# Patient Record
Sex: MALE | Race: BLACK OR AFRICAN AMERICAN | Employment: OTHER | URBAN - METROPOLITAN AREA
[De-identification: names, ages, dates, MRNs, and addresses within clinical notes are randomized per-mention and may not be internally consistent; named-entity substitution may affect disease eponyms.]

---

## 2017-03-10 RX ORDER — LISINOPRIL 5 MG/1
TABLET ORAL
Qty: 30 TAB | Refills: 1 | Status: SHIPPED | OUTPATIENT
Start: 2017-03-10 | End: 2017-05-28 | Stop reason: SDUPTHER

## 2017-07-10 RX ORDER — LISINOPRIL 5 MG/1
TABLET ORAL
Qty: 30 TAB | Refills: 0 | OUTPATIENT
Start: 2017-07-10

## 2017-07-10 NOTE — TELEPHONE ENCOUNTER
Spoke to patient using 2 patient identifiers. Per Janet Wayne NP, refill for lisinopril not approved. Needs to be seen first prior to refill. Will have  to call and schedule an appt.

## 2017-07-13 RX ORDER — LISINOPRIL 5 MG/1
TABLET ORAL
Qty: 30 TAB | Refills: 0 | Status: SHIPPED | OUTPATIENT
Start: 2017-07-13 | End: 2018-06-22 | Stop reason: SDUPTHER

## 2017-07-13 RX ORDER — CARVEDILOL 25 MG/1
25 TABLET ORAL 2 TIMES DAILY WITH MEALS
Qty: 60 TAB | Refills: 0 | Status: SHIPPED | OUTPATIENT
Start: 2017-07-13 | End: 2018-01-16 | Stop reason: ALTCHOICE

## 2017-07-19 ENCOUNTER — TELEPHONE (OUTPATIENT)
Dept: CARDIOLOGY CLINIC | Age: 57
End: 2017-07-19

## 2017-07-19 NOTE — TELEPHONE ENCOUNTER
Jessica Ahmadi and spoke to Giovana Juarez. She said there is a rx for lisinopril and will fill that. Spoke to patient using 2 patient identifiers. Patient was informed that carvedilol has been filled and is ready for  and lisinopril is getting filled now. Patient verbalized understanding.

## 2017-08-01 ENCOUNTER — OFFICE VISIT (OUTPATIENT)
Dept: CARDIOLOGY CLINIC | Age: 57
End: 2017-08-01

## 2017-08-01 DIAGNOSIS — I50.22 CHRONIC SYSTOLIC HEART FAILURE (HCC): ICD-10-CM

## 2017-08-01 DIAGNOSIS — I10 ESSENTIAL HYPERTENSION: Primary | ICD-10-CM

## 2017-08-01 NOTE — PROGRESS NOTES
[address] [place] [address] [address], [place], [address] [facility]     Amy Oliver is a 61 y.o. male. Last seen 1 year ago. Subjective: The patient is a 62 yo male who returns for an annual follow up.      ***  Patient denies any exertional chest pain, dyspnea, palpitations, syncope, orthopnea, edema or paroxysmal nocturnal dyspnea. Patient Active Problem List    Diagnosis Date Noted    Abnormal nuclear stress test 12/07/2015    GERD (gastroesophageal reflux disease)     HTN (hypertension)     Tobacco use disorder     Chronic systolic heart failure (HCC)       Nicole Lowe MD  Past Medical History:   Diagnosis Date    Abnormal nuclear stress test 12/7/2015    Cardiomyopathy ([address] 2010    EF 45%    Chronic systolic heart failure (HCC)     GERD (gastroesophageal reflux disease)     HTN (hypertension)     PAC (premature atrial contraction)     Tobacco use disorder       Past Surgical History:   Procedure Laterality Date    HX COLECTOMY      HX SPLENECTOMY       No Known Allergies   Family History   Problem Relation Age of Onset    Stroke Mother     Heart Disease Paternal Grandfather     Coronary Artery Disease Sister       Social History     Social History    Marital status: SINGLE     Spouse name: N/A    Number of children: N/A    Years of education: N/A     Occupational History    Not on file. Social History Main Topics    Smoking status: Current Some Day Smoker     Packs/day: 0.50     Types: Cigarettes    Smokeless tobacco: Not on file      Comment: 6-7 CIGARETTES A DAY    Alcohol use No    Drug use: Not on file    Sexual activity: Not on file     Other Topics Concern    Not on file     Social History Narrative      Current Outpatient Prescriptions   Medication Sig    lisinopril (PRINIVIL, ZESTRIL) 5 mg tablet TAKE 1 TABLET BY MOUTH DAILY    carvedilol (COREG) 25 mg tablet Take 1 Tab by mouth two (2) times daily (with meals).     esomeprazole (NEXIUM) 40 mg capsule Take 40 mg by mouth daily.  cyclobenzaprine (FLEXERIL) 10 mg tablet Take 10 mg by mouth three (3) times daily as needed for Muscle Spasm(s).  meloxicam (MOBIC) 15 mg tablet Take 15 mg by mouth daily.  Cholecalciferol, Vitamin D3, (VITAMIN D3) 1,000 unit cap Take 1 Cap by mouth as needed. No current facility-administered medications for this visit. Review of Systems  Constitutional: Negative for fever, chills, malaise/fatigue and diaphoresis. Respiratory: Negative for cough, hemoptysis, sputum production, shortness of breath and wheezing. Cardiovascular: Negative for chest pain, palpitations, orthopnea, claudication, leg swelling and PND. Gastrointestinal: Negative for heartburn, nausea, vomiting, blood in stool and melena. Genitourinary: Negative for dysuria and flank pain. Musculoskeletal: Negative for joint pain and back pain. Skin: Negative for rash. Neurological: Negative for focal weakness, seizures, loss of consciousness, weakness and headaches. Endo/Heme/Allergies: Does not bruise/bleed easily. Psychiatric/Behavioral: Negative for memory loss. The patient does not have insomnia. Physical Exam:    Visit Vitals    Ht 5' 11\" (1.803 m)     Wt Readings from Last 3 Encounters:   05/20/16 169 lb 1 oz (76.7 kg)   12/23/15 169 lb 14.4 oz (77.1 kg)   12/07/15 172 lb (78 kg)       Gen: NAD    Mental Status - Alert. General Appearance - Not in acute distress. Neck - no JVD    Chest and Lung Exam   Inspection: Accessory muscles - No use of accessory muscles in breathing. Auscultation:   Breath sounds: - Normal.     Cardiovascular   Inspection: Jugular vein - Bilateral - Inspection Normal.   Palpation/Percussion:   Apical Impulse: - Normal.   Auscultation: Rhythm - Regular. Heart Sounds - S1 WNL and S2 WNL. No S3 or S4. Murmurs & Other Heart Sounds: Auscultation of the heart reveals - No Murmurs.      Peripheral Vascular   Upper Extremity: Inspection - Bilateral - No Cyanotic nailbeds or Digital clubbing. Lower Extremity:   Palpation: Edema - Bilateral - No edema. Abdomen: Soft, non-tender, bowel sounds are active. Neuro: A&O times 3, CN and motor grossly WNL    Cardiographics  ***     Assessment:     Encounter Diagnoses   Name Primary?     Essential hypertension Yes    Chronic systolic heart failure ([address]         Plan:     ***    Written by Kasia Bangura, as dictated by Pamela Salter MD.

## 2018-01-16 ENCOUNTER — OFFICE VISIT (OUTPATIENT)
Dept: CARDIOLOGY CLINIC | Age: 58
End: 2018-01-16

## 2018-01-16 VITALS
OXYGEN SATURATION: 96 % | HEART RATE: 56 BPM | RESPIRATION RATE: 16 BRPM | BODY MASS INDEX: 25.81 KG/M2 | HEIGHT: 71 IN | WEIGHT: 184.4 LBS | DIASTOLIC BLOOD PRESSURE: 96 MMHG | SYSTOLIC BLOOD PRESSURE: 138 MMHG

## 2018-01-16 DIAGNOSIS — I50.22 CHRONIC SYSTOLIC HEART FAILURE (HCC): ICD-10-CM

## 2018-01-16 DIAGNOSIS — I10 ESSENTIAL HYPERTENSION: Primary | ICD-10-CM

## 2018-01-16 RX ORDER — METOPROLOL SUCCINATE 25 MG/1
25 TABLET, EXTENDED RELEASE ORAL
Qty: 30 TAB | Refills: 5 | Status: SHIPPED | OUTPATIENT
Start: 2018-01-16 | End: 2020-01-01 | Stop reason: SDUPTHER

## 2018-01-16 NOTE — PATIENT INSTRUCTIONS
Stop Carvedilol. Start Toprol XL 25 mg every bedtime. Monitor your blood pressure at home. Call back in 1 week to inform us of your BP readings.

## 2018-01-16 NOTE — PROGRESS NOTES
[address] [place] [address] [address], [place], [address]     Sebas Hart is a 61 y.o. male. Last seen 1.5 years ago. Subjective:     Mr. Maira Carolina been has intolerant Coreg BID secondary to fatigue and has only been taking it once daily. He reports chest pressure and fatigue after climbing stairs. He states he can run up 10 flights of stairs but will feel like he is going to collapse once he stops. He quit smoking 1.5 years ago, had been smoking 1 ppd. His weight has increased about 20 lbs since last visit. Patient denies any dyspnea, palpitations, syncope, orthopnea, edema or paroxysmal nocturnal dyspnea. He enjoys playing [employer] in his free time. Patient Active Problem List    Diagnosis Date Noted    Abnormal nuclear stress test 12/07/2015    GERD (gastroesophageal reflux disease)     HTN (hypertension)     Tobacco use disorder     [facility]       Mejia Dorsey NP  Past Medical History:   Diagnosis Date    Abnormal nuclear stress test 12/7/2015    [facility]omy[facility] ([facility] 2010    EF 45%    Chronic systolic heart failure (HCC)     GERD (gastroesophageal reflux disease)     HTN (hypertension)     PAC (premature atrial contraction)     Tobacco use disorder       Past Surgical History:   Procedure Laterality Date    HX COLECTOMY      HX SPLENECTOMY       No Known Allergies   Family History   Problem Relation Age of Onset    Stroke Mother     Heart Disease Paternal Grandfather     Coronary Artery Disease Sister       Social History     Social History    Marital status: SINGLE     Spouse name: N/A    Number of children: N/A    Years of education: N/A     Occupational History    Not on file.      Social History Main Topics    Smoking status: Former Smoker     Packs/day: 0.50     Types: Cigarettes    Smokeless tobacco: Never Used      Comment: quit a year and half ago    Alcohol use No      Comment: rarely    Drug use: No    Sexual activity: Not on file     Other Topics Concern    Not on file     Social History Narrative      Current Outpatient Prescriptions   Medication Sig    metoprolol succinate (TOPROL-XL) 25 mg XL tablet Take 1 Tab by mouth nightly.  lisinopril (PRINIVIL, ZESTRIL) 5 mg tablet TAKE 1 TABLET BY MOUTH DAILY    esomeprazole (NEXIUM) 40 mg capsule Take 40 mg by mouth daily.  cyclobenzaprine (FLEXERIL) 10 mg tablet Take 10 mg by mouth three (3) times daily as needed for Muscle Spasm(s).  meloxicam (MOBIC) 15 mg tablet Take 15 mg by mouth daily.  Cholecalciferol, Vitamin D3, (VITAMIN D3) 1,000 unit cap Take 1 Cap by mouth as needed. No current facility-administered medications for this visit. Review of Systems  Constitutional: Negative for fever, chills and diaphoresis. +fatigue  Respiratory: Negative for cough, hemoptysis, sputum production, shortness of breath and wheezing. Cardiovascular: Negative for palpitations, orthopnea, claudication, leg swelling and PND. +exertional chest pressure  Gastrointestinal: Negative for heartburn, nausea, vomiting, blood in stool and melena. Genitourinary: Negative for dysuria and flank pain. Musculoskeletal: Negative for joint pain and back pain. Skin: Negative for rash. Neurological: Negative for focal weakness, seizures, loss of consciousness, weakness and headaches. Endo/Heme/Allergies: Does not bruise/bleed easily. Psychiatric/Behavioral: Negative for memory loss. The patient does not have insomnia. Physical Exam:    Visit Vitals    BP (!) 138/96 (BP 1 Location: Right arm, BP Patient Position: Sitting)    Pulse (!) 56    Resp 16    Ht 5' 11\" (1.803 m)    Wt 184 lb 6.4 oz (83.6 kg)    SpO2 96%    BMI 25.72 kg/m2     Wt Readings from Last 3 Encounters:   01/16/18 184 lb 6.4 oz (83.6 kg)   05/20/16 169 lb 1 oz (76.7 kg)   12/23/15 169 lb 14.4 oz (77.1 kg)       Gen: NAD    Mental Status - Alert. General Appearance - Not in acute distress.      Neck - no JVD    Chest and Lung Exam Inspection: Accessory muscles - No use of accessory muscles in breathing. Auscultation:   Breath sounds: - Normal.     Cardiovascular   Inspection: Jugular vein - Bilateral - Inspection Normal.   Palpation/Percussion:   Apical Impulse: - Normal.   Auscultation: Rhythm - Regular. Heart Sounds - S1 WNL and S2 WNL. No S3 or S4. Murmurs & Other Heart Sounds: Auscultation of the heart reveals - No Murmurs. Peripheral Vascular   Upper Extremity: Inspection - Bilateral - No Cyanotic nailbeds or Digital clubbing. Lower Extremity:   Palpation: Edema - Bilateral - No edema. Abdomen: Soft, non-tender, bowel sounds are active. Neuro: A&O times 3, CN and motor grossly WNL    Cardiographics  EKG 1/16/18- SB 44      Assessment:     Encounter Diagnoses   Name Primary?  Essential hypertension Yes    Chronic systolic heart failure (HCC)           Plan:     HR is 44 bpm on EKG. He states he has been unable to tolerate Coreg BID secondary to fatigue and has only been taking it once daily. He also reports fatigue on Coreg as well as exertional fatigue and chest pressure after climbing stairs. Will discontinue Coreg and have him start Toprol XL 25 mg once daily at bedtime. He will monitor his BP at home and call back with his results in 1 week. Follow up in 6 months.      Written by Bronson Chavez, as dictated by Fernanda Grant MD.

## 2018-01-16 NOTE — PROGRESS NOTES
1. Have you been to the ER, urgent care clinic since your last visit? Hospitalized since your last visit? No.    2. Have you seen or consulted any other health care providers outside of the [address] [place] [address] since your last visit? Include any pap smears or colon screening. Yes has seen gastro.       Chief Complaint   Patient presents with    Annual Exam     heart palps,soboe-PT IS ONLY TAKING 1 TAB OF COREG DUE TO FATIGUE-WANTS TO DISCUSS

## 2018-06-22 RX ORDER — LISINOPRIL 5 MG/1
TABLET ORAL
Qty: 30 TAB | Refills: 0 | Status: SHIPPED | COMMUNITY
Start: 2018-06-22 | End: 2019-10-23 | Stop reason: SDUPTHER

## 2019-01-01 ENCOUNTER — APPOINTMENT (OUTPATIENT)
Dept: INFUSION THERAPY | Age: 59
End: 2019-01-01
Payer: OTHER GOVERNMENT

## 2019-01-01 ENCOUNTER — HOSPITAL ENCOUNTER (OUTPATIENT)
Dept: INFUSION THERAPY | Age: 59
Discharge: HOME | End: 2019-12-02
Payer: OTHER GOVERNMENT

## 2019-01-01 ENCOUNTER — HOSPITAL ENCOUNTER (OUTPATIENT)
Dept: CT IMAGING | Age: 59
Discharge: HOME | End: 2019-12-16
Attending: NURSE PRACTITIONER
Payer: OTHER GOVERNMENT

## 2019-01-01 ENCOUNTER — TELEPHONE (OUTPATIENT)
Dept: ONCOLOGY | Age: 59
End: 2019-01-01

## 2019-01-01 ENCOUNTER — DOCUMENTATION ONLY (OUTPATIENT)
Dept: ONCOLOGY | Age: 59
End: 2019-01-01

## 2019-01-01 ENCOUNTER — TELEPHONE (OUTPATIENT)
Dept: PALLATIVE CARE | Age: 59
End: 2019-01-01

## 2019-01-01 ENCOUNTER — OFFICE VISIT (OUTPATIENT)
Dept: ONCOLOGY | Age: 59
End: 2019-01-01

## 2019-01-01 ENCOUNTER — HOSPITAL ENCOUNTER (OUTPATIENT)
Dept: INFUSION THERAPY | Age: 59
Discharge: HOME | End: 2019-12-20
Payer: OTHER GOVERNMENT

## 2019-01-01 VITALS
DIASTOLIC BLOOD PRESSURE: 62 MMHG | HEIGHT: 71 IN | TEMPERATURE: 98.2 F | RESPIRATION RATE: 16 BRPM | WEIGHT: 157 LBS | BODY MASS INDEX: 21.98 KG/M2 | HEART RATE: 78 BPM | SYSTOLIC BLOOD PRESSURE: 117 MMHG | OXYGEN SATURATION: 97 %

## 2019-01-01 VITALS
OXYGEN SATURATION: 97 % | DIASTOLIC BLOOD PRESSURE: 61 MMHG | HEART RATE: 90 BPM | BODY MASS INDEX: 22.15 KG/M2 | TEMPERATURE: 98.4 F | RESPIRATION RATE: 16 BRPM | WEIGHT: 158.8 LBS | SYSTOLIC BLOOD PRESSURE: 117 MMHG

## 2019-01-01 VITALS
HEART RATE: 83 BPM | DIASTOLIC BLOOD PRESSURE: 86 MMHG | TEMPERATURE: 98.2 F | BODY MASS INDEX: 21.98 KG/M2 | WEIGHT: 157 LBS | HEIGHT: 71 IN | OXYGEN SATURATION: 92 % | RESPIRATION RATE: 16 BRPM | SYSTOLIC BLOOD PRESSURE: 126 MMHG

## 2019-01-01 VITALS
TEMPERATURE: 97.9 F | DIASTOLIC BLOOD PRESSURE: 80 MMHG | SYSTOLIC BLOOD PRESSURE: 119 MMHG | WEIGHT: 158 LBS | HEIGHT: 71 IN | OXYGEN SATURATION: 97 % | RESPIRATION RATE: 16 BRPM | BODY MASS INDEX: 22.12 KG/M2 | HEART RATE: 95 BPM

## 2019-01-01 DIAGNOSIS — Z51.11 ENCOUNTER FOR ANTINEOPLASTIC CHEMOTHERAPY AND IMMUNOTHERAPY: ICD-10-CM

## 2019-01-01 DIAGNOSIS — C80.1 ADENOCARCINOMA OF UNKNOWN ORIGIN (HCC): ICD-10-CM

## 2019-01-01 DIAGNOSIS — G62.0 NEUROPATHY DUE TO CHEMOTHERAPEUTIC DRUG (HCC): ICD-10-CM

## 2019-01-01 DIAGNOSIS — T45.1X5A NEUROPATHY DUE TO CHEMOTHERAPEUTIC DRUG (HCC): ICD-10-CM

## 2019-01-01 DIAGNOSIS — F51.04 PSYCHOPHYSIOLOGICAL INSOMNIA: ICD-10-CM

## 2019-01-01 DIAGNOSIS — G89.3 ACUTE NEOPLASM-RELATED PAIN: ICD-10-CM

## 2019-01-01 DIAGNOSIS — F41.9 ANXIETY: ICD-10-CM

## 2019-01-01 DIAGNOSIS — C80.1 ADENOCARCINOMA OF UNKNOWN PRIMARY (HCC): ICD-10-CM

## 2019-01-01 DIAGNOSIS — C80.1 ADENOCARCINOMA OF UNKNOWN PRIMARY (HCC): Primary | ICD-10-CM

## 2019-01-01 DIAGNOSIS — F32.89 OTHER DEPRESSION: ICD-10-CM

## 2019-01-01 DIAGNOSIS — Z51.11 CHEMOTHERAPY MANAGEMENT, ENCOUNTER FOR: ICD-10-CM

## 2019-01-01 DIAGNOSIS — K59.03 DRUG-INDUCED CONSTIPATION: ICD-10-CM

## 2019-01-01 DIAGNOSIS — Z51.12 ENCOUNTER FOR ANTINEOPLASTIC CHEMOTHERAPY AND IMMUNOTHERAPY: ICD-10-CM

## 2019-01-01 DIAGNOSIS — G47.09 OTHER INSOMNIA: ICD-10-CM

## 2019-01-01 DIAGNOSIS — F41.9 ANXIETY: Primary | ICD-10-CM

## 2019-01-01 DIAGNOSIS — C34.90 NON-SMALL CELL LUNG CANCER, UNSPECIFIED LATERALITY (HCC): Primary | ICD-10-CM

## 2019-01-01 DIAGNOSIS — D70.1 CHEMOTHERAPY-INDUCED NEUTROPENIA (HCC): Primary | ICD-10-CM

## 2019-01-01 DIAGNOSIS — T45.1X5A CHEMOTHERAPY-INDUCED NEUTROPENIA (HCC): Primary | ICD-10-CM

## 2019-01-01 LAB
ALBUMIN SERPL-MCNC: 3.3 G/DL (ref 3.5–5)
ALBUMIN SERPL-MCNC: 3.5 G/DL (ref 3.5–5)
ALBUMIN/GLOB SERPL: 0.8 {RATIO} (ref 1.1–2.2)
ALBUMIN/GLOB SERPL: 0.9 {RATIO} (ref 1.1–2.2)
ALP SERPL-CCNC: 78 U/L (ref 45–117)
ALP SERPL-CCNC: 93 U/L (ref 45–117)
ALT SERPL-CCNC: 18 U/L (ref 12–78)
ALT SERPL-CCNC: 18 U/L (ref 12–78)
ANION GAP SERPL CALC-SCNC: 5 MMOL/L (ref 5–15)
ANION GAP SERPL CALC-SCNC: 6 MMOL/L (ref 5–15)
AST SERPL-CCNC: 11 U/L (ref 15–37)
AST SERPL-CCNC: 11 U/L (ref 15–37)
BASOPHILS # BLD: 0 K/UL (ref 0–0.1)
BASOPHILS # BLD: 0 K/UL (ref 0–0.1)
BASOPHILS NFR BLD: 0 % (ref 0–1)
BASOPHILS NFR BLD: 1 % (ref 0–1)
BILIRUB SERPL-MCNC: 0.2 MG/DL (ref 0.2–1)
BILIRUB SERPL-MCNC: 0.3 MG/DL (ref 0.2–1)
BUN SERPL-MCNC: 11 MG/DL (ref 6–20)
BUN SERPL-MCNC: 13 MG/DL (ref 6–20)
BUN/CREAT SERPL: 13 (ref 12–20)
BUN/CREAT SERPL: 15 (ref 12–20)
CALCIUM SERPL-MCNC: 8.9 MG/DL (ref 8.5–10.1)
CALCIUM SERPL-MCNC: 9.3 MG/DL (ref 8.5–10.1)
CHLORIDE SERPL-SCNC: 106 MMOL/L (ref 97–108)
CHLORIDE SERPL-SCNC: 108 MMOL/L (ref 97–108)
CO2 SERPL-SCNC: 26 MMOL/L (ref 21–32)
CO2 SERPL-SCNC: 27 MMOL/L (ref 21–32)
CREAT SERPL-MCNC: 0.87 MG/DL (ref 0.7–1.3)
CREAT SERPL-MCNC: 0.87 MG/DL (ref 0.7–1.3)
DIFFERENTIAL METHOD BLD: ABNORMAL
DIFFERENTIAL METHOD BLD: ABNORMAL
EOSINOPHIL # BLD: 0 K/UL (ref 0–0.4)
EOSINOPHIL # BLD: 0 K/UL (ref 0–0.4)
EOSINOPHIL NFR BLD: 0 % (ref 0–7)
EOSINOPHIL NFR BLD: 0 % (ref 0–7)
ERYTHROCYTE [DISTWIDTH] IN BLOOD BY AUTOMATED COUNT: 17.8 % (ref 11.5–14.5)
ERYTHROCYTE [DISTWIDTH] IN BLOOD BY AUTOMATED COUNT: 18.7 % (ref 11.5–14.5)
GLOBULIN SER CALC-MCNC: 3.7 G/DL (ref 2–4)
GLOBULIN SER CALC-MCNC: 4 G/DL (ref 2–4)
GLUCOSE SERPL-MCNC: 106 MG/DL (ref 65–100)
GLUCOSE SERPL-MCNC: 134 MG/DL (ref 65–100)
HCT VFR BLD AUTO: 32.8 % (ref 36.6–50.3)
HCT VFR BLD AUTO: 35.4 % (ref 36.6–50.3)
HGB BLD-MCNC: 10.9 G/DL (ref 12.1–17)
HGB BLD-MCNC: 11.7 G/DL (ref 12.1–17)
IMM GRANULOCYTES # BLD AUTO: 0 K/UL (ref 0–0.04)
IMM GRANULOCYTES # BLD AUTO: 0 K/UL (ref 0–0.04)
IMM GRANULOCYTES NFR BLD AUTO: 0 % (ref 0–0.5)
IMM GRANULOCYTES NFR BLD AUTO: 0 % (ref 0–0.5)
LYMPHOCYTES # BLD: 2.5 K/UL (ref 0.8–3.5)
LYMPHOCYTES # BLD: 2.7 K/UL (ref 0.8–3.5)
LYMPHOCYTES NFR BLD: 43 % (ref 12–49)
LYMPHOCYTES NFR BLD: 50 % (ref 12–49)
MCH RBC QN AUTO: 31.7 PG (ref 26–34)
MCH RBC QN AUTO: 31.8 PG (ref 26–34)
MCHC RBC AUTO-ENTMCNC: 33.1 G/DL (ref 30–36.5)
MCHC RBC AUTO-ENTMCNC: 33.2 G/DL (ref 30–36.5)
MCV RBC AUTO: 95.3 FL (ref 80–99)
MCV RBC AUTO: 96.2 FL (ref 80–99)
MONOCYTES # BLD: 0.9 K/UL (ref 0–1)
MONOCYTES # BLD: 1.2 K/UL (ref 0–1)
MONOCYTES NFR BLD: 16 % (ref 5–13)
MONOCYTES NFR BLD: 22 % (ref 5–13)
NEUTS SEG # BLD: 1.5 K/UL (ref 1.8–8)
NEUTS SEG # BLD: 2.3 K/UL (ref 1.8–8)
NEUTS SEG NFR BLD: 28 % (ref 32–75)
NEUTS SEG NFR BLD: 39 % (ref 32–75)
NRBC # BLD: 0 K/UL (ref 0–0.01)
NRBC # BLD: 0 K/UL (ref 0–0.01)
NRBC BLD-RTO: 0 PER 100 WBC
NRBC BLD-RTO: 0 PER 100 WBC
PLATELET # BLD AUTO: 153 K/UL (ref 150–400)
PLATELET # BLD AUTO: 189 K/UL (ref 150–400)
PMV BLD AUTO: 11 FL (ref 8.9–12.9)
PMV BLD AUTO: 11.8 FL (ref 8.9–12.9)
POTASSIUM SERPL-SCNC: 4 MMOL/L (ref 3.5–5.1)
POTASSIUM SERPL-SCNC: 4 MMOL/L (ref 3.5–5.1)
PROT SERPL-MCNC: 7.2 G/DL (ref 6.4–8.2)
PROT SERPL-MCNC: 7.3 G/DL (ref 6.4–8.2)
RBC # BLD AUTO: 3.44 M/UL (ref 4.1–5.7)
RBC # BLD AUTO: 3.68 M/UL (ref 4.1–5.7)
RBC MORPH BLD: ABNORMAL
SODIUM SERPL-SCNC: 138 MMOL/L (ref 136–145)
SODIUM SERPL-SCNC: 140 MMOL/L (ref 136–145)
TSH SERPL DL<=0.05 MIU/L-ACNC: 2.2 UIU/ML (ref 0.36–3.74)
WBC # BLD AUTO: 5.4 K/UL (ref 4.1–11.1)
WBC # BLD AUTO: 5.8 K/UL (ref 4.1–11.1)

## 2019-01-01 PROCEDURE — 74011000258 HC RX REV CODE- 258: Performed by: INTERNAL MEDICINE

## 2019-01-01 PROCEDURE — 74011250636 HC RX REV CODE- 250/636: Performed by: INTERNAL MEDICINE

## 2019-01-01 PROCEDURE — 96413 CHEMO IV INFUSION 1 HR: CPT

## 2019-01-01 PROCEDURE — 84443 ASSAY THYROID STIM HORMONE: CPT

## 2019-01-01 PROCEDURE — 74011250636 HC RX REV CODE- 250/636: Performed by: NURSE PRACTITIONER

## 2019-01-01 PROCEDURE — 96377 APPLICATON ON-BODY INJECTOR: CPT

## 2019-01-01 PROCEDURE — 80053 COMPREHEN METABOLIC PANEL: CPT

## 2019-01-01 PROCEDURE — 36415 COLL VENOUS BLD VENIPUNCTURE: CPT

## 2019-01-01 PROCEDURE — 74011000250 HC RX REV CODE- 250: Performed by: INTERNAL MEDICINE

## 2019-01-01 PROCEDURE — 96417 CHEMO IV INFUS EACH ADDL SEQ: CPT

## 2019-01-01 PROCEDURE — 74011000255 HC RX REV CODE- 255: Performed by: NURSE PRACTITIONER

## 2019-01-01 PROCEDURE — 77030012965 HC NDL HUBR BBMI -A

## 2019-01-01 PROCEDURE — 74011636320 HC RX REV CODE- 636/320: Performed by: NURSE PRACTITIONER

## 2019-01-01 PROCEDURE — 96415 CHEMO IV INFUSION ADDL HR: CPT

## 2019-01-01 PROCEDURE — 74177 CT ABD & PELVIS W/CONTRAST: CPT

## 2019-01-01 PROCEDURE — 74011000258 HC RX REV CODE- 258: Performed by: NURSE PRACTITIONER

## 2019-01-01 PROCEDURE — 74011000250 HC RX REV CODE- 250: Performed by: NURSE PRACTITIONER

## 2019-01-01 PROCEDURE — 85025 COMPLETE CBC W/AUTO DIFF WBC: CPT

## 2019-01-01 PROCEDURE — 96375 TX/PRO/DX INJ NEW DRUG ADDON: CPT

## 2019-01-01 PROCEDURE — 96367 TX/PROPH/DG ADDL SEQ IV INF: CPT

## 2019-01-01 RX ORDER — SODIUM CHLORIDE 9 MG/ML
10 INJECTION INTRAMUSCULAR; INTRAVENOUS; SUBCUTANEOUS AS NEEDED
Status: ACTIVE | OUTPATIENT
Start: 2019-01-01 | End: 2019-01-01

## 2019-01-01 RX ORDER — HEPARIN 100 UNIT/ML
300-500 SYRINGE INTRAVENOUS AS NEEDED
Status: CANCELLED
Start: 2019-01-01

## 2019-01-01 RX ORDER — SODIUM CHLORIDE 9 MG/ML
25 INJECTION, SOLUTION INTRAVENOUS CONTINUOUS
Status: DISPENSED | OUTPATIENT
Start: 2019-01-01 | End: 2019-01-01

## 2019-01-01 RX ORDER — BARIUM SULFATE 20 MG/ML
900 SUSPENSION ORAL
Status: COMPLETED | OUTPATIENT
Start: 2019-01-01 | End: 2019-01-01

## 2019-01-01 RX ORDER — HEPARIN 100 UNIT/ML
300-500 SYRINGE INTRAVENOUS AS NEEDED
Status: ACTIVE | OUTPATIENT
Start: 2019-01-01 | End: 2019-01-01

## 2019-01-01 RX ORDER — SODIUM CHLORIDE 0.9 % (FLUSH) 0.9 %
10 SYRINGE (ML) INJECTION AS NEEDED
Status: ACTIVE | OUTPATIENT
Start: 2019-01-01 | End: 2019-01-01

## 2019-01-01 RX ORDER — ALBUTEROL SULFATE 0.83 MG/ML
2.5 SOLUTION RESPIRATORY (INHALATION) AS NEEDED
Status: CANCELLED
Start: 2019-01-01

## 2019-01-01 RX ORDER — SODIUM CHLORIDE 9 MG/ML
10 INJECTION INTRAMUSCULAR; INTRAVENOUS; SUBCUTANEOUS AS NEEDED
Status: CANCELLED | OUTPATIENT
Start: 2019-01-01

## 2019-01-01 RX ORDER — ZOLPIDEM TARTRATE 5 MG/1
5 TABLET ORAL
Qty: 30 TAB | Refills: 0 | Status: SHIPPED | OUTPATIENT
Start: 2019-01-01 | End: 2019-01-01 | Stop reason: ALTCHOICE

## 2019-01-01 RX ORDER — AMOXICILLIN 250 MG
1 CAPSULE ORAL
Qty: 30 TAB | Refills: 3 | Status: SHIPPED | OUTPATIENT
Start: 2019-01-01 | End: 2020-01-01 | Stop reason: SDUPTHER

## 2019-01-01 RX ORDER — OXYCODONE HCL 10 MG/1
10 TABLET, FILM COATED, EXTENDED RELEASE ORAL EVERY 12 HOURS
Qty: 60 TAB | Refills: 0 | Status: SHIPPED | OUTPATIENT
Start: 2019-01-01 | End: 2020-01-01 | Stop reason: SDUPTHER

## 2019-01-01 RX ORDER — SODIUM CHLORIDE 0.9 % (FLUSH) 0.9 %
10 SYRINGE (ML) INJECTION
Status: COMPLETED | OUTPATIENT
Start: 2019-01-01 | End: 2019-01-01

## 2019-01-01 RX ORDER — SODIUM CHLORIDE 0.9 % (FLUSH) 0.9 %
5-10 SYRINGE (ML) INJECTION AS NEEDED
Status: CANCELLED | OUTPATIENT
Start: 2019-01-01

## 2019-01-01 RX ORDER — EPINEPHRINE 1 MG/ML
0.3 INJECTION, SOLUTION, CONCENTRATE INTRAVENOUS AS NEEDED
Status: CANCELLED | OUTPATIENT
Start: 2019-01-01

## 2019-01-01 RX ORDER — OXYCODONE HYDROCHLORIDE 10 MG/1
10 TABLET ORAL
Qty: 42 TAB | Refills: 0 | Status: SHIPPED | OUTPATIENT
Start: 2019-01-01 | End: 2019-01-01 | Stop reason: SDUPTHER

## 2019-01-01 RX ORDER — SODIUM CHLORIDE 9 MG/ML
25 INJECTION, SOLUTION INTRAVENOUS CONTINUOUS
Status: CANCELLED | OUTPATIENT
Start: 2019-01-01

## 2019-01-01 RX ORDER — SODIUM CHLORIDE 0.9 % (FLUSH) 0.9 %
10 SYRINGE (ML) INJECTION AS NEEDED
Status: CANCELLED
Start: 2019-01-01

## 2019-01-01 RX ORDER — ACETAMINOPHEN 325 MG/1
650 TABLET ORAL AS NEEDED
Status: CANCELLED
Start: 2019-01-01

## 2019-01-01 RX ORDER — PALONOSETRON 0.05 MG/ML
0.25 INJECTION, SOLUTION INTRAVENOUS ONCE
Status: COMPLETED | OUTPATIENT
Start: 2019-01-01 | End: 2019-01-01

## 2019-01-01 RX ORDER — DIPHENHYDRAMINE HYDROCHLORIDE 50 MG/ML
50 INJECTION, SOLUTION INTRAMUSCULAR; INTRAVENOUS ONCE
Status: COMPLETED | OUTPATIENT
Start: 2019-01-01 | End: 2019-01-01

## 2019-01-01 RX ORDER — HYDROCORTISONE SODIUM SUCCINATE 100 MG/2ML
100 INJECTION, POWDER, FOR SOLUTION INTRAMUSCULAR; INTRAVENOUS AS NEEDED
Status: CANCELLED | OUTPATIENT
Start: 2019-01-01

## 2019-01-01 RX ORDER — LORAZEPAM 1 MG/1
1 TABLET ORAL
Qty: 30 TAB | Refills: 0 | Status: SHIPPED | OUTPATIENT
Start: 2019-01-01 | End: 2020-01-01 | Stop reason: SDUPTHER

## 2019-01-01 RX ORDER — ONDANSETRON 2 MG/ML
8 INJECTION INTRAMUSCULAR; INTRAVENOUS AS NEEDED
Status: CANCELLED | OUTPATIENT
Start: 2019-01-01

## 2019-01-01 RX ORDER — OXYCODONE HYDROCHLORIDE 10 MG/1
10 TABLET ORAL
Qty: 56 TAB | Refills: 0 | Status: SHIPPED | OUTPATIENT
Start: 2019-01-01 | End: 2019-01-01

## 2019-01-01 RX ORDER — HEPARIN 100 UNIT/ML
500 SYRINGE INTRAVENOUS AS NEEDED
Status: CANCELLED | OUTPATIENT
Start: 2019-01-01

## 2019-01-01 RX ORDER — DIPHENHYDRAMINE HYDROCHLORIDE 50 MG/ML
50 INJECTION, SOLUTION INTRAMUSCULAR; INTRAVENOUS AS NEEDED
Status: CANCELLED
Start: 2019-01-01

## 2019-01-01 RX ADMIN — PEGFILGRASTIM 6 MG: KIT SUBCUTANEOUS at 16:53

## 2019-01-01 RX ADMIN — SODIUM CHLORIDE 25 ML/HR: 900 INJECTION, SOLUTION INTRAVENOUS at 12:33

## 2019-01-01 RX ADMIN — Medication 10 ML: at 09:37

## 2019-01-01 RX ADMIN — Medication 10 ML: at 09:48

## 2019-01-01 RX ADMIN — Medication 500 UNITS: at 13:15

## 2019-01-01 RX ADMIN — BARIUM SULFATE 900 ML: 20 SUSPENSION ORAL at 09:36

## 2019-01-01 RX ADMIN — SODIUM CHLORIDE 25 ML/HR: 900 INJECTION, SOLUTION INTRAVENOUS at 11:42

## 2019-01-01 RX ADMIN — SODIUM CHLORIDE 200 MG: 9 INJECTION, SOLUTION INTRAVENOUS at 12:36

## 2019-01-01 RX ADMIN — Medication 10 ML: at 16:48

## 2019-01-01 RX ADMIN — FAMOTIDINE 20 MG: 10 INJECTION, SOLUTION INTRAVENOUS at 11:59

## 2019-01-01 RX ADMIN — SODIUM CHLORIDE 10 ML: 9 INJECTION INTRAMUSCULAR; INTRAVENOUS; SUBCUTANEOUS at 09:48

## 2019-01-01 RX ADMIN — Medication 10 ML: at 13:15

## 2019-01-01 RX ADMIN — Medication 10 ML: at 10:23

## 2019-01-01 RX ADMIN — DEXAMETHASONE SODIUM PHOSPHATE 12 MG: 4 INJECTION, SOLUTION INTRA-ARTICULAR; INTRALESIONAL; INTRAMUSCULAR; INTRAVENOUS; SOFT TISSUE at 12:10

## 2019-01-01 RX ADMIN — PACLITAXEL 324 MG: 6 INJECTION, SOLUTION INTRAVENOUS at 13:05

## 2019-01-01 RX ADMIN — Medication 500 UNITS: at 16:48

## 2019-01-01 RX ADMIN — SODIUM CHLORIDE 10 ML: 9 INJECTION, SOLUTION INTRAMUSCULAR; INTRAVENOUS; SUBCUTANEOUS at 10:23

## 2019-01-01 RX ADMIN — DIPHENHYDRAMINE HYDROCHLORIDE 50 MG: 50 INJECTION INTRAMUSCULAR; INTRAVENOUS at 12:02

## 2019-01-01 RX ADMIN — SODIUM CHLORIDE 150 MG: 900 INJECTION, SOLUTION INTRAVENOUS at 12:05

## 2019-01-01 RX ADMIN — IOPAMIDOL 100 ML: 755 INJECTION, SOLUTION INTRAVENOUS at 09:36

## 2019-01-01 RX ADMIN — CARBOPLATIN 580 MG: 10 INJECTION INTRAVENOUS at 16:15

## 2019-01-01 RX ADMIN — PALONOSETRON 0.25 MG: 0.25 INJECTION, SOLUTION INTRAVENOUS at 11:56

## 2019-04-22 ENCOUNTER — APPOINTMENT (OUTPATIENT)
Dept: GENERAL RADIOLOGY | Age: 59
End: 2019-04-22
Attending: EMERGENCY MEDICINE
Payer: OTHER GOVERNMENT

## 2019-04-22 ENCOUNTER — HOSPITAL ENCOUNTER (EMERGENCY)
Age: 59
Discharge: HOME | End: 2019-04-22
Attending: EMERGENCY MEDICINE | Admitting: EMERGENCY MEDICINE
Payer: OTHER GOVERNMENT

## 2019-04-22 VITALS
HEIGHT: 71 IN | SYSTOLIC BLOOD PRESSURE: 156 MMHG | HEART RATE: 66 BPM | WEIGHT: 186 LBS | RESPIRATION RATE: 20 BRPM | TEMPERATURE: 98 F | DIASTOLIC BLOOD PRESSURE: 91 MMHG | OXYGEN SATURATION: 97 % | BODY MASS INDEX: 26.04 KG/M2

## 2019-04-22 DIAGNOSIS — R91.1 LUNG NODULE: ICD-10-CM

## 2019-04-22 DIAGNOSIS — S43.401A SPRAIN OF RIGHT SHOULDER, UNSPECIFIED SHOULDER SPRAIN TYPE, INITIAL ENCOUNTER: Primary | ICD-10-CM

## 2019-04-22 PROCEDURE — 74011250636 HC RX REV CODE- 250/636: Performed by: PHYSICIAN ASSISTANT

## 2019-04-22 PROCEDURE — 99282 EMERGENCY DEPT VISIT SF MDM: CPT

## 2019-04-22 PROCEDURE — 73030 X-RAY EXAM OF SHOULDER: CPT

## 2019-04-22 PROCEDURE — 96372 THER/PROPH/DIAG INJ SC/IM: CPT

## 2019-04-22 RX ORDER — MELOXICAM 15 MG/1
15 TABLET ORAL DAILY
Qty: 20 TAB | Refills: 0 | Status: SHIPPED | OUTPATIENT
Start: 2019-04-22 | End: 2019-06-04

## 2019-04-22 RX ORDER — CYCLOBENZAPRINE HCL 10 MG
10 TABLET ORAL
Qty: 10 TAB | Refills: 0 | Status: SHIPPED | OUTPATIENT
Start: 2019-04-22 | End: 2019-06-04

## 2019-04-22 RX ORDER — KETOROLAC TROMETHAMINE 30 MG/ML
30 INJECTION, SOLUTION INTRAMUSCULAR; INTRAVENOUS
Status: COMPLETED | OUTPATIENT
Start: 2019-04-22 | End: 2019-04-22

## 2019-04-22 RX ADMIN — KETOROLAC TROMETHAMINE 30 MG: 30 INJECTION, SOLUTION INTRAMUSCULAR; INTRAVENOUS at 10:44

## 2019-04-22 NOTE — ED TRIAGE NOTES
States pain to right shoulder x 2 weeks from \" I was swinging some kids around\". States went to PCP, given Lidocaine patches without relief.

## 2019-04-22 NOTE — DISCHARGE INSTRUCTIONS
Patient Education        Shoulder Pain: Care Instructions  Your Care Instructions    You can hurt your shoulder by using it too much during an activity, such as fishing or baseball. It can also happen as part of the everyday wear and tear of getting older. Shoulder injuries can be slow to heal, but your shoulder should get better with time. Your doctor may recommend a sling to rest your shoulder. If you have injured your shoulder, you may need testing and treatment. Follow-up care is a key part of your treatment and safety. Be sure to make and go to all appointments, and call your doctor if you are having problems. It's also a good idea to know your test results and keep a list of the medicines you take. How can you care for yourself at home? · Take pain medicines exactly as directed. ? If the doctor gave you a prescription medicine for pain, take it as prescribed. ? If you are not taking a prescription pain medicine, ask your doctor if you can take an over-the-counter medicine. ? Do not take two or more pain medicines at the same time unless the doctor told you to. Many pain medicines contain acetaminophen, which is Tylenol. Too much acetaminophen (Tylenol) can be harmful. · If your doctor recommends that you wear a sling, use it as directed. Do not take it off before your doctor tells you to. · Put ice or a cold pack on the sore area for 10 to 20 minutes at a time. Put a thin cloth between the ice and your skin. · If there is no swelling, you can put moist heat, a heating pad, or a warm cloth on your shoulder. Some doctors suggest alternating between hot and cold. · Rest your shoulder for a few days. If your doctor recommends it, you can then begin gentle exercise of the shoulder, but do not lift anything heavy. When should you call for help? Call 911 anytime you think you may need emergency care. For example, call if:    · You have chest pain or pressure.  This may occur with:  ? Sweating. ? Shortness of breath. ? Nausea or vomiting. ? Pain that spreads from the chest to the neck, jaw, or one or both shoulders or arms. ? Dizziness or lightheadedness. ? A fast or uneven pulse. After calling 911, chew 1 adult-strength aspirin. Wait for an ambulance. Do not try to drive yourself.     · Your arm or hand is cool or pale or changes color.    Call your doctor now or seek immediate medical care if:    · You have signs of infection, such as:  ? Increased pain, swelling, warmth, or redness in your shoulder. ? Red streaks leading from a place on your shoulder. ? Pus draining from an area of your shoulder. ? Swollen lymph nodes in your neck, armpits, or groin. ? A fever.    Watch closely for changes in your health, and be sure to contact your doctor if:    · You cannot use your shoulder.     · Your shoulder does not get better as expected. Where can you learn more? Go to http://judi-juliana.info/. Enter G518 in the search box to learn more about \"Shoulder Pain: Care Instructions. \"  Current as of: September 20, 2018  Content Version: 11.9  © 2122-9098 [employer]. Care instructions adapted under license by [employer] (which disclaims liability or warranty for this information). If you have questions about a medical condition or this instruction, always ask your healthcare professional. [place] [address] any warranty or liability for your use of this information.

## 2019-04-22 NOTE — ED NOTES
.. 
Emergency Department Nursing Plan of Care The Nursing Plan of Care is developed from the Nursing assessment and Emergency Department Attending provider initial evaluation. The plan of care may be reviewed in the ED Provider note. The Plan of Care was developed with the following considerations:  
Patient / Family readiness to learn indicated by:verbalized understanding and appropriate questions asked Persons(s) to be included in education: patient Barriers to Learning/Limitations:No 
 
Signed John Ayala RN   
4/22/2019   9:57 AM

## 2019-04-22 NOTE — ED NOTES
Pt given a copy of radiologist typed report today. Pt given printed discharge instructions and 2 script(s). Pt verbalized understanding of instructions and script(s). Pt verbalized importance of following up with PCP. Pt alert and oriented, in no acute distress, ambulatory with self.

## 2019-04-22 NOTE — ED PROVIDER NOTES
EMERGENCY DEPARTMENT HISTORY AND PHYSICAL EXAM 
 
 
Date: 4/22/2019 Patient Name: Jona Gilford History of Presenting Illness Chief Complaint Patient presents with  Shoulder Pain History Provided By: Patient HPI: Jona Gilford, 60 y.o. male with PMHx significant for GERD, hypertension, tobacco abuse, chronic systolic heart failure, PAC, cardiomyopathy, colectomy, splenectomy, presents ambulatory to the ED with cc of acute moderate aching right shoulder pain X 2 weeks secondary to \"throwing kids around while playing\". Topical lidocaine patch from PCP without relief. Denies numbness, tingling, fever, chills, chest pain, shortness of breath, focal weakness. There are no other complaints, changes, or physical findings at this time. PCP: Coretta Escobedo NP No current facility-administered medications on file prior to encounter. Current Outpatient Medications on File Prior to Encounter Medication Sig Dispense Refill  lisinopril (PRINIVIL, ZESTRIL) 5 mg tablet TAKE 1 TABLET BY MOUTH DAILY 30 Tab 0  
 metoprolol succinate (TOPROL-XL) 25 mg XL tablet Take 1 Tab by mouth nightly. 30 Tab 5  esomeprazole (NEXIUM) 40 mg capsule Take 40 mg by mouth daily.  Cholecalciferol, Vitamin D3, (VITAMIN D3) 1,000 unit cap Take 1 Cap by mouth as needed. Past History Past Medical History: 
Past Medical History:  
Diagnosis Date  Abnormal nuclear stress test 12/7/2015  Cardiomyopathy ([facility] 2010 EF 45%  Chronic systolic heart failure ([facility]  GERD (gastroesophageal reflux disease)  HTN (hypertension)  PAC (premature atrial contraction)  Tobacco use disorder Past Surgical History: 
Past Surgical History:  
Procedure Laterality Date  HX COLECTOMY  HX SPLENECTOMY Family History: 
Family History Problem Relation Age of Onset  Stroke Mother  Coronary Artery Disease Sister  Heart Disease Paternal Grandfather Social History: 
Social History Tobacco Use  Smoking status: Former Smoker Packs/day: 0.50 Types: Cigarettes Last attempt to quit: 4/22/2016 Years since quitting: 3.0  Smokeless tobacco: Never Used Substance Use Topics  Alcohol use: Yes Comment: rarely  Drug use: No  
 
 
Allergies: 
No Known Allergies Review of Systems Review of Systems Constitutional: Negative for activity change, appetite change, chills, fatigue and fever. HENT: Negative. Respiratory: Negative. Negative for cough and shortness of breath. Cardiovascular: Negative. Negative for chest pain. Gastrointestinal: Negative. Negative for abdominal pain. Musculoskeletal: Positive for arthralgias and myalgias. Negative for back pain, gait problem, joint swelling, neck pain and neck stiffness. Skin: Negative. Negative for color change, pallor, rash and wound. Neurological: Negative for numbness and headaches. Psychiatric/Behavioral: Negative. Physical Exam  
Physical Exam  
Constitutional: He is oriented to person, place, and time. He appears well-developed and well-nourished. No distress. HENT:  
Head: Normocephalic and atraumatic. Right Ear: Hearing and external ear normal.  
Left Ear: Hearing and external ear normal.  
Nose: Nose normal.  
Eyes: Pupils are equal, round, and reactive to light. Conjunctivae and EOM are normal.  
Neck: Normal range of motion. Cardiovascular:  
Pulses: 
     Radial pulses are 2+ on the right side, and 2+ on the left side. Pulmonary/Chest: Effort normal. No accessory muscle usage. No respiratory distress. Musculoskeletal:  
     Right shoulder: He exhibits decreased range of motion, tenderness, bony tenderness and pain. He exhibits no swelling, no effusion, no crepitus, no deformity, no laceration, no spasm, normal pulse and normal strength.   
     Left shoulder: Normal.  
     Right elbow: Normal. 
     Cervical back: Normal.  
 Neurological: He is alert and oriented to person, place, and time. Skin: Skin is warm, dry and intact. He is not diaphoretic. No pallor. Psychiatric: He has a normal mood and affect. His speech is normal and behavior is normal. Judgment and thought content normal.  
Nursing note and vitals reviewed. Diagnostic Study Results Labs - No results found for this or any previous visit (from the past 12 hour(s)). Radiologic Studies -  
XR SHOULDER RT AP/LAT MIN 2 V Final Result IMPRESSION:  
  
1. No acute fracture. 2. Possible 9 mm right basilar lung nodule. Consider nonemergent PA and lateral  
chest radiograph with nipple markers. CT Results  (Last 48 hours) None CXR Results  (Last 48 hours) None Medical Decision Making I am the first provider for this patient. I reviewed the vital signs, available nursing notes, past medical history, past surgical history, family history and social history. Vital Signs-Reviewed the patient's vital signs. Patient Vitals for the past 12 hrs: 
 Temp Pulse Resp BP SpO2  
04/22/19 0929 98 °F (36.7 °C) 66 20 (!) 151/98 97 % Pulse Oximetry Analysis - 97% on RA Records Reviewed: Nursing Notes, Old Medical Records, Previous Radiology Studies and Previous Laboratory Studies Provider Notes (Medical Decision Making):  
Patient presents with shoulder pain. Ddx: sprain, strain, arthritis, bursitis, tendonitis, dislocation, fracture, strain, AC separation, clavicle fracture. Will get XR to further evaluate and treat the pain. ED Course:  
Initial assessment performed. The patients presenting problems have been discussed, and they are in agreement with the care plan formulated and outlined with them. I have encouraged them to ask questions as they arise throughout their visit. Critical Care Time:  
0 Disposition: 
10:17 AM 
I have discussed with patient their diagnosis, treatment, and follow up plan. The patient agrees to follow up as outlined in discharge paperwork and also to return to the ED with any worsening. Mónica Casas PA-C 
 
PLAN: 
1. Current Discharge Medication List  
  
CONTINUE these medications which have CHANGED Details  
meloxicam (MOBIC) 15 mg tablet Take 1 Tab by mouth daily. Qty: 20 Tab, Refills: 0  
  
cyclobenzaprine (FLEXERIL) 10 mg tablet Take 1 Tab by mouth three (3) times daily as needed for Muscle Spasm(s). Qty: 10 Tab, Refills: 0 CONTINUE these medications which have NOT CHANGED Details  
lisinopril (PRINIVIL, ZESTRIL) 5 mg tablet TAKE 1 TABLET BY MOUTH DAILY Qty: 30 Tab, Refills: 0  
  
metoprolol succinate (TOPROL-XL) 25 mg XL tablet Take 1 Tab by mouth nightly. Qty: 30 Tab, Refills: 5  
  
esomeprazole (NEXIUM) 40 mg capsule Take 40 mg by mouth daily. Cholecalciferol, Vitamin D3, (VITAMIN D3) 1,000 unit cap Take 1 Cap by mouth as needed. 2.  
Follow-up Information Follow up With Specialties Details Why Contact Info [facility]  Schedule an appointment as soon as possible for a visit in 1 week As needed, If symptoms worsen [facility] [address] [facility] 
[phone] [facility] Orthopedic Surgery  Schedule an appointment as soon as possible for a visit in 1 week As needed, If symptoms worsen [address] [address] 
[phone] Return to ED if worse Diagnosis Clinical Impression: 1. Sprain of right shoulder, unspecified shoulder sprain type, initial encounter 2. Lung nodule Attestations:

## 2019-05-10 ENCOUNTER — HOSPITAL ENCOUNTER (OUTPATIENT)
Dept: CT IMAGING | Age: 59
Discharge: HOME | End: 2019-05-10
Attending: INTERNAL MEDICINE
Payer: OTHER GOVERNMENT

## 2019-05-10 DIAGNOSIS — R91.1 PULMONARY NODULE/LESION, SOLITARY: ICD-10-CM

## 2019-05-10 PROCEDURE — 71250 CT THORAX DX C-: CPT

## 2019-05-28 ENCOUNTER — HOSPITAL ENCOUNTER (OUTPATIENT)
Dept: PET IMAGING | Age: 59
Discharge: HOME | End: 2019-05-28
Attending: INTERNAL MEDICINE
Payer: OTHER GOVERNMENT

## 2019-05-28 VITALS — WEIGHT: 183 LBS | HEIGHT: 71 IN | BODY MASS INDEX: 25.62 KG/M2

## 2019-05-28 DIAGNOSIS — R91.1 PULMONARY NODULE: ICD-10-CM

## 2019-05-28 PROCEDURE — A9552 F18 FDG: HCPCS

## 2019-05-28 RX ORDER — SODIUM CHLORIDE 0.9 % (FLUSH) 0.9 %
10 SYRINGE (ML) INJECTION
Status: COMPLETED | OUTPATIENT
Start: 2019-05-28 | End: 2019-05-28

## 2019-05-28 RX ADMIN — Medication 10 ML: at 08:33

## 2019-06-04 ENCOUNTER — ANESTHESIA (OUTPATIENT)
Dept: ENDOSCOPY | Age: 59
End: 2019-06-04
Payer: OTHER GOVERNMENT

## 2019-06-04 ENCOUNTER — ANESTHESIA EVENT (OUTPATIENT)
Dept: ENDOSCOPY | Age: 59
End: 2019-06-04
Payer: OTHER GOVERNMENT

## 2019-06-04 ENCOUNTER — HOSPITAL ENCOUNTER (OUTPATIENT)
Age: 59
Setting detail: OUTPATIENT SURGERY
Discharge: HOME | End: 2019-06-04
Attending: INTERNAL MEDICINE | Admitting: INTERNAL MEDICINE
Payer: OTHER GOVERNMENT

## 2019-06-04 VITALS
RESPIRATION RATE: 12 BRPM | OXYGEN SATURATION: 95 % | HEIGHT: 71 IN | WEIGHT: 184 LBS | TEMPERATURE: 97.8 F | DIASTOLIC BLOOD PRESSURE: 78 MMHG | SYSTOLIC BLOOD PRESSURE: 111 MMHG | BODY MASS INDEX: 25.76 KG/M2 | HEART RATE: 83 BPM

## 2019-06-04 PROCEDURE — 74011250636 HC RX REV CODE- 250/636

## 2019-06-04 PROCEDURE — 77030009046 HC CATH BRNCH BLLN OCOA -B: Performed by: INTERNAL MEDICINE

## 2019-06-04 PROCEDURE — 88341 IMHCHEM/IMCYTCHM EA ADD ANTB: CPT

## 2019-06-04 PROCEDURE — 76040000008: Performed by: INTERNAL MEDICINE

## 2019-06-04 PROCEDURE — 77030003657 HC NDL SCLER BSC -B: Performed by: INTERNAL MEDICINE

## 2019-06-04 PROCEDURE — 74011250636 HC RX REV CODE- 250/636: Performed by: INTERNAL MEDICINE

## 2019-06-04 PROCEDURE — 88305 TISSUE EXAM BY PATHOLOGIST: CPT

## 2019-06-04 PROCEDURE — 77030020143 HC AIRWY LARYN INTUB CGAS -A: Performed by: ANESTHESIOLOGY

## 2019-06-04 PROCEDURE — 74011000250 HC RX REV CODE- 250: Performed by: INTERNAL MEDICINE

## 2019-06-04 PROCEDURE — 88342 IMHCHEM/IMCYTCHM 1ST ANTB: CPT

## 2019-06-04 PROCEDURE — 77030022556 HC FCPS BIOP TIS ENDOSC BSC -B: Performed by: INTERNAL MEDICINE

## 2019-06-04 PROCEDURE — 76060000033 HC ANESTHESIA 1 TO 1.5 HR: Performed by: INTERNAL MEDICINE

## 2019-06-04 PROCEDURE — 88172 CYTP DX EVAL FNA 1ST EA SITE: CPT

## 2019-06-04 PROCEDURE — 74011000250 HC RX REV CODE- 250

## 2019-06-04 PROCEDURE — 88173 CYTOPATH EVAL FNA REPORT: CPT

## 2019-06-04 RX ORDER — SODIUM CHLORIDE 9 MG/ML
INJECTION, SOLUTION INTRAVENOUS
Status: DISCONTINUED | OUTPATIENT
Start: 2019-06-04 | End: 2019-06-04 | Stop reason: HOSPADM

## 2019-06-04 RX ORDER — LIDOCAINE HYDROCHLORIDE 20 MG/ML
2 INJECTION, SOLUTION INFILTRATION; PERINEURAL
Status: DISCONTINUED | OUTPATIENT
Start: 2019-06-04 | End: 2019-06-04 | Stop reason: HOSPADM

## 2019-06-04 RX ORDER — EPHEDRINE SULFATE/0.9% NACL/PF 50 MG/5 ML
SYRINGE (ML) INTRAVENOUS AS NEEDED
Status: DISCONTINUED | OUTPATIENT
Start: 2019-06-04 | End: 2019-06-04 | Stop reason: HOSPADM

## 2019-06-04 RX ORDER — GLYCOPYRROLATE 0.2 MG/ML
INJECTION INTRAMUSCULAR; INTRAVENOUS AS NEEDED
Status: DISCONTINUED | OUTPATIENT
Start: 2019-06-04 | End: 2019-06-04 | Stop reason: HOSPADM

## 2019-06-04 RX ORDER — FENTANYL CITRATE 50 UG/ML
INJECTION, SOLUTION INTRAMUSCULAR; INTRAVENOUS AS NEEDED
Status: DISCONTINUED | OUTPATIENT
Start: 2019-06-04 | End: 2019-06-04 | Stop reason: HOSPADM

## 2019-06-04 RX ORDER — SEVOFLURANE 250 ML/250ML
LIQUID RESPIRATORY (INHALATION)
Status: DISCONTINUED
Start: 2019-06-04 | End: 2019-06-04 | Stop reason: HOSPADM

## 2019-06-04 RX ORDER — LIDOCAINE HYDROCHLORIDE 20 MG/ML
INJECTION, SOLUTION EPIDURAL; INFILTRATION; INTRACAUDAL; PERINEURAL AS NEEDED
Status: DISCONTINUED | OUTPATIENT
Start: 2019-06-04 | End: 2019-06-04 | Stop reason: HOSPADM

## 2019-06-04 RX ORDER — TAMSULOSIN HYDROCHLORIDE 0.4 MG/1
0.4 CAPSULE ORAL DAILY
COMMUNITY
End: 2020-01-01

## 2019-06-04 RX ORDER — LIDOCAINE HYDROCHLORIDE 40 MG/ML
SOLUTION TOPICAL ONCE
Status: COMPLETED | OUTPATIENT
Start: 2019-06-04 | End: 2019-06-04

## 2019-06-04 RX ORDER — LIDOCAINE HYDROCHLORIDE AND EPINEPHRINE 20; 10 MG/ML; UG/ML
2 INJECTION, SOLUTION INFILTRATION; PERINEURAL AS NEEDED
Status: DISCONTINUED | OUTPATIENT
Start: 2019-06-04 | End: 2019-06-04 | Stop reason: HOSPADM

## 2019-06-04 RX ORDER — PROPOFOL 10 MG/ML
INJECTION, EMULSION INTRAVENOUS AS NEEDED
Status: DISCONTINUED | OUTPATIENT
Start: 2019-06-04 | End: 2019-06-04 | Stop reason: HOSPADM

## 2019-06-04 RX ORDER — LIDOCAINE HYDROCHLORIDE 20 MG/ML
JELLY TOPICAL ONCE
Status: COMPLETED | OUTPATIENT
Start: 2019-06-04 | End: 2019-06-04

## 2019-06-04 RX ADMIN — Medication 20 MG: at 13:16

## 2019-06-04 RX ADMIN — Medication 30 MG: at 13:20

## 2019-06-04 RX ADMIN — LIDOCAINE HYDROCHLORIDE 18 ML: 20 INJECTION, SOLUTION INFILTRATION; PERINEURAL at 14:00

## 2019-06-04 RX ADMIN — LIDOCAINE HYDROCHLORIDE: 20 JELLY TOPICAL at 14:00

## 2019-06-04 RX ADMIN — LIDOCAINE HYDROCHLORIDE: 40 SOLUTION TOPICAL at 12:42

## 2019-06-04 RX ADMIN — PROPOFOL 160 MG: 10 INJECTION, EMULSION INTRAVENOUS at 13:11

## 2019-06-04 RX ADMIN — SODIUM CHLORIDE: 9 INJECTION, SOLUTION INTRAVENOUS at 13:02

## 2019-06-04 RX ADMIN — GLYCOPYRROLATE 0.1 MG: 0.2 INJECTION INTRAMUSCULAR; INTRAVENOUS at 13:07

## 2019-06-04 RX ADMIN — LIDOCAINE HYDROCHLORIDE 60 MG: 20 INJECTION, SOLUTION EPIDURAL; INFILTRATION; INTRACAUDAL; PERINEURAL at 13:11

## 2019-06-04 RX ADMIN — FENTANYL CITRATE 100 MCG: 50 INJECTION, SOLUTION INTRAMUSCULAR; INTRAVENOUS at 13:11

## 2019-06-04 NOTE — PERIOP NOTES
Patient tolerated procedure without problems. Abdomen soft and patient arousable and voices no complaints Report received from CRNA, see anesthesia note. Patient transported to endoscopy recovery area.  Endoscope was pre-cleaned at bedside immediately following procedure by Kathie Doe

## 2019-06-04 NOTE — DISCHARGE INSTRUCTIONS
Lucrecia Irizarry  [id number]  1960        BRONCHOSCOPY / EUS / EBUS DISCHARGE INSTRUCTIONS  Discomfort:  Sore throat- throat lozenges or warm salt water gargle  redness at IV site- apply warm compress to area; if redness or soreness persist- contact your physician  You may not operate a vehicle for 12 hours  You may not engage in an occupation involving machinery or appliances for rest of today  You may not drink alcoholic beverages for at least 12 hours  Avoid making any critical decisions for at least 24 hour  Blood tinged secretions - this should stop within 2-3 hours  DIET  Nothing by mouth- do not eat or drink for two hours. You may eat and drink after 4 pm              You may resume your regular diet            ACTIVITY  You may resume your normal daily activities however it is recommended that you spend the remainder of the day resting -  avoid any strenuous activity. CALL M.D.   ANY SIGN OF                                         Increasing pain, nausea, vomiting  Abdominal distension (swelling)  New increased bleeding (oral or rectal)  Fever (chills)  Pain in chest area  Bloody discharge from nose or mouth  Shortness of breath     You may not take any Advil, Aspirin, Ibuprofen, Motrin, Aleve, or Goodys for 5 days, ONLY  Tylenol as needed for pain.     Call physicians office for following  Results of procedure / biopsy in 5 business days  Follow up with Dr. Prince Jeong

## 2019-06-04 NOTE — ANESTHESIA POSTPROCEDURE EVALUATION
Procedure(s):  ENDOSCOPIC BRONCHOSCOPY ULTRASOUND (EBUS)  BRONCHOSCOPY  INJECTION.     general    Anesthesia Post Evaluation      Multimodal analgesia: multimodal analgesia not used between 6 hours prior to anesthesia start to PACU discharge  Patient location during evaluation: bedside  Patient participation: complete - patient participated  Level of consciousness: awake  Pain management: adequate  Airway patency: patent  Anesthetic complications: no  Cardiovascular status: acceptable  Respiratory status: acceptable  Hydration status: acceptable  Post anesthesia nausea and vomiting:  controlled      Vitals Value Taken Time   /78 6/4/2019  3:10 PM   Temp     Pulse 83 6/4/2019  3:10 PM   Resp 12 6/4/2019  3:10 PM   SpO2 95 % 6/4/2019  3:10 PM

## 2019-06-04 NOTE — PERIOP NOTES
1320: Anesthesia staff at patient's bedside administering anesthesia and monitoring patients vital signs throughout procedure. See anesthesia note. 1430: Patient tolerated procedure without problems. Abdomen soft and patient arousable and voices no complaints Report received from CRNA, see anesthesia note. Patient transported to endoscopy recovery area. Patient tolerated procedure without problems.  Endoscope was pre-cleaned at bedside immediately following procedure by Lex Lainez

## 2019-06-04 NOTE — H&P
59 yo with PMHx of tobacco abuse and mediastinal lymphadenopathy on PET scan presenting for EBUS. No new sxs since clinic visit.     Gen: awake, alert, in RA  Lungs: CTA, no w/r/r  CV: RRR, no m/g/r  Abd: soft/nt/nd  LE: no edema    - will proceed with EBUS through [facility]

## 2019-06-04 NOTE — DISCHARGE SUMMARY
Sarita Richard  [id number]  1960      BRONCHOSCOPY / EUS / EBUS DISCHARGE INSTRUCTIONS  Discomfort:  Sore throat- throat lozenges or warm salt water gargle  redness at IV site- apply warm compress to area; if redness or soreness persist- contact your physician  Gaseous discomfort- walking, belching will help relieve any discomfort  You may not operate a vehicle for 12 hours  You may not engage in an occupation involving machinery or appliances for rest of today  You may not drink alcoholic beverages for at least 12 hours  Avoid making any critical decisions for at least 24 hour  Blood tinged secretions - this should stop within 2-3 hours  DIET  Nothing by mouth- do not eat or drink for two hours. You may eat and drink after 4 pm   You may resume your regular diet - however -  remember your colon is empty and a heavy meal will produce gas. Avoid these foods:  vegetables, fried / greasy foods, carbonated drinks  ACTIVITY  You may resume your normal daily activities however it is recommended that you spend the remainder of the day resting -  avoid any strenuous activity. CALL M.D. ANY SIGN OF   Increasing pain, nausea, vomiting  Abdominal distension (swelling)  New increased bleeding (oral or rectal)  Fever (chills)  Pain in chest area  Bloody discharge from nose or mouth  Shortness of breath    You smart list may or may not take any Advil, Aspirin, Ibuprofen, Motrin, Aleve, or Goodys for 5 days, ONLY  Tylenol as needed for pain.     Call [phone] office for following  Results of procedure / biopsy in 5 business days  Follow up with Dr. Shelby Perez

## 2019-06-04 NOTE — ANESTHESIA PREPROCEDURE EVALUATION
Relevant Problems   No relevant active problems       Anesthetic History   No history of anesthetic complications            Review of Systems / Medical History  Patient summary reviewed and pertinent labs reviewed    Pulmonary    COPD: moderate               Neuro/Psych   Within defined limits           Cardiovascular    Hypertension: well controlled      CHF  Dysrhythmias (\"irregular hearbeat\")       Exercise tolerance: <4 METS  Comments: EF 50-55% 2016   GI/Hepatic/Renal     GERD           Endo/Other        Cancer (h/o colon cancer)     Other Findings              Physical Exam    Airway  Mallampati: III  TM Distance: 4 - 6 cm  Neck ROM: normal range of motion   Mouth opening: Normal     Cardiovascular    Rhythm: regular  Rate: normal         Dental    Dentition: Upper dentition intact and Lower dentition intact     Pulmonary  Breath sounds clear to auscultation               Abdominal         Other Findings            Anesthetic Plan    ASA: 3  Anesthesia type: general          Induction: Intravenous  Anesthetic plan and risks discussed with: Patient

## 2019-06-04 NOTE — ROUTINE PROCESS
[facility]  1960  [id number]    Situation:  Verbal report received from: JHON Hernandez RN  Procedure: Procedure(s):  ENDOSCOPIC BRONCHOSCOPY ULTRASOUND (EBUS)  BRONCHOSCOPY    Background:    Preoperative diagnosis: Lymphadenopathy  Postoperative diagnosis: * No post-op diagnosis entered *    :  Dr. Sebas Salamanca  Assistant(s): Endoscopy Technician-1: Onur Fleming  Endoscopy RN-1: Temo James RN    Specimens: * No specimens in log *  H. Pylori  no    Assessment:  Intra-procedure medications     Anesthesia gave intra-procedure sedation and medications, see anesthesia flow sheet yes    Intravenous fluids: NS@ KVO     Vital signs stable     Abdominal assessment: round and soft     Recommendation:  Discharge patient per MD order. Family or Friend   Permission to share finding with family or friend yes    Endoscopy discharge instructions have been reviewed and given to patient and friend. The patient and friend verbalized understanding and acceptance of instructions.

## 2019-06-05 NOTE — PROCEDURES
[address] [facility] [address]  [address] [facility] [address]  [address] [facility], [address] [place] [address]  [phone]    Aisha Reaves  1960  [id number]      Date of Procedure: 6/4/2019    Preoperative diagnosis: Lymphadenopathy    Procedure: Procedure(s):  ENDOSCOPIC BRONCHOSCOPY ULTRASOUND (EBUS)  BRONCHOSCOPY  INJECTION    Indication: mediastinal lymphadenopathy    :  Pascual Stock MD    Assistant(s): Endoscopy Technician-1: Bear García  Endoscopy RN-1: Keon Frey RN    Anesthesia/Sedation:  General anesthesia      Procedure Details:  After infomed consent was obtained for the procedure, with all risks and benefits of procedure explained the patient was taken to the endoscopy suite and placed in the supine position. Following sequential administration of sedation as per above, the bronchoscope was inserted into the mouth and advanced under direct vision to the tracheobronchial tree. Normal appearing lungs and mucosa. TBNA done of Station 4R (3 passes), 4L (4 passes) and 2 R (4 passes)      Findings:   Enlarged lymphnodes    Therapies:   None    Specimens:   See above           Complications:   None noted; patient tolerated the procedure well. EBL:  Minimal           Impression:    Abnormal CT      Recommendations:    Follow up cytology      Blake Barney MD  6/5/2019  9:57 AM

## 2019-06-14 NOTE — PROGRESS NOTES
[address] [facility] Oncology at [address] [place] [address]  [phone]    Hematology / Oncology Consult    Reason for Visit:   Martha Strange is a 60 y.o. male who is seen in consultation at the request of Dr. Janine Armstrong for evaluation of lung cancer. Hematology Oncology Treatment History:     Diagnosis: Adenocarcinoma of unknown primary    Stage: Pending    Pathology:   6/4/19 4R LN biopsy: rare malignant cells admixed with abundant blood clot  6/4/19 4L LN FNA and core biopsy: Adenocarcinoma. 6/4/19 2R LN FNA and core biopsy: Adenocarcinoma. Comment: IHC stains negative for GATA3, AR, ER, p40. Immunohistochemistry shows that the tumor cells express CK7 with focal expression of CDX2. Tumor cells lack expression of CK20, TTF-1 and Napsin A. These findings are not entirely diagnostic and could be seen in either a primary pulmonary malignancy or a primary upper gastrointestinal tract malignancy. Other sites are also not excluded. Clinical and radiographic correlation is recommended. Prior Treatment: None    Current Treatment: pending  Treatment duration   Frequency of visits     History of Present Illness:   Mr. Pam Calix is a 59 y/o male with COPD, remote h/o colon cancer    Pt presented with shortness of breath, dyspnea on exertion, and was found on chest CT to have R mediastinal lymphadenopathy, which also were PET avid. Case was discussed at Thoracic Tumor Board with thought that this could be pancreatic, urothelial, or germ cell origin. More tissue is recommended. Pt had EGD 3 yrs ago and was told he had GERD. Patient has h/o colon cancer in 2002. He states a cancerous polyp was found and then he underwent colectomy in 2002. This was done by Dr. Petros Peguero at [facility]. Patient had colonoscopies regularly afterwards, last one was approx 2015. Last EGD was in 2018.     He saw Dr. Alissa Dupont who felt that     Past Medical History:   Diagnosis Date    Abnormal nuclear stress test 12/7/2015   [facility] ([facility]     colon    Cardiomyopathy ([facility] 2010    EF 45%    Chronic obstructive pulmonary disease (HCC)     Chronic systolic heart failure (HCC)     GERD (gastroesophageal reflux disease)     HTN (hypertension)     PAC (premature atrial contraction)     Tobacco use disorder       Past Surgical History:   Procedure Laterality Date    HX COLECTOMY      HX SPLENECTOMY        Social History     Tobacco Use    Smoking status: Former Smoker     Packs/day: 0.50     Types: Cigarettes     Last attempt to quit: 4/22/2016     Years since quitting: 3.1    Smokeless tobacco: Never Used   Substance Use Topics    Alcohol use: Yes     Comment: rarely      Family History   Problem Relation Age of Onset    Stroke Mother     Coronary Artery Disease Sister     Heart Disease Paternal Grandfather      Current Outpatient Medications   Medication Sig    tamsulosin (FLOMAX) 0.4 mg capsule Take 0.4 mg by mouth daily.  OTHER Take  by inhalation. Alero Inhaler    lisinopril (PRINIVIL, ZESTRIL) 5 mg tablet TAKE 1 TABLET BY MOUTH DAILY    metoprolol succinate (TOPROL-XL) 25 mg XL tablet Take 1 Tab by mouth nightly. No current facility-administered medications for this visit. No Known Allergies     Review of Systems: A complete review of systems was obtained, negative except as described above.     Physical Exam:     Visit Vitals  /81   Pulse 72   Temp 97.8 °F (36.6 °C) (Oral)   Resp 16   Ht 5' 11\" (1.803 m)   Wt 176 lb (79.8 kg)   SpO2 95%   BMI 24.55 kg/m²     ECOG PS: 0  General: Well developed, no acute distress  Eyes: PERRLA, EOMI, anicteric sclerae  HENT: Atraumatic, OP clear, TMs intact without erythema  Neck: Supple  Lymphatic: No cervical, supraclavicular, axillary or inguinal adenopathy  Respiratory: CTAB, normal respiratory effort  CV: Normal rate, regular rhythm, no murmurs, no peripheral edema  GI: Soft, nontender, nondistended, no masses, no hepatomegaly, no splenomegaly  MS: Normal gait and station. Digits without clubbing or cyanosis. Skin: No rashes, ecchymoses, or petechiae. Normal temperature, turgor, and texture. Neuro/Psych: Alert, oriented. 5/5 strength in all 4 extremities. Appropriate affect, normal judgment/insight. Results:     Lab Results   Component Value Date/Time    WBC 5.9 11/25/2015 10:22 AM    HGB 15.6 11/25/2015 10:22 AM    HCT 44.7 11/25/2015 10:22 AM    PLATELET 129 80/56/9340 10:22 AM    MCV 88 11/25/2015 10:22 AM    ABS. NEUTROPHILS 2.7 11/25/2015 10:22 AM     Lab Results   Component Value Date/Time    Sodium 145 (H) 11/25/2015 10:22 AM    Potassium 5.1 11/25/2015 10:22 AM    Chloride 107 11/25/2015 10:22 AM    CO2 26 11/25/2015 10:22 AM    Glucose 62 (L) 11/25/2015 10:22 AM    BUN 12 11/25/2015 10:22 AM    Creatinine 1.06 11/25/2015 10:22 AM    GFR est AA 90 11/25/2015 10:22 AM    GFR est non-AA 78 11/25/2015 10:22 AM    Calcium 9.7 11/25/2015 10:22 AM     Lab Results   Component Value Date/Time    Bilirubin, total 0.4 11/25/2015 10:22 AM    ALT (SGPT) 12 11/25/2015 10:22 AM    AST (SGOT) 19 11/25/2015 10:22 AM    Alk. phosphatase 72 11/25/2015 10:22 AM    Protein, total 6.8 11/25/2015 10:22 AM    Albumin 4.3 11/25/2015 10:22 AM     No results found for: IRON, FE, TIBC, IBCT, PSAT, FERR    No results found for: B12LT, FOL, RBCF  No results found for: TSH, TSH2, TSH3, TSHP, TSHEXT  No results found for: HAMAT, HAAB, HABT, HAAT, HBSAG, HBSB, HBSAT, HBABN, HBCM, HBCAB, HBCAT, XBCABS, [address], [address] [place] [address], [facility], [address], [facility], HBCMLT, HBCLT, HBEBLT, [id number], [id number], HAVMLT, [id number], HBCMLT, [id number], HCGAT      Imaging:     Chest CT 5/10/19:  FINDINGS:  THYROID: No nodule. MEDIASTINUM: There are enlarged lymph nodes in the right paratracheal region  with 3 enlarged lymph nodes in this area. The largest is inferiorly in the right  paratracheal region measuring 2.5 x 1.7 cm.  There are also enlarged lymph nodes  in the medial and lateral AP window with the largest lymph node measuring 18 mm. These lymph nodes are worrisome for neoplastic process. There is a normal size  subcarinal lymph node. Dung Orcas REBECCA: No mass or lymphadenopathy. THORACIC AORTA: No aneurysm. MAIN PULMONARY ARTERY: Normal in caliber. TRACHEA/BRONCHI: Patent. ESOPHAGUS: No wall thickening or dilatation. HEART: Normal in size. PLEURA: No effusion or pneumothorax. LUNGS: There are moderate changes of centrilobular emphysema. There are bullous  lesions at each apex left greater than right. There is a calcified granuloma in the left upper lobe.   There is volume loss in the medial segment right middle lobe that extends to a  oval-shaped opacity measures 1.8 x 1.1 cm this may all be atelectasis. Pulmonary  nodule within the atelectasis is not excluded. No abnormality corresponds with the rounded opacity seen on the chest  radiograph. Possibly that was a nipple shadow. .  Mild scarring is seen medially in the lingula. INCIDENTALLY IMAGED UPPER ABDOMEN: The spleen has been removed. No adrenal  masses. BONES: No destructive bone lesion. IMPRESSION:  1. There are are multiple enlarged lymph nodes in the right paratracheal region  as well as adenopathy and AP window region. These enlarged lymph nodes are  worrisome for neoplastic process. 2. There is volume loss in right middle lobe with a nodular area of opacity that  could be atelectasis but a superimposed nodule within the areas of atelectasis  is not excluded. Further evaluation is suggested. Tissue diagnosis is suggested. PET CT scan may yield more information. . 23X     5/28/19 PET:  FINDINGS:  HEAD/NECK: No apparent foci of abnormal hypermetabolism. Cerebral evaluation is  limited by normal intense activity. CHEST: A hypermetabolic right paratracheal lymph node SUV is 8.5. There are  hypermetabolic lymph nodes anterior to the main bronchi bilaterally. There is  centrilobular emphysema.  There is volume loss in the right middle lobe but a  central obstructing mass is not identified on this PET scan. ABDOMEN/PELVIS: No foci of abnormal hypermetabolism. The prostate gland is  enlarged. SKELETON: No foci of abnormal hypermetabolism in the axial and visualized  appendicular skeleton. IMPRESSION: Hypermetabolic mediastinal lymph nodes concerning for neoplastic  process. Assessment & Plan:   Shahla Guthrie is a 60 y.o. male with COPD, remote h/o colon cancer comes in for evaluation and management of adenocarcinoma of unknown primary. 1. Adenocarcinoma of unknown primary: Involving mediastinal LNs, with no other PET findings. Per Thoracic Tumor Board discussion, this could represent upper GI origin, urothelial origin or germ cell tumor. Therefore, search for a primary lesion would involve referral to Urology for cystoscopy and GI for EGD, colonoscopy. Given h/o colon cancer in 2002, it would be very helpful to obtain these records. Additional tissue would also be helpful, and therefore patient is seeing Dr. Kd Pate on 6/20/19 for thoracoscopy and LN biopsy. -- Obtain records from [facility] regarding prior surgery  -- Obtain records from GI regarding prior EGD, colonoscopy results  -- Follow with up with Urology, Dr. Froilan Sanchez on 6/24/19 - I recommend cystoscopy to search from a primary urothelial cancer. -- Follow up with Dr. Rashmi Jovel for EGD, colonoscopy now  -- Thoracoscopy and biopsy by Dr. Kd Pate next week. -- Check CEA, CA 19-9, PSA, LDH, betaHCG, AFP, CBC, CMP  -- Return in 3 weeks for follow up    2. H/o Stage I [T1NxMx] sigmoid colon cancer: Found in sigmoid adenoma during a colonoscopy; went on to undergo segmented resection of the sigmoid colon in 2002. Pathology per outside records:  2/6/2002 sigmoid colon polyp: Well differentiated adenocarcinoma arising in an adenoma, involving the mucosa and invading into the upper half of the submucosal stalk. No vascular space involvement is identified.    2/21/2002 Sigmoidectomy, liver biopsy:  -Sigmoid colon, segmented resection: No residual adenocarcinoma present. Polypectomy site with granulation tissue and vascular congestion. No LNs recovered. Distal and proximal margins benign.  -Liver biopsy: Negative for metastatic carcinoma. No significant inflammation or obvious cirrhosis identified. Very minimal steatosis (rare cells with fat globules). -Addendum: Reblocks of adipose tissue; three small benign lymphoid aggregates (< 0.1cm). 3. COPD: Quit smoking in approx 2016. SOB improved after starting inhaler. 4. HTN: Well controlled on Lisinopril and Metoprolol. 5. BPH: On Flomax. Supportive care medications include: Pending  Emotional well being: Pt is coping well with his/her disease and has excellent support. I appreciate the opportunity to participate in Mr. Mónica solo.     Signed By: Shant Dorantes MD     June 18, 2019

## 2019-06-18 ENCOUNTER — OFFICE VISIT (OUTPATIENT)
Dept: ONCOLOGY | Age: 59
End: 2019-06-18

## 2019-06-18 VITALS
OXYGEN SATURATION: 95 % | SYSTOLIC BLOOD PRESSURE: 116 MMHG | TEMPERATURE: 97.8 F | BODY MASS INDEX: 24.64 KG/M2 | HEART RATE: 72 BPM | RESPIRATION RATE: 16 BRPM | WEIGHT: 176 LBS | HEIGHT: 71 IN | DIASTOLIC BLOOD PRESSURE: 81 MMHG

## 2019-06-18 DIAGNOSIS — C80.1 ADENOCARCINOMA OF UNKNOWN PRIMARY (HCC): Primary | ICD-10-CM

## 2019-06-18 DIAGNOSIS — Z85.038 HISTORY OF COLON CANCER: ICD-10-CM

## 2019-06-18 DIAGNOSIS — I10 ESSENTIAL HYPERTENSION: ICD-10-CM

## 2019-06-18 DIAGNOSIS — J44.9 CHRONIC OBSTRUCTIVE PULMONARY DISEASE, UNSPECIFIED COPD TYPE (HCC): ICD-10-CM

## 2019-06-18 DIAGNOSIS — N40.0 BENIGN PROSTATIC HYPERPLASIA, UNSPECIFIED WHETHER LOWER URINARY TRACT SYMPTOMS PRESENT: ICD-10-CM

## 2019-06-18 NOTE — PROGRESS NOTES
Janette Eubanks is a 60 y.o. male here today for evaluation of lung cancer. He reports no pain today.

## 2019-06-19 ENCOUNTER — TELEPHONE (OUTPATIENT)
Dept: ONCOLOGY | Age: 59
End: 2019-06-19

## 2019-06-19 NOTE — TELEPHONE ENCOUNTER
Spoke with patient. Verified ID. Made aware of need to have labs drawn per Dr. Luis Alberto Gibbons. He verbalized understanding. Lab orders faxed complete to [facility] per his request.  He states he is still attempting to obtain records from [facility] regarding his colon cancer. States he will update this office as soon as possible.

## 2019-06-20 ENCOUNTER — TELEPHONE (OUTPATIENT)
Dept: ONCOLOGY | Age: 59
End: 2019-06-20

## 2019-06-20 ENCOUNTER — OFFICE VISIT (OUTPATIENT)
Dept: SURGERY | Age: 59
End: 2019-06-20

## 2019-06-20 VITALS
WEIGHT: 176 LBS | OXYGEN SATURATION: 92 % | DIASTOLIC BLOOD PRESSURE: 77 MMHG | HEIGHT: 71 IN | BODY MASS INDEX: 24.64 KG/M2 | RESPIRATION RATE: 18 BRPM | SYSTOLIC BLOOD PRESSURE: 117 MMHG | HEART RATE: 84 BPM

## 2019-06-20 DIAGNOSIS — C80.1 ADENOCARCINOMA OF UNKNOWN PRIMARY (HCC): Primary | ICD-10-CM

## 2019-06-20 DIAGNOSIS — R59.0 MEDIASTINAL LYMPHADENOPATHY: ICD-10-CM

## 2019-06-20 NOTE — PROGRESS NOTES
New patient. 1. Have you been to the ER, urgent care clinic since your last visit? Hospitalized since your last visit? No    2. Have you seen or consulted any other health care providers outside of the [address] [place] [address] since your last visit? Include any pap smears or colon screening. Yes, Dr. Melanie Harding @ [facility].

## 2019-06-20 NOTE — PROGRESS NOTES
Asked to see Mr Lyndon Keith re: mediastinal lymphadenopathy    Referred by Dr. Domo Perdue    Mr Lyndon Keith is a pleasant 61 y/o gentleman with a past medical history significant for HTN, cardiomyopathy and colon cancer. He is referred by Oncology for a surgical biopsy. He was discovered to have mediastinal lymphadenopathy and an EBUS was performed by Pulmonary. This was diagnostic for Adeno of unknown primary. He was presented in tumor board and the recommendations were for more tissue to continue the workup to determine the primary. He reports that he is nervous but essentially asymptomatic    No Known Allergies    PMHx: cardiomyopathy, colon cancer, HTN    PSHx: partial colectomy, splenectomy    SocHx: quit smoking 2016    Family History   Problem Relation Age of Onset    Stroke Mother     Coronary Artery Disease Sister     Heart Disease Paternal Grandfather        Outpatient Medications Marked as Taking for the 6/20/19 encounter (Office Visit) with Gilbert Snider MD   Medication Sig Dispense Refill    tamsulosin (FLOMAX) 0.4 mg capsule Take 0.4 mg by mouth daily.  OTHER Take  by inhalation. Alero Inhaler      lisinopril (PRINIVIL, ZESTRIL) 5 mg tablet TAKE 1 TABLET BY MOUTH DAILY 30 Tab 0    metoprolol succinate (TOPROL-XL) 25 mg XL tablet Take 1 Tab by mouth nightly. 30 Tab 5       ROS:    Constitutional- denies weight loss or gain  HEENT- denies dysphagia  Neuro- denies syncope  Optho- no recent changes in vision  Resp- denies hemoptysis  CV- denies angina or palpitations  GI- denies abd pain  - no complaints  ID- denies recent fevers  Vasc- denies claudication    Blood pressure 117/77, pulse 84, resp. rate 18, height 5' 11\" (1.803 m), weight 176 lb (79.8 kg), SpO2 92 %.     On exam he is seated upright  Alert and oriented  Well developed, muscular  Appears younger than his stated age  Normal speech, normal affect  No cervical or supraclavicular lymphadenopathy  Lungs CTA b/l  Rrr, no murmurs  Radial pulses palp b/l  Abd sntnd, no organomegaly  LE non edematous    ==============================    Labs pending    ==============================    I have personally reviewed his Chest CT and PET scan. He has enlarged, hypermetabolic station 2R and 4R LNs. No other evidence of disease    ==============================    PFTs- FeV1   DLCO    ==============================    Pathology- Adenocarcinoma, mixed staining patter    ==========================    Diagnoses  1: Adenocarcinoma of unknown origin  2: Mediastinal lymphadenopathy    =============================    Mr. Coco Pugh has adenocarcinoma in his mediastinal lymph nodes. The primary is unknown. More tissue is needed. I think he would benefit from a mediastinoscopy for surgical biopsies. Will post for next week. He is comfortable with this plan and just wants definitive answers so he can start getting treated. Thank you for allowing us to care for Mr Alia Boyd spent 60 minutes with Amy Oliver and their family, greater than 50% of which was spent in counseling and education reviewing their films, discussing surgical options and formulating a future care plan.

## 2019-06-20 NOTE — TELEPHONE ENCOUNTER
Patient called requesting a call back in regards to the medical records he had sent over. Patient would like to know if that was enough records or is there something in particular Dr. Joes Bauman wanted him to send. The records that the patient sent over have been scanned into his chart.  # [phone] or [phone]

## 2019-06-23 LAB
AFP-TM SERPL-MCNC: 2.5 NG/ML (ref 0–8.3)
ALBUMIN SERPL-MCNC: 4.4 G/DL (ref 3.6–4.8)
ALBUMIN/GLOB SERPL: 1.6 {RATIO} (ref 1.2–2.2)
ALP SERPL-CCNC: 57 IU/L (ref 39–117)
ALT SERPL-CCNC: 8 IU/L (ref 0–44)
AST SERPL-CCNC: 10 IU/L (ref 0–40)
B-HCG SERPL QL: NEGATIVE MIU/ML
BASOPHILS # BLD AUTO: 0 X10E3/UL (ref 0–0.2)
BASOPHILS NFR BLD AUTO: 0 %
BILIRUB SERPL-MCNC: 0.4 MG/DL (ref 0–1.2)
BUN SERPL-MCNC: 15 MG/DL (ref 8–27)
BUN/CREAT SERPL: 13 (ref 10–24)
CALCIUM SERPL-MCNC: 9.9 MG/DL (ref 8.6–10.2)
CANCER AG19-9 SERPL-ACNC: 1 U/ML (ref 0–35)
CEA SERPL-MCNC: 26.8 NG/ML (ref 0–4.7)
CHLORIDE SERPL-SCNC: 103 MMOL/L (ref 96–106)
CO2 SERPL-SCNC: 25 MMOL/L (ref 20–29)
CREAT SERPL-MCNC: 1.16 MG/DL (ref 0.76–1.27)
EOSINOPHIL # BLD AUTO: 0.1 X10E3/UL (ref 0–0.4)
EOSINOPHIL NFR BLD AUTO: 1 %
ERYTHROCYTE [DISTWIDTH] IN BLOOD BY AUTOMATED COUNT: 16.2 % (ref 12.3–15.4)
GLOBULIN SER CALC-MCNC: 2.8 G/DL (ref 1.5–4.5)
GLUCOSE SERPL-MCNC: 95 MG/DL (ref 65–99)
HCT VFR BLD AUTO: 41 % (ref 37.5–51)
HGB BLD-MCNC: 13.7 G/DL (ref 13–17.7)
IMM GRANULOCYTES # BLD AUTO: 0 X10E3/UL (ref 0–0.1)
IMM GRANULOCYTES NFR BLD AUTO: 0 %
LDH SERPL-CCNC: 170 IU/L (ref 121–224)
LYMPHOCYTES # BLD AUTO: 2.5 X10E3/UL (ref 0.7–3.1)
LYMPHOCYTES NFR BLD AUTO: 41 %
MCH RBC QN AUTO: 29.5 PG (ref 26.6–33)
MCHC RBC AUTO-ENTMCNC: 33.4 G/DL (ref 31.5–35.7)
MCV RBC AUTO: 88 FL (ref 79–97)
MONOCYTES # BLD AUTO: 0.7 X10E3/UL (ref 0.1–0.9)
MONOCYTES NFR BLD AUTO: 11 %
NEUTROPHILS # BLD AUTO: 2.9 X10E3/UL (ref 1.4–7)
NEUTROPHILS NFR BLD AUTO: 47 %
PLATELET # BLD AUTO: 255 X10E3/UL (ref 150–450)
POTASSIUM SERPL-SCNC: 5.1 MMOL/L (ref 3.5–5.2)
PROT SERPL-MCNC: 7.2 G/DL (ref 6–8.5)
PSA SERPL-MCNC: 0.5 NG/ML (ref 0–4)
RBC # BLD AUTO: 4.64 X10E6/UL (ref 4.14–5.8)
SODIUM SERPL-SCNC: 141 MMOL/L (ref 134–144)
WBC # BLD AUTO: 6.1 X10E3/UL (ref 3.4–10.8)

## 2019-06-25 ENCOUNTER — HOSPITAL ENCOUNTER (OUTPATIENT)
Age: 59
Discharge: HOME | End: 2019-06-25
Attending: THORACIC SURGERY (CARDIOTHORACIC VASCULAR SURGERY) | Admitting: THORACIC SURGERY (CARDIOTHORACIC VASCULAR SURGERY)
Payer: OTHER GOVERNMENT

## 2019-06-25 ENCOUNTER — APPOINTMENT (OUTPATIENT)
Dept: GENERAL RADIOLOGY | Age: 59
End: 2019-06-25
Attending: THORACIC SURGERY (CARDIOTHORACIC VASCULAR SURGERY)
Payer: OTHER GOVERNMENT

## 2019-06-25 ENCOUNTER — ANESTHESIA EVENT (OUTPATIENT)
Dept: SURGERY | Age: 59
End: 2019-06-25
Payer: OTHER GOVERNMENT

## 2019-06-25 ENCOUNTER — ANESTHESIA (OUTPATIENT)
Dept: SURGERY | Age: 59
End: 2019-06-25
Payer: OTHER GOVERNMENT

## 2019-06-25 VITALS
TEMPERATURE: 97.5 F | HEART RATE: 53 BPM | RESPIRATION RATE: 11 BRPM | WEIGHT: 176 LBS | SYSTOLIC BLOOD PRESSURE: 112 MMHG | HEIGHT: 71 IN | DIASTOLIC BLOOD PRESSURE: 70 MMHG | BODY MASS INDEX: 24.64 KG/M2 | OXYGEN SATURATION: 97 %

## 2019-06-25 DIAGNOSIS — C80.1 ADENOCARCINOMA OF UNKNOWN ORIGIN (HCC): Primary | ICD-10-CM

## 2019-06-25 LAB
ABO + RH BLD: NORMAL
BLOOD GROUP ANTIBODIES SERPL: NORMAL
SPECIMEN EXP DATE BLD: NORMAL

## 2019-06-25 PROCEDURE — 77030032490 HC SLV COMPR SCD KNE COVD -B: Performed by: THORACIC SURGERY (CARDIOTHORACIC VASCULAR SURGERY)

## 2019-06-25 PROCEDURE — 77030018836 HC SOL IRR NACL ICUM -A: Performed by: THORACIC SURGERY (CARDIOTHORACIC VASCULAR SURGERY)

## 2019-06-25 PROCEDURE — 77030026438 HC STYL ET INTUB CARD -A: Performed by: NURSE ANESTHETIST, CERTIFIED REGISTERED

## 2019-06-25 PROCEDURE — 77030013079 HC BLNKT BAIR HGGR 3M -A: Performed by: NURSE ANESTHETIST, CERTIFIED REGISTERED

## 2019-06-25 PROCEDURE — 88342 IMHCHEM/IMCYTCHM 1ST ANTB: CPT

## 2019-06-25 PROCEDURE — 77030031139 HC SUT VCRL2 J&J -A: Performed by: THORACIC SURGERY (CARDIOTHORACIC VASCULAR SURGERY)

## 2019-06-25 PROCEDURE — 86900 BLOOD TYPING SEROLOGIC ABO: CPT

## 2019-06-25 PROCEDURE — 88305 TISSUE EXAM BY PATHOLOGIST: CPT

## 2019-06-25 PROCEDURE — 88331 PATH CONSLTJ SURG 1 BLK 1SPC: CPT

## 2019-06-25 PROCEDURE — 76210000016 HC OR PH I REC 1 TO 1.5 HR: Performed by: THORACIC SURGERY (CARDIOTHORACIC VASCULAR SURGERY)

## 2019-06-25 PROCEDURE — 74011000250 HC RX REV CODE- 250: Performed by: THORACIC SURGERY (CARDIOTHORACIC VASCULAR SURGERY)

## 2019-06-25 PROCEDURE — 36415 COLL VENOUS BLD VENIPUNCTURE: CPT

## 2019-06-25 PROCEDURE — 74011250636 HC RX REV CODE- 250/636: Performed by: THORACIC SURGERY (CARDIOTHORACIC VASCULAR SURGERY)

## 2019-06-25 PROCEDURE — 77030014008 HC SPNG HEMSTAT J&J -C: Performed by: THORACIC SURGERY (CARDIOTHORACIC VASCULAR SURGERY)

## 2019-06-25 PROCEDURE — 76210000021 HC REC RM PH II 0.5 TO 1 HR: Performed by: THORACIC SURGERY (CARDIOTHORACIC VASCULAR SURGERY)

## 2019-06-25 PROCEDURE — 74011000250 HC RX REV CODE- 250

## 2019-06-25 PROCEDURE — 76010000149 HC OR TIME 1 TO 1.5 HR: Performed by: THORACIC SURGERY (CARDIOTHORACIC VASCULAR SURGERY)

## 2019-06-25 PROCEDURE — 77030002996 HC SUT SLK J&J -A: Performed by: THORACIC SURGERY (CARDIOTHORACIC VASCULAR SURGERY)

## 2019-06-25 PROCEDURE — 77030008684 HC TU ET CUF COVD -B: Performed by: NURSE ANESTHETIST, CERTIFIED REGISTERED

## 2019-06-25 PROCEDURE — 76060000033 HC ANESTHESIA 1 TO 1.5 HR: Performed by: THORACIC SURGERY (CARDIOTHORACIC VASCULAR SURGERY)

## 2019-06-25 PROCEDURE — 77030020782 HC GWN BAIR PAWS FLX 3M -B

## 2019-06-25 PROCEDURE — 74011250636 HC RX REV CODE- 250/636

## 2019-06-25 PROCEDURE — 74011250637 HC RX REV CODE- 250/637: Performed by: ANESTHESIOLOGY

## 2019-06-25 PROCEDURE — 71045 X-RAY EXAM CHEST 1 VIEW: CPT

## 2019-06-25 PROCEDURE — 74011250636 HC RX REV CODE- 250/636: Performed by: ANESTHESIOLOGY

## 2019-06-25 PROCEDURE — 77030011640 HC PAD GRND REM COVD -A: Performed by: THORACIC SURGERY (CARDIOTHORACIC VASCULAR SURGERY)

## 2019-06-25 PROCEDURE — 77030010938 HC CLP LIG TELE -A: Performed by: THORACIC SURGERY (CARDIOTHORACIC VASCULAR SURGERY)

## 2019-06-25 PROCEDURE — 88341 IMHCHEM/IMCYTCHM EA ADD ANTB: CPT

## 2019-06-25 RX ORDER — MIDAZOLAM HYDROCHLORIDE 1 MG/ML
INJECTION, SOLUTION INTRAMUSCULAR; INTRAVENOUS AS NEEDED
Status: DISCONTINUED | OUTPATIENT
Start: 2019-06-25 | End: 2019-06-25 | Stop reason: HOSPADM

## 2019-06-25 RX ORDER — ROPIVACAINE HYDROCHLORIDE 5 MG/ML
150 INJECTION, SOLUTION EPIDURAL; INFILTRATION; PERINEURAL AS NEEDED
Status: DISCONTINUED | OUTPATIENT
Start: 2019-06-25 | End: 2019-06-25 | Stop reason: HOSPADM

## 2019-06-25 RX ORDER — MIDAZOLAM HYDROCHLORIDE 1 MG/ML
1 INJECTION, SOLUTION INTRAMUSCULAR; INTRAVENOUS AS NEEDED
Status: DISCONTINUED | OUTPATIENT
Start: 2019-06-25 | End: 2019-06-25 | Stop reason: HOSPADM

## 2019-06-25 RX ORDER — EPHEDRINE SULFATE/0.9% NACL/PF 50 MG/5 ML
5 SYRINGE (ML) INTRAVENOUS AS NEEDED
Status: DISCONTINUED | OUTPATIENT
Start: 2019-06-25 | End: 2019-06-25 | Stop reason: HOSPADM

## 2019-06-25 RX ORDER — FENTANYL CITRATE 50 UG/ML
INJECTION, SOLUTION INTRAMUSCULAR; INTRAVENOUS AS NEEDED
Status: DISCONTINUED | OUTPATIENT
Start: 2019-06-25 | End: 2019-06-25 | Stop reason: HOSPADM

## 2019-06-25 RX ORDER — SODIUM CHLORIDE 9 MG/ML
25 INJECTION, SOLUTION INTRAVENOUS CONTINUOUS
Status: DISCONTINUED | OUTPATIENT
Start: 2019-06-25 | End: 2019-06-25 | Stop reason: HOSPADM

## 2019-06-25 RX ORDER — DEXMEDETOMIDINE HYDROCHLORIDE 4 UG/ML
INJECTION, SOLUTION INTRAVENOUS AS NEEDED
Status: DISCONTINUED | OUTPATIENT
Start: 2019-06-25 | End: 2019-06-25 | Stop reason: HOSPADM

## 2019-06-25 RX ORDER — DIPHENHYDRAMINE HYDROCHLORIDE 50 MG/ML
12.5 INJECTION, SOLUTION INTRAMUSCULAR; INTRAVENOUS AS NEEDED
Status: DISCONTINUED | OUTPATIENT
Start: 2019-06-25 | End: 2019-06-25 | Stop reason: HOSPADM

## 2019-06-25 RX ORDER — OXYCODONE HYDROCHLORIDE 5 MG/1
5 TABLET ORAL AS NEEDED
Status: DISCONTINUED | OUTPATIENT
Start: 2019-06-25 | End: 2019-06-25 | Stop reason: HOSPADM

## 2019-06-25 RX ORDER — SODIUM CHLORIDE 0.9 % (FLUSH) 0.9 %
5-40 SYRINGE (ML) INJECTION EVERY 8 HOURS
Status: DISCONTINUED | OUTPATIENT
Start: 2019-06-25 | End: 2019-06-25 | Stop reason: HOSPADM

## 2019-06-25 RX ORDER — OXYCODONE AND ACETAMINOPHEN 5; 325 MG/1; MG/1
1 TABLET ORAL
Qty: 30 TAB | Refills: 0 | Status: SHIPPED | OUTPATIENT
Start: 2019-06-25 | End: 2019-06-30

## 2019-06-25 RX ORDER — MORPHINE SULFATE 10 MG/ML
2 INJECTION, SOLUTION INTRAMUSCULAR; INTRAVENOUS
Status: DISCONTINUED | OUTPATIENT
Start: 2019-06-25 | End: 2019-06-25 | Stop reason: HOSPADM

## 2019-06-25 RX ORDER — SODIUM CHLORIDE, SODIUM LACTATE, POTASSIUM CHLORIDE, CALCIUM CHLORIDE 600; 310; 30; 20 MG/100ML; MG/100ML; MG/100ML; MG/100ML
INJECTION, SOLUTION INTRAVENOUS
Status: DISCONTINUED | OUTPATIENT
Start: 2019-06-25 | End: 2019-06-25 | Stop reason: HOSPADM

## 2019-06-25 RX ORDER — NEOSTIGMINE METHYLSULFATE 1 MG/ML
INJECTION INTRAVENOUS AS NEEDED
Status: DISCONTINUED | OUTPATIENT
Start: 2019-06-25 | End: 2019-06-25 | Stop reason: HOSPADM

## 2019-06-25 RX ORDER — SODIUM CHLORIDE, SODIUM LACTATE, POTASSIUM CHLORIDE, CALCIUM CHLORIDE 600; 310; 30; 20 MG/100ML; MG/100ML; MG/100ML; MG/100ML
1000 INJECTION, SOLUTION INTRAVENOUS CONTINUOUS
Status: DISCONTINUED | OUTPATIENT
Start: 2019-06-25 | End: 2019-06-25 | Stop reason: HOSPADM

## 2019-06-25 RX ORDER — ONDANSETRON 2 MG/ML
INJECTION INTRAMUSCULAR; INTRAVENOUS AS NEEDED
Status: DISCONTINUED | OUTPATIENT
Start: 2019-06-25 | End: 2019-06-25 | Stop reason: HOSPADM

## 2019-06-25 RX ORDER — SODIUM CHLORIDE, SODIUM LACTATE, POTASSIUM CHLORIDE, CALCIUM CHLORIDE 600; 310; 30; 20 MG/100ML; MG/100ML; MG/100ML; MG/100ML
100 INJECTION, SOLUTION INTRAVENOUS CONTINUOUS
Status: DISCONTINUED | OUTPATIENT
Start: 2019-06-25 | End: 2019-06-25 | Stop reason: HOSPADM

## 2019-06-25 RX ORDER — ONDANSETRON 2 MG/ML
4 INJECTION INTRAMUSCULAR; INTRAVENOUS AS NEEDED
Status: DISCONTINUED | OUTPATIENT
Start: 2019-06-25 | End: 2019-06-25 | Stop reason: HOSPADM

## 2019-06-25 RX ORDER — ROCURONIUM BROMIDE 10 MG/ML
INJECTION, SOLUTION INTRAVENOUS AS NEEDED
Status: DISCONTINUED | OUTPATIENT
Start: 2019-06-25 | End: 2019-06-25 | Stop reason: HOSPADM

## 2019-06-25 RX ORDER — MIDAZOLAM HYDROCHLORIDE 1 MG/ML
0.5 INJECTION, SOLUTION INTRAMUSCULAR; INTRAVENOUS
Status: DISCONTINUED | OUTPATIENT
Start: 2019-06-25 | End: 2019-06-25 | Stop reason: HOSPADM

## 2019-06-25 RX ORDER — FENTANYL CITRATE 50 UG/ML
50 INJECTION, SOLUTION INTRAMUSCULAR; INTRAVENOUS AS NEEDED
Status: DISCONTINUED | OUTPATIENT
Start: 2019-06-25 | End: 2019-06-25 | Stop reason: HOSPADM

## 2019-06-25 RX ORDER — GLYCOPYRROLATE 0.2 MG/ML
INJECTION INTRAMUSCULAR; INTRAVENOUS AS NEEDED
Status: DISCONTINUED | OUTPATIENT
Start: 2019-06-25 | End: 2019-06-25 | Stop reason: HOSPADM

## 2019-06-25 RX ORDER — HYDROMORPHONE HYDROCHLORIDE 1 MG/ML
0.2 INJECTION, SOLUTION INTRAMUSCULAR; INTRAVENOUS; SUBCUTANEOUS
Status: ACTIVE | OUTPATIENT
Start: 2019-06-25 | End: 2019-06-25

## 2019-06-25 RX ORDER — LIDOCAINE HYDROCHLORIDE 10 MG/ML
0.1 INJECTION, SOLUTION EPIDURAL; INFILTRATION; INTRACAUDAL; PERINEURAL AS NEEDED
Status: DISCONTINUED | OUTPATIENT
Start: 2019-06-25 | End: 2019-06-25 | Stop reason: HOSPADM

## 2019-06-25 RX ORDER — EPHEDRINE SULFATE/0.9% NACL/PF 50 MG/5 ML
SYRINGE (ML) INTRAVENOUS AS NEEDED
Status: DISCONTINUED | OUTPATIENT
Start: 2019-06-25 | End: 2019-06-25 | Stop reason: HOSPADM

## 2019-06-25 RX ORDER — DEXAMETHASONE SODIUM PHOSPHATE 4 MG/ML
INJECTION, SOLUTION INTRA-ARTICULAR; INTRALESIONAL; INTRAMUSCULAR; INTRAVENOUS; SOFT TISSUE AS NEEDED
Status: DISCONTINUED | OUTPATIENT
Start: 2019-06-25 | End: 2019-06-25 | Stop reason: HOSPADM

## 2019-06-25 RX ORDER — PHENYLEPHRINE HCL IN 0.9% NACL 0.4MG/10ML
SYRINGE (ML) INTRAVENOUS AS NEEDED
Status: DISCONTINUED | OUTPATIENT
Start: 2019-06-25 | End: 2019-06-25 | Stop reason: HOSPADM

## 2019-06-25 RX ORDER — SODIUM CHLORIDE 0.9 % (FLUSH) 0.9 %
5-40 SYRINGE (ML) INJECTION AS NEEDED
Status: DISCONTINUED | OUTPATIENT
Start: 2019-06-25 | End: 2019-06-25 | Stop reason: HOSPADM

## 2019-06-25 RX ORDER — CEFAZOLIN SODIUM/WATER 2 G/20 ML
2 SYRINGE (ML) INTRAVENOUS ONCE
Status: COMPLETED | OUTPATIENT
Start: 2019-06-25 | End: 2019-06-25

## 2019-06-25 RX ORDER — ACETAMINOPHEN 325 MG/1
650 TABLET ORAL ONCE
Status: COMPLETED | OUTPATIENT
Start: 2019-06-25 | End: 2019-06-25

## 2019-06-25 RX ORDER — FENTANYL CITRATE 50 UG/ML
25 INJECTION, SOLUTION INTRAMUSCULAR; INTRAVENOUS
Status: DISCONTINUED | OUTPATIENT
Start: 2019-06-25 | End: 2019-06-25 | Stop reason: HOSPADM

## 2019-06-25 RX ORDER — LIDOCAINE HYDROCHLORIDE 20 MG/ML
INJECTION, SOLUTION EPIDURAL; INFILTRATION; INTRACAUDAL; PERINEURAL AS NEEDED
Status: DISCONTINUED | OUTPATIENT
Start: 2019-06-25 | End: 2019-06-25 | Stop reason: HOSPADM

## 2019-06-25 RX ORDER — PROPOFOL 10 MG/ML
INJECTION, EMULSION INTRAVENOUS AS NEEDED
Status: DISCONTINUED | OUTPATIENT
Start: 2019-06-25 | End: 2019-06-25 | Stop reason: HOSPADM

## 2019-06-25 RX ADMIN — DEXAMETHASONE SODIUM PHOSPHATE 4 MG: 4 INJECTION, SOLUTION INTRA-ARTICULAR; INTRALESIONAL; INTRAMUSCULAR; INTRAVENOUS; SOFT TISSUE at 09:38

## 2019-06-25 RX ADMIN — Medication 120 MCG: at 09:36

## 2019-06-25 RX ADMIN — FENTANYL CITRATE 50 MCG: 50 INJECTION, SOLUTION INTRAMUSCULAR; INTRAVENOUS at 09:48

## 2019-06-25 RX ADMIN — NEOSTIGMINE METHYLSULFATE 3 MG: 1 INJECTION INTRAVENOUS at 10:18

## 2019-06-25 RX ADMIN — SODIUM CHLORIDE, SODIUM LACTATE, POTASSIUM CHLORIDE, CALCIUM CHLORIDE: 600; 310; 30; 20 INJECTION, SOLUTION INTRAVENOUS at 09:12

## 2019-06-25 RX ADMIN — PROPOFOL 250 MG: 10 INJECTION, EMULSION INTRAVENOUS at 09:24

## 2019-06-25 RX ADMIN — LIDOCAINE HYDROCHLORIDE 60 MG: 20 INJECTION, SOLUTION EPIDURAL; INFILTRATION; INTRACAUDAL; PERINEURAL at 09:24

## 2019-06-25 RX ADMIN — FENTANYL CITRATE 100 MCG: 50 INJECTION, SOLUTION INTRAMUSCULAR; INTRAVENOUS at 09:24

## 2019-06-25 RX ADMIN — FENTANYL CITRATE 50 MCG: 50 INJECTION, SOLUTION INTRAMUSCULAR; INTRAVENOUS at 10:35

## 2019-06-25 RX ADMIN — Medication 2 G: at 09:32

## 2019-06-25 RX ADMIN — DEXMEDETOMIDINE HYDROCHLORIDE 8 MCG: 4 INJECTION, SOLUTION INTRAVENOUS at 09:42

## 2019-06-25 RX ADMIN — ROCURONIUM BROMIDE 50 MG: 10 INJECTION, SOLUTION INTRAVENOUS at 09:24

## 2019-06-25 RX ADMIN — SODIUM CHLORIDE, SODIUM LACTATE, POTASSIUM CHLORIDE, AND CALCIUM CHLORIDE 1000 ML: 600; 310; 30; 20 INJECTION, SOLUTION INTRAVENOUS at 08:21

## 2019-06-25 RX ADMIN — ACETAMINOPHEN 650 MG: 325 TABLET ORAL at 08:25

## 2019-06-25 RX ADMIN — Medication 120 MCG: at 09:32

## 2019-06-25 RX ADMIN — MIDAZOLAM HYDROCHLORIDE 2 MG: 1 INJECTION, SOLUTION INTRAMUSCULAR; INTRAVENOUS at 09:19

## 2019-06-25 RX ADMIN — GLYCOPYRROLATE 0.6 MG: 0.2 INJECTION INTRAMUSCULAR; INTRAVENOUS at 10:18

## 2019-06-25 RX ADMIN — DEXMEDETOMIDINE HYDROCHLORIDE 4 MCG: 4 INJECTION, SOLUTION INTRAVENOUS at 09:40

## 2019-06-25 RX ADMIN — DEXMEDETOMIDINE HYDROCHLORIDE 8 MCG: 4 INJECTION, SOLUTION INTRAVENOUS at 09:46

## 2019-06-25 RX ADMIN — ONDANSETRON 4 MG: 2 INJECTION INTRAMUSCULAR; INTRAVENOUS at 09:38

## 2019-06-25 RX ADMIN — Medication 20 MG: at 09:59

## 2019-06-25 NOTE — BRIEF OP NOTE
BRIEF OPERATIVE NOTE    Date of Procedure: 6/25/2019   Preoperative Diagnosis: ADENOCARCINOMA OF UNKNOWN PRIMARY  Postoperative Diagnosis: ADENOCARCINOMA OF UNKNOWN PRIMARY    Procedure(s):  MEDIASTINOSCOPY  Surgeon(s) and Role:     Serina Santos MD - Primary         Surgical Assistant: none    Surgical Staff:  Circ-1: Chris Nur RN  Physician Assistant: DOMINIK Pina  Scrub RN-1: Anuja Serrano RN  Event Time In Time Out   Incision Start 0199    Incision Close 1008      Anesthesia: General   Estimated Blood Loss: minimal  Specimens:   ID Type Source Tests Collected by Time Destination   1 : 2 R lymph node Frozen Section Lymph Node  Armando Thayer MD 6/25/2019 6883 Pathology   2 : 2 R lymph node Fresh Lymph Node  Armando Thayer MD 6/25/2019 0984 Pathology      Findings: 2R LN biopsied.   Frozen confirms metastatic disease   Complications: none  Implants: * No implants in log *    Condition: stable    Disposition: to pacu

## 2019-06-25 NOTE — OP NOTES
[address]  
OPERATIVE REPORT Name:  Ernesto Ojeda 
MR#:  [id number] :  1960 ACCOUNT #:  [de-identified] DATE OF SERVICE:  2019 CLINICAL SERVICE:  Thoracic Service. ATTENDING SURGEON:  Maame Powell MD 
 
OPERATIONS PERFORMED:  Mediastinoscopy with multiple excisional biopsies. PREOPERATIVE DIAGNOSES: 
1. Adenocarcinoma of unknown primary. 2.  Mediastinal lymphadenopathy. POSTOPERATIVE DIAGNOSES: 
1. Adenocarcinoma of unknown primary. 2.  Mediastinal lymphadenopathy. FIRST ASSIST:  DOMINIK Abarca 
 
SPECIMENS SENT:  Multiple biopsies of lymph nodes from stations 2R were sent to anatomic pathology. DRAINS AND TUBE:  None. ANESTHESIA:  General with endotracheal intubation. ESTIMATED BLOOD LOSS:  For this case was minimal. 
 
INDICATIONS FOR PROCEDURE:  The patient is a 41-year-old gentleman with a remote history of colon cancer who has been seen by Oncology for incidentally diagnosed mediastinal lymphadenopathy. He underwent an EBUS which showed adenocarcinoma of unknown primary, however, there was not enough tissue to complete a workup. He was presented in Tumor Board and the decision was made to proceed with mediastinoscopy for surgical biopsies. PROCEDURE IN DETAIL:  After informed consent was obtained and placed on the chart, the patient was taken to the operating room and placed supine on the operating table. General anesthesia with endotracheal intubation was induced without complication. Preoperative antibiotics were administered. The patient's neck was placed in an extended position and the anterior neck and chest were prepped and draped in sterile fashion. Time-out was performed. A semilunar incision was then made just cephalad to the sternal notch. Dissection was carried down through the platysma. The strap muscles were identified and the raphae was divided.   Dissection was carried down to the pretracheal fascia which was developed in a caudad fashion. Mediastinoscope was then introduced. Correlating the PET scan and CT scan with a very large lymph node which was encountered at station 2R. Multiple excisional biopsies of this lymph node were taken. A very small sample was sent for frozen section to confirm we had the correct specimen. Frozen section revealed metastatic carcinoma. The rest of the biopsies were sent to anatomic pathology for permanent section. Meticulous hemostasis was ensured. Mediastinoscope was withdrawn. Strap muscles were reapproximated using 3-0 Vicryl suture, the platysma was reapproximated using 3-0 Vicryl suture, and the skin closed using 4-0 Vicryl subcuticular stitch and Dermabond. Approximately, 25 mL of 1% lidocaine mixed with 0.25% Marcaine was used as local anesthetic. The patient was then reversed from general anesthesia, extubated, and taken to PACU in stable condition. All surgical counts were correct x2 at the end of this case. There were no immediate complications identified during this case. Dr. Letha Gonzalez was present and scrubbed throughout the entire procedure. Jose LEHMAN, was instrumental in completion of the operation. She assisted with opening and closing of all incisions and was the primary bedside Chavez Fontana MD 
 
 
[id number] 
D:  06/25/2019 13:02 
T:  06/25/2019 13:07 JOB #:  [id number] CC:  Dot Cowart MD

## 2019-06-25 NOTE — DISCHARGE INSTRUCTIONS
Patient Education        Mediastinoscopy: What to Expect at Home  Your Recovery  Mediastinoscopy (say \"lpi-bpu-lyhi-xyb-USN-ofxg-pee\") is a test that looks at the space behind your breastbone in front of your lungs. During the test, your doctor made a small cut (incision) in your neck or chest. Then the doctor placed a lighted tube into the cut to look around inside that space. You may be sore where the doctor made the cut in your skin and put in the lighted tube. You may feel some pain in your lung when you take a deep breath. These symptoms usually get better in a few days. You may need to take it easy at home for a day or two after the procedure. For 1 week, try to avoid heavy lifting and strenuous activities. Your doctor may give you specific instructions on when you can do your normal activities again, such as driving and going back to work. This care sheet gives you a general idea about how long it will take for you to recover. But each person recovers at a different pace. Follow the steps below to get better as quickly as possible. How can you care for yourself at home? Activity    · You can do your normal activities when it feels okay to do so.     · Many people are able to return to work within a few days after this procedure. Diet    · You can eat your normal diet. If your stomach is upset, try bland, low-fat foods like plain rice, broiled chicken, toast, and yogurt. Medicines    · Be safe with medicines. Read and follow all instructions on the label. ? If the doctor gave you a prescription medicine for pain, take it as prescribed. ? If you are not taking a prescription pain medicine, ask your doctor if you can take an over-the-counter medicine.     · If you take aspirin or some other blood thinner, be sure to talk to your doctor. He or she will tell you if and when to start taking this medicine again.  Make sure that you understand exactly what your doctor wants you to do.     · Your doctor will tell you if and when you can restart your medicines. He or she will also give you instructions about taking any new medicines. Incision care    · You will have a dressing over the cut (incision). A dressing helps the incision heal and protects it. Your doctor will tell you how to take care of this.     · If you have strips of tape on the cut the doctor made, leave the tape on for a week or until it falls off.     · If you had stitches, your doctor will tell you when to come back to have them removed.     · You may shower 24 to 48 hours after the procedure, if your doctor okays it. Pat the incision dry. Do not take a bath for the first 2 weeks, or until your doctor tells you it is okay. Follow-up care is a key part of your treatment and safety. Be sure to make and go to all appointments, and call your doctor if you are having problems. It's also a good idea to know your test results and keep a list of the medicines you take. When should you call for help? Call 911 anytime you think you may need emergency care. For example, call if:    · You passed out (lost consciousness).     · You have severe trouble breathing.     · You are having chest pain that is different or worse than usual.     · You cough up blood.    Call your doctor now or seek immediate medical care if:    · You have new pain, or your pain gets worse.     · You have loose stitches, or your incision comes open.     · You have trouble breathing.     · You have symptoms of infection, such as:  ? Increased pain, swelling, warmth, or redness. ? Red streaks leading from the incision. ? Pus draining from the incision. ? A fever.    Watch closely for any changes in your health, and be sure to contact your doctor if you have any problems. Where can you learn more? Go to http://judi-juliana.info/. Enter D579 in the search box to learn more about \"Mediastinoscopy: What to Expect at Home. \"  Current as of: September 5, 2018  Content Version: 11.9  © 2006-2018 [employer]. Care instructions adapted under license by [employer] (which disclaims liability or warranty for this information). If you have questions about a medical condition or this instruction, always ask your healthcare professional. [address] any warranty or liability for your use of this information. ______________________________________________________________________    Anesthesia Discharge Instructions    After general anesthesia or intervenous sedation, for 24 hours or while taking prescription Narcotics:  · Limit your activities  · Do not drive or operate hazardous machinery  · If you have not urinated within 8 hours after discharge, please contact your surgeon on call. · Do not make important personal or business decisions  · Do not drink alcoholic beverages    Report the following to your surgeon:  · Excessive pain, swelling, redness or odor of or around the surgical area  · Temperature over 100.5 degrees  · Nausea and vomiting lasting longer than 4 hours or if unable to take medication  · Any signs of decreased circulation or nerve impairment to extremity:  Change in color, persistent numbness, tingling, coldness or increased pain.   · Any questions

## 2019-06-25 NOTE — ANESTHESIA PREPROCEDURE EVALUATION
Relevant Problems No relevant active problems Anesthetic History No history of anesthetic complications Review of Systems / Medical History Patient summary reviewed, nursing notes reviewed and pertinent labs reviewed Pulmonary COPD Smoker Neuro/Psych Within defined limits Cardiovascular Hypertension CHF Comments: CM 45% GI/Hepatic/Renal 
  
GERD Endo/Other Cancer Other Findings Physical Exam 
 
Airway Mallampati: II 
TM Distance: > 6 cm Neck ROM: normal range of motion Mouth opening: Normal 
 
 Cardiovascular Regular rate and rhythm,  S1 and S2 normal,  no murmur, click, rub, or gallop Dental 
No notable dental hx Pulmonary Breath sounds clear to auscultation Abdominal 
GI exam deferred Other Findings Anesthetic Plan ASA: 3 Anesthesia type: general 
 
 
 
 
Induction: Intravenous Anesthetic plan and risks discussed with: Patient

## 2019-06-25 NOTE — H&P
Asked to see Mr Bravo Leahy re: mediastinal lymphadenopathy     Referred by Dr. Satnam Greene     Mr Bravo Leahy is a pleasant 59 y/o gentleman with a past medical history significant for HTN, cardiomyopathy and colon cancer. He is referred by Oncology for a surgical biopsy. He was discovered to have mediastinal lymphadenopathy and an EBUS was performed by Pulmonary. This was diagnostic for Adeno of unknown primary. He was presented in tumor board and the recommendations were for more tissue to continue the workup to determine the primary. He reports that he is nervous but essentially asymptomatic     No Known Allergies     PMHx: cardiomyopathy, colon cancer, HTN     PSHx: partial colectomy, splenectomy     SocHx: quit smoking 2016           Family History   Problem Relation Age of Onset    Stroke Mother      Coronary Artery Disease Sister      Heart Disease Paternal Grandfather                  Outpatient Medications Marked as Taking for the 6/20/19 encounter (Office Visit) with Farhana Sam MD   Medication Sig Dispense Refill    tamsulosin (FLOMAX) 0.4 mg capsule Take 0.4 mg by mouth daily.        OTHER Take  by inhalation. Alero Inhaler        lisinopril (PRINIVIL, ZESTRIL) 5 mg tablet TAKE 1 TABLET BY MOUTH DAILY 30 Tab 0    metoprolol succinate (TOPROL-XL) 25 mg XL tablet Take 1 Tab by mouth nightly. 30 Tab 5         ROS:     Constitutional- denies weight loss or gain  HEENT- denies dysphagia  Neuro- denies syncope  Optho- no recent changes in vision  Resp- denies hemoptysis  CV- denies angina or palpitations  GI- denies abd pain  - no complaints  ID- denies recent fevers  Vasc- denies claudication     Blood pressure 117/77, pulse 84, resp.  rate 18, height 5' 11\" (1.803 m), weight 176 lb (79.8 kg), SpO2 92 %.     On exam he is seated upright  Alert and oriented  Well developed, muscular  Appears younger than his stated age  Normal speech, normal affect  No cervical or supraclavicular lymphadenopathy  Lungs CTA b/l  Rrr, no murmurs  Radial pulses palp b/l  Abd sntnd, no organomegaly  LE non edematous     ==============================     Labs pending     ==============================     I have personally reviewed his Chest CT and PET scan. He has enlarged, hypermetabolic station 2R and 4R LNs. No other evidence of disease     ==============================     PFTs- FeV1   DLCO     ==============================     Pathology- Adenocarcinoma, mixed staining patter     ==========================     Diagnoses  1: Adenocarcinoma of unknown origin  2: Mediastinal lymphadenopathy     =============================     Mr. Piero Willis has adenocarcinoma in his mediastinal lymph nodes. The primary is unknown. More tissue is needed. I think he would benefit from a mediastinoscopy for surgical biopsies.     Will post for next week. He is comfortable with this plan and just wants definitive answers so he can start getting treated. Date of Surgery Update:  Godwin Grey was seen and examined. History and physical has been reviewed. The patient has been examined. There have been no significant clinical changes since the completion of the originally dated History and Physical.  Patient identified by surgeon; surgical site was confirmed by patient and surgeon.     Signed By: Gabbi Whipple MD     June 25, 2019 8:53 AM

## 2019-06-25 NOTE — ANESTHESIA POSTPROCEDURE EVALUATION
Post-Anesthesia Evaluation and Assessment Patient: Sully Griffiths MRN: [id number]  SSN: [id number] YOB: 1960  Age: 60 y.o. Sex: male I have evaluated the patient and they are stable and ready for discharge from the PACU. Cardiovascular Function/Vital Signs Visit Vitals /67 Pulse (!) 59 Temp 36.4 °C (97.5 °F) Resp 15 Ht 5' 11\" (1.803 m) Wt 79.8 kg (176 lb) SpO2 94% BMI 24.55 kg/m² Patient is status post General anesthesia for Procedure(s): MEDIASTINOSCOPY. Nausea/Vomiting: None Postoperative hydration reviewed and adequate. Pain: 
Pain Scale 1: Numeric (0 - 10) (06/25/19 1037) Pain Intensity 1: 6 (06/25/19 1037) Managed Neurological Status:  
Neuro (WDL): Within Defined Limits (06/25/19 1037) At baseline Mental Status, Level of Consciousness: Alert and  oriented to person, place, and time Pulmonary Status:  
O2 Device: Room air (06/25/19 1037) Adequate oxygenation and airway patent Complications related to anesthesia: None Post-anesthesia assessment completed. No concerns Signed By: Katiana Gonzalez MD   
 June 25, 2019 Procedure(s): MEDIASTINOSCOPY. general 
 
<BSHSIANPOST> Vitals Value Taken Time /67 6/25/2019 10:45 AM  
Temp 36.4 °C (97.5 °F) 6/25/2019 10:35 AM  
Pulse 58 6/25/2019 10:48 AM  
Resp 15 6/25/2019 10:48 AM  
SpO2 95 % 6/25/2019 10:48 AM  
Vitals shown include unvalidated device data.

## 2019-06-25 NOTE — ROUTINE PROCESS
Patient: Macho Plummer MRN: [id number]  SSN: [id number]   YOB: 1960  Age: 60 y.o. Sex: male     Patient is status post Procedure(s):  MEDIASTINOSCOPY.     Surgeon(s) and Role:     Kt Adjutant, MD - Primary    Local/Dose/Irrigation:                    Peripheral IV 06/25/19 Left Hand (Active)            Airway - Endotracheal Tube 06/25/19 Oral (Active)                   Dressing/Packing:       Splint/Cast:  ]    Other:

## 2019-07-09 ENCOUNTER — OFFICE VISIT (OUTPATIENT)
Dept: ONCOLOGY | Age: 59
End: 2019-07-09

## 2019-07-09 VITALS
OXYGEN SATURATION: 94 % | HEART RATE: 68 BPM | RESPIRATION RATE: 16 BRPM | SYSTOLIC BLOOD PRESSURE: 136 MMHG | TEMPERATURE: 98.3 F | BODY MASS INDEX: 24.56 KG/M2 | WEIGHT: 175.4 LBS | HEIGHT: 71 IN | DIASTOLIC BLOOD PRESSURE: 87 MMHG

## 2019-07-09 DIAGNOSIS — R59.0 MEDIASTINAL LYMPHADENOPATHY: ICD-10-CM

## 2019-07-09 DIAGNOSIS — N40.0 BENIGN PROSTATIC HYPERPLASIA, UNSPECIFIED WHETHER LOWER URINARY TRACT SYMPTOMS PRESENT: ICD-10-CM

## 2019-07-09 DIAGNOSIS — I10 ESSENTIAL HYPERTENSION: ICD-10-CM

## 2019-07-09 DIAGNOSIS — C80.1 ADENOCARCINOMA OF UNKNOWN PRIMARY (HCC): Primary | ICD-10-CM

## 2019-07-09 DIAGNOSIS — J44.9 CHRONIC OBSTRUCTIVE PULMONARY DISEASE, UNSPECIFIED COPD TYPE (HCC): ICD-10-CM

## 2019-07-09 DIAGNOSIS — Z85.038 HISTORY OF COLON CANCER: ICD-10-CM

## 2019-07-09 NOTE — PERIOP NOTES
[address]  Endoscopy Preprocedure Instructions      1. On the day of your surgery, please report to registration located on the 2nd floor of the  [facility]. yes    2. You must have a responsible adult to drive you to the hospital, stay at the hospital during your procedure and drive you home. If they leave your procedure will not be started (no exceptions). yes    3. Do not have anything to eat or drink (including water, gum, mints, coffee, and juice) after midnight. This does not apply to the medications you were instructed to take by your physician. yesIf you are currently taking Plavix, Coumadin, Aspirin, or other blood-thinning agents, contact your physician for special instructions. not applicable,    4. If you are having a procedure that requires bowel prep: We recommend that you have only clear liquids the day before your procedure and begin your bowel prep by 5:00 pm.  You may continue to drink clear liquids until midnight. If for any reason you are not able to complete your prep please notify your physician immediately. yes    5. Have a list of all current medications, including vitamins, herbal supplements and any other over the counter medications. yes    6. If you wear glasses, contacts, dentures and/or hearing aids, they may be removed prior to procedure, please bring a case to store them in. yes    7. You should understand that if you do not follow these instructions your procedure may be cancelled. If your physical condition changes (I.e. fever, cold or flu) please contact your doctor as soon as possible. 8. It is important that you be on time.   If for any reason you are unable to keep your appointment please call [phone] the day of or your physicians office prior to your scheduled procedure

## 2019-07-09 NOTE — PROGRESS NOTES
Ahmet Quiroz is a 60 y.o. male    Chief Complaint   Patient presents with    Lung Cancer     1. Have you been to the ER, urgent care clinic since your last visit? Hospitalized since your last visit? No    2. Have you seen or consulted any other health care providers outside of the [address] [place] [address] since your last visit? Include any pap smears or colon screening.  No

## 2019-07-09 NOTE — PROGRESS NOTES
[address] [facility] Oncology at [facility]  [phone]    Hematology / Oncology Established Visit    Reason for Visit:   Gloria Myers is a 60 y.o. male who is seen in consultation at the request of Dr. Jabari Multani for evaluation of lung cancer. Hematology Oncology Treatment History:     Diagnosis: Adenocarcinoma of unknown primary    Stage: Pending    Pathology:   6/4/19 4R LN biopsy: rare malignant cells admixed with abundant blood clot  6/4/19 4L LN FNA and core biopsy: Adenocarcinoma. 6/4/19 2R LN FNA and core biopsy: Adenocarcinoma. Comment: IHC stains negative for GATA3, AR, ER, p40. Immunohistochemistry shows that the tumor cells express CK7 with focal expression of CDX2. Tumor cells lack expression of CK20, TTF-1 and Napsin A. These findings are not entirely diagnostic and could be seen in either a primary pulmonary malignancy or a primary upper gastrointestinal tract malignancy. Other sites are also not excluded. Clinical and radiographic correlation is recommended. 6/25/19 2R LN, mediastinum: Metastatic adenocarcinoma (see Comment). Comment - The tumor appears poorly differentiated with areas of necrosis. Immunohistochemical staining reveals positive staining for cytokeratin 7 (strong, diffuse), CDX-2 (focal, weak to moderate) and CEA (focal, moderate to strong). The tumor cells are negative for cytokeratin 20, cytokeratin 5/6, p40, p63, TTF-1, napsin-A, PLAP, HCG, AFP and c-KIT (). The findings are not entirely specific with regard to primary site but would be consistent with upper GI/pancreaticobiliary tract. A pulmonary primary is less likely, but still in the differential.     Prior Treatment: None    Current Treatment: pending  Treatment duration   Frequency of visits     History of Present Illness:   Mr. Maico Greene is a 59 y/o male with COPD, remote h/o colon cancer comes in for evaluation of mediastinal lymphadenopathy.  Pt had recently p/w shortness of breath, dyspnea on exertion, and was found on chest CT to have R mediastinal lymphadenopathy, which also were PET avid. Case was discussed at Thoracic Tumor Board with thought that this could be pancreatic, urothelial, or germ cell origin. More tissue is recommended. Pt had EGD 3 yrs ago and was told he had GERD. Patient has h/o colon cancer in 2002. He states a cancerous polyp was found and then he underwent colectomy in 2002. This was done by Dr. Adolfo Pinzon at [facility]. Patient had colonoscopies regularly afterwards, last one was approx 2015. Last EGD was in 2018. Since the last visit, patient underwent surgical biopsy of mediastinal LN by Dr. Rowdy Wu. He saw Dr. Jeremiah Garcia who felt that new endoscopies could be done if needed. He saw Dr. Jason Chiang of [facility] and is planned for a cystoscopy later this month. Pt remains anxious about getting answers as soon as possible. Past Medical History:   Diagnosis Date    Abnormal nuclear stress test 12/7/2015    Cancer [facility] [address]     colon    Cardiomyopathy ([facility] [address] 2010    EF 45%    Chronic obstructive pulmonary disease (HCC)     Chronic systolic heart failure (HCC)     GERD (gastroesophageal reflux disease)     HTN (hypertension)     PAC (premature atrial contraction)     Tobacco use disorder       Past Surgical History:   Procedure Laterality Date    HX COLECTOMY      HX SPLENECTOMY        Social History     Tobacco Use    Smoking status: Former Smoker     Packs/day: 0.50     Types: Cigarettes     Last attempt to quit: 4/22/2016     Years since quitting: 3.2    Smokeless tobacco: Never Used   Substance Use Topics    Alcohol use: Yes     Comment: rarely      Family History   Problem Relation Age of Onset    Stroke Mother     Coronary Artery Disease Sister     Heart Disease Paternal Grandfather      Current Outpatient Medications   Medication Sig    tamsulosin (FLOMAX) 0.4 mg capsule Take 0.4 mg by mouth daily.  OTHER Take  by inhalation.  Alero Inhaler   [facility] lisinopril (PRINIVIL, ZESTRIL) 5 mg tablet TAKE 1 TABLET BY MOUTH DAILY    metoprolol succinate (TOPROL-XL) 25 mg XL tablet Take 1 Tab by mouth nightly. No current facility-administered medications for this visit. No Known Allergies     Review of Systems: A complete review of systems was obtained, negative except as described above. Physical Exam:     Visit Vitals  /87 (BP 1 Location: Left arm, BP Patient Position: Sitting)   Pulse 68   Temp 98.3 °F (36.8 °C) (Oral)   Resp 16   Ht 5' 11\" (1.803 m)   Wt 175 lb 6.4 oz (79.6 kg)   SpO2 94%   BMI 24.46 kg/m²     ECOG PS: 0  General: Well developed, no acute distress  Eyes: PERRLA, EOMI, anicteric sclerae  HENT: Atraumatic, OP clear, TMs intact without erythema  Neck: Supple  Lymphatic: No cervical, supraclavicular, axillary or inguinal adenopathy  Respiratory: CTAB, normal respiratory effort  CV: Normal rate, regular rhythm, no murmurs, no peripheral edema  GI: Soft, nontender, nondistended, no masses, no hepatomegaly, no splenomegaly  MS: Normal gait and station. Digits without clubbing or cyanosis. Skin: No rashes, ecchymoses, or petechiae. Normal temperature, turgor, and texture. Neuro/Psych: Alert, oriented. 5/5 strength in all 4 extremities. Appropriate affect, normal judgment/insight. Results:     Lab Results   Component Value Date/Time    WBC 6.1 06/21/2019 10:30 AM    HGB 13.7 06/21/2019 10:30 AM    HCT 41.0 06/21/2019 10:30 AM    PLATELET 079 27/22/3027 10:30 AM    MCV 88 06/21/2019 10:30 AM    ABS.  NEUTROPHILS 2.9 06/21/2019 10:30 AM     Lab Results   Component Value Date/Time    Sodium 141 06/21/2019 10:30 AM    Potassium 5.1 06/21/2019 10:30 AM    Chloride 103 06/21/2019 10:30 AM    CO2 25 06/21/2019 10:30 AM    Glucose 95 06/21/2019 10:30 AM    BUN 15 06/21/2019 10:30 AM    Creatinine 1.16 06/21/2019 10:30 AM    GFR est AA 79 06/21/2019 10:30 AM    GFR est non-AA 68 06/21/2019 10:30 AM    Calcium 9.9 06/21/2019 10:30 AM     Lab Results   Component Value Date/Time    Bilirubin, total 0.4 06/21/2019 10:30 AM    ALT (SGPT) 8 06/21/2019 10:30 AM    AST (SGOT) 10 06/21/2019 10:30 AM    Alk. phosphatase 57 06/21/2019 10:30 AM    Protein, total 7.2 06/21/2019 10:30 AM    Albumin 4.4 06/21/2019 10:30 AM     No results found for: IRON, FE, TIBC, IBCT, PSAT, FERR    No results found for: B12LT, FOL, RBCF  No results found for: TSH, TSH2, TSH3, TSHP, TSHEXT, TSHEXT  No results found for: HAMAT, HAAB, HABT, HAAT, HBSAG, HBSB, HBSAT, HBABN, HBCM, HBCAB, HBCAT, XBCABS, [address] [facility], [address] [place] [address], [facility], [id number], [address] [facility], [facility], HBCLT, HBEBLT, [id number], [id number], [address] [place] [address], [id number], HBCMLT, [id number], HCGAT      Imaging:     Chest CT 5/10/19:  FINDINGS:  THYROID: No nodule. MEDIASTINUM: There are enlarged lymph nodes in the right paratracheal region  with 3 enlarged lymph nodes in this area. The largest is inferiorly in the right  paratracheal region measuring 2.5 x 1.7 cm. There are also enlarged lymph nodes  in the medial and lateral AP window with the largest lymph node measuring 18 mm. These lymph nodes are worrisome for neoplastic process. There is a normal size  subcarinal lymph node. Velora Sis REBECCA: No mass or lymphadenopathy. THORACIC AORTA: No aneurysm. MAIN PULMONARY ARTERY: Normal in caliber. TRACHEA/BRONCHI: Patent. ESOPHAGUS: No wall thickening or dilatation. HEART: Normal in size. PLEURA: No effusion or pneumothorax. LUNGS: There are moderate changes of centrilobular emphysema. There are bullous  lesions at each apex left greater than right. There is a calcified granuloma in the left upper lobe.   There is volume loss in the medial segment right middle lobe that extends to a  oval-shaped opacity measures 1.8 x 1.1 cm this may all be atelectasis. Pulmonary  nodule within the atelectasis is not excluded. No abnormality corresponds with the rounded opacity seen on the chest  radiograph. Possibly that was a nipple shadow. .  Mild scarring is seen medially in the lingula. INCIDENTALLY IMAGED UPPER ABDOMEN: The spleen has been removed. No adrenal  masses. BONES: No destructive bone lesion. IMPRESSION:  1. There are are multiple enlarged lymph nodes in the right paratracheal region  as well as adenopathy and AP window region. These enlarged lymph nodes are  worrisome for neoplastic process. 2. There is volume loss in right middle lobe with a nodular area of opacity that  could be atelectasis but a superimposed nodule within the areas of atelectasis  is not excluded. Further evaluation is suggested. Tissue diagnosis is suggested. PET CT scan may yield more information. . 23X     5/28/19 PET:  FINDINGS:  HEAD/NECK: No apparent foci of abnormal hypermetabolism. Cerebral evaluation is  limited by normal intense activity. CHEST: A hypermetabolic right paratracheal lymph node SUV is 8.5. There are  hypermetabolic lymph nodes anterior to the main bronchi bilaterally. There is  centrilobular emphysema. There is volume loss in the right middle lobe but a  central obstructing mass is not identified on this PET scan. ABDOMEN/PELVIS: No foci of abnormal hypermetabolism. The prostate gland is  enlarged. SKELETON: No foci of abnormal hypermetabolism in the axial and visualized  appendicular skeleton. IMPRESSION: Hypermetabolic mediastinal lymph nodes concerning for neoplastic process. Procedures:     EGD 7/10/19:   Esophagus:normal  Stomach: diffuse enlargement gastric folds with nodular erythema and erosion throughout stomach  Duodenum/jejunum: normal    Colonoscopy 7/10/19: Diverticulosis    Assessment & Plan:   Varsha Calderon is a 60 y.o. male with COPD, remote h/o colon cancer comes in for evaluation and management of adenocarcinoma of unknown primary. 1. Adenocarcinoma of unknown primary: Involving mediastinal LNs, with no other PET findings. Per Thoracic Tumor Board discussion, this could represent upper GI origin, urothelial origin or germ cell tumor.  Therefore, search for a primary lesion would involve referral to Urology for cystoscopy and GI for EGD, colonoscopy. Given h/o colon cancer in 2002, it would be very helpful to obtain these records. Additional tissue would also be helpful, and therefore patient is seeing Dr. Collette Doyle on 6/20/19 for thoracoscopy and LN biopsy. CEA 26.8. Other tumor markers negative (AFP, hCG, CA 19-9)  -- [facility] testing off of 6/25/19 pathology specimen  -- EGD, colonoscopy tomorrow, 7/10 - diffusely enlarged gastric folds seen in stomach - biopsies taken and path pending  -- Scheduled for cystoscopy 7/17  -- Return in 2 weeks. 2. H/o Stage I [T1NxMx] sigmoid colon cancer: Found in sigmoid adenoma during a colonoscopy; went on to undergo segmented resection of the sigmoid colon in 2002. Pathology per outside records:  2/6/2002 sigmoid colon polyp: Well differentiated adenocarcinoma arising in an adenoma, involving the mucosa and invading into the upper half of the submucosal stalk. No vascular space involvement is identified. 2/21/2002 Sigmoidectomy, liver biopsy:  -Sigmoid colon, segmented resection: No residual adenocarcinoma present. Polypectomy site with granulation tissue and vascular congestion. No LNs recovered. Distal and proximal margins benign.  -Liver biopsy: Negative for metastatic carcinoma. No significant inflammation or obvious cirrhosis identified. Very minimal steatosis (rare cells with fat globules). -Addendum: Reblocks of adipose tissue; three small benign lymphoid aggregates (< 0.1cm). 3. COPD: Quit smoking in approx 2016. SOB improved after starting inhaler. 4. HTN: Well controlled on Lisinopril and Metoprolol. 5. BPH: On Flomax. Supportive care medications include: Pending  Emotional well being: Pt is coping well with his/her disease and has excellent support. I appreciate the opportunity to participate in Mr. Rj solo.     Signed By: Anita Davenport MD     July 9, 2019

## 2019-07-10 ENCOUNTER — ANESTHESIA (OUTPATIENT)
Dept: ENDOSCOPY | Age: 59
End: 2019-07-10
Payer: OTHER GOVERNMENT

## 2019-07-10 ENCOUNTER — HOSPITAL ENCOUNTER (OUTPATIENT)
Age: 59
Setting detail: OUTPATIENT SURGERY
Discharge: HOME | End: 2019-07-10
Attending: INTERNAL MEDICINE | Admitting: INTERNAL MEDICINE
Payer: OTHER GOVERNMENT

## 2019-07-10 ENCOUNTER — ANESTHESIA EVENT (OUTPATIENT)
Dept: ENDOSCOPY | Age: 59
End: 2019-07-10
Payer: OTHER GOVERNMENT

## 2019-07-10 VITALS
OXYGEN SATURATION: 99 % | HEIGHT: 71 IN | TEMPERATURE: 97.6 F | WEIGHT: 168.87 LBS | SYSTOLIC BLOOD PRESSURE: 132 MMHG | RESPIRATION RATE: 23 BRPM | DIASTOLIC BLOOD PRESSURE: 78 MMHG | HEART RATE: 76 BPM | BODY MASS INDEX: 23.64 KG/M2

## 2019-07-10 PROCEDURE — 74011250636 HC RX REV CODE- 250/636

## 2019-07-10 PROCEDURE — 77030021593 HC FCPS BIOP ENDOSC BSC -A: Performed by: INTERNAL MEDICINE

## 2019-07-10 PROCEDURE — 76040000019: Performed by: INTERNAL MEDICINE

## 2019-07-10 PROCEDURE — 76060000031 HC ANESTHESIA FIRST 0.5 HR: Performed by: INTERNAL MEDICINE

## 2019-07-10 PROCEDURE — 88342 IMHCHEM/IMCYTCHM 1ST ANTB: CPT

## 2019-07-10 PROCEDURE — 88305 TISSUE EXAM BY PATHOLOGIST: CPT

## 2019-07-10 RX ORDER — FENTANYL CITRATE 50 UG/ML
100 INJECTION, SOLUTION INTRAMUSCULAR; INTRAVENOUS
Status: DISCONTINUED | OUTPATIENT
Start: 2019-07-10 | End: 2019-07-10 | Stop reason: HOSPADM

## 2019-07-10 RX ORDER — SODIUM CHLORIDE 9 MG/ML
50 INJECTION, SOLUTION INTRAVENOUS CONTINUOUS
Status: DISCONTINUED | OUTPATIENT
Start: 2019-07-10 | End: 2019-07-10 | Stop reason: HOSPADM

## 2019-07-10 RX ORDER — LIDOCAINE HYDROCHLORIDE 20 MG/ML
INJECTION, SOLUTION EPIDURAL; INFILTRATION; INTRACAUDAL; PERINEURAL AS NEEDED
Status: DISCONTINUED | OUTPATIENT
Start: 2019-07-10 | End: 2019-07-10 | Stop reason: HOSPADM

## 2019-07-10 RX ORDER — NALOXONE HYDROCHLORIDE 0.4 MG/ML
0.4 INJECTION, SOLUTION INTRAMUSCULAR; INTRAVENOUS; SUBCUTANEOUS
Status: DISCONTINUED | OUTPATIENT
Start: 2019-07-10 | End: 2019-07-10 | Stop reason: HOSPADM

## 2019-07-10 RX ORDER — SODIUM CHLORIDE 9 MG/ML
INJECTION, SOLUTION INTRAVENOUS
Status: DISCONTINUED | OUTPATIENT
Start: 2019-07-10 | End: 2019-07-10 | Stop reason: HOSPADM

## 2019-07-10 RX ORDER — FLUMAZENIL 0.1 MG/ML
0.2 INJECTION INTRAVENOUS
Status: DISCONTINUED | OUTPATIENT
Start: 2019-07-10 | End: 2019-07-10 | Stop reason: HOSPADM

## 2019-07-10 RX ORDER — PROPOFOL 10 MG/ML
INJECTION, EMULSION INTRAVENOUS
Status: DISCONTINUED | OUTPATIENT
Start: 2019-07-10 | End: 2019-07-10 | Stop reason: HOSPADM

## 2019-07-10 RX ORDER — EPINEPHRINE 0.1 MG/ML
1 INJECTION INTRACARDIAC; INTRAVENOUS
Status: DISCONTINUED | OUTPATIENT
Start: 2019-07-10 | End: 2019-07-10 | Stop reason: HOSPADM

## 2019-07-10 RX ORDER — MIDAZOLAM HYDROCHLORIDE 1 MG/ML
.25-5 INJECTION, SOLUTION INTRAMUSCULAR; INTRAVENOUS
Status: DISCONTINUED | OUTPATIENT
Start: 2019-07-10 | End: 2019-07-10 | Stop reason: HOSPADM

## 2019-07-10 RX ORDER — DEXTROMETHORPHAN/PSEUDOEPHED 2.5-7.5/.8
1.2 DROPS ORAL
Status: DISCONTINUED | OUTPATIENT
Start: 2019-07-10 | End: 2019-07-10 | Stop reason: HOSPADM

## 2019-07-10 RX ORDER — PROPOFOL 10 MG/ML
INJECTION, EMULSION INTRAVENOUS AS NEEDED
Status: DISCONTINUED | OUTPATIENT
Start: 2019-07-10 | End: 2019-07-10 | Stop reason: HOSPADM

## 2019-07-10 RX ORDER — ATROPINE SULFATE 0.1 MG/ML
0.5 INJECTION INTRAVENOUS
Status: DISCONTINUED | OUTPATIENT
Start: 2019-07-10 | End: 2019-07-10 | Stop reason: HOSPADM

## 2019-07-10 RX ADMIN — LIDOCAINE HYDROCHLORIDE 20 MG: 20 INJECTION, SOLUTION EPIDURAL; INFILTRATION; INTRACAUDAL; PERINEURAL at 15:50

## 2019-07-10 RX ADMIN — PROPOFOL 100 MG: 10 INJECTION, EMULSION INTRAVENOUS at 15:50

## 2019-07-10 RX ADMIN — PROPOFOL 140 MCG/KG/MIN: 10 INJECTION, EMULSION INTRAVENOUS at 15:50

## 2019-07-10 RX ADMIN — PROPOFOL 50 MG: 10 INJECTION, EMULSION INTRAVENOUS at 15:54

## 2019-07-10 RX ADMIN — SODIUM CHLORIDE: 9 INJECTION, SOLUTION INTRAVENOUS at 14:45

## 2019-07-10 NOTE — DISCHARGE INSTRUCTIONS
Macho Plummer  [id number]  1960    DISCHARGE INSTRUCTIONS  Discomfort:  Redness at IV site- apply warm compress to area; if redness or soreness persist- contact your physician. There may be a slight amount of blood passed from the rectum. Gaseous discomfort - walking, belching will help relieve any discomfort. You may not operate a vehicle for 12 hours. You may not engage in an occupation involving machinery or appliances for rest of today. You may not drink alcoholic beverages for at least 12 hours. Avoid making any critical decisions for at least 24 hours. DIET:   High fiber diet. - however -  remember your colon is empty and a heavy meal will produce gas. Avoid these foods:  vegetables, fried / greasy foods, carbonated drinks for today. ACTIVITY:  You may resume your normal daily activities it is recommended that you spend the remainder of the day resting -  avoid any strenuous activity. CALL M.D.   ANY SIGN OF:   Increasing pain, nausea, vomiting  Abdominal distension (swelling)  New increased bleeding (oral or rectal)  Fever (chills)  Pain in chest area  Bloody discharge from nose or mouth  Shortness of breath     Follow-up Instructions:  Call Dr. Mary Ann Stiles in two weeks for biopsy results      DISCHARGE SUMMARY from Nurse    The following personal items collected during your admission are returned to you:   Dental Appliance: Dental Appliances: None  Vision: Visual Aid: None  Hearing Aid:    Jewelry:    Clothing:    Other Valuables:    Valuables sent to safe:

## 2019-07-10 NOTE — PERIOP NOTES
Patient tolerated procedures well. Report was eceived from [address]. Patient was transferred to recovery and report was given to [facility].     Endoscope was pre-cleaned at bedside immediately following procedure by Wilner Coyne

## 2019-07-10 NOTE — PROCEDURES
Eliot Naranjo M.D. July 10, 2019    Esophagogastroduodenoscopy (EGD) Procedure Note  Herberth Mosqueda  : 1960  [facility] Medical Record Number: [id number]      Indications:    Abnormal CT  Referring Physician:  Pebbles Lam NP  Anesthesia/Sedation: Conscious Sedation/Moderate Sedation  Endoscopist:  Dr. Ravindra Mejias  Assistants: None  Permit:  The indications, risks, benefits and alternatives were reviewed with the patient or their decision maker who was provided an opportunity to ask questions and all questions were answered. The specific risks of esophagogastroduodenoscopy with conscious sedation were reviewed, including but not limited to anesthetic complication, bleeding, adverse drug reaction, missed lesion, infection, IV site reactions, and intestinal perforation which would lead to the need for surgical repair. Alternatives to EGD including radiographic imaging, observation without testing, or laboratory testing were reviewed as well as the limitations of those alternatives discussed. After considering the options and having all their questions answered, the patient or their decision maker provided both verbal and written consent to proceed. Procedure in Detail:  After obtaining informed consent, positioning of the patient in the left lateral decubitus position, and conduction of a pre-procedure pause or \"time out\" the endoscope was introduced into the mouth and advanced to the duodenum. A careful inspection was made, and findings or interventions are described below.     Findings:   Esophagus:normal  Stomach: diffuse enlargement gastric folds with nodular erythema and erosion throughout stomach  Duodenum/jejunum: normal    Complications/estimated blood loss: none    Therapies:  biopsy of stomach fundus, body, antrum    Specimens: biopsies    Implants:none Recommendations:  -colonoscopy  Thank you for entrusting me with this patient's care. Please do not hesitate to contact me with any questions or if I can be of assistance with any of your other patients' GI needs.   Signed By: Clementina Bishop MD                        July 10, 2019

## 2019-07-10 NOTE — PROCEDURES
[address]  [phone]      July 10, 2019    Colonoscopy Procedure Note  Gloria Myers  :  1960  [facility] Medical Record Number: [id number]    Indications:     abnormal XR  PCP:  Carola Mcdonald NP  Anesthesia/Sedation: Conscious Sedation/Moderate Sedation  Endoscopist:  Dr. Gui Quinones  Assistants: None  Complications:  None  Estimate Blood Loss:  None    Permit:  The indications, risks, benefits and alternatives were reviewed with the patient or their decision maker who was provided an opportunity to ask questions and all questions were answered. The specific risks of colonoscopy with conscious sedation were reviewed, including but not limited to anesthetic complication, bleeding, adverse drug reaction, missed lesion, infection, IV site reactions, and intestinal perforation which would lead to the need for surgical repair. Alternatives to colonoscopy including radiographic imaging, observation without testing, or laboratory testing were reviewed including the limitations of those alternatives. After considering the options and having all their questions answered, the patient or their decision maker provided both verbal and written consent to proceed. Procedure in Detail:  After obtaining informed consent, positioning of the patient in the left lateral decubitus position, and conduction of a pre-procedure pause or \"time out\" the endoscope was introduced into the anus and advanced to the terminal ileum. The quality of the colonic preparation was good. A careful inspection was made as the colonoscope was withdrawn, findings and interventions are described below. Appendiceal orifice photographed    Findings:   no mucosal lesion appreciated      - Diverticulosis    Specimens:    none    Implants: none      Complications:   None; patient tolerated the procedure well.   Estimated blood loss: none    Impression:  diverticulosis    Recommendations:      - no routine follow up exam    Thank you for entrusting me with this patient's care. Please do not hesitate to contact me with any questions or if I can be of assistance with any of your other patients' GI needs.     Signed By: Venita Kamara MD                        July 10, 2019

## 2019-07-10 NOTE — ROUTINE PROCESS
Sarabjit Tian  1960  [id number]    Situation:  Verbal report received from: Jordana Yoo Rn  Procedure: Procedure(s):  COLONOSCOPY AND ESOPHAGOGASTRODUODENOSCOPY (EGD)  ESOPHAGOGASTRODUODENOSCOPY (EGD)    Background:    Preoperative diagnosis: EPIGASTRIC PAIN, GERD, MALIGNANT ADENOMATOUS NEOPLASM, PERSONAL HX OF COLON CANCER  Postoperative diagnosis: nodular gastritis  colonoc anastamosis  diverticulosis    :  Dr. Yumiko Plata  Assistant(s): Endoscopy Technician-1: Heike Ring  Endoscopy RN-1: Lyndon Alvarado RN    Specimens:   ID Type Source Tests Collected by Time Destination   1 : gastric biopsy Preservative   Chris Peña MD 7/10/2019 1602 Pathology     H. Pylori  no    Assessment:  Intra-procedure medications     Anesthesia gave intra-procedure sedation and medications, see anesthesia flow sheet yes    Intravenous fluids: NS@ KVO     Vital signs stable     Abdominal assessment: round and soft     Recommendation:  Discharge patient per MD order.   Family or Friend   Permission to share finding with family or friend yes

## 2019-07-10 NOTE — ANESTHESIA PREPROCEDURE EVALUATION
Relevant Problems No relevant active problems Anesthetic History No history of anesthetic complications Review of Systems / Medical History Patient summary reviewed, nursing notes reviewed and pertinent labs reviewed Pulmonary COPD Smoker Neuro/Psych Within defined limits Cardiovascular Hypertension CHF Dysrhythmias (Pac's) Comments: metoprolol succinate (TOPROL-XL) 25 mg XL tablet last taken yesterday, will give PRN Chronic systolic heart failure EF 45% GI/Hepatic/Renal 
  
GERD Endo/Other Cancer Other Findings Comments: EPIGASTRIC PAIN, GERD, MALIGNANT ADENOMATOUS NEOPLASM, PERSONAL HX OF COLON CANCER 
 
H/o colectomy H/o spenectomy Physical Exam 
 
Airway Mallampati: II 
TM Distance: 4 - 6 cm Neck ROM: normal range of motion Mouth opening: Normal 
 
 Cardiovascular Regular rate and rhythm,  S1 and S2 normal,  no murmur, click, rub, or gallop Dental 
No notable dental hx Pulmonary Breath sounds clear to auscultation Abdominal 
GI exam deferred Other Findings Anesthetic Plan ASA: 3 Anesthesia type: MAC Induction: Intravenous Anesthetic plan and risks discussed with: Patient

## 2019-07-10 NOTE — ANESTHESIA POSTPROCEDURE EVALUATION
Procedure(s): 
COLONOSCOPY AND ESOPHAGOGASTRODUODENOSCOPY (EGD) ESOPHAGOGASTRODUODENOSCOPY (EGD). MAC Anesthesia Post Evaluation Patient location during evaluation: PACU Level of consciousness: awake Pain management: adequate Airway patency: patent Anesthetic complications: no 
Cardiovascular status: acceptable Respiratory status: acceptable Hydration status: acceptable Post anesthesia nausea and vomiting:  none Vitals Value Taken Time /83 7/10/2019  4:24 PM  
Temp Pulse 79 7/10/2019  4:27 PM  
Resp 26 7/10/2019  4:27 PM  
SpO2 99 % 7/10/2019  4:27 PM  
Vitals shown include unvalidated device data.

## 2019-07-10 NOTE — H&P
Patient Name: Suki Carter   Account #: [id number]    Gender: Male    (age): 1960 (59)       Provider:     Martin Muñoz MD        Referring Physician:     Kimberley Shin 49, [place], [address]  [phone] (phone)  [phone] (fax)        Chief Complaint: abnormal XR           History of Present Illness:              Incidental finding of abnormal CXR; biopsy mediastinal LN show adenocarcinoma. No chest and no GI symptoms? Past Medical History      Medical Conditions: Hemorrhoids  High blood pressure   Surgical Procedures: Colon Resection,   Other major surgeries:, - splenectomy   Dx Studies: CAT Scan,   Colonoscopy,   Endo Posting, 1/3/2018   Medications: Anoro Ellipta 62.5-25 mcg/actuation  lisinopril 5 mg  metoprolol succinate 25 mg   Allergies: Patient has no known allergies or drug allergies   Immunizations: No Immunizations      Social History      Alcohol: None   Tobacco: Former smoker   Drugs: None   Exercise: None   Caffeine: Daily. Family History No history of Celiac sprue, GI Cancers, Inflammatory bowel disease (Crohn's or Ulcerative Colitis), Liver disease, Pancreatic Cancer, Stomach Cancer  Brother: Diagnosed with Esophageal cancer; Mother: Diagnosed with colon cancer, colon polyps; Review of Systems:   Cardiovascular: Presents suffers from palpitations. Denies chest pain, irregular heart beat, peripheral edema, syncope, Sweats. Constitutional: Denies fatigue, fever, loss of appetite, weight gain, weight loss. ENMT: Denies nose bleeds, sore throat, hearing loss. Endocrine: Denies excessive thirst, heat intolerance. Eyes: Denies loss of vision. Gastrointestinal: Presents suffers from abdominal pain, diarrhea, heartburn, nausea. Denies abdominal swelling, change in bowel habits, constipation, Bloating/gas, jaundice, rectal bleeding, stomach cramps, vomiting, dysphagia, rectal pain, Stool incontinence, hematemesis. Genitourinary: Denies dark urine, dysuria, frequent urination, hematuria, incontinence. Hematologic/Lymphatic: Denies easy bruising, prolonged bleeding. Integumentary: Denies itching, rashes, sun sensitivity. Musculoskeletal: Denies arthritis, back pain, gout, joint pain, muscle weakness, stiffness. Neurological: Denies dizziness, fainting, frequent headaches, memory loss. Psychiatric: Presents suffers from anxiety. Denies depression, difficulty sleeping, hallucinations, nervousness, panic attacks, paranoia. Respiratory: Presents suffers from dyspnea. Denies cough, wheezing. Vital Signs: see nursing notes     Physical Exam:   Constitutional:      Appearance: No distress, appears comfortable. Skin:      Inspection: No rash, no jaundice. Head/face: Inspection: Normacephalic, atraumatic. Eyes:      Conjunctivae/lids: Normal.   ENMT:      External: Normal.   Neck:      Neck: Normal appearance, trachea midline. Respiratory:      Effort: Normal respiratory effort, comfortable, speaks in complete sentences. Auscultation: normal breath sounds, no rubs, wheezes or rhonchi. Cardiovascular: Auscultation: normal, S1 and S2, no gallops , no rubs or murmurs . Gastrointestinal/Abdomen:      Abdomen: non-distended, nontender. Liver/Spleen: normal, normal size, Liver size and consistency normal, spleen is non-palpable. Musculoskeletal:      Gait/station: normal.   Muscles: normal strength and tone, no atrophy or abnormal movements. Psychiatric:      Judgment/insight: Normal, normal judgement, normal insight. Mood and affect: Normal mood, affect full, no evidence of depression, anxiety or agitation. Lymphatic:      Neck: No lymphadenopathy in the cervical or supraclavicular chain. Impressions: Malignant adenomatous neoplasm?  ?       Plan: I've discussed EGD, colonoscopy possible biopsy, polypectomy, cautery, injection, alternatives, complications including but not limited to pain, cardiopulmonary event, bleeding, perforation requiring additional blood transfusion or operative repair; all questions answered.

## 2019-07-11 ENCOUNTER — TELEPHONE (OUTPATIENT)
Dept: ONCOLOGY | Age: 59
End: 2019-07-11

## 2019-07-11 NOTE — TELEPHONE ENCOUNTER
Spoke with Carlito Acosta at [facility]. Cystoscopy has been rescheduled for 7/17/19. Will update Dr. America Raman.

## 2019-07-11 NOTE — TELEPHONE ENCOUNTER
Call placed to office of Dr. Mara Capps, [facility]. Message left for nurse to return call to office. Need to check if cystoscopy can be moved up from scheduled date of 7/24/19 per Dr. Deana Cardenas.

## 2019-07-19 ENCOUNTER — HOSPITAL ENCOUNTER (OUTPATIENT)
Dept: INTERVENTIONAL RADIOLOGY/VASCULAR | Age: 59
Discharge: HOME | End: 2019-07-19
Attending: INTERNAL MEDICINE
Payer: OTHER GOVERNMENT

## 2019-07-19 VITALS
HEART RATE: 52 BPM | HEIGHT: 71 IN | WEIGHT: 175 LBS | BODY MASS INDEX: 24.5 KG/M2 | RESPIRATION RATE: 20 BRPM | SYSTOLIC BLOOD PRESSURE: 131 MMHG | TEMPERATURE: 98.6 F | DIASTOLIC BLOOD PRESSURE: 76 MMHG | OXYGEN SATURATION: 98 %

## 2019-07-19 DIAGNOSIS — C80.1 ADENOCARCINOMA OF UNKNOWN PRIMARY (HCC): ICD-10-CM

## 2019-07-19 PROCEDURE — 74011000250 HC RX REV CODE- 250: Performed by: RADIOLOGY

## 2019-07-19 PROCEDURE — C1788 PORT, INDWELLING, IMP: HCPCS

## 2019-07-19 PROCEDURE — C1892 INTRO/SHEATH,FIXED,PEEL-AWAY: HCPCS

## 2019-07-19 PROCEDURE — 36561 INSERT TUNNELED CV CATH: CPT

## 2019-07-19 PROCEDURE — 74011250636 HC RX REV CODE- 250/636: Performed by: RADIOLOGY

## 2019-07-19 PROCEDURE — 74011250636 HC RX REV CODE- 250/636: Performed by: INTERNAL MEDICINE

## 2019-07-19 PROCEDURE — 77030031139 HC SUT VCRL2 J&J -A

## 2019-07-19 PROCEDURE — 77030039266 HC ADH SKN EXOFIN S2SG -A

## 2019-07-19 RX ORDER — LIDOCAINE HYDROCHLORIDE 20 MG/ML
20 INJECTION, SOLUTION INFILTRATION; PERINEURAL ONCE
Status: COMPLETED | OUTPATIENT
Start: 2019-07-19 | End: 2019-07-19

## 2019-07-19 RX ORDER — HEPARIN SODIUM 200 [USP'U]/100ML
400 INJECTION, SOLUTION INTRAVENOUS ONCE
Status: COMPLETED | OUTPATIENT
Start: 2019-07-19 | End: 2019-07-19

## 2019-07-19 RX ORDER — MIDAZOLAM HYDROCHLORIDE 1 MG/ML
5 INJECTION, SOLUTION INTRAMUSCULAR; INTRAVENOUS
Status: DISCONTINUED | OUTPATIENT
Start: 2019-07-19 | End: 2019-07-23 | Stop reason: HOSPADM

## 2019-07-19 RX ORDER — HEPARIN 100 UNIT/ML
300 SYRINGE INTRAVENOUS ONCE
Status: COMPLETED | OUTPATIENT
Start: 2019-07-19 | End: 2019-07-19

## 2019-07-19 RX ORDER — CEFAZOLIN SODIUM/WATER 2 G/20 ML
2 SYRINGE (ML) INTRAVENOUS ONCE
Status: COMPLETED | OUTPATIENT
Start: 2019-07-19 | End: 2019-07-19

## 2019-07-19 RX ORDER — SODIUM CHLORIDE 9 MG/ML
25 INJECTION, SOLUTION INTRAVENOUS CONTINUOUS
Status: DISCONTINUED | OUTPATIENT
Start: 2019-07-19 | End: 2019-07-23 | Stop reason: HOSPADM

## 2019-07-19 RX ORDER — LIDOCAINE HYDROCHLORIDE AND EPINEPHRINE 10; 10 MG/ML; UG/ML
20 INJECTION, SOLUTION INFILTRATION; PERINEURAL ONCE
Status: COMPLETED | OUTPATIENT
Start: 2019-07-19 | End: 2019-07-19

## 2019-07-19 RX ORDER — FENTANYL CITRATE 50 UG/ML
100 INJECTION, SOLUTION INTRAMUSCULAR; INTRAVENOUS
Status: DISCONTINUED | OUTPATIENT
Start: 2019-07-19 | End: 2019-07-23 | Stop reason: HOSPADM

## 2019-07-19 RX ADMIN — SODIUM CHLORIDE 25 ML/HR: 900 INJECTION, SOLUTION INTRAVENOUS at 10:33

## 2019-07-19 RX ADMIN — SODIUM CHLORIDE, PRESERVATIVE FREE 300 UNITS: 5 INJECTION INTRAVENOUS at 11:35

## 2019-07-19 RX ADMIN — LIDOCAINE HYDROCHLORIDE AND EPINEPHRINE 200 MG: 10; 10 INJECTION, SOLUTION INFILTRATION; PERINEURAL at 11:35

## 2019-07-19 RX ADMIN — LIDOCAINE HYDROCHLORIDE 200 MG: 20 INJECTION, SOLUTION INFILTRATION; PERINEURAL at 11:35

## 2019-07-19 RX ADMIN — FENTANYL CITRATE 50 MCG: 50 INJECTION, SOLUTION INTRAMUSCULAR; INTRAVENOUS at 11:11

## 2019-07-19 RX ADMIN — MIDAZOLAM 1 MG: 1 INJECTION INTRAMUSCULAR; INTRAVENOUS at 11:18

## 2019-07-19 RX ADMIN — MIDAZOLAM 2 MG: 1 INJECTION INTRAMUSCULAR; INTRAVENOUS at 11:11

## 2019-07-19 RX ADMIN — HEPARIN SODIUM IN SODIUM CHLORIDE 200 UNITS: 200 INJECTION INTRAVENOUS at 11:35

## 2019-07-19 RX ADMIN — FENTANYL CITRATE 50 MCG: 50 INJECTION, SOLUTION INTRAMUSCULAR; INTRAVENOUS at 11:31

## 2019-07-19 RX ADMIN — FENTANYL CITRATE 25 MCG: 50 INJECTION, SOLUTION INTRAMUSCULAR; INTRAVENOUS at 11:18

## 2019-07-19 RX ADMIN — Medication 2 G: at 10:51

## 2019-07-19 RX ADMIN — MIDAZOLAM 1 MG: 1 INJECTION INTRAMUSCULAR; INTRAVENOUS at 11:28

## 2019-07-19 RX ADMIN — MIDAZOLAM 1 MG: 1 INJECTION INTRAMUSCULAR; INTRAVENOUS at 11:35

## 2019-07-19 NOTE — DISCHARGE INSTRUCTIONS
[facility]  Angiography Department      Radiologist:    Dr. Gunjan Buitrago     Date:   7/19/2019      [facility]      Watch for signs of infection:    1. Redness,   2. Fever, chills,   3. Increased pain, and/or drainage from the site. If this occurs, call your physician at once. Return next week for a [employer] check:       Do not register. Proceed to the Radiology Waiting Area and let the  know you are here for a \"PORT site check\". If you have an appointment with a provider or at the infusion center in the upcoming week,  they may check your site and change the dressing for you. Keep your dressing clean and dry. Leave the dressing in place until seen here next week. Continue your previous diet and restart your regularly prescribed medications. You may take Tylenol, as directed on the label, for pain if needed. Avoid ibuprofen (Advil, Motrin) and aspirin as they may cause you to bleed. Because you received sedation, you are not to drive or sign any legal documents for the next 24 hours. Do not lift anything heavier than 5 pounds with the affected arm and avoid pushing and pulling movements for several days. If you have any questions or concerns, please call our radiology department at [phone]. [facility] Department of Interventional Radiology  [facility]  Implanted [facility] Longest Discharge Instructions      General Instructions:   A port is like an implanted IV. They are usually ordered for patients who will be getting chemotherapy, but can also be used as an IV for long term antibiotics, large amounts of fluids, and/or blood products. Your blood can be drawn from your port for labs also. Those patients who do not have good veins find the ports convenient as they can get the IV they need with one stick. The port can be used long term, and the care is easy.  The device is under the skin, and once the skin heals, care is minimal. All that is required is the nurse who accesses the port will need to flush it with heparinized saline after each use. Ports are usually placed in the chest wall, usually on the right side. But they can be place in the arms and in the abdomen. Home Care Instructions: If your port is in your arm, do not allow blood pressure or other IVs to be place in that arm. Do not allow bra straps or any clothing to rub the skin over the port. Do not bathe or swim until the skin has healed and if the port is accessed. Once it is healed, and when the port is not accessed, it is okay to bathe and swim. Restrict yourself to light activity for the first 5 days after getting the port put in, after that, resume normal activity slowly. You may resume your normal diet and medications. Follow-Up Instructions: Please see your oncologist, or whatever physician ordered the port as he/she has requested of you. Call If: You should call your Physician and/or the Radiology Nurse if you notice redness, pus, swelling, or pain from the area of your incision. Call if you should develop a fever. The nurses who access your port will know to call your doctor if the port does not seem to be working properly. You need to tell the nurses who use the port if you should have any pain or swelling at the site during an infusion.     To Reach Us: [phone]      Patient Signature:  Date: 7/19/2019  Discharging Nurse: Syeda Lind RN

## 2019-07-19 NOTE — H&P
Interventional and Vascular Radiology History and Physical    Patient: Braulio Rinaldi 60 y.o. male       Chief Complaint: No chief complaint on file.       History of Present Illness: chemotherapy     History:    Past Medical History:   Diagnosis Date    Abnormal nuclear stress test 12/7/2015    Cancer ([facility]     colon    Cardiomyopathy ([facility] 2010    EF 45%    Chronic obstructive pulmonary disease (HCC)     Chronic systolic heart failure (HCC)     GERD (gastroesophageal reflux disease)     HTN (hypertension)     PAC (premature atrial contraction)     Tobacco use disorder      Family History   Problem Relation Age of Onset    Stroke Mother     Coronary Artery Disease Sister     Heart Disease Paternal Grandfather      Social History     Socioeconomic History    Marital status: SINGLE     Spouse name: Not on file    Number of children: Not on file    Years of education: Not on file    Highest education level: Not on file   Occupational History    Not on file   Social Needs    Financial resource strain: Not on file    Food insecurity:     Worry: Not on file     Inability: Not on file    Transportation needs:     Medical: Not on file     Non-medical: Not on file   Tobacco Use    Smoking status: Former Smoker     Packs/day: 0.50     Types: Cigarettes     Last attempt to quit: 4/22/2016     Years since quitting: 3.2    Smokeless tobacco: Never Used   Substance and Sexual Activity    Alcohol use: Not Currently     Comment: rarely    Drug use: No    Sexual activity: Not on file   Lifestyle    Physical activity:     Days per week: Not on file     Minutes per session: Not on file    Stress: Not on file   Relationships    Social connections:     Talks on phone: Not on file     Gets together: Not on file     Attends Mandaen service: Not on file     Active member of club or organization: Not on file     Attends meetings of clubs or organizations: Not on file     Relationship status: Not on file   Piotr Carry Intimate partner violence:     Fear of current or ex partner: Not on file     Emotionally abused: Not on file     Physically abused: Not on file     Forced sexual activity: Not on file   Other Topics Concern    Not on file   Social History Narrative    Not on file       Allergies: No Known Allergies    Current Medications:  Current Outpatient Medications   Medication Sig    tamsulosin (FLOMAX) 0.4 mg capsule Take 0.4 mg by mouth daily.  OTHER Take  by inhalation. Alero Inhaler    lisinopril (PRINIVIL, ZESTRIL) 5 mg tablet TAKE 1 TABLET BY MOUTH DAILY    metoprolol succinate (TOPROL-XL) 25 mg XL tablet Take 1 Tab by mouth nightly. Current Facility-Administered Medications   Medication Dose Route Frequency    0.9% sodium chloride infusion  25 mL/hr IntraVENous CONTINUOUS    fentaNYL citrate (PF) injection 100 mcg  100 mcg IntraVENous Multiple    midazolam (VERSED) injection 5 mg  5 mg IntraVENous Multiple        Physical Exam:  Blood pressure 131/76, pulse (!) 52, temperature 98.6 °F (37 °C), resp. rate 20, height 5' 11\" (1.803 m), weight 79.4 kg (175 lb), SpO2 98 %. LUNG: clear to auscultation bilaterally, HEART: regular rate and rhythm, S1, S2 normal, no murmur, click, rub or gallop      Alerts:    Hospital Problems  Date Reviewed: 7/11/2019    None          Laboratory:    No results for input(s): HGB, HCT, WBC, PLT, INR, BUN, CREA, K, CRCLT, HGBEXT, HCTEXT, PLTEXT in the last 72 hours. No lab exists for component: PTT, PT, INREXT      Plan of Care/Planned Procedure:  Risks, benefits, and alternatives reviewed with patient and he agrees to proceed with the procedure. Conscious sedation will be performed with IV fentanyl and versed.  Plan is for chest port placement       Angelia Ellington MD

## 2019-07-19 NOTE — ROUTINE PROCESS
Procedure reviewed with patient by Dr. Daniels Shown. Opportunity to verbalize questions and concerns. Consent obtained.

## 2019-07-23 ENCOUNTER — TELEPHONE (OUTPATIENT)
Dept: ONCOLOGY | Age: 59
End: 2019-07-23

## 2019-07-23 NOTE — PROGRESS NOTES
[id number] [facility]  [phone]    Hematology / Oncology Established Visit    Reason for Visit:   Geovanna Henry is a 60 y.o. male who comes in for f/u of adenocarcinoma of unknown primary. Initially referred by Dr. Hiram Ramirez. Hematology Oncology Treatment History:     Diagnosis: Adenocarcinoma of unknown primary    Stage: N/A    Pathology:   6/4/19 4R LN biopsy: rare malignant cells admixed with abundant blood clot  6/4/19 4L LN FNA and core biopsy: Adenocarcinoma. 6/4/19 2R LN FNA and core biopsy: Adenocarcinoma. Comment: IHC stains negative for GATA3, AR, ER, p40. Immunohistochemistry shows that the tumor cells express CK7 with focal expression of CDX2. Tumor cells lack expression of CK20, TTF-1 and Napsin A. These findings are not entirely diagnostic and could be seen in either a primary pulmonary malignancy or a primary upper gastrointestinal tract malignancy. Other sites are also not excluded. Clinical and radiographic correlation is recommended. 6/25/19 2R LN, mediastinum: Metastatic adenocarcinoma (see Comment). Comment - The tumor appears poorly differentiated with areas of necrosis. Immunohistochemical staining reveals positive staining for cytokeratin 7 (strong, diffuse), CDX-2 (focal, weak to moderate) and CEA (focal, moderate to strong). The tumor cells are negative for cytokeratin 20, cytokeratin 5/6, p40, p63, TTF-1, napsin-A, PLAP, HCG, AFP and c-KIT (). The findings are not entirely specific with regard to primary site but would be consistent with upper GI/pancreaticobiliary tract. A pulmonary primary is less likely, but still in the differential.     Prior Treatment: None    Current Treatment: pending  Treatment duration   Frequency of visits     History of Present Illness:   Mr. Sierra Gooden is a 60 y.o. male with COPD, remote h/o colon cancer comes in for evaluation of mediastinal lymphadenopathy.  Pt had recently p/w shortness of breath, dyspnea on exertion, and was found on chest CT to have R mediastinal lymphadenopathy, which also were PET avid. Case was discussed at Thoracic Tumor Board with thought that this could be pancreatic, urothelial, or germ cell origin. More tissue is recommended. Pt had EGD 3 yrs ago and was told he had GERD. Patient has h/o colon cancer in 2002. He states a cancerous polyp was found and then he underwent colectomy in 2002. This was done by Dr. Arelis White at [facility]. Patient had colonoscopies regularly afterwards, last one was approx 2015. Last EGD was in 2018. Since the last visit, patient underwent EGD, colonoscopy by Dr. Tiny Palma, notable for diffusely enlarged gastric folds, but biopsies negative. Pt was scheduled for cytoscopy on 7/17/19, but pt did not attend. Pt remains anxious about getting answers as soon as possible. Patient states that his mid back pain has now progressed to involve the back of his shoulders.      Past Medical History:   Diagnosis Date    Abnormal nuclear stress test 12/7/2015    Cancer ([facility]     colon    Cardiomyopathy ([facility] [facility] 2010    EF 45%    Chronic obstructive pulmonary disease (HCC)     Chronic systolic heart failure (HCC)     GERD (gastroesophageal reflux disease)     HTN (hypertension)     PAC (premature atrial contraction)     Tobacco use disorder       Past Surgical History:   Procedure Laterality Date    COLONOSCOPY N/A 7/10/2019    COLONOSCOPY AND ESOPHAGOGASTRODUODENOSCOPY (EGD) performed by Chela Bhatia MD at [facility] 112 HX COLECTOMY      HX SPLENECTOMY      IR INSERT TUNL CVC W PORT OVER 5 YEARS  7/19/2019      Social History     Tobacco Use    Smoking status: Former Smoker     Packs/day: 0.50     Types: Cigarettes     Last attempt to quit: 4/22/2016     Years since quitting: 3.2    Smokeless tobacco: Never Used   Substance Use Topics    Alcohol use: Not Currently     Comment: rarely      Family History   Problem Relation Age of Onset    Stroke Mother     Coronary Artery Disease Sister     Heart Disease Paternal Grandfather      Current Outpatient Medications   Medication Sig    tamsulosin (FLOMAX) 0.4 mg capsule Take 0.4 mg by mouth daily.  OTHER Take  by inhalation. Alero Inhaler    lisinopril (PRINIVIL, ZESTRIL) 5 mg tablet TAKE 1 TABLET BY MOUTH DAILY    metoprolol succinate (TOPROL-XL) 25 mg XL tablet Take 1 Tab by mouth nightly. No current facility-administered medications for this visit. No Known Allergies     Review of Systems: A complete review of systems was obtained, negative except as described above. Physical Exam:     Visit Vitals  Ht 5' 11\" (1.803 m)   BMI 24.41 kg/m²     ECOG PS: 0  General: Well developed, no acute distress  Eyes: PERRLA, EOMI, anicteric sclerae  HENT: Atraumatic, OP clear, TMs intact without erythema  Neck: Supple  Lymphatic: No cervical, supraclavicular, axillary or inguinal adenopathy  Respiratory: CTAB, normal respiratory effort  CV: Normal rate, regular rhythm, no murmurs, no peripheral edema, port  GI: Soft, nontender, nondistended, no masses, no hepatomegaly, no splenomegaly  MS: Normal gait and station. Digits without clubbing or cyanosis. Skin: No rashes, ecchymoses, or petechiae. Normal temperature, turgor, and texture. Neuro/Psych: Alert, oriented. 5/5 strength in all 4 extremities. Appropriate affect, normal judgment/insight. Results:     Lab Results   Component Value Date/Time    WBC 6.1 06/21/2019 10:30 AM    HGB 13.7 06/21/2019 10:30 AM    HCT 41.0 06/21/2019 10:30 AM    PLATELET 592 68/35/4138 10:30 AM    MCV 88 06/21/2019 10:30 AM    ABS.  NEUTROPHILS 2.9 06/21/2019 10:30 AM     Lab Results   Component Value Date/Time    Sodium 141 06/21/2019 10:30 AM    Potassium 5.1 06/21/2019 10:30 AM    Chloride 103 06/21/2019 10:30 AM    CO2 25 06/21/2019 10:30 AM    Glucose 95 06/21/2019 10:30 AM    BUN 15 06/21/2019 10:30 AM    Creatinine 1.16 06/21/2019 10:30 AM    GFR est AA 79 06/21/2019 10:30 AM    GFR est non-AA 68 06/21/2019 10:30 AM    Calcium 9.9 06/21/2019 10:30 AM     Lab Results   Component Value Date/Time    Bilirubin, total 0.4 06/21/2019 10:30 AM    ALT (SGPT) 8 06/21/2019 10:30 AM    AST (SGOT) 10 06/21/2019 10:30 AM    Alk. phosphatase 57 06/21/2019 10:30 AM    Protein, total 7.2 06/21/2019 10:30 AM    Albumin 4.4 06/21/2019 10:30 AM     No results found for: IRON, FE, TIBC, IBCT, PSAT, FERR    No results found for: B12LT, FOL, RBCF  No results found for: TSH, TSH2, TSH3, TSHP, TSHEXT, TSHEXT  No results found for: HAMAT, HAAB, HABT, HAAT, HBSAG, HBSB, HBSAT, HBABN, HBCM, HBCAB, HBCAT, XBCABS, [address] [facility], [address] [place] [address], [facility], [id number], [facility], [facility], HBCLT, HBEBLT, [id number], [id number], [address] [facility], [id number], HBCMLT, [id number], HCGAT      Imaging:     Chest CT 5/10/19:  FINDINGS:  THYROID: No nodule. MEDIASTINUM: There are enlarged lymph nodes in the right paratracheal region  with 3 enlarged lymph nodes in this area. The largest is inferiorly in the right  paratracheal region measuring 2.5 x 1.7 cm. There are also enlarged lymph nodes  in the medial and lateral AP window with the largest lymph node measuring 18 mm. These lymph nodes are worrisome for neoplastic process. There is a normal size  subcarinal lymph node. Gabriel Humberto REBECCA: No mass or lymphadenopathy. THORACIC AORTA: No aneurysm. MAIN PULMONARY ARTERY: Normal in caliber. TRACHEA/BRONCHI: Patent. ESOPHAGUS: No wall thickening or dilatation. HEART: Normal in size. PLEURA: No effusion or pneumothorax. LUNGS: There are moderate changes of centrilobular emphysema. There are bullous  lesions at each apex left greater than right. There is a calcified granuloma in the left upper lobe.   There is volume loss in the medial segment right middle lobe that extends to a  oval-shaped opacity measures 1.8 x 1.1 cm this may all be atelectasis. Pulmonary  nodule within the atelectasis is not excluded.   No abnormality corresponds with the rounded opacity seen on the chest  radiograph. Possibly that was a nipple shadow. .  Mild scarring is seen medially in the lingula. INCIDENTALLY IMAGED UPPER ABDOMEN: The spleen has been removed. No adrenal  masses. BONES: No destructive bone lesion. IMPRESSION:  1. There are are multiple enlarged lymph nodes in the right paratracheal region  as well as adenopathy and AP window region. These enlarged lymph nodes are  worrisome for neoplastic process. 2. There is volume loss in right middle lobe with a nodular area of opacity that  could be atelectasis but a superimposed nodule within the areas of atelectasis  is not excluded. Further evaluation is suggested. Tissue diagnosis is suggested. PET CT scan may yield more information. . 23X     5/28/19 PET:  FINDINGS:  HEAD/NECK: No apparent foci of abnormal hypermetabolism. Cerebral evaluation is  limited by normal intense activity. CHEST: A hypermetabolic right paratracheal lymph node SUV is 8.5. There are  hypermetabolic lymph nodes anterior to the main bronchi bilaterally. There is  centrilobular emphysema. There is volume loss in the right middle lobe but a  central obstructing mass is not identified on this PET scan. ABDOMEN/PELVIS: No foci of abnormal hypermetabolism. The prostate gland is  enlarged. SKELETON: No foci of abnormal hypermetabolism in the axial and visualized  appendicular skeleton. IMPRESSION: Hypermetabolic mediastinal lymph nodes concerning for neoplastic process. Procedures:     EGD 7/10/19:   Esophagus:normal  Stomach: diffuse enlargement gastric folds with nodular erythema and erosion throughout stomach  Duodenum/jejunum: normal    Colonoscopy 7/10/19: Diverticulosis    Assessment & Plan:   Herberth Mosqueda is a 60 y.o. male with COPD, remote h/o colon cancer comes in for evaluation and management of adenocarcinoma of unknown primary. 1. Adenocarcinoma of unknown primary: Involving mediastinal LNs, with no other PET findings.  Per Thoracic Tumor Board discussion, this could represent upper GI origin, urothelial origin or germ cell tumor. Search for primary lesion was overall negative: Cystoscopy, EGD, colonoscopy negative for malignancy although EGD abnormal with diffusely enlarged gastric folds. Despite h/o colon cancer in 2002, it would be very odd to see a recurrence in the mediastinal LNs. CEA 26.8. Other tumor markers negative (AFP, hCG, CA 19-9). Unfortunately, this disease is considered incurable, but is treatable. At this time, I recommend chemotherapy treatment using the Carboplatin-Paclitaxel regimen (which covers both lung and GI primaries). We discussed the risks and benefits of chemotherapy with Carboplatin and Paclitaxel. Potential side effects include but are not limited to: nausea, vomiting, diarrhea, constipation, taste changes, allergic reactions, myelosuppression, infection, fatigue, alopecia, neuropathy, mucositis, renal failure, infertility, and rarely, death. Additionally, chemotherapy leads to radiosensitization which can intensify the side effects of radiation therapy. The patient has consented to beginning chemotherapy and chemo ed packet given. Supportive care medications include: Zofran, Compazine, Emla cream.  -- f/u [facility] testing off of 6/25/19 pathology specimen  -- f/u HER2 and PDL1 testing  -- Repeat imaging for new baseline now, scheduled for 7/30/19.  -- Discussed treatment with Carbo-Taxol to be given every 3 weeks with plan to repeat imaging prior to cycle 3.  -- Return on 8/1/19 to start cycle 1 of Carbo-Taxol regimen    2. Neoplasm pain: Affecting the upper back and now shoulders. This may be referred pain related to the mediastinal LNs. Pt is mainly taking pain medicines at night to ease pain enough to sleep. Discussed stool softeners prn, but pt currently having loose stools rather than constipation. -- Hydrocodone/APAP 5/325 - bid prn, #30 tabs given on 7/25/19.  -- Refer to palliative medicine if needed.      3. H/o Stage I [T1NxMx] sigmoid colon cancer: Found in sigmoid adenoma during a colonoscopy; went on to undergo segmented resection of the sigmoid colon in 2002. Pathology per outside records:  2/6/2002 sigmoid colon polyp: Well differentiated adenocarcinoma arising in an adenoma, involving the mucosa and invading into the upper half of the submucosal stalk. No vascular space involvement is identified. 2/21/2002 Sigmoidectomy, liver biopsy:  -Sigmoid colon, segmented resection: No residual adenocarcinoma present. Polypectomy site with granulation tissue and vascular congestion. No LNs recovered. Distal and proximal margins benign.  -Liver biopsy: Negative for metastatic carcinoma. No significant inflammation or obvious cirrhosis identified. Very minimal steatosis (rare cells with fat globules). -Addendum: Reblocks of adipose tissue; three small benign lymphoid aggregates (< 0.1cm). 4. COPD: Quit smoking in approx 2016. SOB improved after starting inhaler. 5. HTN: Well controlled on Lisinopril and Metoprolol. 6. BPH: On Flomax. Goals of care: Palliative to improve quality and duration of life, but no cure. Emotional well being: Pt is coping well with his/her disease and has excellent support. I appreciate the opportunity to participate in Mr. Tari solo.     Signed By: Mark Gracia MD     July 25, 2019

## 2019-07-23 NOTE — TELEPHONE ENCOUNTER
Call placed to [facility], spoke with medical records. States patient canceled cystoscopy and follow up office visit. Call placed to patient to confirm. Message left to return call to office.

## 2019-07-24 NOTE — TELEPHONE ENCOUNTER
Received return call from patient. Verified ID x 2.  States he did have cystoscopy completed on 7/17/19 at [facility]. He voices confusion as to why the office would report it wasn't done. States he will call [facility] to discuss this. Provided fax number to this office. He will ask to have cystoscopy results sent here. He will call this office back if further assist is needed.

## 2019-07-24 NOTE — TELEPHONE ENCOUNTER
Call placed to [facility]. Confirmed cystoscopy was completed on 7/17/19. Per Kuldip Shhaid in medical records, results not received yet. Results to be sent to this office when available. Message left for Dr. Lc Foy to call Dr. Austin Pruett.

## 2019-07-25 ENCOUNTER — OFFICE VISIT (OUTPATIENT)
Dept: ONCOLOGY | Age: 59
End: 2019-07-25

## 2019-07-25 VITALS
OXYGEN SATURATION: 95 % | RESPIRATION RATE: 16 BRPM | WEIGHT: 178 LBS | BODY MASS INDEX: 24.92 KG/M2 | HEART RATE: 67 BPM | DIASTOLIC BLOOD PRESSURE: 83 MMHG | TEMPERATURE: 98 F | SYSTOLIC BLOOD PRESSURE: 150 MMHG | HEIGHT: 71 IN

## 2019-07-25 DIAGNOSIS — J44.9 CHRONIC OBSTRUCTIVE PULMONARY DISEASE, UNSPECIFIED COPD TYPE (HCC): ICD-10-CM

## 2019-07-25 DIAGNOSIS — C80.1 ADENOCARCINOMA OF UNKNOWN PRIMARY (HCC): Primary | ICD-10-CM

## 2019-07-25 DIAGNOSIS — Z85.038 HISTORY OF COLON CANCER: ICD-10-CM

## 2019-07-25 DIAGNOSIS — R59.0 MEDIASTINAL LYMPHADENOPATHY: ICD-10-CM

## 2019-07-25 DIAGNOSIS — I10 ESSENTIAL HYPERTENSION: ICD-10-CM

## 2019-07-25 DIAGNOSIS — G89.3 ACUTE NEOPLASM-RELATED PAIN: ICD-10-CM

## 2019-07-25 RX ORDER — HYDROCODONE BITARTRATE AND ACETAMINOPHEN 5; 325 MG/1; MG/1
TABLET ORAL
COMMUNITY
Start: 2019-07-22 | End: 2019-07-25 | Stop reason: SDUPTHER

## 2019-07-25 RX ORDER — ONDANSETRON 4 MG/1
8 TABLET, FILM COATED ORAL
Qty: 45 TAB | Refills: 3 | Status: SHIPPED | OUTPATIENT
Start: 2019-07-25 | End: 2019-10-23 | Stop reason: SDUPTHER

## 2019-07-25 RX ORDER — HYDROCODONE BITARTRATE AND ACETAMINOPHEN 5; 325 MG/1; MG/1
1 TABLET ORAL
Qty: 30 TAB | Refills: 0 | Status: SHIPPED | OUTPATIENT
Start: 2019-07-25 | End: 2019-08-13 | Stop reason: SDUPTHER

## 2019-07-25 RX ORDER — PROCHLORPERAZINE MALEATE 10 MG
10 TABLET ORAL
Qty: 45 TAB | Refills: 3 | Status: SHIPPED | OUTPATIENT
Start: 2019-07-25 | End: 2019-10-23 | Stop reason: SDUPTHER

## 2019-07-25 RX ORDER — LIDOCAINE AND PRILOCAINE 25; 25 MG/G; MG/G
CREAM TOPICAL AS NEEDED
Qty: 30 G | Refills: 1 | Status: SHIPPED | OUTPATIENT
Start: 2019-07-25 | End: 2020-01-01

## 2019-07-25 NOTE — PATIENT INSTRUCTIONS
We are prescribing two anti-nausea medications, zofran (ondansetron) and compazine (prochlorperazine). Nausea prevention: For the first 2 days after chemotherapy, take zofran before breakfast and lunch. Take compazine before dinner. This is on a scheduled basis, which means you take these medications whether you feel nauseated or not. Nausea treatment:  After the first 2-3 days, you can switch to taking the zofran and compazine only as needed for nausea. You can take the zofran every 8 hours, and the compazine every 6 hours. I recommend taking the compazine in the evening because it can cause drowsiness.

## 2019-07-25 NOTE — PROGRESS NOTES
Albin Alvarez is a 60 y.o. male here today for follow up of Adenocarcinoma of unknown primary. States he saw PCP this past Monday for pain to shoulders & upper back. Was prescribed short supply of hydrocodone/acet. & was advised to follow up in this office. States medication effective for pain. Asking if refill can be provided today, has one tab left.

## 2019-07-29 RX ORDER — DIPHENHYDRAMINE HYDROCHLORIDE 50 MG/ML
50 INJECTION, SOLUTION INTRAMUSCULAR; INTRAVENOUS ONCE
Status: CANCELLED
Start: 2019-08-01

## 2019-07-29 RX ORDER — ACETAMINOPHEN 325 MG/1
650 TABLET ORAL AS NEEDED
Status: CANCELLED
Start: 2019-08-01

## 2019-07-29 RX ORDER — SODIUM CHLORIDE 9 MG/ML
10 INJECTION INTRAMUSCULAR; INTRAVENOUS; SUBCUTANEOUS AS NEEDED
Status: CANCELLED | OUTPATIENT
Start: 2019-08-01

## 2019-07-29 RX ORDER — HEPARIN 100 UNIT/ML
300-500 SYRINGE INTRAVENOUS AS NEEDED
Status: CANCELLED
Start: 2019-08-01

## 2019-07-29 RX ORDER — SODIUM CHLORIDE 9 MG/ML
25 INJECTION, SOLUTION INTRAVENOUS CONTINUOUS
Status: CANCELLED | OUTPATIENT
Start: 2019-08-01

## 2019-07-29 RX ORDER — DIPHENHYDRAMINE HYDROCHLORIDE 50 MG/ML
50 INJECTION, SOLUTION INTRAMUSCULAR; INTRAVENOUS AS NEEDED
Status: CANCELLED
Start: 2019-08-01

## 2019-07-29 RX ORDER — SODIUM CHLORIDE 0.9 % (FLUSH) 0.9 %
10 SYRINGE (ML) INJECTION AS NEEDED
Status: CANCELLED
Start: 2019-08-01

## 2019-07-29 RX ORDER — ALBUTEROL SULFATE 0.83 MG/ML
2.5 SOLUTION RESPIRATORY (INHALATION) AS NEEDED
Status: CANCELLED
Start: 2019-08-01

## 2019-07-29 RX ORDER — PALONOSETRON 0.05 MG/ML
0.25 INJECTION, SOLUTION INTRAVENOUS ONCE
Status: CANCELLED
Start: 2019-08-01

## 2019-07-29 RX ORDER — ONDANSETRON 2 MG/ML
8 INJECTION INTRAMUSCULAR; INTRAVENOUS AS NEEDED
Status: CANCELLED | OUTPATIENT
Start: 2019-08-01

## 2019-07-29 RX ORDER — HYDROCORTISONE SODIUM SUCCINATE 100 MG/2ML
100 INJECTION, POWDER, FOR SOLUTION INTRAMUSCULAR; INTRAVENOUS AS NEEDED
Status: CANCELLED | OUTPATIENT
Start: 2019-08-01

## 2019-07-29 RX ORDER — LORAZEPAM 2 MG/ML
0.5 INJECTION INTRAMUSCULAR
Status: CANCELLED
Start: 2019-08-01

## 2019-07-29 RX ORDER — EPINEPHRINE 1 MG/ML
0.3 INJECTION, SOLUTION, CONCENTRATE INTRAVENOUS AS NEEDED
Status: CANCELLED | OUTPATIENT
Start: 2019-08-01

## 2019-07-30 ENCOUNTER — HOSPITAL ENCOUNTER (OUTPATIENT)
Dept: CT IMAGING | Age: 59
Discharge: HOME | End: 2019-07-30
Attending: INTERNAL MEDICINE
Payer: OTHER GOVERNMENT

## 2019-07-30 ENCOUNTER — TELEPHONE (OUTPATIENT)
Dept: ONCOLOGY | Age: 59
End: 2019-07-30

## 2019-07-30 DIAGNOSIS — C80.1 ADENOCARCINOMA OF UNKNOWN PRIMARY (HCC): ICD-10-CM

## 2019-07-30 PROCEDURE — 74177 CT ABD & PELVIS W/CONTRAST: CPT

## 2019-07-30 PROCEDURE — 74011636320 HC RX REV CODE- 636/320: Performed by: RADIOLOGY

## 2019-07-30 RX ADMIN — IOPAMIDOL 100 ML: 755 INJECTION, SOLUTION INTRAVENOUS at 14:14

## 2019-07-30 NOTE — PROGRESS NOTES
[address] [facility] Oncology at [facility]  [phone]    Hematology / Oncology Established Visit    Reason for Visit:   Mandie Murphy is a 60 y.o. male who comes in for f/u of adenocarcinoma of unknown primary. Initially referred by Dr. Alvin Acevedo. Hematology Oncology Treatment History:     Diagnosis: Adenocarcinoma of unknown primary    Stage: N/A    Pathology:   6/4/19 4R LN biopsy: rare malignant cells admixed with abundant blood clot  6/4/19 4L LN FNA and core biopsy: Adenocarcinoma. 6/4/19 2R LN FNA and core biopsy: Adenocarcinoma. Comment: IHC stains negative for GATA3, AR, ER, p40. Immunohistochemistry shows that the tumor cells express CK7 with focal expression of CDX2. Tumor cells lack expression of CK20, TTF-1 and Napsin A. These findings are not entirely diagnostic and could be seen in either a primary pulmonary malignancy or a primary upper gastrointestinal tract malignancy. Other sites are also not excluded. Clinical and radiographic correlation is recommended. 6/25/19 2R LN, mediastinum: Metastatic adenocarcinoma (see Comment). Comment - The tumor appears poorly differentiated with areas of necrosis. Immunohistochemical staining reveals positive staining for cytokeratin 7 (strong, diffuse), CDX-2 (focal, weak to moderate) and CEA (focal, moderate to strong). The tumor cells are negative for cytokeratin 20, cytokeratin 5/6, p40, p63, TTF-1, napsin-A, PLAP, HCG, AFP and c-KIT (). The findings are not entirely specific with regard to primary site but would be consistent with upper GI/pancreaticobiliary tract. A pulmonary primary is less likely, but still in the differential.     Prior Treatment: None    Current Treatment: Carbo-Taxol every 3 weeks until disease progression or toxicity. History of Present Illness:   Mr. Steph Serna is a 60 y.o. male with COPD, remote h/o colon cancer comes in for evaluation of mediastinal lymphadenopathy.  Pt had recently p/w shortness of breath, dyspnea on exertion, and was found on chest CT to have R mediastinal lymphadenopathy, which also were PET avid. Case was discussed at Thoracic Tumor Board with thought that this could be pancreatic, urothelial, or germ cell origin. More tissue is recommended. Pt had EGD 3 yrs ago and was told he had GERD. Patient has h/o colon cancer in 2002. He states a cancerous polyp was found and then he underwent colectomy in 2002. This was done by Dr. Stu Richardson at [facility]. Patient had colonoscopies regularly afterwards, last one was approx 2015. Last EGD was in 2018. As part of search for primary lesion, patient underwent EGD, colonoscopy by Dr. Bahman Casas, notable for diffusely enlarged gastric folds, but biopsies negative. Pt underwent cytoscopy on 7/17/19 with findings negative for malignancy. Patient states that he has some mid back pain which is involving the back of his shoulders. He comes in to start chemotherapy with cycle 1 of Carbo-Taxol today. No new symptoms.      Past Medical History:   Diagnosis Date    Abnormal nuclear stress test 12/7/2015    Cancer ([address]     colon    Cardiomyopathy ([address] 2010    EF 45%    Chronic obstructive pulmonary disease (HCC)     Chronic systolic heart failure (HCC)     GERD (gastroesophageal reflux disease)     HTN (hypertension)     PAC (premature atrial contraction)     Tobacco use disorder       Past Surgical History:   Procedure Laterality Date    COLONOSCOPY N/A 7/10/2019    COLONOSCOPY AND ESOPHAGOGASTRODUODENOSCOPY (EGD) performed by Mary Damian MD at [facility] HX COLECTOMY      HX SPLENECTOMY      IR INSERT TUNL CVC W PORT OVER 5 YEARS  7/19/2019      Social History     Tobacco Use    Smoking status: Former Smoker     Packs/day: 0.50     Types: Cigarettes     Last attempt to quit: 4/22/2016     Years since quitting: 3.2    Smokeless tobacco: Never Used   Substance Use Topics    Alcohol use: Not Currently     Comment: rarely Family History   Problem Relation Age of Onset    Stroke Mother     Coronary Artery Disease Sister     Heart Disease Paternal Grandfather      Current Outpatient Medications   Medication Sig    HYDROcodone-acetaminophen (NORCO) 5-325 mg per tablet Take 1 Tab by mouth two (2) times daily as needed for Pain for up to 30 days. Max Daily Amount: 2 Tabs.  ondansetron hcl (ZOFRAN) 4 mg tablet Take 2 Tabs by mouth every eight (8) hours as needed for Nausea.  prochlorperazine (COMPAZINE) 10 mg tablet Take 1 Tab by mouth every six (6) hours as needed for Nausea.  lidocaine-prilocaine (EMLA) topical cream Apply  to affected area as needed for Pain. Apply quarter size amount to port prior to chemotherapy    tamsulosin (FLOMAX) 0.4 mg capsule Take 0.4 mg by mouth daily.  OTHER Take  by inhalation. Alero Inhaler    lisinopril (PRINIVIL, ZESTRIL) 5 mg tablet TAKE 1 TABLET BY MOUTH DAILY    metoprolol succinate (TOPROL-XL) 25 mg XL tablet Take 1 Tab by mouth nightly. No current facility-administered medications for this visit. No Known Allergies     Review of Systems: A complete review of systems was obtained, negative except as described above. Physical Exam:     There were no vitals taken for this visit. ECOG PS: 0  General: Well developed, no acute distress  Eyes: PERRLA, EOMI, anicteric sclerae  HENT: Atraumatic, OP clear, TMs intact without erythema  Neck: Supple  Lymphatic: No cervical, supraclavicular, axillary or inguinal adenopathy  Respiratory: CTAB, normal respiratory effort  CV: Normal rate, regular rhythm, no murmurs, no peripheral edema, port  GI: Soft, nontender, nondistended, no masses, no hepatomegaly, no splenomegaly  MS: Normal gait and station. Digits without clubbing or cyanosis. Skin: No rashes, ecchymoses, or petechiae. Normal temperature, turgor, and texture. Neuro/Psych: Alert, oriented. 5/5 strength in all 4 extremities.  Appropriate affect, normal judgment/insight. Results:     Lab Results   Component Value Date/Time    WBC 6.5 08/01/2019 11:40 AM    HGB 14.1 08/01/2019 11:40 AM    HCT 42.8 08/01/2019 11:40 AM    PLATELET 249 15/61/4020 11:40 AM    MCV 89.5 08/01/2019 11:40 AM    ABS. NEUTROPHILS 2.8 08/01/2019 11:40 AM     Lab Results   Component Value Date/Time    Sodium 141 06/21/2019 10:30 AM    Potassium 5.1 06/21/2019 10:30 AM    Chloride 103 06/21/2019 10:30 AM    CO2 25 06/21/2019 10:30 AM    Glucose 95 06/21/2019 10:30 AM    BUN 15 06/21/2019 10:30 AM    Creatinine 1.16 06/21/2019 10:30 AM    GFR est AA 79 06/21/2019 10:30 AM    GFR est non-AA 68 06/21/2019 10:30 AM    Calcium 9.9 06/21/2019 10:30 AM     Lab Results   Component Value Date/Time    Bilirubin, total 0.4 06/21/2019 10:30 AM    ALT (SGPT) 8 06/21/2019 10:30 AM    AST (SGOT) 10 06/21/2019 10:30 AM    Alk. phosphatase 57 06/21/2019 10:30 AM    Protein, total 7.2 06/21/2019 10:30 AM    Albumin 4.4 06/21/2019 10:30 AM     No results found for: IRON, FE, TIBC, IBCT, PSAT, FERR    No results found for: B12LT, FOL, RBCF  No results found for: TSH, TSH2, TSH3, TSHP, TSHEXT, TSHEXT  No results found for: HAMAT, HAAB, HABT, HAAT, HBSAG, HBSB, HBSAT, HBABN, HBCM, HBCAB, HBCAT, XBCABS, [facility], [address] [place] [address], [facility], [id number], [facility], [facility], HBCLT, HBEBLT, [id number], [id number], [address] [place] [address], [id number], HBCMLT, [id number], HCGAT      Imaging:     Chest CT 5/10/19:  FINDINGS:  THYROID: No nodule. MEDIASTINUM: There are enlarged lymph nodes in the right paratracheal region  with 3 enlarged lymph nodes in this area. The largest is inferiorly in the right  paratracheal region measuring 2.5 x 1.7 cm. There are also enlarged lymph nodes  in the medial and lateral AP window with the largest lymph node measuring 18 mm. These lymph nodes are worrisome for neoplastic process. There is a normal size  subcarinal lymph node. Alphia Pierini REBECCA: No mass or lymphadenopathy. THORACIC AORTA: No aneurysm.   MAIN PULMONARY ARTERY: Normal in caliber. TRACHEA/BRONCHI: Patent. ESOPHAGUS: No wall thickening or dilatation. HEART: Normal in size. PLEURA: No effusion or pneumothorax. LUNGS: There are moderate changes of centrilobular emphysema. There are bullous  lesions at each apex left greater than right. There is a calcified granuloma in the left upper lobe.   There is volume loss in the medial segment right middle lobe that extends to a  oval-shaped opacity measures 1.8 x 1.1 cm this may all be atelectasis. Pulmonary  nodule within the atelectasis is not excluded. No abnormality corresponds with the rounded opacity seen on the chest  radiograph. Possibly that was a nipple shadow. .  Mild scarring is seen medially in the lingula. INCIDENTALLY IMAGED UPPER ABDOMEN: The spleen has been removed. No adrenal  masses. BONES: No destructive bone lesion. IMPRESSION:  1. There are are multiple enlarged lymph nodes in the right paratracheal region  as well as adenopathy and AP window region. These enlarged lymph nodes are  worrisome for neoplastic process. 2. There is volume loss in right middle lobe with a nodular area of opacity that  could be atelectasis but a superimposed nodule within the areas of atelectasis  is not excluded. Further evaluation is suggested. Tissue diagnosis is suggested. PET CT scan may yield more information. . 23X     5/28/19 PET:  FINDINGS:  HEAD/NECK: No apparent foci of abnormal hypermetabolism. Cerebral evaluation is  limited by normal intense activity. CHEST: A hypermetabolic right paratracheal lymph node SUV is 8.5. There are  hypermetabolic lymph nodes anterior to the main bronchi bilaterally. There is  centrilobular emphysema. There is volume loss in the right middle lobe but a  central obstructing mass is not identified on this PET scan. ABDOMEN/PELVIS: No foci of abnormal hypermetabolism. The prostate gland is  enlarged.   SKELETON: No foci of abnormal hypermetabolism in the axial and visualized  appendicular skeleton. IMPRESSION: Hypermetabolic mediastinal lymph nodes concerning for neoplastic process. Chest/abd/pelvis CT 7/30/19:  FINDINGS:   THYROID: No nodule. MEDIASTINUM: Mediastinal lymphadenopathy has loosely decreased with upper  paratracheal node measuring 1.3 x 1.9 cm, previously 1.4 x 1.8 cm (2, 24), right  paratracheal node at the level of the aortic arch measuring 1.4 x 2.1 cm,  previously 1.7 x 2.6 cm of (2, 27) series, precarinal node on the right  measuring 1.7 x 2.2 cm, previously 1.7 x 2.5 cm (2, 31), and left pericarinal  noted measuring 1.2 x 1.8 cm, previously 1.8 x 2.1 cm of (2, 30). However, there  is an enlarged left preaortic node just lateral to left mainstem bronchus  measuring 1.3 x 2.1 cm which previously measured 8 x 8 mm (2, 33). REBECCA: No mass or lymphadenopathy. THORACIC AORTA: No dissection or aneurysm. MAIN PULMONARY ARTERY: Normal in caliber. TRACHEA/BRONCHI: Patent. ESOPHAGUS: No wall thickening or dilatation. HEART: Normal in size. PLEURA: No effusion or pneumothorax. LUNGS: Centrilobular bullous emphysematous change. Right middle lobe ovoid  entity most likely reflective of atelectasis adjacent to the right heart margin  appears unchanged. Left upper lobe granuloma. No acute infiltrate, nodule, or  mass. LIVER: No mass or biliary dilatation. GALLBLADDER: Unremarkable. SPLEEN: Surgically absent. PANCREAS: No mass or ductal dilatation. ADRENALS: Right adrenal mass not seen previously measures 2.0 x 3.0 cm (2, 62). Left adrenal appears unremarkable. KIDNEYS: No mass, calculus, or hydronephrosis. STOMACH: Unremarkable. SMALL BOWEL: No dilatation or wall thickening. COLON: No dilatation or wall thickening. APPENDIX: Unremarkable. PERITONEUM: No ascites or pneumoperitoneum. RETROPERITONEUM: Right retroperitoneal adenopathy posterior to the inferior vena  cava at the level of the renal hilum measures 1.3 x 1.9 cm, not previously seen  (2, 69).   REPRODUCTIVE ORGANS: The prostate and seminal vesicles appear unremarkable. URINARY BLADDER: No mass or calculus. BONES: No destructive bone lesion. ADDITIONAL COMMENTS: Right Port-A-Cath in place with catheter traversing to the  atriocaval junction region. IMPRESSION:  1. Mixed response to therapy with most mediastinal lymphadenopathy diminishing  in size, however, a left mediastinal lymph node has shown increased size and  there is a new right adrenal mass and new right retroperitoneal adenopathy  suspicious for aggressive metastatic disease. 2. Additional incidental findings as above including centrilobular bullous  emphysema with probable right middle lobe atelectasis. Procedures:     EGD 7/10/19:   Esophagus:normal  Stomach: diffuse enlargement gastric folds with nodular erythema and erosion throughout stomach  Duodenum/jejunum: normal    Colonoscopy 7/10/19: Diverticulosis    Assessment & Plan:   Albin Alvarez is a 60 y.o. male with COPD, remote h/o colon cancer comes in for evaluation and management of adenocarcinoma of unknown primary. 1. Adenocarcinoma of unknown primary: Involving mediastinal LNs, with no other PET findings. Per Thoracic Tumor Board discussion, this could represent upper GI origin, urothelial origin or germ cell tumor. Search for primary lesion was overall negative: Cystoscopy, EGD, colonoscopy negative for malignancy although EGD abnormal with diffusely enlarged gastric folds. Despite h/o colon cancer in 2002, it would be very odd to see a recurrence in the mediastinal LNs. CEA 26.8. Other tumor markers negative (AFP, hCG, CA 19-9). Unfortunately, this disease is considered incurable, but is treatable. At this time, I recommend chemotherapy treatment using the Carboplatin-Paclitaxel regimen (which covers both lung and GI primaries). We discussed the risks and benefits of chemotherapy with Carboplatin and Paclitaxel.   Potential side effects include but are not limited to: nausea, vomiting, diarrhea, constipation, taste changes, allergic reactions, myelosuppression, infection, fatigue, alopecia, neuropathy, mucositis, renal failure, infertility, and rarely, death. Additionally, chemotherapy leads to radiosensitization which can intensify the side effects of radiation therapy. The patient has consented to beginning chemotherapy and chemo ed packet given. Supportive care medications include: Zofran, Compazine, Emla cream.  -- f/u [facility] testing off of 6/25/19 pathology specimen  -- f/u HER2 and PDL1 testing  -- Proceed with cycle 1 of Carbo-Taxol today with plan to treat every 3 weeks with plan to repeat imaging prior to cycle 3.  -- [facility] sheet for CBC, BMP on 8/15/19.   -- Return in 3 weeks for cycle 2  -- Remind to take dexamethasone 4 mg on days 2 and 3 after treatment- sign rx.    -- Lindsey Major to meet with pt about FMLA     2. Neoplasm pain: Affecting the upper back and now shoulders. This may be referred pain related to the mediastinal LNs. Pt is mainly taking pain medicines at night to ease pain enough to sleep. Discussed stool softeners prn, but pt currently having loose stools rather than constipation. -- Hydrocodone/APAP 5/325 - bid prn, #30 tabs given on 7/25/19.  -- Refer to palliative medicine if needed. 3. H/o Stage I [T1NxMx] sigmoid colon cancer: Found in sigmoid adenoma during a colonoscopy; went on to undergo segmented resection of the sigmoid colon in 2002. Pathology per outside records:  2/6/2002 sigmoid colon polyp: Well differentiated adenocarcinoma arising in an adenoma, involving the mucosa and invading into the upper half of the submucosal stalk. No vascular space involvement is identified. 2/21/2002 Sigmoidectomy, liver biopsy:  -Sigmoid colon, segmented resection: No residual adenocarcinoma present. Polypectomy site with granulation tissue and vascular congestion. No LNs recovered. Distal and proximal margins benign.  -Liver biopsy: Negative for metastatic carcinoma.  No significant inflammation or obvious cirrhosis identified. Very minimal steatosis (rare cells with fat globules). -Addendum: Reblocks of adipose tissue; three small benign lymphoid aggregates (< 0.1cm). 4. COPD: Quit smoking in approx 2016. SOB improved after starting inhaler. 5. HTN: Well controlled on Lisinopril and Metoprolol. 6. BPH: On Flomax. Goals of care: Palliative to improve quality and duration of life, but no cure. Emotional well being: Pt is coping well with his/her disease and has excellent support. I appreciate the opportunity to participate in Mr. Aida solo.     Signed By: Hans Velásquez MD     August 1, 2019

## 2019-07-30 NOTE — TELEPHONE ENCOUNTER
[address]  Social Work Navigator Encounter     Patient Name:  Pamela Zamudio    Medical History: Adenocarcinoma of unknown primary    Advance Directives: none of file; conversation deferred    Narrative: Social work referral from [facility] regarding disability questions. Follow-up call placed to patient. No answer. Left voicemail advising patient can call me back or wait to talk to me when he RTC on 8/1/19.      Barriers to Care:     Assessment/Action:    Plan/Referral:   Insurance/Entitlements referral    Thank you,   Catalina Post

## 2019-08-01 ENCOUNTER — HOSPITAL ENCOUNTER (OUTPATIENT)
Dept: INFUSION THERAPY | Age: 59
Discharge: HOME | End: 2019-08-01
Payer: OTHER GOVERNMENT

## 2019-08-01 ENCOUNTER — OFFICE VISIT (OUTPATIENT)
Dept: ONCOLOGY | Age: 59
End: 2019-08-01

## 2019-08-01 VITALS
WEIGHT: 171 LBS | HEIGHT: 71 IN | RESPIRATION RATE: 16 BRPM | TEMPERATURE: 97.2 F | SYSTOLIC BLOOD PRESSURE: 133 MMHG | BODY MASS INDEX: 23.94 KG/M2 | DIASTOLIC BLOOD PRESSURE: 84 MMHG | HEART RATE: 56 BPM | OXYGEN SATURATION: 97 %

## 2019-08-01 VITALS
TEMPERATURE: 97.7 F | HEART RATE: 82 BPM | WEIGHT: 171.2 LBS | HEIGHT: 71 IN | OXYGEN SATURATION: 97 % | BODY MASS INDEX: 23.97 KG/M2 | SYSTOLIC BLOOD PRESSURE: 135 MMHG | RESPIRATION RATE: 18 BRPM | DIASTOLIC BLOOD PRESSURE: 79 MMHG

## 2019-08-01 DIAGNOSIS — Z51.11 CHEMOTHERAPY MANAGEMENT, ENCOUNTER FOR: ICD-10-CM

## 2019-08-01 DIAGNOSIS — J44.9 CHRONIC OBSTRUCTIVE PULMONARY DISEASE, UNSPECIFIED COPD TYPE (HCC): ICD-10-CM

## 2019-08-01 DIAGNOSIS — C80.1 ADENOCARCINOMA OF UNKNOWN ORIGIN (HCC): Primary | ICD-10-CM

## 2019-08-01 DIAGNOSIS — C80.1 ADENOCARCINOMA OF UNKNOWN PRIMARY (HCC): Primary | ICD-10-CM

## 2019-08-01 DIAGNOSIS — I10 ESSENTIAL HYPERTENSION: ICD-10-CM

## 2019-08-01 DIAGNOSIS — R59.0 MEDIASTINAL LYMPHADENOPATHY: ICD-10-CM

## 2019-08-01 DIAGNOSIS — T45.1X5A CHEMOTHERAPY INDUCED NAUSEA AND VOMITING: ICD-10-CM

## 2019-08-01 DIAGNOSIS — Z85.038 HISTORY OF COLON CANCER: ICD-10-CM

## 2019-08-01 DIAGNOSIS — R11.2 CHEMOTHERAPY INDUCED NAUSEA AND VOMITING: ICD-10-CM

## 2019-08-01 LAB
ALBUMIN SERPL-MCNC: 3.8 G/DL (ref 3.5–5)
ALBUMIN/GLOB SERPL: 1 {RATIO} (ref 1.1–2.2)
ALP SERPL-CCNC: 67 U/L (ref 45–117)
ALT SERPL-CCNC: 12 U/L (ref 12–78)
ANION GAP SERPL CALC-SCNC: 4 MMOL/L (ref 5–15)
AST SERPL-CCNC: 11 U/L (ref 15–37)
BASOPHILS # BLD: 0 K/UL (ref 0–0.1)
BASOPHILS NFR BLD: 1 % (ref 0–1)
BILIRUB SERPL-MCNC: 0.6 MG/DL (ref 0.2–1)
BUN SERPL-MCNC: 14 MG/DL (ref 6–20)
BUN/CREAT SERPL: 14 (ref 12–20)
CALCIUM SERPL-MCNC: 9.1 MG/DL (ref 8.5–10.1)
CHLORIDE SERPL-SCNC: 109 MMOL/L (ref 97–108)
CO2 SERPL-SCNC: 28 MMOL/L (ref 21–32)
CREAT SERPL-MCNC: 0.99 MG/DL (ref 0.7–1.3)
DIFFERENTIAL METHOD BLD: ABNORMAL
EOSINOPHIL # BLD: 0.1 K/UL (ref 0–0.4)
EOSINOPHIL NFR BLD: 1 % (ref 0–7)
ERYTHROCYTE [DISTWIDTH] IN BLOOD BY AUTOMATED COUNT: 15.9 % (ref 11.5–14.5)
GLOBULIN SER CALC-MCNC: 3.9 G/DL (ref 2–4)
GLUCOSE SERPL-MCNC: 95 MG/DL (ref 65–100)
HCT VFR BLD AUTO: 42.8 % (ref 36.6–50.3)
HGB BLD-MCNC: 14.1 G/DL (ref 12.1–17)
IMM GRANULOCYTES # BLD AUTO: 0 K/UL (ref 0–0.04)
IMM GRANULOCYTES NFR BLD AUTO: 0 % (ref 0–0.5)
LYMPHOCYTES # BLD: 2.7 K/UL (ref 0.8–3.5)
LYMPHOCYTES NFR BLD: 42 % (ref 12–49)
MCH RBC QN AUTO: 29.5 PG (ref 26–34)
MCHC RBC AUTO-ENTMCNC: 32.9 G/DL (ref 30–36.5)
MCV RBC AUTO: 89.5 FL (ref 80–99)
MONOCYTES # BLD: 0.9 K/UL (ref 0–1)
MONOCYTES NFR BLD: 14 % (ref 5–13)
NEUTS SEG # BLD: 2.8 K/UL (ref 1.8–8)
NEUTS SEG NFR BLD: 42 % (ref 32–75)
NRBC # BLD: 0 K/UL (ref 0–0.01)
NRBC BLD-RTO: 0 PER 100 WBC
PLATELET # BLD AUTO: 217 K/UL (ref 150–400)
PMV BLD AUTO: 11.4 FL (ref 8.9–12.9)
POTASSIUM SERPL-SCNC: 4.2 MMOL/L (ref 3.5–5.1)
PROT SERPL-MCNC: 7.7 G/DL (ref 6.4–8.2)
RBC # BLD AUTO: 4.78 M/UL (ref 4.1–5.7)
SODIUM SERPL-SCNC: 141 MMOL/L (ref 136–145)
WBC # BLD AUTO: 6.5 K/UL (ref 4.1–11.1)

## 2019-08-01 PROCEDURE — 96415 CHEMO IV INFUSION ADDL HR: CPT

## 2019-08-01 PROCEDURE — 85025 COMPLETE CBC W/AUTO DIFF WBC: CPT

## 2019-08-01 PROCEDURE — 36415 COLL VENOUS BLD VENIPUNCTURE: CPT

## 2019-08-01 PROCEDURE — 74011250636 HC RX REV CODE- 250/636: Performed by: INTERNAL MEDICINE

## 2019-08-01 PROCEDURE — 96413 CHEMO IV INFUSION 1 HR: CPT

## 2019-08-01 PROCEDURE — 80053 COMPREHEN METABOLIC PANEL: CPT

## 2019-08-01 PROCEDURE — 96375 TX/PRO/DX INJ NEW DRUG ADDON: CPT

## 2019-08-01 PROCEDURE — 96417 CHEMO IV INFUS EACH ADDL SEQ: CPT

## 2019-08-01 PROCEDURE — 74011000258 HC RX REV CODE- 258: Performed by: INTERNAL MEDICINE

## 2019-08-01 PROCEDURE — 77030012965 HC NDL HUBR BBMI -A

## 2019-08-01 PROCEDURE — 96367 TX/PROPH/DG ADDL SEQ IV INF: CPT

## 2019-08-01 RX ORDER — SODIUM CHLORIDE 0.9 % (FLUSH) 0.9 %
10 SYRINGE (ML) INJECTION AS NEEDED
Status: ACTIVE | OUTPATIENT
Start: 2019-08-01 | End: 2019-08-01

## 2019-08-01 RX ORDER — HEPARIN 100 UNIT/ML
300-500 SYRINGE INTRAVENOUS AS NEEDED
Status: ACTIVE | OUTPATIENT
Start: 2019-08-01 | End: 2019-08-01

## 2019-08-01 RX ORDER — DIPHENHYDRAMINE HYDROCHLORIDE 50 MG/ML
50 INJECTION, SOLUTION INTRAMUSCULAR; INTRAVENOUS AS NEEDED
Status: DISCONTINUED | OUTPATIENT
Start: 2019-08-01 | End: 2019-08-02 | Stop reason: HOSPADM

## 2019-08-01 RX ORDER — DIPHENHYDRAMINE HYDROCHLORIDE 50 MG/ML
50 INJECTION, SOLUTION INTRAMUSCULAR; INTRAVENOUS ONCE
Status: COMPLETED | OUTPATIENT
Start: 2019-08-01 | End: 2019-08-01

## 2019-08-01 RX ORDER — SODIUM CHLORIDE 9 MG/ML
25 INJECTION, SOLUTION INTRAVENOUS CONTINUOUS
Status: DISCONTINUED | OUTPATIENT
Start: 2019-08-01 | End: 2019-08-02 | Stop reason: HOSPADM

## 2019-08-01 RX ORDER — PALONOSETRON 0.05 MG/ML
0.25 INJECTION, SOLUTION INTRAVENOUS ONCE
Status: COMPLETED | OUTPATIENT
Start: 2019-08-01 | End: 2019-08-01

## 2019-08-01 RX ORDER — SODIUM CHLORIDE 9 MG/ML
10 INJECTION INTRAMUSCULAR; INTRAVENOUS; SUBCUTANEOUS AS NEEDED
Status: ACTIVE | OUTPATIENT
Start: 2019-08-01 | End: 2019-08-01

## 2019-08-01 RX ORDER — DEXAMETHASONE 4 MG/1
TABLET ORAL
Qty: 12 TAB | Refills: 0 | Status: SHIPPED | OUTPATIENT
Start: 2019-08-01 | End: 2019-10-09 | Stop reason: SDUPTHER

## 2019-08-01 RX ORDER — HYDROCORTISONE SODIUM SUCCINATE 100 MG/2ML
100 INJECTION, POWDER, FOR SOLUTION INTRAMUSCULAR; INTRAVENOUS AS NEEDED
Status: DISCONTINUED | OUTPATIENT
Start: 2019-08-01 | End: 2019-08-02 | Stop reason: HOSPADM

## 2019-08-01 RX ADMIN — FAMOTIDINE 20 MG: 10 INJECTION, SOLUTION INTRAVENOUS at 13:19

## 2019-08-01 RX ADMIN — CARBOPLATIN 673 MG: 10 INJECTION, SOLUTION INTRAVENOUS at 17:36

## 2019-08-01 RX ADMIN — Medication 10 ML: at 18:10

## 2019-08-01 RX ADMIN — DIPHENHYDRAMINE HYDROCHLORIDE 50 MG: 50 INJECTION INTRAMUSCULAR; INTRAVENOUS at 13:20

## 2019-08-01 RX ADMIN — SODIUM CHLORIDE 25 ML/HR: 900 INJECTION, SOLUTION INTRAVENOUS at 13:19

## 2019-08-01 RX ADMIN — SODIUM CHLORIDE 150 MG: 900 INJECTION, SOLUTION INTRAVENOUS at 13:26

## 2019-08-01 RX ADMIN — PACLITAXEL 402 MG: 6 INJECTION, SOLUTION INTRAVENOUS at 14:26

## 2019-08-01 RX ADMIN — Medication 500 UNITS: at 18:10

## 2019-08-01 RX ADMIN — DEXAMETHASONE SODIUM PHOSPHATE 12 MG: 4 INJECTION, SOLUTION INTRAMUSCULAR; INTRAVENOUS at 13:26

## 2019-08-01 RX ADMIN — PALONOSETRON HYDROCHLORIDE 0.25 MG: 0.05 INJECTION INTRAVENOUS at 13:19

## 2019-08-01 NOTE — PROGRESS NOTES

## 2019-08-01 NOTE — PROGRESS NOTES
[facility] Progress Note    Date: 2019    Name: Leandra Montelongo    MRN: [id number]         : 1960      1810. Mr. Nikolay Kim tolerated treatment well and was discharged from [place] [address] in stable condition. Port de-accessed, flushed & heparinized per protocol. He is to return on 19 for his next appointment.     Celeste Valdes RN  2019

## 2019-08-01 NOTE — PATIENT INSTRUCTIONS
1. Please  the dexamethasone prescription sent to your pharmacy. You will take 4 mg (1 tab) on days 2 and 3 after your treatment. 2. Please call with any questions, this is your care team: [phone]  Dr. Margarito Kevin, RENETTA Domingo, RN  We have an on-call doctor who covers after clinic closes at 5 pm on weekdays and weekends. 3. Please go to [address] [place] [address] on 8/14 or 8/15 with the lab slip that I gave you.

## 2019-08-01 NOTE — PROGRESS NOTES
[facility] VISIT NOTE    1130  Pt arrived at [facility] ambulatory and in no distress for C1D1 Taxol + Carboplatin. Assessment completed, pt c/o numbness in his left hand, but no pain or other complaints today. Blood pressure 133/84, pulse (!) 56, temperature 97.2 °F (36.2 °C), resp. rate 16, height 5' 11\" (1.803 m), weight 77.7 kg (171 lb 3.2 oz), SpO2 97 %. Right chest port accessed with 0.75 in madera no difficulty. Positive blood return noted and labs drawn. Patient proceeded to MD clinic. [address] report given to Kirill Singh RN.

## 2019-08-01 NOTE — PROGRESS NOTES
[facility] VISIT NOTE    SBAR received from [facility] resulted within parameters to proceed with treatment. Vitals:  Patient Vitals for the past 12 hrs:   Temp Pulse Resp BP SpO2   08/01/19 1133 97.2 °F (36.2 °C) (!) 56 16 133/84 97 %       Labs:     Recent Results (from the past 12 hour(s))   CBC WITH AUTOMATED DIFF    Collection Time: 08/01/19 11:40 AM   Result Value Ref Range    WBC 6.5 4.1 - 11.1 K/uL    RBC 4.78 4.10 - 5.70 M/uL    HGB 14.1 12.1 - 17.0 g/dL    HCT 42.8 36.6 - 50.3 %    MCV 89.5 80.0 - 99.0 FL    MCH 29.5 26.0 - 34.0 PG    MCHC 32.9 30.0 - 36.5 g/dL    RDW 15.9 (H) 11.5 - 14.5 %    PLATELET 721 987 - 648 K/uL    MPV 11.4 8.9 - 12.9 FL    NRBC 0.0 0  WBC    ABSOLUTE NRBC 0.00 0.00 - 0.01 K/uL    NEUTROPHILS 42 32 - 75 %    LYMPHOCYTES 42 12 - 49 %    MONOCYTES 14 (H) 5 - 13 %    EOSINOPHILS 1 0 - 7 %    BASOPHILS 1 0 - 1 %    IMMATURE GRANULOCYTES 0 0.0 - 0.5 %    ABS. NEUTROPHILS 2.8 1.8 - 8.0 K/UL    ABS. LYMPHOCYTES 2.7 0.8 - 3.5 K/UL    ABS. MONOCYTES 0.9 0.0 - 1.0 K/UL    ABS. EOSINOPHILS 0.1 0.0 - 0.4 K/UL    ABS. BASOPHILS 0.0 0.0 - 0.1 K/UL    ABS. IMM. GRANS. 0.0 0.00 - 0.04 K/UL    DF AUTOMATED     METABOLIC PANEL, COMPREHENSIVE    Collection Time: 08/01/19 11:40 AM   Result Value Ref Range    Sodium 141 136 - 145 mmol/L    Potassium 4.2 3.5 - 5.1 mmol/L    Chloride 109 (H) 97 - 108 mmol/L    CO2 28 21 - 32 mmol/L    Anion gap 4 (L) 5 - 15 mmol/L    Glucose 95 65 - 100 mg/dL    BUN 14 6 - 20 MG/DL    Creatinine 0.99 0.70 - 1.30 MG/DL    BUN/Creatinine ratio 14 12 - 20      GFR est AA >60 >60 ml/min/1.73m2    GFR est non-AA >60 >60 ml/min/1.73m2    Calcium 9.1 8.5 - 10.1 MG/DL    Bilirubin, total 0.6 0.2 - 1.0 MG/DL    ALT (SGPT) 12 12 - 78 U/L    AST (SGOT) 11 (L) 15 - 37 U/L    Alk.  phosphatase 67 45 - 117 U/L    Protein, total 7.7 6.4 - 8.2 g/dL    Albumin 3.8 3.5 - 5.0 g/dL    Globulin 3.9 2.0 - 4.0 g/dL    A-G Ratio 1.0 (L) 1.1 - 2.2         Medications received:    Medications Administered     0.9% sodium chloride infusion     Admin Date  08/01/2019 Action  New Bag Dose  25 mL/hr Rate  25 mL/hr Route  IntraVENous Administered By  Orly Kaplan RN          dexamethasone (DECADRON) 12 mg in 0.9% sodium chloride 50 mL IVPB     Admin Date  08/01/2019 Action  Given Dose  12 mg Route  IntraVENous Administered By  Orly Kaplan RN          diphenhydrAMINE (BENADRYL) injection 50 mg     Admin Date  08/01/2019 Action  Given Dose  50 mg Route  IntraVENous Administered By  Orly Kaplan RN          famotidine (PF) (PEPCID) 20 mg in sodium chloride 0.9% 10 mL injection     Admin Date  08/01/2019 Action  Given Dose  20 mg Route  IntraVENous Administered By  Orly Kaplan RN          fosaprepitant (EMEND) 150 mg in 0.9% sodium chloride 150 mL IVPB     Admin Date  08/01/2019 Action  Given Dose  150 mg Rate  450 mL/hr Route  IntraVENous Administered By  Orly Kaplan RN          PACLitaxel (TAXOL) 402 mg in 0.9% sodium chloride 500 mL, overfill volume 50 mL chemo infusion     Admin Date  08/01/2019 Action  New Bag Dose  402 mg Rate  205.7 mL/hr Route  IntraVENous Administered By  Antonino Marley RN          palonosetron HCl (ALOXI) injection 0.25 mg     Admin Date  08/01/2019 Action  Given Dose  0.25 mg Route  IntraVENous Administered By  Orly Kaplan RN              SBAR given to [facility].     Future Appointments:  Future Appointments   Date Time Provider Camren Wynn   8/22/2019  9:30 AM SS INF6 CH2 >4H [facility]   9/12/2019  9:00 AM SS INF2 CH3 <4H [facility]   10/3/2019  9:00 AM SS INF7 CH4 <4H [facility] [address] [place] [address]

## 2019-08-13 ENCOUNTER — TELEPHONE (OUTPATIENT)
Dept: ONCOLOGY | Age: 59
End: 2019-08-13

## 2019-08-13 DIAGNOSIS — C80.1 ADENOCARCINOMA OF UNKNOWN PRIMARY (HCC): ICD-10-CM

## 2019-08-13 DIAGNOSIS — G89.3 ACUTE NEOPLASM-RELATED PAIN: ICD-10-CM

## 2019-08-13 DIAGNOSIS — C80.1 ADENOCARCINOMA OF UNKNOWN PRIMARY (HCC): Primary | ICD-10-CM

## 2019-08-13 RX ORDER — HYDROCODONE BITARTRATE AND ACETAMINOPHEN 5; 325 MG/1; MG/1
1 TABLET ORAL
Qty: 30 TAB | Refills: 0 | Status: SHIPPED | OUTPATIENT
Start: 2019-08-13 | End: 2019-08-22 | Stop reason: SDUPTHER

## 2019-08-13 RX ORDER — HYDROCODONE BITARTRATE AND ACETAMINOPHEN 5; 325 MG/1; MG/1
1 TABLET ORAL
Qty: 30 TAB | Refills: 0 | Status: SHIPPED | OUTPATIENT
Start: 2019-08-13 | End: 2019-08-13 | Stop reason: DRUGHIGH

## 2019-08-13 NOTE — TELEPHONE ENCOUNTER
08/13/19 10:28 AM Refilled rx hydrocodone 5-325mg #30 Q8H PRN x 2 weeks. Referred to palliative for pain management. See patient back in clinic next week.

## 2019-08-13 NOTE — TELEPHONE ENCOUNTER
[employer] at [facility]  [phone]      08/13/19 9:35 AM Contacted patient regarding his prescription refill request. Per Jairo Grissom, advised that we can't escribe or provide refills on narcotics in the state of [place]. Informed him that a prescription has been placed at  for . While on phone, patient inquired if quantity could be changed. Per last note, norco twice daily prn written 7/25 for #30 tablets. Patient says he has one pill left. Also while on phone, patient wanted to relay that after most recent chemotherapy treatment he had bilateral leg aching requiring a cane to ambulate safely in house. Also had complaints of decreased appetite, no nausea or vomiting. Above symptoms have now resolved. Faxed lab order to [facility] as well per patient request to be drawn on 08/15.      11:23 AM Called patient back. Informed him prescription was ready for , re-written for a 14 day supply. Patient has office visit 08/22. Advised, per Jairo Grissom, that we can re-address pain at that visit and provide refill if needed. Also informed patient that palliative care referral was placed. Explained role of palliative care. Patient declines at this time, stating that it takes \"awhile\" to obtain referral with his insurance. Patient requesting to wait and see if pain improves over the next couple weeks. Advised I would forward above to Dr. Thomas Everett and Jairo Grissom to make aware. No further questions or concerns at this time.

## 2019-08-15 DIAGNOSIS — C80.1 ADENOCARCINOMA OF UNKNOWN PRIMARY (HCC): ICD-10-CM

## 2019-08-16 ENCOUNTER — TELEPHONE (OUTPATIENT)
Dept: ONCOLOGY | Age: 59
End: 2019-08-16

## 2019-08-16 LAB
ALBUMIN SERPL-MCNC: 4.3 G/DL (ref 3.6–4.8)
ALBUMIN/GLOB SERPL: 1.6 {RATIO} (ref 1.2–2.2)
ALP SERPL-CCNC: 60 IU/L (ref 39–117)
ALT SERPL-CCNC: 9 IU/L (ref 0–44)
AST SERPL-CCNC: 11 IU/L (ref 0–40)
BASOPHILS # BLD AUTO: 0 X10E3/UL (ref 0–0.2)
BASOPHILS NFR BLD AUTO: 1 %
BILIRUB SERPL-MCNC: 0.2 MG/DL (ref 0–1.2)
BUN SERPL-MCNC: 13 MG/DL (ref 8–27)
BUN/CREAT SERPL: 15 (ref 10–24)
CALCIUM SERPL-MCNC: 9.4 MG/DL (ref 8.6–10.2)
CHLORIDE SERPL-SCNC: 107 MMOL/L (ref 96–106)
CO2 SERPL-SCNC: 23 MMOL/L (ref 20–29)
CREAT SERPL-MCNC: 0.88 MG/DL (ref 0.76–1.27)
EOSINOPHIL # BLD AUTO: 0.1 X10E3/UL (ref 0–0.4)
EOSINOPHIL NFR BLD AUTO: 2 %
ERYTHROCYTE [DISTWIDTH] IN BLOOD BY AUTOMATED COUNT: 16.2 % (ref 12.3–15.4)
GLOBULIN SER CALC-MCNC: 2.7 G/DL (ref 1.5–4.5)
GLUCOSE SERPL-MCNC: 92 MG/DL (ref 65–99)
HCT VFR BLD AUTO: 36.8 % (ref 37.5–51)
HGB BLD-MCNC: 12.7 G/DL (ref 13–17.7)
IMM GRANULOCYTES # BLD AUTO: 0 X10E3/UL (ref 0–0.1)
IMM GRANULOCYTES NFR BLD AUTO: 0 %
LYMPHOCYTES # BLD AUTO: 2.6 X10E3/UL (ref 0.7–3.1)
LYMPHOCYTES NFR BLD AUTO: 69 %
MCH RBC QN AUTO: 30.2 PG (ref 26.6–33)
MCHC RBC AUTO-ENTMCNC: 34.5 G/DL (ref 31.5–35.7)
MCV RBC AUTO: 87 FL (ref 79–97)
MONOCYTES # BLD AUTO: 1 X10E3/UL (ref 0.1–0.9)
MONOCYTES NFR BLD AUTO: 26 %
MORPHOLOGY BLD-IMP: ABNORMAL
NEUTROPHILS # BLD AUTO: 0.1 X10E3/UL (ref 1.4–7)
NEUTROPHILS NFR BLD AUTO: 2 %
PLATELET # BLD AUTO: 231 X10E3/UL (ref 150–450)
POTASSIUM SERPL-SCNC: 4.6 MMOL/L (ref 3.5–5.2)
PROT SERPL-MCNC: 7 G/DL (ref 6–8.5)
RBC # BLD AUTO: 4.21 X10E6/UL (ref 4.14–5.8)
SODIUM SERPL-SCNC: 141 MMOL/L (ref 134–144)
WBC # BLD AUTO: 3.8 X10E3/UL (ref 3.4–10.8)

## 2019-08-16 NOTE — PROGRESS NOTES
Attempted to call patient. No answer on home/cell number listed and voicemail is currently full. Will attempt again later.

## 2019-08-16 NOTE — TELEPHONE ENCOUNTER
08/16/19 8:37 AM Called to notify of recent labs, patient's phone is off and  full will call later today.

## 2019-08-16 NOTE — PROGRESS NOTES
Patient returned call. Informed him, per Eliel, that his most recent labs show that the chemotherapy has caused his WBC count to decrease. Advised that he practice good hand hygiene, stay away from anyone known to be sick, and avoid fresh fruits/vegetables that are not thoroughly washed. Also advised that he call us if his temperature reaches 100.4 or higher, or go to the emergency department if he is unable to reach provider. Explained that the decrease in WBC increases his risk of infection. Patient verbalized understanding. No additional questions or concerns at this time.

## 2019-08-16 NOTE — PROGRESS NOTES
[address] [facility] Oncology at [facility]  [phone]    Hematology / Oncology Established Visit    Reason for Visit:   Dianna Luna is a 60 y.o. male who comes in for f/u of adenocarcinoma of unknown primary. Initially referred by Dr. Maria Eugenia Ramos. Hematology Oncology Treatment History:     Diagnosis: Adenocarcinoma of unknown primary    Stage: N/A    Pathology:   6/4/19 4R LN biopsy: rare malignant cells admixed with abundant blood clot  6/4/19 4L LN FNA and core biopsy: Adenocarcinoma. 6/4/19 2R LN FNA and core biopsy: Adenocarcinoma. Comment: IHC stains negative for GATA3, AR, ER, p40. Immunohistochemistry shows that the tumor cells express CK7 with focal expression of CDX2. Tumor cells lack expression of CK20, TTF-1 and Napsin A. These findings are not entirely diagnostic and could be seen in either a primary pulmonary malignancy or a primary upper gastrointestinal tract malignancy. Other sites are also not excluded. Clinical and radiographic correlation is recommended. 6/25/19 2R LN, mediastinum: Metastatic adenocarcinoma (see Comment). Comment - The tumor appears poorly differentiated with areas of necrosis. Immunohistochemical staining reveals positive staining for cytokeratin 7 (strong, diffuse), CDX-2 (focal, weak to moderate) and CEA (focal, moderate to strong). The tumor cells are negative for cytokeratin 20, cytokeratin 5/6, p40, p63, TTF-1, napsin-A, PLAP, HCG, AFP and c-KIT (). The findings are not entirely specific with regard to primary site but would be consistent with upper GI/pancreaticobiliary tract. A pulmonary primary is less likely, but still in the differential.     Prior Treatment: None    Current Treatment: Carbo-Taxol every 3 weeks until disease progression or toxicity. History of Present Illness:   Mr. Heather Wright is a 60 y.o. male with COPD, remote h/o colon cancer comes in for evaluation of mediastinal lymphadenopathy.  Pt had recently p/w shortness of breath, dyspnea on exertion, and was found on chest CT to have R mediastinal lymphadenopathy, which also were PET avid. Case was discussed at Thoracic Tumor Board with thought that this could be pancreatic, urothelial, or germ cell origin. More tissue is recommended. Pt had EGD 3 yrs ago and was told he had GERD. Patient has h/o colon cancer in 2002. He states a cancerous polyp was found and then he underwent colectomy in 2002. This was done by Dr. Jun Soria at [facility]. Patient had colonoscopies regularly afterwards, last one was approx 2015. Last EGD was in 2018. As part of search for primary lesion, patient underwent EGD, colonoscopy by Dr. Debbie Monk, notable for diffusely enlarged gastric folds, but biopsies negative. Pt underwent cytoscopy on 7/17/19 with findings negative for malignancy. Interval History: Patient completed cycle 1 of treatment and comes in for cycle 2. He reports the first week was \"rough. \" Reports it took a week for his appetite to return. He was only able to eat small amount of food after the first week. Reports the nausea medication worked well and prevented nausea after treatment. Patient states that he has some mid back pain which is involving the back of his shoulders and has required increased pain medication. Reports mild neuropathy in his left arm, from the tip of his fingers.      Past Medical History:   Diagnosis Date    Abnormal nuclear stress test 12/7/2015    Cancer ([facility]     colon    Cardiomyopathy ([facility] [address] 2010    EF 45%    Chronic obstructive pulmonary disease (HCC)     Chronic systolic heart failure (HCC)     GERD (gastroesophageal reflux disease)     HTN (hypertension)     PAC (premature atrial contraction)     Tobacco use disorder       Past Surgical History:   Procedure Laterality Date    COLONOSCOPY N/A 7/10/2019    COLONOSCOPY AND ESOPHAGOGASTRODUODENOSCOPY (EGD) performed by Deni Jeffers MD at [address]  IR INSERT TRESA CVC W PORT OVER 5 YEARS  7/19/2019      Social History     Tobacco Use    Smoking status: Former Smoker     Packs/day: 0.50     Types: Cigarettes     Last attempt to quit: 4/22/2016     Years since quitting: 3.3    Smokeless tobacco: Never Used   Substance Use Topics    Alcohol use: Not Currently     Comment: rarely      Family History   Problem Relation Age of Onset    Stroke Mother     Coronary Artery Disease Sister     Heart Disease Paternal Grandfather      Current Outpatient Medications   Medication Sig    HYDROcodone-acetaminophen (NORCO) 5-325 mg per tablet Take 1 Tab by mouth every eight (8) hours as needed for Pain for up to 14 days. Max Daily Amount: 3 Tabs.  dexAMETHasone (DECADRON) 4 mg tablet Take 4mg (1 tab) with breakfast on days 2 and 3 after chemotherapy to prevent nausea.  ondansetron hcl (ZOFRAN) 4 mg tablet Take 2 Tabs by mouth every eight (8) hours as needed for Nausea.  prochlorperazine (COMPAZINE) 10 mg tablet Take 1 Tab by mouth every six (6) hours as needed for Nausea.  lidocaine-prilocaine (EMLA) topical cream Apply  to affected area as needed for Pain. Apply quarter size amount to port prior to chemotherapy    tamsulosin (FLOMAX) 0.4 mg capsule Take 0.4 mg by mouth daily.  OTHER Take  by inhalation. Alero Inhaler    lisinopril (PRINIVIL, ZESTRIL) 5 mg tablet TAKE 1 TABLET BY MOUTH DAILY    metoprolol succinate (TOPROL-XL) 25 mg XL tablet Take 1 Tab by mouth nightly. No current facility-administered medications for this visit. No Known Allergies     Review of Systems: A complete review of systems was obtained, negative except as described above.     Physical Exam:     Visit Vitals  BP (!) 138/93 Comment: stats he did not take metoprolol last evening   Pulse (!) 57   Temp 97.1 °F (36.2 °C) (Oral)   Resp 16   Ht 5' 10\" (1.778 m)   Wt 168 lb (76.2 kg)   SpO2 98%   BMI 24.11 kg/m²     ECOG PS: 0  General: Well developed, no acute distress  Eyes: PERRLA, EOMI, anicteric sclerae  HENT: Atraumatic, OP clear, TMs intact without erythema  Neck: Supple  Lymphatic: No cervical, supraclavicular, axillary or inguinal adenopathy  Respiratory: CTAB, normal respiratory effort  CV: Normal rate, regular rhythm, no murmurs, no peripheral edema, port in place. GI: Soft, nontender, nondistended, no masses, no hepatomegaly, no splenomegaly  MS: Normal gait and station. Digits without clubbing or cyanosis. Skin: No rashes, ecchymoses, or petechiae. Normal temperature, turgor, and texture. Neuro/Psych: Alert, oriented. 5/5 strength in all 4 extremities. Appropriate affect, normal judgment/insight. Results:     Lab Results   Component Value Date/Time    WBC 6.5 08/22/2019 09:55 AM    HGB 13.2 08/22/2019 09:55 AM    HCT 40.0 08/22/2019 09:55 AM    PLATELET 751 39/79/9261 09:55 AM    MCV 87.7 08/22/2019 09:55 AM    ABS. NEUTROPHILS 2.6 08/22/2019 09:55 AM     Lab Results   Component Value Date/Time    Sodium 142 08/22/2019 09:55 AM    Potassium 4.3 08/22/2019 09:55 AM    Chloride 111 (H) 08/22/2019 09:55 AM    CO2 25 08/22/2019 09:55 AM    Glucose 92 08/22/2019 09:55 AM    BUN 14 08/22/2019 09:55 AM    Creatinine 0.82 08/22/2019 09:55 AM    GFR est AA >60 08/22/2019 09:55 AM    GFR est non-AA >60 08/22/2019 09:55 AM    Calcium 9.2 08/22/2019 09:55 AM     Lab Results   Component Value Date/Time    Bilirubin, total 0.2 08/15/2019 09:13 AM    ALT (SGPT) 9 08/15/2019 09:13 AM    AST (SGOT) 11 08/15/2019 09:13 AM    Alk.  phosphatase 60 08/15/2019 09:13 AM    Protein, total 7.0 08/15/2019 09:13 AM    Albumin 4.3 08/15/2019 09:13 AM    Globulin 3.9 08/01/2019 11:40 AM     No results found for: IRON, FE, TIBC, IBCT, PSAT, FERR    No results found for: B12LT, FOL, RBCF  No results found for: TSH, TSH2, TSH3, TSHP, TSHEXT, TSHEXT  No results found for: HAMAT, HAAB, HABT, HAAT, HBSAG, HBSB, HBSAT, HBABN, HBCM, HBCAB, HBCAT, XBCABS, HBEAB, HBEAG, XHEPCS, [id number], [address] [facility], [address], [address] [facility], [place], [id number], [id number], [address] [facility], [id number], [facility], [id number], [employer] [facility] Association[facility]      Imaging:     Chest CT 5/10/19:  FINDINGS:  THYROID: No nodule. MEDIASTINUM: There are enlarged lymph nodes in the right paratracheal region  with 3 enlarged lymph nodes in this area. The largest is inferiorly in the right  paratracheal region measuring 2.5 x 1.7 cm. There are also enlarged lymph nodes  in the medial and lateral AP window with the largest lymph node measuring 18 mm. These lymph nodes are worrisome for neoplastic process. There is a normal size  subcarinal lymph node. Annie [place] REBECCA: No mass or lymphadenopathy. THORACIC AORTA: No aneurysm. MAIN PULMONARY ARTERY: Normal in caliber. TRACHEA/BRONCHI: Patent. ESOPHAGUS: No wall thickening or dilatation. HEART: Normal in size. PLEURA: No effusion or pneumothorax. LUNGS: There are moderate changes of centrilobular emphysema. There are bullous  lesions at each apex left greater than right. There is a calcified granuloma in the left upper lobe.   There is volume loss in the medial segment right middle lobe that extends to a  oval-shaped opacity measures 1.8 x 1.1 cm this may all be atelectasis. Pulmonary  nodule within the atelectasis is not excluded. No abnormality corresponds with the rounded opacity seen on the chest  radiograph. Possibly that was a nipple shadow. .  Mild scarring is seen medially in the lingula. INCIDENTALLY IMAGED UPPER ABDOMEN: The spleen has been removed. No adrenal  masses. BONES: No destructive bone lesion. IMPRESSION:  1. There are are multiple enlarged lymph nodes in the right paratracheal region  as well as adenopathy and AP window region. These enlarged lymph nodes are  worrisome for neoplastic process. 2. There is volume loss in right middle lobe with a nodular area of opacity that  could be atelectasis but a superimposed nodule within the areas of atelectasis  is not excluded. Further evaluation is suggested.  Tissue diagnosis is suggested. PET CT scan may yield more information. . 23X     5/28/19 PET:  FINDINGS:  HEAD/NECK: No apparent foci of abnormal hypermetabolism. Cerebral evaluation is  limited by normal intense activity. CHEST: A hypermetabolic right paratracheal lymph node SUV is 8.5. There are  hypermetabolic lymph nodes anterior to the main bronchi bilaterally. There is  centrilobular emphysema. There is volume loss in the right middle lobe but a  central obstructing mass is not identified on this PET scan. ABDOMEN/PELVIS: No foci of abnormal hypermetabolism. The prostate gland is  enlarged. SKELETON: No foci of abnormal hypermetabolism in the axial and visualized  appendicular skeleton. IMPRESSION: Hypermetabolic mediastinal lymph nodes concerning for neoplastic process. Chest/abd/pelvis CT 7/30/19:  FINDINGS:   THYROID: No nodule. MEDIASTINUM: Mediastinal lymphadenopathy has loosely decreased with upper  paratracheal node measuring 1.3 x 1.9 cm, previously 1.4 x 1.8 cm (2, 24), right  paratracheal node at the level of the aortic arch measuring 1.4 x 2.1 cm,  previously 1.7 x 2.6 cm of (2, 27) series, precarinal node on the right  measuring 1.7 x 2.2 cm, previously 1.7 x 2.5 cm (2, 31), and left pericarinal  noted measuring 1.2 x 1.8 cm, previously 1.8 x 2.1 cm of (2, 30). However, there  is an enlarged left preaortic node just lateral to left mainstem bronchus  measuring 1.3 x 2.1 cm which previously measured 8 x 8 mm (2, 33). REBECCA: No mass or lymphadenopathy. THORACIC AORTA: No dissection or aneurysm. MAIN PULMONARY ARTERY: Normal in caliber. TRACHEA/BRONCHI: Patent. ESOPHAGUS: No wall thickening or dilatation. HEART: Normal in size. PLEURA: No effusion or pneumothorax. LUNGS: Centrilobular bullous emphysematous change. Right middle lobe ovoid  entity most likely reflective of atelectasis adjacent to the right heart margin  appears unchanged. Left upper lobe granuloma.  No acute infiltrate, nodule, or  mass. LIVER: No mass or biliary dilatation. GALLBLADDER: Unremarkable. SPLEEN: Surgically absent. PANCREAS: No mass or ductal dilatation. ADRENALS: Right adrenal mass not seen previously measures 2.0 x 3.0 cm (2, 62). Left adrenal appears unremarkable. KIDNEYS: No mass, calculus, or hydronephrosis. STOMACH: Unremarkable. SMALL BOWEL: No dilatation or wall thickening. COLON: No dilatation or wall thickening. APPENDIX: Unremarkable. PERITONEUM: No ascites or pneumoperitoneum. RETROPERITONEUM: Right retroperitoneal adenopathy posterior to the inferior vena  cava at the level of the renal hilum measures 1.3 x 1.9 cm, not previously seen  (2, 69). REPRODUCTIVE ORGANS: The prostate and seminal vesicles appear unremarkable. URINARY BLADDER: No mass or calculus. BONES: No destructive bone lesion. ADDITIONAL COMMENTS: Right Port-A-Cath in place with catheter traversing to the  atriocaval junction region. IMPRESSION:  1. Mixed response to therapy with most mediastinal lymphadenopathy diminishing  in size, however, a left mediastinal lymph node has shown increased size and  there is a new right adrenal mass and new right retroperitoneal adenopathy  suspicious for aggressive metastatic disease. 2. Additional incidental findings as above including centrilobular bullous  emphysema with probable right middle lobe atelectasis. Procedures:     EGD 7/10/19:   Esophagus:normal  Stomach: diffuse enlargement gastric folds with nodular erythema and erosion throughout stomach  Duodenum/jejunum: normal    Colonoscopy 7/10/19: Diverticulosis    Assessment & Plan:   Allie Goldman is a 60 y.o. male with COPD, remote h/o colon cancer comes in for evaluation and management of adenocarcinoma of unknown primary. 1. Adenocarcinoma of unknown primary: Involving mediastinal LNs, with no other PET findings. Per Thoracic Tumor Board discussion, this could represent upper GI origin, urothelial origin or germ cell tumor. Search for primary lesion was overall negative: Cystoscopy, EGD, colonoscopy negative for malignancy although EGD abnormal with diffusely enlarged gastric folds. Despite h/o colon cancer in 2002, it would be very odd to see a recurrence in the mediastinal LNs. CEA 26.8. Other tumor markers negative (AFP, hCG, CA 19-9). Unfortunately, this disease is considered incurable, but is treatable. At this time, I recommend chemotherapy treatment using the Carboplatin-Paclitaxel regimen (which covers both lung and GI primaries). We discussed the risks and benefits of chemotherapy with Carboplatin and Paclitaxel. Potential side effects include but are not limited to: nausea, vomiting, diarrhea, constipation, taste changes, allergic reactions, myelosuppression, infection, fatigue, alopecia, neuropathy, mucositis, renal failure, infertility, and rarely, death. Additionally, chemotherapy leads to radiosensitization which can intensify the side effects of radiation therapy. The patient has consented to beginning chemotherapy and chemo ed packet given. Supportive care medications include: Zofran, Compazine, Emla cream, dexamethasone 4 mg on days 2 and 3 after treatment. -- f/u [facility] testing off of 6/25/19 pathology specimen  -- f/u HER2 and PDL1 testing  -- Proceed with cycle 2 of Carbo-Taxol today with plan to treat every 3 weeks with plan to repeat imaging prior to cycle 3.  -- Jeanette counts on 8/16/19 showed neutropenia but will maintain dose for now and recheck jeanette counts again after cycle 2.  -- Proceed with cycle 2- decreased carboplatin to AUC 5.   -- CT after cycle 2- ordered. -- Repeat counts in 2 weeks at [facility]-call patient with results. -- Return in 3 weeks for cycle 3, MD visit, labs. -- Send PCP note Dr. Umberto Baez. 2. Neoplasm pain: Affecting the upper back and now shoulders. This may be referred pain related to the mediastinal LNs.  Pt is mainly taking pain medicines at night to ease pain enough to sleep. Discussed stool softeners prn, but pt currently having loose stools rather than constipation. -- Refused palliative medicine referral.   -- Refilled rx hydrocodone/APAP 5-325mg #42 BID PRN x 3 weeks on 8/22/19.      3. H/o Stage I [T1NxMx] sigmoid colon cancer: Found in sigmoid adenoma during a colonoscopy; went on to undergo segmented resection of the sigmoid colon in 2002. Pathology per outside records:  2/6/2002 sigmoid colon polyp: Well differentiated adenocarcinoma arising in an adenoma, involving the mucosa and invading into the upper half of the submucosal stalk. No vascular space involvement is identified. 2/21/2002 Sigmoidectomy, liver biopsy:  -Sigmoid colon, segmented resection: No residual adenocarcinoma present. Polypectomy site with granulation tissue and vascular congestion. No LNs recovered. Distal and proximal margins benign.  -Liver biopsy: Negative for metastatic carcinoma. No significant inflammation or obvious cirrhosis identified. Very minimal steatosis (rare cells with fat globules). -Addendum: Reblocks of adipose tissue; three small benign lymphoid aggregates (< 0.1cm). 4. COPD: Quit smoking in approx 2016. SOB improved after starting inhaler. 5. HTN: Well controlled on Lisinopril and Metoprolol. 6. BPH: On Flomax. 7. Chemotherapy induced neutropenia: WBC 3.5, ANC 0.1 on 8/16/19. Treatment with paclitaxel and carboplatin should be delayed until the [address] is >1500 cells/microL and platelet count is >682,496/ERCDTQ. If a patient developed severe neutropenia (<500 cells/microL) for a week or longer, or febrile neutropenia during the prior course, then the paclitaxel and carboplatin doses should be reduced by 20 to 25% for subsequent courses. [2] An alternative to dose reduction is the institution of hematopoietic growth factor support. The carboplatin dose should be decreased by 25% in patients whose platelet count jeanette is <50,000/microL for longer than five days. WBC count improved to 6.5 and ANC 2.6.   -- Repeat labs in 2 weeks- call patient with results      Goals of care: Palliative to improve quality and duration of life, but no cure. Emotional well being: Pt is coping well with his/her disease and has excellent support. I appreciate the opportunity to participate in Mr. Seth Little care.     Signed By: Beth Enciso MD     August 22, 2019

## 2019-08-16 NOTE — PROGRESS NOTES
Please call patient and let him know his most recent labs show the chemotherapy has caused his wbc count to decrease. Due to this decrease in wbc (the infection fighting cells), he needs to practice good hand hygiene, stay away from anyone who is sick, avoid fresh fruits/vegtables that are not thoroughly washed. Advise him to call us if his temperature reaches 100.4 or go the hospital if he is unable to get in touch with our practice and his temperature is rising because he is at an increased risk for getting an infection.

## 2019-08-16 NOTE — TELEPHONE ENCOUNTER
[facility] at [place]  [phone]      08/16/19 9:04 AM Received call from [facility] with critical lab result. Absolute neutrophil count 0.1. Performed readback of result. Lashay Simmons NP, already aware and has attempted to call patient.

## 2019-08-20 RX ORDER — SODIUM CHLORIDE 0.9 % (FLUSH) 0.9 %
10 SYRINGE (ML) INJECTION AS NEEDED
Status: CANCELLED
Start: 2019-08-22

## 2019-08-20 RX ORDER — DIPHENHYDRAMINE HYDROCHLORIDE 50 MG/ML
50 INJECTION, SOLUTION INTRAMUSCULAR; INTRAVENOUS ONCE
Status: CANCELLED
Start: 2019-08-22

## 2019-08-20 RX ORDER — ONDANSETRON 2 MG/ML
8 INJECTION INTRAMUSCULAR; INTRAVENOUS AS NEEDED
Status: CANCELLED | OUTPATIENT
Start: 2019-08-22

## 2019-08-20 RX ORDER — HYDROCORTISONE SODIUM SUCCINATE 100 MG/2ML
100 INJECTION, POWDER, FOR SOLUTION INTRAMUSCULAR; INTRAVENOUS AS NEEDED
Status: CANCELLED | OUTPATIENT
Start: 2019-08-22

## 2019-08-20 RX ORDER — EPINEPHRINE 1 MG/ML
0.3 INJECTION, SOLUTION, CONCENTRATE INTRAVENOUS AS NEEDED
Status: CANCELLED | OUTPATIENT
Start: 2019-08-22

## 2019-08-20 RX ORDER — SODIUM CHLORIDE 9 MG/ML
25 INJECTION, SOLUTION INTRAVENOUS CONTINUOUS
Status: CANCELLED | OUTPATIENT
Start: 2019-08-22

## 2019-08-20 RX ORDER — DIPHENHYDRAMINE HYDROCHLORIDE 50 MG/ML
50 INJECTION, SOLUTION INTRAMUSCULAR; INTRAVENOUS AS NEEDED
Status: CANCELLED
Start: 2019-08-22

## 2019-08-20 RX ORDER — ALBUTEROL SULFATE 0.83 MG/ML
2.5 SOLUTION RESPIRATORY (INHALATION) AS NEEDED
Status: CANCELLED
Start: 2019-08-22

## 2019-08-20 RX ORDER — SODIUM CHLORIDE 9 MG/ML
10 INJECTION INTRAMUSCULAR; INTRAVENOUS; SUBCUTANEOUS AS NEEDED
Status: CANCELLED | OUTPATIENT
Start: 2019-08-22

## 2019-08-20 RX ORDER — PALONOSETRON 0.05 MG/ML
0.25 INJECTION, SOLUTION INTRAVENOUS ONCE
Status: CANCELLED
Start: 2019-08-22

## 2019-08-20 RX ORDER — ACETAMINOPHEN 325 MG/1
650 TABLET ORAL AS NEEDED
Status: CANCELLED
Start: 2019-08-22

## 2019-08-20 RX ORDER — HEPARIN 100 UNIT/ML
300-500 SYRINGE INTRAVENOUS AS NEEDED
Status: CANCELLED
Start: 2019-08-22

## 2019-08-20 RX ORDER — LORAZEPAM 2 MG/ML
0.5 INJECTION INTRAMUSCULAR
Status: CANCELLED
Start: 2019-08-22

## 2019-08-22 ENCOUNTER — OFFICE VISIT (OUTPATIENT)
Dept: ONCOLOGY | Age: 59
End: 2019-08-22

## 2019-08-22 ENCOUNTER — HOSPITAL ENCOUNTER (OUTPATIENT)
Dept: INFUSION THERAPY | Age: 59
Discharge: HOME | End: 2019-08-22
Payer: OTHER GOVERNMENT

## 2019-08-22 VITALS
SYSTOLIC BLOOD PRESSURE: 138 MMHG | OXYGEN SATURATION: 98 % | HEIGHT: 70 IN | TEMPERATURE: 97.1 F | WEIGHT: 168 LBS | BODY MASS INDEX: 24.05 KG/M2 | HEART RATE: 57 BPM | DIASTOLIC BLOOD PRESSURE: 93 MMHG | RESPIRATION RATE: 16 BRPM

## 2019-08-22 VITALS
SYSTOLIC BLOOD PRESSURE: 131 MMHG | DIASTOLIC BLOOD PRESSURE: 80 MMHG | WEIGHT: 167.7 LBS | TEMPERATURE: 97.7 F | BODY MASS INDEX: 23.48 KG/M2 | OXYGEN SATURATION: 98 % | HEART RATE: 65 BPM | HEIGHT: 71 IN | RESPIRATION RATE: 16 BRPM

## 2019-08-22 DIAGNOSIS — C80.1 ADENOCARCINOMA OF UNKNOWN PRIMARY (HCC): Primary | ICD-10-CM

## 2019-08-22 DIAGNOSIS — C80.1 ADENOCARCINOMA OF UNKNOWN ORIGIN (HCC): Primary | ICD-10-CM

## 2019-08-22 DIAGNOSIS — Z51.11 CHEMOTHERAPY MANAGEMENT, ENCOUNTER FOR: ICD-10-CM

## 2019-08-22 DIAGNOSIS — G89.3 ACUTE NEOPLASM-RELATED PAIN: ICD-10-CM

## 2019-08-22 DIAGNOSIS — J44.9 CHRONIC OBSTRUCTIVE PULMONARY DISEASE, UNSPECIFIED COPD TYPE (HCC): ICD-10-CM

## 2019-08-22 LAB
ALBUMIN SERPL-MCNC: 3.7 G/DL (ref 3.5–5)
ALBUMIN/GLOB SERPL: 0.9 {RATIO} (ref 1.1–2.2)
ALP SERPL-CCNC: 73 U/L (ref 45–117)
ALT SERPL-CCNC: 21 U/L (ref 12–78)
ANION GAP SERPL CALC-SCNC: 6 MMOL/L (ref 5–15)
AST SERPL-CCNC: 14 U/L (ref 15–37)
BASOPHILS # BLD: 0 K/UL (ref 0–0.1)
BASOPHILS NFR BLD: 1 % (ref 0–1)
BILIRUB SERPL-MCNC: 0.2 MG/DL (ref 0.2–1)
BUN SERPL-MCNC: 14 MG/DL (ref 6–20)
BUN/CREAT SERPL: 17 (ref 12–20)
CALCIUM SERPL-MCNC: 9.2 MG/DL (ref 8.5–10.1)
CHLORIDE SERPL-SCNC: 111 MMOL/L (ref 97–108)
CO2 SERPL-SCNC: 25 MMOL/L (ref 21–32)
CREAT SERPL-MCNC: 0.82 MG/DL (ref 0.7–1.3)
DIFFERENTIAL METHOD BLD: ABNORMAL
EOSINOPHIL # BLD: 0 K/UL (ref 0–0.4)
EOSINOPHIL NFR BLD: 1 % (ref 0–7)
ERYTHROCYTE [DISTWIDTH] IN BLOOD BY AUTOMATED COUNT: 15.6 % (ref 11.5–14.5)
GLOBULIN SER CALC-MCNC: 4.1 G/DL (ref 2–4)
GLUCOSE SERPL-MCNC: 92 MG/DL (ref 65–100)
HCT VFR BLD AUTO: 40 % (ref 36.6–50.3)
HGB BLD-MCNC: 13.2 G/DL (ref 12.1–17)
IMM GRANULOCYTES # BLD AUTO: 0 K/UL (ref 0–0.04)
IMM GRANULOCYTES NFR BLD AUTO: 1 % (ref 0–0.5)
LYMPHOCYTES # BLD: 2.9 K/UL (ref 0.8–3.5)
LYMPHOCYTES NFR BLD: 43 % (ref 12–49)
MCH RBC QN AUTO: 28.9 PG (ref 26–34)
MCHC RBC AUTO-ENTMCNC: 33 G/DL (ref 30–36.5)
MCV RBC AUTO: 87.7 FL (ref 80–99)
MONOCYTES # BLD: 1 K/UL (ref 0–1)
MONOCYTES NFR BLD: 15 % (ref 5–13)
NEUTS SEG # BLD: 2.6 K/UL (ref 1.8–8)
NEUTS SEG NFR BLD: 39 % (ref 32–75)
NRBC # BLD: 0 K/UL (ref 0–0.01)
NRBC BLD-RTO: 0 PER 100 WBC
PLATELET # BLD AUTO: 168 K/UL (ref 150–400)
PMV BLD AUTO: 11.4 FL (ref 8.9–12.9)
POTASSIUM SERPL-SCNC: 4.3 MMOL/L (ref 3.5–5.1)
PROT SERPL-MCNC: 7.8 G/DL (ref 6.4–8.2)
RBC # BLD AUTO: 4.56 M/UL (ref 4.1–5.7)
SODIUM SERPL-SCNC: 142 MMOL/L (ref 136–145)
WBC # BLD AUTO: 6.5 K/UL (ref 4.1–11.1)

## 2019-08-22 PROCEDURE — 96413 CHEMO IV INFUSION 1 HR: CPT

## 2019-08-22 PROCEDURE — 85025 COMPLETE CBC W/AUTO DIFF WBC: CPT

## 2019-08-22 PROCEDURE — 74011250636 HC RX REV CODE- 250/636: Performed by: NURSE PRACTITIONER

## 2019-08-22 PROCEDURE — 80053 COMPREHEN METABOLIC PANEL: CPT

## 2019-08-22 PROCEDURE — 96375 TX/PRO/DX INJ NEW DRUG ADDON: CPT

## 2019-08-22 PROCEDURE — 36415 COLL VENOUS BLD VENIPUNCTURE: CPT

## 2019-08-22 PROCEDURE — 77030012965 HC NDL HUBR BBMI -A

## 2019-08-22 PROCEDURE — 74011000250 HC RX REV CODE- 250: Performed by: INTERNAL MEDICINE

## 2019-08-22 PROCEDURE — 96415 CHEMO IV INFUSION ADDL HR: CPT

## 2019-08-22 PROCEDURE — 96417 CHEMO IV INFUS EACH ADDL SEQ: CPT

## 2019-08-22 PROCEDURE — 74011000258 HC RX REV CODE- 258: Performed by: INTERNAL MEDICINE

## 2019-08-22 PROCEDURE — 74011250636 HC RX REV CODE- 250/636: Performed by: INTERNAL MEDICINE

## 2019-08-22 PROCEDURE — 96367 TX/PROPH/DG ADDL SEQ IV INF: CPT

## 2019-08-22 RX ORDER — HYDROCODONE BITARTRATE AND ACETAMINOPHEN 5; 325 MG/1; MG/1
1 TABLET ORAL 2 TIMES DAILY
Qty: 42 TAB | Refills: 0 | Status: SHIPPED | OUTPATIENT
Start: 2019-08-22 | End: 2019-09-12 | Stop reason: SDUPTHER

## 2019-08-22 RX ORDER — HEPARIN 100 UNIT/ML
300-500 SYRINGE INTRAVENOUS AS NEEDED
Status: ACTIVE | OUTPATIENT
Start: 2019-08-22 | End: 2019-08-22

## 2019-08-22 RX ORDER — DIPHENHYDRAMINE HYDROCHLORIDE 50 MG/ML
50 INJECTION, SOLUTION INTRAMUSCULAR; INTRAVENOUS ONCE
Status: COMPLETED | OUTPATIENT
Start: 2019-08-22 | End: 2019-08-22

## 2019-08-22 RX ORDER — SODIUM CHLORIDE 0.9 % (FLUSH) 0.9 %
10 SYRINGE (ML) INJECTION AS NEEDED
Status: ACTIVE | OUTPATIENT
Start: 2019-08-22 | End: 2019-08-22

## 2019-08-22 RX ORDER — SODIUM CHLORIDE 9 MG/ML
10 INJECTION INTRAMUSCULAR; INTRAVENOUS; SUBCUTANEOUS AS NEEDED
Status: ACTIVE | OUTPATIENT
Start: 2019-08-22 | End: 2019-08-22

## 2019-08-22 RX ORDER — PALONOSETRON 0.05 MG/ML
0.25 INJECTION, SOLUTION INTRAVENOUS ONCE
Status: COMPLETED | OUTPATIENT
Start: 2019-08-22 | End: 2019-08-22

## 2019-08-22 RX ORDER — SODIUM CHLORIDE 9 MG/ML
25 INJECTION, SOLUTION INTRAVENOUS CONTINUOUS
Status: DISPENSED | OUTPATIENT
Start: 2019-08-22 | End: 2019-08-22

## 2019-08-22 RX ADMIN — DIPHENHYDRAMINE HYDROCHLORIDE 50 MG: 50 INJECTION INTRAMUSCULAR; INTRAVENOUS at 11:39

## 2019-08-22 RX ADMIN — SODIUM CHLORIDE 10 ML: 9 INJECTION, SOLUTION INTRAMUSCULAR; INTRAVENOUS; SUBCUTANEOUS at 09:56

## 2019-08-22 RX ADMIN — PACLITAXEL 402 MG: 6 INJECTION, SOLUTION INTRAVENOUS at 12:37

## 2019-08-22 RX ADMIN — SODIUM CHLORIDE 10 ML: 9 INJECTION, SOLUTION INTRAMUSCULAR; INTRAVENOUS; SUBCUTANEOUS at 16:10

## 2019-08-22 RX ADMIN — SODIUM CHLORIDE 150 MG: 900 INJECTION, SOLUTION INTRAVENOUS at 11:46

## 2019-08-22 RX ADMIN — SODIUM CHLORIDE 25 ML/HR: 900 INJECTION, SOLUTION INTRAVENOUS at 11:29

## 2019-08-22 RX ADMIN — FAMOTIDINE 20 MG: 10 INJECTION, SOLUTION INTRAVENOUS at 11:36

## 2019-08-22 RX ADMIN — DEXAMETHASONE SODIUM PHOSPHATE 12 MG: 4 INJECTION, SOLUTION INTRA-ARTICULAR; INTRALESIONAL; INTRAMUSCULAR; INTRAVENOUS; SOFT TISSUE at 11:46

## 2019-08-22 RX ADMIN — PALONOSETRON HYDROCHLORIDE 0.25 MG: 0.05 INJECTION INTRAVENOUS at 11:33

## 2019-08-22 RX ADMIN — CARBOPLATIN 641 MG: 10 INJECTION, SOLUTION INTRAVENOUS at 15:36

## 2019-08-22 RX ADMIN — Medication 500 UNITS: at 16:11

## 2019-08-22 NOTE — PATIENT INSTRUCTIONS
Take Zofran in the mornings and Compazine in the evenings - do this for 1 week straight. Eat more calorie dense foods. Make sure you go to [facility] in 2 weeks  You will also get a call about a CT scan for approx 2 weeks from now.

## 2019-08-22 NOTE — PROGRESS NOTES
[facility] VISIT NOTE    0940. Pt arrived at [facility] ambulatory and in no distress for C2D1 Carbo/Taxol. Assessment completed, pt c/o feeling achy and weak after last treatment. Patient reports he bought a cane to use due to pain and weakness. RCW chest port accessed with . 75 in madera with no difficulty. Positive blood return noted and labs drawn. Pt to see MD. Modesto Moura resulted and are within parameters to treat. Medications received:  NS KVO  Aloxi IV  Pepcid IV  Benadryl IV  Emend IV  Dexamethasone IV  Taxol IV  Carboplatin IV    Tolerated treatment well, no adverse reaction noted. Port de-accessed and flushed per protocol. Positive blood return noted. Patient Vitals for the past 12 hrs:   Temp Pulse Resp BP SpO2   08/22/19 1610 97.7 °F (36.5 °C) 65 16 131/80 --   08/22/19 0944 97.1 °F (36.2 °C) (!) 57 18 (!) 138/93 98 %     Recent Results (from the past 12 hour(s))   CBC WITH AUTOMATED DIFF    Collection Time: 08/22/19  9:55 AM   Result Value Ref Range    WBC 6.5 4.1 - 11.1 K/uL    RBC 4.56 4.10 - 5.70 M/uL    HGB 13.2 12.1 - 17.0 g/dL    HCT 40.0 36.6 - 50.3 %    MCV 87.7 80.0 - 99.0 FL    MCH 28.9 26.0 - 34.0 PG    MCHC 33.0 30.0 - 36.5 g/dL    RDW 15.6 (H) 11.5 - 14.5 %    PLATELET 057 665 - 348 K/uL    MPV 11.4 8.9 - 12.9 FL    NRBC 0.0 0  WBC    ABSOLUTE NRBC 0.00 0.00 - 0.01 K/uL    NEUTROPHILS 39 32 - 75 %    LYMPHOCYTES 43 12 - 49 %    MONOCYTES 15 (H) 5 - 13 %    EOSINOPHILS 1 0 - 7 %    BASOPHILS 1 0 - 1 %    IMMATURE GRANULOCYTES 1 (H) 0.0 - 0.5 %    ABS. NEUTROPHILS 2.6 1.8 - 8.0 K/UL    ABS. LYMPHOCYTES 2.9 0.8 - 3.5 K/UL    ABS. MONOCYTES 1.0 0.0 - 1.0 K/UL    ABS. EOSINOPHILS 0.0 0.0 - 0.4 K/UL    ABS. BASOPHILS 0.0 0.0 - 0.1 K/UL    ABS. IMM.  GRANS. 0.0 0.00 - 0.04 K/UL    DF AUTOMATED     METABOLIC PANEL, COMPREHENSIVE    Collection Time: 08/22/19  9:55 AM   Result Value Ref Range    Sodium 142 136 - 145 mmol/L    Potassium 4.3 3.5 - 5.1 mmol/L    Chloride 111 (H) 97 - 108 mmol/L    CO2 25 21 - 32 mmol/L    Anion gap 6 5 - 15 mmol/L    Glucose 92 65 - 100 mg/dL    BUN 14 6 - 20 MG/DL    Creatinine 0.82 0.70 - 1.30 MG/DL    BUN/Creatinine ratio 17 12 - 20      GFR est AA >60 >60 ml/min/1.73m2    GFR est non-AA >60 >60 ml/min/1.73m2    Calcium 9.2 8.5 - 10.1 MG/DL    Bilirubin, total 0.2 0.2 - 1.0 MG/DL    ALT (SGPT) 21 12 - 78 U/L    AST (SGOT) 14 (L) 15 - 37 U/L    Alk. phosphatase 73 45 - 117 U/L    Protein, total 7.8 6.4 - 8.2 g/dL    Albumin 3.7 3.5 - 5.0 g/dL    Globulin 4.1 (H) 2.0 - 4.0 g/dL    A-G Ratio 0.9 (L) 1.1 - 2.2       1615. D/C'd from [facility] ambulatory and in no distress accompanied by family member.  Next appointment is 9/12/19 at 9:00 am.

## 2019-08-22 NOTE — PROGRESS NOTES
Renny Blair is a 60 y.o. male here today for follow up of adenocarcinoma of unknown primary. He complains of pain to muscles in shoulders, shoulder blades and arms.

## 2019-08-30 RX ORDER — ACETAMINOPHEN 325 MG/1
650 TABLET ORAL AS NEEDED
Status: CANCELLED
Start: 2019-09-12

## 2019-08-30 RX ORDER — PALONOSETRON 0.05 MG/ML
0.25 INJECTION, SOLUTION INTRAVENOUS ONCE
Status: CANCELLED
Start: 2019-09-12

## 2019-08-30 RX ORDER — DIPHENHYDRAMINE HYDROCHLORIDE 50 MG/ML
50 INJECTION, SOLUTION INTRAMUSCULAR; INTRAVENOUS AS NEEDED
Status: CANCELLED
Start: 2019-09-12

## 2019-08-30 RX ORDER — LORAZEPAM 2 MG/ML
0.5 INJECTION INTRAMUSCULAR
Status: CANCELLED
Start: 2019-09-12

## 2019-08-30 RX ORDER — SODIUM CHLORIDE 0.9 % (FLUSH) 0.9 %
10 SYRINGE (ML) INJECTION AS NEEDED
Status: CANCELLED
Start: 2019-09-12

## 2019-08-30 RX ORDER — ALBUTEROL SULFATE 0.83 MG/ML
2.5 SOLUTION RESPIRATORY (INHALATION) AS NEEDED
Status: CANCELLED
Start: 2019-09-12

## 2019-08-30 RX ORDER — HEPARIN 100 UNIT/ML
300-500 SYRINGE INTRAVENOUS AS NEEDED
Status: CANCELLED
Start: 2019-09-12

## 2019-08-30 RX ORDER — EPINEPHRINE 1 MG/ML
0.3 INJECTION, SOLUTION, CONCENTRATE INTRAVENOUS AS NEEDED
Status: CANCELLED | OUTPATIENT
Start: 2019-09-12

## 2019-08-30 RX ORDER — DIPHENHYDRAMINE HYDROCHLORIDE 50 MG/ML
50 INJECTION, SOLUTION INTRAMUSCULAR; INTRAVENOUS ONCE
Status: CANCELLED
Start: 2019-09-12

## 2019-08-30 RX ORDER — SODIUM CHLORIDE 9 MG/ML
10 INJECTION INTRAMUSCULAR; INTRAVENOUS; SUBCUTANEOUS AS NEEDED
Status: CANCELLED | OUTPATIENT
Start: 2019-09-12

## 2019-08-30 RX ORDER — HYDROCORTISONE SODIUM SUCCINATE 100 MG/2ML
100 INJECTION, POWDER, FOR SOLUTION INTRAMUSCULAR; INTRAVENOUS AS NEEDED
Status: CANCELLED | OUTPATIENT
Start: 2019-09-12

## 2019-08-30 RX ORDER — ONDANSETRON 2 MG/ML
8 INJECTION INTRAMUSCULAR; INTRAVENOUS AS NEEDED
Status: CANCELLED | OUTPATIENT
Start: 2019-09-12

## 2019-08-30 RX ORDER — SODIUM CHLORIDE 9 MG/ML
25 INJECTION, SOLUTION INTRAVENOUS CONTINUOUS
Status: CANCELLED | OUTPATIENT
Start: 2019-09-12

## 2019-08-30 NOTE — PROGRESS NOTES
[address] [facility] ([place], [place]) Oncology at [address] [place] [address]  [phone]    Hematology / Oncology Established Visit    Reason for Visit:   Carola Dorantes is a 60 y.o. male who comes in for f/u of adenocarcinoma of unknown primary. Initially referred by Dr. Pura Hunt. Hematology Oncology Treatment History:     Diagnosis: Adenocarcinoma of unknown primary    Stage: N/A    Pathology:   6/4/19 4R LN biopsy: rare malignant cells admixed with abundant blood clot  6/4/19 4L LN FNA and core biopsy: Adenocarcinoma. 6/4/19 2R LN FNA and core biopsy: Adenocarcinoma. Comment: IHC stains negative for GATA3, AR, ER, p40. Immunohistochemistry shows that the tumor cells express CK7 with focal expression of CDX2. Tumor cells lack expression of CK20, TTF-1 and Napsin A. These findings are not entirely diagnostic and could be seen in either a primary pulmonary malignancy or a primary upper gastrointestinal tract malignancy. Other sites are also not excluded. Clinical and radiographic correlation is recommended. 6/25/19 2R LN, mediastinum: Metastatic adenocarcinoma (see Comment). Comment - The tumor appears poorly differentiated with areas of necrosis. Immunohistochemical staining reveals positive staining for cytokeratin 7 (strong, diffuse), CDX-2 (focal, weak to moderate) and CEA (focal, moderate to strong). The tumor cells are negative for cytokeratin 20, cytokeratin 5/6, p40, p63, TTF-1, napsin-A, PLAP, HCG, AFP and c-KIT (). The findings are not entirely specific with regard to primary site but would be consistent with upper GI/pancreaticobiliary tract. A pulmonary primary is less likely, but still in the differential.   -PDL1 30%? , [facility] identified high tumor mutational burden - which is predictive of higher response to immunotherapy agents. Prior Treatment: None    Current Treatment: Carbo-Taxol every 3 weeks until disease progression or toxicity.      History of Present Illness:   Mr. Marlin Caldwell is a 60 y.o. male with COPD, remote h/o colon cancer comes in for evaluation of mediastinal lymphadenopathy. Pt had recently p/w shortness of breath, dyspnea on exertion, and was found on chest CT to have R mediastinal lymphadenopathy, which also were PET avid. Case was discussed at Thoracic Tumor Board with thought that this could be pancreatic, urothelial, or germ cell origin. More tissue is recommended. Pt had EGD 3 yrs ago and was told he had GERD. Patient has h/o colon cancer in 2002. He states a cancerous polyp was found and then he underwent colectomy in 2002. This was done by Dr. Skyler Flores at [facility]. Patient had colonoscopies regularly afterwards, last one was approx 2015. Last EGD was in 2018. As part of search for primary lesion, patient underwent EGD, colonoscopy by Dr. Tha Stewart, notable for diffusely enlarged gastric folds, but biopsies negative. Pt underwent cytoscopy on 7/17/19 with findings negative for malignancy. Interval History: Patient here for follow up and cycle 3 of treatment. He felt extremely fatigued and moderately nauseated on day 3 after treatment. He also reports intermittent left hand tingling and shooting pain as well as numbness in the bottom of both feet. He just started Gabapentin a few days ago. He still has the shoulder and back pain for which he takes Norco bid prn and needs a refill. He is eating and drinking moderately well. He asks whether chemo can be done a few days later next time since his daughter is getting  on 10/5/19.       Past Medical History:   Diagnosis Date    Abnormal nuclear stress test 12/7/2015    Cancer ([facility] [address] [address]     colon    Cardiomyopathy ([facility] [address] 2010    EF 45%    Chronic obstructive pulmonary disease (HCC)     Chronic systolic heart failure (HCC)     GERD (gastroesophageal reflux disease)     HTN (hypertension)     PAC (premature atrial contraction)     Tobacco use disorder       Past Surgical History:   Procedure Laterality Date    COLONOSCOPY N/A 7/10/2019    COLONOSCOPY AND ESOPHAGOGASTRODUODENOSCOPY (EGD) performed by Chris Peña MD at [facility] HX COLECTOMY      HX SPLENECTOMY      IR INSERT TUNL CVC W PORT OVER 5 YEARS  7/19/2019      Social History     Tobacco Use    Smoking status: Former Smoker     Packs/day: 0.50     Types: Cigarettes     Last attempt to quit: 4/22/2016     Years since quitting: 3.3    Smokeless tobacco: Never Used   Substance Use Topics    Alcohol use: Not Currently     Comment: rarely      Family History   Problem Relation Age of Onset    Stroke Mother     Coronary Artery Disease Sister     Heart Disease Paternal Grandfather      Current Outpatient Medications   Medication Sig    HYDROcodone-acetaminophen (NORCO) 5-325 mg per tablet Take 1 Tab by mouth two (2) times a day for 21 days. Max Daily Amount: 2 Tabs.  dexAMETHasone (DECADRON) 4 mg tablet Take 4mg (1 tab) with breakfast on days 2 and 3 after chemotherapy to prevent nausea.  ondansetron hcl (ZOFRAN) 4 mg tablet Take 2 Tabs by mouth every eight (8) hours as needed for Nausea.  prochlorperazine (COMPAZINE) 10 mg tablet Take 1 Tab by mouth every six (6) hours as needed for Nausea.  lidocaine-prilocaine (EMLA) topical cream Apply  to affected area as needed for Pain. Apply quarter size amount to port prior to chemotherapy    tamsulosin (FLOMAX) 0.4 mg capsule Take 0.4 mg by mouth daily.  OTHER Take  by inhalation. Alero Inhaler    lisinopril (PRINIVIL, ZESTRIL) 5 mg tablet TAKE 1 TABLET BY MOUTH DAILY    metoprolol succinate (TOPROL-XL) 25 mg XL tablet Take 1 Tab by mouth nightly. No current facility-administered medications for this visit. No Known Allergies     Review of Systems: A complete review of systems was obtained, negative except as described above.     Physical Exam:     Visit Vitals  /84   Pulse (!) 58   Temp 97.7 °F (36.5 °C) (Oral)   Resp 16   Ht 5' 10\" (1.778 m)   Wt 168 lb (76.2 kg)   SpO2 97% BMI 24.11 kg/m²     ECOG PS: 0  General: Well developed, no acute distress  Eyes: PERRLA, EOMI, anicteric sclerae  HENT: Atraumatic, OP clear, TMs intact without erythema  Neck: Supple  Lymphatic: No cervical, supraclavicular, axillary or inguinal adenopathy  Respiratory: CTAB, normal respiratory effort  CV: Normal rate, regular rhythm, no murmurs, no peripheral edema, port in place. GI: Soft, nontender, nondistended, no masses, no hepatomegaly, no splenomegaly  MS: Normal gait and station. Digits without clubbing or cyanosis. Skin: No rashes, ecchymoses, or petechiae. Normal temperature, turgor, and texture. Neuro/Psych: Alert, oriented. 5/5 strength in all 4 extremities. Appropriate affect, normal judgment/insight. Results:     Lab Results   Component Value Date/Time    WBC 4.9 09/12/2019 09:31 AM    HGB 12.3 09/12/2019 09:31 AM    HCT 37.1 09/12/2019 09:31 AM    PLATELET 328 72/70/3686 09:31 AM    MCV 88.3 09/12/2019 09:31 AM    ABS. NEUTROPHILS 1.6 (L) 09/12/2019 09:31 AM     Lab Results   Component Value Date/Time    Sodium 141 09/12/2019 09:31 AM    Potassium 4.3 09/12/2019 09:31 AM    Chloride 107 09/12/2019 09:31 AM    CO2 28 09/12/2019 09:31 AM    Glucose 101 (H) 09/12/2019 09:31 AM    BUN 11 09/12/2019 09:31 AM    Creatinine 0.86 09/12/2019 09:31 AM    GFR est AA >60 09/12/2019 09:31 AM    GFR est non-AA >60 09/12/2019 09:31 AM    Calcium 9.4 09/12/2019 09:31 AM     Lab Results   Component Value Date/Time    Bilirubin, total 0.2 09/12/2019 09:31 AM    ALT (SGPT) 16 09/12/2019 09:31 AM    AST (SGOT) 9 (L) 09/12/2019 09:31 AM    Alk.  phosphatase 67 09/12/2019 09:31 AM    Protein, total 7.7 09/12/2019 09:31 AM    Albumin 3.7 09/12/2019 09:31 AM    Globulin 4.0 09/12/2019 09:31 AM     No results found for: IRON, FE, TIBC, IBCT, PSAT, FERR    No results found for: B12LT, FOL, RBCF  No results found for: TSH, TSH2, TSH3, TSHP, TSHEXT, TSHEXT  No results found for: HAMAT, HAAB, HABT, HAAT, HBSAG, HBSB, HBSAT, HBABN, HBCM, HBCAB, HBCAT, XBCABS, [facility], [address], [address] [facility], [id number], [address] [facility], [facility], [address] [facility] ([place], [place]), [address] [place] [address], [id number], [id number], [address] [place] [address], [id number], HBCMLT, [id number], HCGAT      Imaging:     Chest CT 5/10/19:  FINDINGS:  THYROID: No nodule. MEDIASTINUM: There are enlarged lymph nodes in the right paratracheal region  with 3 enlarged lymph nodes in this area. The largest is inferiorly in the right  paratracheal region measuring 2.5 x 1.7 cm. There are also enlarged lymph nodes  in the medial and lateral AP window with the largest lymph node measuring 18 mm. These lymph nodes are worrisome for neoplastic process. There is a normal size  subcarinal lymph node. Isac Neal REBECCA: No mass or lymphadenopathy. THORACIC AORTA: No aneurysm. MAIN PULMONARY ARTERY: Normal in caliber. TRACHEA/BRONCHI: Patent. ESOPHAGUS: No wall thickening or dilatation. HEART: Normal in size. PLEURA: No effusion or pneumothorax. LUNGS: There are moderate changes of centrilobular emphysema. There are bullous  lesions at each apex left greater than right. There is a calcified granuloma in the left upper lobe.   There is volume loss in the medial segment right middle lobe that extends to a  oval-shaped opacity measures 1.8 x 1.1 cm this may all be atelectasis. Pulmonary  nodule within the atelectasis is not excluded. No abnormality corresponds with the rounded opacity seen on the chest  radiograph. Possibly that was a nipple shadow. .  Mild scarring is seen medially in the lingula. INCIDENTALLY IMAGED UPPER ABDOMEN: The spleen has been removed. No adrenal  masses. BONES: No destructive bone lesion. IMPRESSION:  1. There are are multiple enlarged lymph nodes in the right paratracheal region  as well as adenopathy and AP window region. These enlarged lymph nodes are  worrisome for neoplastic process.   2. There is volume loss in right middle lobe with a nodular area of opacity that  could be atelectasis but a superimposed nodule within the areas of atelectasis  is not excluded. Further evaluation is suggested. Tissue diagnosis is suggested. PET CT scan may yield more information. . 23X     5/28/19 PET:  FINDINGS:  HEAD/NECK: No apparent foci of abnormal hypermetabolism. Cerebral evaluation is  limited by normal intense activity. CHEST: A hypermetabolic right paratracheal lymph node SUV is 8.5. There are  hypermetabolic lymph nodes anterior to the main bronchi bilaterally. There is  centrilobular emphysema. There is volume loss in the right middle lobe but a  central obstructing mass is not identified on this PET scan. ABDOMEN/PELVIS: No foci of abnormal hypermetabolism. The prostate gland is  enlarged. SKELETON: No foci of abnormal hypermetabolism in the axial and visualized  appendicular skeleton. IMPRESSION: Hypermetabolic mediastinal lymph nodes concerning for neoplastic process. Chest/abd/pelvis CT 7/30/19:  FINDINGS:   THYROID: No nodule. MEDIASTINUM: Mediastinal lymphadenopathy has loosely decreased with upper  paratracheal node measuring 1.3 x 1.9 cm, previously 1.4 x 1.8 cm (2, 24), right  paratracheal node at the level of the aortic arch measuring 1.4 x 2.1 cm,  previously 1.7 x 2.6 cm of (2, 27) series, precarinal node on the right  measuring 1.7 x 2.2 cm, previously 1.7 x 2.5 cm (2, 31), and left pericarinal  noted measuring 1.2 x 1.8 cm, previously 1.8 x 2.1 cm of (2, 30). However, there  is an enlarged left preaortic node just lateral to left mainstem bronchus  measuring 1.3 x 2.1 cm which previously measured 8 x 8 mm (2, 33). REBECCA: No mass or lymphadenopathy. THORACIC AORTA: No dissection or aneurysm. MAIN PULMONARY ARTERY: Normal in caliber. TRACHEA/BRONCHI: Patent. ESOPHAGUS: No wall thickening or dilatation. HEART: Normal in size. PLEURA: No effusion or pneumothorax. LUNGS: Centrilobular bullous emphysematous change.  Right middle lobe ovoid  entity most likely reflective of atelectasis adjacent to the right heart margin  appears unchanged. Left upper lobe granuloma. No acute infiltrate, nodule, or  mass. LIVER: No mass or biliary dilatation. GALLBLADDER: Unremarkable. SPLEEN: Surgically absent. PANCREAS: No mass or ductal dilatation. ADRENALS: Right adrenal mass not seen previously measures 2.0 x 3.0 cm (2, 62). Left adrenal appears unremarkable. KIDNEYS: No mass, calculus, or hydronephrosis. STOMACH: Unremarkable. SMALL BOWEL: No dilatation or wall thickening. COLON: No dilatation or wall thickening. APPENDIX: Unremarkable. PERITONEUM: No ascites or pneumoperitoneum. RETROPERITONEUM: Right retroperitoneal adenopathy posterior to the inferior vena  cava at the level of the renal hilum measures 1.3 x 1.9 cm, not previously seen  (2, 69). REPRODUCTIVE ORGANS: The prostate and seminal vesicles appear unremarkable. URINARY BLADDER: No mass or calculus. BONES: No destructive bone lesion. ADDITIONAL COMMENTS: Right Port-A-Cath in place with catheter traversing to the  atriocaval junction region. IMPRESSION:  1. Mixed response to therapy with most mediastinal lymphadenopathy diminishing  in size, however, a left mediastinal lymph node has shown increased size and  there is a new right adrenal mass and new right retroperitoneal adenopathy  suspicious for aggressive metastatic disease. 2. Additional incidental findings as above including centrilobular bullous  emphysema with probable right middle lobe atelectasis. Procedures:     EGD 7/10/19:   Esophagus:normal  Stomach: diffuse enlargement gastric folds with nodular erythema and erosion throughout stomach  Duodenum/jejunum: normal    Colonoscopy 7/10/19: Diverticulosis    CT c/a/p 9/3/19: IMPRESSION:  1. Improvement in adenopathy in the chest. No acute findings or parenchymal abnormalities in the chest.  2. Interval increase in right adrenal mass. 3. Minimal decrease in size of right retroperitoneal node. Assessment & Plan:   Gaby Han is a 60 y.o. male with COPD, remote h/o colon cancer comes in for evaluation and management of adenocarcinoma of unknown primary. 1. Adenocarcinoma of unknown primary: Involving mediastinal LNs, with no other PET findings. Per Thoracic Tumor Board discussion, this could represent upper GI origin, urothelial origin, germ cell tumor, or lung origin. Search for primary lesion was overall negative: Cystoscopy, EGD, colonoscopy negative for malignancy although EGD abnormal with diffusely enlarged gastric folds. Despite h/o colon cancer in 2002, it would be very odd to see a recurrence in the mediastinal LNs. CEA 26.8. Other tumor markers negative (AFP, hCG, CA 19-9). Unfortunately, this disease is considered incurable, but is treatable. At this time, I recommend chemotherapy treatment using the Carboplatin-Paclitaxel regimen (which covers both lung and GI primaries). We discussed the risks and benefits of chemotherapy with Carboplatin and Paclitaxel. Potential side effects include but are not limited to: nausea, vomiting, diarrhea, constipation, taste changes, allergic reactions, myelosuppression, infection, fatigue, alopecia, neuropathy, mucositis, renal failure, infertility, and rarely, death. Additionally, chemotherapy leads to radiosensitization which can intensify the side effects of radiation therapy. The patient has consented to beginning chemotherapy and chemo ed packet given. CT on 9/3/19 showed mixed response with improvement in adenopathy in the chest, but increase in right adrenal mass, minimal decrease in size of right retroperitoneal node. Supportive care medications include: Zofran, Compazine, Emla cream, dexamethasone 4 mg on days 2 and 3 after treatment. -- Proceed with cycle 3 of Carbo-Taxol today with plan to treat every 3 weeks with plan to repeat imaging after cycle 4.  -- Fantasma counts showed severe neutropenia after both cycle 1 and 2. Will add Neulasta OBI to remaining cycles.   -- Return in 3 weeks for cycle 4, MD visit, labs - Moved rx to Monday, 10/7/19 so patient could enjoy his daughter's wedding the weekend prior  -- Will plan on next imaging 2 weeks after cycle 4 (If adrenal mass still increasing, will need to biopsy this). -- Send PCP note Dr. Grecia Green. 2. Neoplasm pain: Affecting the upper back and now shoulders. Perhaps related to the mediastinal LNs, but unclear. Pt is mainly taking pain medicines at night to ease pain enough to sleep. Discussed stool softeners prn, but pt currently having loose stools rather than constipation. -- Refused palliative medicine referral.   -- Refilled rx hydrocodone/APAP 5-325mg #60 BID PRN x 30 days, written 9/12    3. H/o Stage I [T1NxMx] sigmoid colon cancer: Found in sigmoid adenoma during a colonoscopy; went on to undergo segmented resection of the sigmoid colon in 2002. Pathology per outside records:  2/6/2002 sigmoid colon polyp: Well differentiated adenocarcinoma arising in an adenoma, involving the mucosa and invading into the upper half of the submucosal stalk. No vascular space involvement is identified. 2/21/2002 Sigmoidectomy, liver biopsy:  -Sigmoid colon, segmented resection: No residual adenocarcinoma present. Polypectomy site with granulation tissue and vascular congestion. No LNs recovered. Distal and proximal margins benign.  -Liver biopsy: Negative for metastatic carcinoma. No significant inflammation or obvious cirrhosis identified. Very minimal steatosis (rare cells with fat globules). -Addendum: Reblocks of adipose tissue; three small benign lymphoid aggregates (< 0.1cm). 4. COPD: Quit smoking in approx 2016. SOB improved after starting inhaler. 5. HTN: Well controlled on Lisinopril and Metoprolol. 6. BPH: On Flomax. 7. Chemotherapy induced neutropenia: Reduced dose of Carbo to AUC 5 and Taxol to 175mg/m2, added Neulasta OBI.  Treatment with paclitaxel and carboplatin should be delayed until the [address] is >1500 cells/microL and platelet count is >108,177/SFDLKW. 8. Chemotherapy induced peripheral neuropathy: Numbness in feet, tingling and pain in left hand. Just started Gabapentin 100mg bid. Low threshold to increase to tid. Goals of care: Palliative to improve quality and duration of life, but no cure. Emotional well being: Pt is coping well with his/her disease and has excellent support. I appreciate the opportunity to participate in Mr. Benjamín Garvey [facility].     Signed By: Ann-Marie Shah MD     September 12, 2019

## 2019-09-03 ENCOUNTER — HOSPITAL ENCOUNTER (OUTPATIENT)
Dept: CT IMAGING | Age: 59
Discharge: HOME | End: 2019-09-03
Attending: NURSE PRACTITIONER
Payer: OTHER GOVERNMENT

## 2019-09-03 DIAGNOSIS — C80.1 ADENOCARCINOMA OF UNKNOWN PRIMARY (HCC): ICD-10-CM

## 2019-09-03 PROCEDURE — 74011000255 HC RX REV CODE- 255: Performed by: NURSE PRACTITIONER

## 2019-09-03 PROCEDURE — 74177 CT ABD & PELVIS W/CONTRAST: CPT

## 2019-09-03 PROCEDURE — 74011636320 HC RX REV CODE- 636/320: Performed by: NURSE PRACTITIONER

## 2019-09-03 RX ORDER — BARIUM SULFATE 20 MG/ML
450 SUSPENSION ORAL
Status: COMPLETED | OUTPATIENT
Start: 2019-09-03 | End: 2019-09-03

## 2019-09-03 RX ORDER — SODIUM CHLORIDE 0.9 % (FLUSH) 0.9 %
10 SYRINGE (ML) INJECTION
Status: COMPLETED | OUTPATIENT
Start: 2019-09-03 | End: 2019-09-03

## 2019-09-03 RX ADMIN — IOPAMIDOL 100 ML: 755 INJECTION, SOLUTION INTRAVENOUS at 09:16

## 2019-09-03 RX ADMIN — BARIUM SULFATE 450 ML: 20 SUSPENSION ORAL at 09:16

## 2019-09-03 RX ADMIN — Medication 10 ML: at 09:16

## 2019-09-04 ENCOUNTER — TELEPHONE (OUTPATIENT)
Dept: ONCOLOGY | Age: 59
End: 2019-09-04

## 2019-09-04 DIAGNOSIS — G57.93 NEUROPATHIC PAIN OF BOTH FEET: Primary | ICD-10-CM

## 2019-09-04 PROBLEM — D70.1 CHEMOTHERAPY-INDUCED NEUTROPENIA (HCC): Status: ACTIVE | Noted: 2019-09-04

## 2019-09-04 PROBLEM — T45.1X5A CHEMOTHERAPY-INDUCED NEUTROPENIA (HCC): Status: ACTIVE | Noted: 2019-09-04

## 2019-09-04 LAB
ALBUMIN SERPL-MCNC: 4.3 G/DL (ref 3.6–4.8)
ALBUMIN/GLOB SERPL: 1.5 {RATIO} (ref 1.2–2.2)
ALP SERPL-CCNC: 61 IU/L (ref 39–117)
ALT SERPL-CCNC: 14 IU/L (ref 0–44)
AST SERPL-CCNC: 12 IU/L (ref 0–40)
BASOPHILS # BLD AUTO: 0 X10E3/UL (ref 0–0.2)
BASOPHILS NFR BLD AUTO: 0 %
BILIRUB SERPL-MCNC: <0.2 MG/DL (ref 0–1.2)
BUN SERPL-MCNC: 15 MG/DL (ref 8–27)
BUN/CREAT SERPL: 18 (ref 10–24)
CALCIUM SERPL-MCNC: 9.6 MG/DL (ref 8.6–10.2)
CHLORIDE SERPL-SCNC: 104 MMOL/L (ref 96–106)
CO2 SERPL-SCNC: 24 MMOL/L (ref 20–29)
CREAT SERPL-MCNC: 0.82 MG/DL (ref 0.76–1.27)
EOSINOPHIL # BLD AUTO: 0 X10E3/UL (ref 0–0.4)
EOSINOPHIL NFR BLD AUTO: 1 %
ERYTHROCYTE [DISTWIDTH] IN BLOOD BY AUTOMATED COUNT: 15.2 % (ref 12.3–15.4)
GLOBULIN SER CALC-MCNC: 2.8 G/DL (ref 1.5–4.5)
GLUCOSE SERPL-MCNC: 97 MG/DL (ref 65–99)
HCT VFR BLD AUTO: 36.5 % (ref 37.5–51)
HGB BLD-MCNC: 12.4 G/DL (ref 13–17.7)
IMM GRANULOCYTES # BLD AUTO: 0 X10E3/UL (ref 0–0.1)
IMM GRANULOCYTES NFR BLD AUTO: 0 %
LYMPHOCYTES # BLD AUTO: 2.2 X10E3/UL (ref 0.7–3.1)
LYMPHOCYTES NFR BLD AUTO: 75 %
MCH RBC QN AUTO: 29.3 PG (ref 26.6–33)
MCHC RBC AUTO-ENTMCNC: 34 G/DL (ref 31.5–35.7)
MCV RBC AUTO: 86 FL (ref 79–97)
MONOCYTES # BLD AUTO: 0.5 X10E3/UL (ref 0.1–0.9)
MONOCYTES NFR BLD AUTO: 16 %
MORPHOLOGY BLD-IMP: ABNORMAL
NEUTROPHILS # BLD AUTO: 0.2 X10E3/UL (ref 1.4–7)
NEUTROPHILS NFR BLD AUTO: 8 %
NRBC BLD AUTO-RTO: 1 % (ref 0–0)
PLATELET # BLD AUTO: 113 X10E3/UL (ref 150–450)
POTASSIUM SERPL-SCNC: 4.6 MMOL/L (ref 3.5–5.2)
PROT SERPL-MCNC: 7.1 G/DL (ref 6–8.5)
RBC # BLD AUTO: 4.23 X10E6/UL (ref 4.14–5.8)
SODIUM SERPL-SCNC: 140 MMOL/L (ref 134–144)
WBC # BLD AUTO: 2.9 X10E3/UL (ref 3.4–10.8)

## 2019-09-04 RX ORDER — GABAPENTIN 100 MG/1
100 CAPSULE ORAL 2 TIMES DAILY
Qty: 60 CAP | Refills: 0 | Status: SHIPPED | OUTPATIENT
Start: 2019-09-04 | End: 2019-09-30 | Stop reason: SDUPTHER

## 2019-09-04 NOTE — TELEPHONE ENCOUNTER
[facility] [employer] at [facility] [address]  [phone]      09/04/19 9:12 AM Spoke to Tati Jade with [facility]. Patient's absolute neutrophil count is 0.2. Provided read back of result as well. Patient last received chemotherapy on 08/22. Will inform Yessenia Elizabeth NP and Dr. Hung Melgoza and await further instruction. 9:46 AM Left message for patient via home/cell number listed requesting he return call. Per Yessenia Elizabeth, patient is neutropenic and should follow neutropenic precautions. Patient also to call office if he develops fever with temperature of 100.4 or higher. Provided office phone number for him to return call. 2:39 PM Spoke with patient and informed of above. He also purchased disposable masks that he will wear while at work. While on phone, patient had complaints of neuropathy in feet. Described numbness as constant and painful, having to wear sneakers while at work. Patient requesting prescription for gabapentin. Advised I would defer above to Yessenia Elizabeth and Dr. Hung Melgoza and call patient back. He verbalized understanding. 3:37 PM Called in gabapentin to [facility] on file. 09/05/19 11:21 AM Spoke with patient. Verified with patient that he picked up prescription for gabapentin. Reviewed that patient should take 1 capsule twice daily. While on phone, patient inquired about his CT results. Informed him that I would relay that he inquired about results, but that Dr. Hung Melgoza reviews imaging results with all patients during the office visit. Patient verbalized understanding. No further questions or concerns at this time.

## 2019-09-04 NOTE — TELEPHONE ENCOUNTER
Porsha Reyes with [facility] left a voicemail stating she was calling for a critical lab on patient.  Ref# [id number] # [phone] opt 1

## 2019-09-05 DIAGNOSIS — C80.1 ADENOCARCINOMA OF UNKNOWN PRIMARY (HCC): ICD-10-CM

## 2019-09-06 ENCOUNTER — TELEPHONE (OUTPATIENT)
Dept: ONCOLOGY | Age: 59
End: 2019-09-06

## 2019-09-12 ENCOUNTER — OFFICE VISIT (OUTPATIENT)
Dept: ONCOLOGY | Age: 59
End: 2019-09-12

## 2019-09-12 ENCOUNTER — HOSPITAL ENCOUNTER (OUTPATIENT)
Dept: INFUSION THERAPY | Age: 59
Discharge: HOME | End: 2019-09-12
Payer: OTHER GOVERNMENT

## 2019-09-12 VITALS
DIASTOLIC BLOOD PRESSURE: 84 MMHG | RESPIRATION RATE: 16 BRPM | TEMPERATURE: 97.7 F | BODY MASS INDEX: 24.05 KG/M2 | WEIGHT: 168 LBS | HEIGHT: 70 IN | OXYGEN SATURATION: 97 % | HEART RATE: 58 BPM | SYSTOLIC BLOOD PRESSURE: 122 MMHG

## 2019-09-12 VITALS
TEMPERATURE: 98.3 F | SYSTOLIC BLOOD PRESSURE: 101 MMHG | HEIGHT: 70 IN | DIASTOLIC BLOOD PRESSURE: 72 MMHG | HEART RATE: 72 BPM | OXYGEN SATURATION: 93 % | BODY MASS INDEX: 24.12 KG/M2 | WEIGHT: 168.5 LBS | RESPIRATION RATE: 18 BRPM

## 2019-09-12 DIAGNOSIS — Z51.11 CHEMOTHERAPY MANAGEMENT, ENCOUNTER FOR: ICD-10-CM

## 2019-09-12 DIAGNOSIS — D70.1 CHEMOTHERAPY-INDUCED NEUTROPENIA (HCC): Primary | ICD-10-CM

## 2019-09-12 DIAGNOSIS — T45.1X5A CHEMOTHERAPY-INDUCED PERIPHERAL NEUROPATHY (HCC): ICD-10-CM

## 2019-09-12 DIAGNOSIS — K59.03 DRUG-INDUCED CONSTIPATION: ICD-10-CM

## 2019-09-12 DIAGNOSIS — C80.1 ADENOCARCINOMA OF UNKNOWN ORIGIN (HCC): ICD-10-CM

## 2019-09-12 DIAGNOSIS — T45.1X5A CHEMOTHERAPY-INDUCED NEUTROPENIA (HCC): Primary | ICD-10-CM

## 2019-09-12 DIAGNOSIS — C80.1 ADENOCARCINOMA OF UNKNOWN PRIMARY (HCC): Primary | ICD-10-CM

## 2019-09-12 DIAGNOSIS — G62.0 CHEMOTHERAPY-INDUCED PERIPHERAL NEUROPATHY (HCC): ICD-10-CM

## 2019-09-12 DIAGNOSIS — G89.3 ACUTE NEOPLASM-RELATED PAIN: ICD-10-CM

## 2019-09-12 LAB
ALBUMIN SERPL-MCNC: 3.7 G/DL (ref 3.5–5)
ALBUMIN/GLOB SERPL: 0.9 {RATIO} (ref 1.1–2.2)
ALP SERPL-CCNC: 67 U/L (ref 45–117)
ALT SERPL-CCNC: 16 U/L (ref 12–78)
ANION GAP SERPL CALC-SCNC: 6 MMOL/L (ref 5–15)
AST SERPL-CCNC: 9 U/L (ref 15–37)
BASOPHILS # BLD: 0 K/UL (ref 0–0.1)
BASOPHILS NFR BLD: 0 % (ref 0–1)
BILIRUB SERPL-MCNC: 0.2 MG/DL (ref 0.2–1)
BUN SERPL-MCNC: 11 MG/DL (ref 6–20)
BUN/CREAT SERPL: 13 (ref 12–20)
CALCIUM SERPL-MCNC: 9.4 MG/DL (ref 8.5–10.1)
CHLORIDE SERPL-SCNC: 107 MMOL/L (ref 97–108)
CO2 SERPL-SCNC: 28 MMOL/L (ref 21–32)
CREAT SERPL-MCNC: 0.86 MG/DL (ref 0.7–1.3)
DIFFERENTIAL METHOD BLD: ABNORMAL
EOSINOPHIL # BLD: 0 K/UL (ref 0–0.4)
EOSINOPHIL NFR BLD: 0 % (ref 0–7)
ERYTHROCYTE [DISTWIDTH] IN BLOOD BY AUTOMATED COUNT: 16.1 % (ref 11.5–14.5)
GLOBULIN SER CALC-MCNC: 4 G/DL (ref 2–4)
GLUCOSE SERPL-MCNC: 101 MG/DL (ref 65–100)
HCT VFR BLD AUTO: 37.1 % (ref 36.6–50.3)
HGB BLD-MCNC: 12.3 G/DL (ref 12.1–17)
IMM GRANULOCYTES # BLD AUTO: 0 K/UL (ref 0–0.04)
IMM GRANULOCYTES NFR BLD AUTO: 0 % (ref 0–0.5)
LYMPHOCYTES # BLD: 2.6 K/UL (ref 0.8–3.5)
LYMPHOCYTES NFR BLD: 53 % (ref 12–49)
MCH RBC QN AUTO: 29.3 PG (ref 26–34)
MCHC RBC AUTO-ENTMCNC: 33.2 G/DL (ref 30–36.5)
MCV RBC AUTO: 88.3 FL (ref 80–99)
MONOCYTES # BLD: 0.7 K/UL (ref 0–1)
MONOCYTES NFR BLD: 14 % (ref 5–13)
NEUTS SEG # BLD: 1.6 K/UL (ref 1.8–8)
NEUTS SEG NFR BLD: 33 % (ref 32–75)
NRBC # BLD: 0 K/UL (ref 0–0.01)
NRBC BLD-RTO: 0 PER 100 WBC
PLATELET # BLD AUTO: 165 K/UL (ref 150–400)
PMV BLD AUTO: 10.5 FL (ref 8.9–12.9)
POTASSIUM SERPL-SCNC: 4.3 MMOL/L (ref 3.5–5.1)
PROT SERPL-MCNC: 7.7 G/DL (ref 6.4–8.2)
RBC # BLD AUTO: 4.2 M/UL (ref 4.1–5.7)
SODIUM SERPL-SCNC: 141 MMOL/L (ref 136–145)
WBC # BLD AUTO: 4.9 K/UL (ref 4.1–11.1)

## 2019-09-12 PROCEDURE — 80053 COMPREHEN METABOLIC PANEL: CPT

## 2019-09-12 PROCEDURE — 96415 CHEMO IV INFUSION ADDL HR: CPT

## 2019-09-12 PROCEDURE — 96377 APPLICATON ON-BODY INJECTOR: CPT

## 2019-09-12 PROCEDURE — 85025 COMPLETE CBC W/AUTO DIFF WBC: CPT

## 2019-09-12 PROCEDURE — 96367 TX/PROPH/DG ADDL SEQ IV INF: CPT

## 2019-09-12 PROCEDURE — 96375 TX/PRO/DX INJ NEW DRUG ADDON: CPT

## 2019-09-12 PROCEDURE — 96417 CHEMO IV INFUS EACH ADDL SEQ: CPT

## 2019-09-12 PROCEDURE — 74011250636 HC RX REV CODE- 250/636: Performed by: NURSE PRACTITIONER

## 2019-09-12 PROCEDURE — 74011000250 HC RX REV CODE- 250: Performed by: NURSE PRACTITIONER

## 2019-09-12 PROCEDURE — 36415 COLL VENOUS BLD VENIPUNCTURE: CPT

## 2019-09-12 PROCEDURE — 74011000258 HC RX REV CODE- 258: Performed by: NURSE PRACTITIONER

## 2019-09-12 PROCEDURE — 96413 CHEMO IV INFUSION 1 HR: CPT

## 2019-09-12 PROCEDURE — 77030012965 HC NDL HUBR BBMI -A

## 2019-09-12 RX ORDER — AMOXICILLIN 250 MG
1 CAPSULE ORAL
Qty: 30 TAB | Refills: 3 | Status: SHIPPED | OUTPATIENT
Start: 2019-09-12 | End: 2019-11-04 | Stop reason: SDUPTHER

## 2019-09-12 RX ORDER — HYDROCODONE BITARTRATE AND ACETAMINOPHEN 5; 325 MG/1; MG/1
1 TABLET ORAL 2 TIMES DAILY
Qty: 60 TAB | Refills: 0 | Status: SHIPPED | OUTPATIENT
Start: 2019-09-12 | End: 2019-10-03

## 2019-09-12 RX ORDER — HEPARIN 100 UNIT/ML
300-500 SYRINGE INTRAVENOUS AS NEEDED
Status: ACTIVE | OUTPATIENT
Start: 2019-09-12 | End: 2019-09-12

## 2019-09-12 RX ORDER — SODIUM CHLORIDE 9 MG/ML
25 INJECTION, SOLUTION INTRAVENOUS CONTINUOUS
Status: DISPENSED | OUTPATIENT
Start: 2019-09-12 | End: 2019-09-12

## 2019-09-12 RX ORDER — SODIUM CHLORIDE 0.9 % (FLUSH) 0.9 %
10 SYRINGE (ML) INJECTION AS NEEDED
Status: ACTIVE | OUTPATIENT
Start: 2019-09-12 | End: 2019-09-12

## 2019-09-12 RX ORDER — SODIUM CHLORIDE 9 MG/ML
10 INJECTION INTRAMUSCULAR; INTRAVENOUS; SUBCUTANEOUS AS NEEDED
Status: ACTIVE | OUTPATIENT
Start: 2019-09-12 | End: 2019-09-12

## 2019-09-12 RX ORDER — DIPHENHYDRAMINE HYDROCHLORIDE 50 MG/ML
50 INJECTION, SOLUTION INTRAMUSCULAR; INTRAVENOUS ONCE
Status: COMPLETED | OUTPATIENT
Start: 2019-09-12 | End: 2019-09-12

## 2019-09-12 RX ORDER — PALONOSETRON 0.05 MG/ML
0.25 INJECTION, SOLUTION INTRAVENOUS ONCE
Status: COMPLETED | OUTPATIENT
Start: 2019-09-12 | End: 2019-09-12

## 2019-09-12 RX ADMIN — Medication 10 ML: at 11:06

## 2019-09-12 RX ADMIN — SODIUM CHLORIDE 10 ML: 9 INJECTION INTRAMUSCULAR; INTRAVENOUS; SUBCUTANEOUS at 09:30

## 2019-09-12 RX ADMIN — DIPHENHYDRAMINE HYDROCHLORIDE 50 MG: 50 INJECTION INTRAMUSCULAR; INTRAVENOUS at 11:06

## 2019-09-12 RX ADMIN — Medication 500 UNITS: at 16:08

## 2019-09-12 RX ADMIN — SODIUM CHLORIDE 150 MG: 900 INJECTION, SOLUTION INTRAVENOUS at 11:07

## 2019-09-12 RX ADMIN — FAMOTIDINE 20 MG: 10 INJECTION INTRAVENOUS at 11:06

## 2019-09-12 RX ADMIN — DEXAMETHASONE SODIUM PHOSPHATE 12 MG: 4 INJECTION, SOLUTION INTRA-ARTICULAR; INTRALESIONAL; INTRAMUSCULAR; INTRAVENOUS; SOFT TISSUE at 11:07

## 2019-09-12 RX ADMIN — PEGFILGRASTIM 6 MG: KIT SUBCUTANEOUS at 16:01

## 2019-09-12 RX ADMIN — PACLITAXEL 352 MG: 6 INJECTION, SOLUTION INTRAVENOUS at 12:25

## 2019-09-12 RX ADMIN — Medication 10 ML: at 16:07

## 2019-09-12 RX ADMIN — SODIUM CHLORIDE 25 ML/HR: 900 INJECTION, SOLUTION INTRAVENOUS at 11:07

## 2019-09-12 RX ADMIN — Medication 10 ML: at 09:35

## 2019-09-12 RX ADMIN — CARBOPLATIN 619 MG: 10 INJECTION, SOLUTION INTRAVENOUS at 15:31

## 2019-09-12 RX ADMIN — PALONOSETRON HYDROCHLORIDE 0.25 MG: 0.05 INJECTION INTRAVENOUS at 11:07

## 2019-09-12 NOTE — PATIENT INSTRUCTIONS
Reminders: Take Claritin for 3 days starting today (to avoid bone pain from the growth factor injection)  Take Gabapentin twice a day for the numbness/tingling.

## 2019-09-12 NOTE — PROGRESS NOTES
[facility] Progress Note    Date: 2019    Name: Kathleen Todd    MRN: [id number]         : 1960    Mr. Carleen Guerrero Arrived ambulatory and in no distress for C3D1 of Regimen. Assessment was completed, no acute issues at this time, no new complaints voiced. R chest wall port accessed without difficulty, labs drawn & sent for processing. Chemotherapy Flowsheet 2019   Cycle C3D1   Date 2019   Drug / Regimen carbo/taxol   Pre Meds -   Notes -       Patient proceed to appointment with Dr. Shawnee Fletcher. Mr. Ayesha Webb vitals were reviewed. Patient Vitals for the past 12 hrs:   Temp Pulse Resp BP SpO2   19 1606 98.3 °F (36.8 °C) 72 18 101/72 93 %   19 0944 97 °F (36.1 °C) (!) 58 18 122/84 97 %         Lab results were obtained and reviewed. Recent Results (from the past 12 hour(s))   CBC WITH AUTOMATED DIFF    Collection Time: 19  9:31 AM   Result Value Ref Range    WBC 4.9 4.1 - 11.1 K/uL    RBC 4.20 4. 10 - 5.70 M/uL    HGB 12.3 12.1 - 17.0 g/dL    HCT 37.1 36.6 - 50.3 %    MCV 88.3 80.0 - 99.0 FL    MCH 29.3 26.0 - 34.0 PG    MCHC 33.2 30.0 - 36.5 g/dL    RDW 16.1 (H) 11.5 - 14.5 %    PLATELET 005 895 - 192 K/uL    MPV 10.5 8.9 - 12.9 FL    NRBC 0.0 0  WBC    ABSOLUTE NRBC 0.00 0.00 - 0.01 K/uL    NEUTROPHILS 33 32 - 75 %    LYMPHOCYTES 53 (H) 12 - 49 %    MONOCYTES 14 (H) 5 - 13 %    EOSINOPHILS 0 0 - 7 %    BASOPHILS 0 0 - 1 %    IMMATURE GRANULOCYTES 0 0.0 - 0.5 %    ABS. NEUTROPHILS 1.6 (L) 1.8 - 8.0 K/UL    ABS. LYMPHOCYTES 2.6 0.8 - 3.5 K/UL    ABS. MONOCYTES 0.7 0.0 - 1.0 K/UL    ABS. EOSINOPHILS 0.0 0.0 - 0.4 K/UL    ABS. BASOPHILS 0.0 0.0 - 0.1 K/UL    ABS. IMM.  GRANS. 0.0 0.00 - 0.04 K/UL    DF AUTOMATED     METABOLIC PANEL, COMPREHENSIVE    Collection Time: 19  9:31 AM   Result Value Ref Range    Sodium 141 136 - 145 mmol/L    Potassium 4.3 3.5 - 5.1 mmol/L    Chloride 107 97 - 108 mmol/L    CO2 28 21 - 32 mmol/L    Anion gap 6 5 - 15 mmol/L    Glucose 101 (H) 65 - 100 mg/dL    BUN 11 6 - 20 MG/DL    Creatinine 0.86 0.70 - 1.30 MG/DL    BUN/Creatinine ratio 13 12 - 20      GFR est AA >60 >60 ml/min/1.73m2    GFR est non-AA >60 >60 ml/min/1.73m2    Calcium 9.4 8.5 - 10.1 MG/DL    Bilirubin, total 0.2 0.2 - 1.0 MG/DL    ALT (SGPT) 16 12 - 78 U/L    AST (SGOT) 9 (L) 15 - 37 U/L    Alk.  phosphatase 67 45 - 117 U/L    Protein, total 7.7 6.4 - 8.2 g/dL    Albumin 3.7 3.5 - 5.0 g/dL    Globulin 4.0 2.0 - 4.0 g/dL    A-G Ratio 0.9 (L) 1.1 - 2.2           Medications:  Medications Administered     0.9% sodium chloride infusion     Admin Date  09/12/2019 Action  New Bag Dose  25 mL/hr Rate  25 mL/hr Route  IntraVENous Administered By  Cherylene Bunting, RN          CARBOplatin (PARAPLATIN) 619 mg in 0.9% sodium chloride 250 mL, overfill volume 25 mL chemo infusion     Admin Date  09/12/2019 Action  New Bag Dose  619 mg Rate  673.8 mL/hr Route  IntraVENous Administered By  Cherylene Bunting, MIGUELITO          dexamethasone (DECADRON) 12 mg in 0.9% sodium chloride 50 mL IVPB     Admin Date  09/12/2019 Action  Given Dose  12 mg Route  IntraVENous Administered By  Cherylene Bunting, RN          diphenhydrAMINE (BENADRYL) injection 50 mg     Admin Date  09/12/2019 Action  Given Dose  50 mg Route  IntraVENous Administered By  Cherylene Bunting, MIGUELITO          famotidine (PF) (PEPCID) 20 mg in sodium chloride 0.9% 10 mL injection     Admin Date  09/12/2019 Action  Given Dose  20 mg Route  IntraVENous Administered By  Cherylene Bunting, RN          fosaprepitant (EMEND) 150 mg in 0.9% sodium chloride 150 mL IVPB     Admin Date  09/12/2019 Action  Given Dose  150 mg Rate  450 mL/hr Route  IntraVENous Administered By  Cherylene Bunting, MIGUELITO          heparin (porcine) pf 300-500 Units     Admin Date  09/12/2019 Action  Given Dose  500 Units Route  InterCATHeter Administered By  Cherylene Bunting, RN          PACLitaxel (TAXOL) 352 mg in 0.9% sodium chloride 500 mL, overfill volume 50 mL chemo infusion Admin Date  09/12/2019 Action  New Bag Dose  352 mg Rate  202.9 mL/hr Route  IntraVENous Administered By  Rina Mckeon RN          palonosetron HCl (ALOXI) injection 0.25 mg     Admin Date  09/12/2019 Action  Given Dose  0.25 mg Route  IntraVENous Administered By  Rina Mckeon RN          pegfilgrastim (NEULASTA) wearable SQ injector 6 mg     Admin Date  09/12/2019 Action  Given Dose  6 mg Route  SubCUTAneous Administered By  Rina Mckeon RN          saline peripheral flush soln 10 mL     Admin Date  09/12/2019 Action  Given Dose  10 mL Route  InterCATHeter Administered By  Rina Mckeon RN           Admin Date  09/12/2019 Action  Given Dose  10 mL Route  InterCATHeter Administered By  Rina Mckeon RN           Admin Date  09/12/2019 Action  Given Dose  10 mL Route  InterCATHeter Administered By  Rina Mckeon RN          sodium chloride 0.9% injection 10 mL     Admin Date  09/12/2019 Action  Given Dose  10 mL Route  IntraVENous Administered By  Rina Mckeon RN              Education provided to patient about Neulasta On Body Injector including: side effects, how/when to remove the device, as well as what to do in the event of device malfunction. Opportunity for questions was provided and all questions were answered. Patient verbalized understanding. Mr. Celso Rueda tolerated treatment well and was discharged from [place] [id number] in stable condition. Port de-accessed, flushed & heparinized per protocol. He is to return on October 7 for his next appointment.     Naun Luciano RN  September 12, 2019

## 2019-09-12 NOTE — PROGRESS NOTES
Renny Blair is a 60 y.o. male here today for follow up of Adenocarcinoma of unknown primary. He is asking for refill for hydrocodone/acet. Reports slight improvement from neuropathy by taking gabapentin. Reports pain to back, shoulder, legs & feet, 9/10 today. States rash to groin area, has been using topical cream to area.

## 2019-09-18 ENCOUNTER — TELEPHONE (OUTPATIENT)
Dept: ONCOLOGY | Age: 59
End: 2019-09-18

## 2019-09-18 NOTE — TELEPHONE ENCOUNTER
Patient left a voicemail and stated that he is having problems sleeping at night. No call back number left.

## 2019-09-18 NOTE — TELEPHONE ENCOUNTER
[address] at [facility]  [phone]      09/18/19 1:07 PM Called patient. States he is having both trouble falling and staying asleep. States that he has had this issue since starting his care at the office. Patient says he awoke every hour last night. Has tried taking melatonin but states this did not help. Advised I would defer above to Shavonne Hoffman NP and call patient back. Patient verbalized understanding. 2:33 PM Left message via home/cell number requesting that patient return call. Provided office phone number. 09/19/19 10:45 AM Spoke with patient. He is agreeable to try [place]. Advised, per Shavonne Hoffman, that patient should be cautious taking this medication in combination with the pain medication due to drowsiness. Patient verbalized understanding. Verified pharmacy information, called in prescription per Shavonne Hoffman NP, order. No further questions or concerns at this time.

## 2019-09-19 DIAGNOSIS — G47.01 INSOMNIA DUE TO MEDICAL CONDITION: Primary | ICD-10-CM

## 2019-09-19 RX ORDER — ZOLPIDEM TARTRATE 5 MG/1
5 TABLET ORAL
Qty: 30 TAB | Refills: 0 | Status: SHIPPED | OUTPATIENT
Start: 2019-09-19 | End: 2019-10-16 | Stop reason: DRUGHIGH

## 2019-09-30 ENCOUNTER — DOCUMENTATION ONLY (OUTPATIENT)
Dept: ONCOLOGY | Age: 59
End: 2019-09-30

## 2019-09-30 DIAGNOSIS — G57.93 NEUROPATHIC PAIN OF BOTH FEET: ICD-10-CM

## 2019-09-30 RX ORDER — GABAPENTIN 100 MG/1
100 CAPSULE ORAL 2 TIMES DAILY
Qty: 60 CAP | Refills: 0 | Status: SHIPPED | OUTPATIENT
Start: 2019-09-30 | End: 2019-10-07 | Stop reason: DRUGHIGH

## 2019-09-30 NOTE — TELEPHONE ENCOUNTER
09/30/19 12:02 PM Refilled gabapentin 100 mg BID #60 x 30 days. Asked MIGUELITO Melgar to call rx into pharmacy.

## 2019-09-30 NOTE — PROGRESS NOTES
V.O. for refill of Gabapentin called to [facility] pharmacy on file per written order by provider.

## 2019-10-02 RX ORDER — SODIUM CHLORIDE 9 MG/ML
10 INJECTION INTRAMUSCULAR; INTRAVENOUS; SUBCUTANEOUS AS NEEDED
Status: CANCELLED | OUTPATIENT
Start: 2019-10-07

## 2019-10-02 RX ORDER — ACETAMINOPHEN 325 MG/1
650 TABLET ORAL AS NEEDED
Status: CANCELLED
Start: 2019-10-07

## 2019-10-02 RX ORDER — PALONOSETRON 0.05 MG/ML
0.25 INJECTION, SOLUTION INTRAVENOUS ONCE
Status: CANCELLED
Start: 2019-10-07

## 2019-10-02 RX ORDER — ONDANSETRON 2 MG/ML
8 INJECTION INTRAMUSCULAR; INTRAVENOUS AS NEEDED
Status: CANCELLED | OUTPATIENT
Start: 2019-10-07

## 2019-10-02 RX ORDER — DIPHENHYDRAMINE HYDROCHLORIDE 50 MG/ML
50 INJECTION, SOLUTION INTRAMUSCULAR; INTRAVENOUS AS NEEDED
Status: CANCELLED
Start: 2019-10-07

## 2019-10-02 RX ORDER — SODIUM CHLORIDE 0.9 % (FLUSH) 0.9 %
10 SYRINGE (ML) INJECTION AS NEEDED
Status: CANCELLED
Start: 2019-10-07

## 2019-10-02 RX ORDER — HEPARIN 100 UNIT/ML
300-500 SYRINGE INTRAVENOUS AS NEEDED
Status: CANCELLED
Start: 2019-10-07

## 2019-10-02 RX ORDER — SODIUM CHLORIDE 9 MG/ML
25 INJECTION, SOLUTION INTRAVENOUS CONTINUOUS
Status: CANCELLED | OUTPATIENT
Start: 2019-10-07

## 2019-10-02 RX ORDER — HYDROCORTISONE SODIUM SUCCINATE 100 MG/2ML
100 INJECTION, POWDER, FOR SOLUTION INTRAMUSCULAR; INTRAVENOUS AS NEEDED
Status: CANCELLED | OUTPATIENT
Start: 2019-10-07

## 2019-10-02 RX ORDER — DIPHENHYDRAMINE HYDROCHLORIDE 50 MG/ML
50 INJECTION, SOLUTION INTRAMUSCULAR; INTRAVENOUS ONCE
Status: CANCELLED
Start: 2019-10-07

## 2019-10-02 RX ORDER — ALBUTEROL SULFATE 0.83 MG/ML
2.5 SOLUTION RESPIRATORY (INHALATION) AS NEEDED
Status: CANCELLED
Start: 2019-10-07

## 2019-10-02 RX ORDER — LORAZEPAM 2 MG/ML
0.5 INJECTION INTRAMUSCULAR
Status: CANCELLED
Start: 2019-10-07

## 2019-10-02 RX ORDER — EPINEPHRINE 1 MG/ML
0.3 INJECTION, SOLUTION, CONCENTRATE INTRAVENOUS AS NEEDED
Status: CANCELLED | OUTPATIENT
Start: 2019-10-07

## 2019-10-02 NOTE — PROGRESS NOTES
[id number] [facility] at [address] [place] [address]  [phone]    Hematology / Oncology Established Visit    Reason for Visit:   Avril Chaidez is a 60 y.o. male who comes in for f/u of adenocarcinoma of unknown primary. Initially referred by Dr. Evan Garzon. Hematology Oncology Treatment History:     Diagnosis: Adenocarcinoma of unknown primary    Stage: N/A    Pathology:   6/4/19 4R LN biopsy: rare malignant cells admixed with abundant blood clot  6/4/19 4L LN FNA and core biopsy: Adenocarcinoma. 6/4/19 2R LN FNA and core biopsy: Adenocarcinoma. Comment: IHC stains negative for GATA3, AR, ER, p40. Immunohistochemistry shows that the tumor cells express CK7 with focal expression of CDX2. Tumor cells lack expression of CK20, TTF-1 and Napsin A. These findings are not entirely diagnostic and could be seen in either a primary pulmonary malignancy or a primary upper gastrointestinal tract malignancy. Other sites are also not excluded. Clinical and radiographic correlation is recommended. 6/25/19 2R LN, mediastinum: Metastatic adenocarcinoma (see Comment). Comment - The tumor appears poorly differentiated with areas of necrosis. Immunohistochemical staining reveals positive staining for cytokeratin 7 (strong, diffuse), CDX-2 (focal, weak to moderate) and CEA (focal, moderate to strong). The tumor cells are negative for cytokeratin 20, cytokeratin 5/6, p40, p63, TTF-1, napsin-A, PLAP, HCG, AFP and c-KIT (). The findings are not entirely specific with regard to primary site but would be consistent with upper GI/pancreaticobiliary tract. A pulmonary primary is less likely, but still in the differential.   -PDL1 30%? , [facility] identified high tumor mutational burden - which is predictive of higher response to immunotherapy agents. Prior Treatment: None    Current Treatment: Carbo-Taxol every 3 weeks until disease progression or toxicity.      History of Present Illness:   Mr. Yani Gunn is a 60 y.o. male with COPD, remote h/o colon cancer comes in for evaluation of mediastinal lymphadenopathy. Pt had recently p/w shortness of breath, dyspnea on exertion, and was found on chest CT to have R mediastinal lymphadenopathy, which also were PET avid. Case was discussed at Thoracic Tumor Board with thought that this could be pancreatic, urothelial, or germ cell origin. More tissue is recommended. Pt had EGD 3 yrs ago and was told he had GERD. Patient has h/o colon cancer in 2002. He states a cancerous polyp was found and then he underwent colectomy in 2002. This was done by Dr. Felix Calderón at [facility]. Patient had colonoscopies regularly afterwards, last one was approx 2015. Last EGD was in 2018. As part of search for primary lesion, patient underwent EGD, colonoscopy by Dr. Yumiko Norwood, notable for diffusely enlarged gastric folds, but biopsies negative. Pt underwent cytoscopy on 7/17/19 with findings negative for malignancy. Interval History: Patient here for follow up and cycle 4 of treatment. He is ambulating with a cane and his gait is very slow. Reports his current pain regimen is not working. He is taking Norco twice daily. Reports it works but only for a short period. The pain is the worst behind his legs and upper back. He also asks if his gabapentin can be increased. Reports the neuropathy is worse in his left arm, left hand and bilateral feet. Reports increased difficulty buttoning shirts with his left hand. He states the Ambien only allows him to sleep for 3 hours at a time. He felt extremely fatigued and moderately nauseated on day 3 after treatment. He is eating and drinking moderately well.        Past Medical History:   Diagnosis Date    Abnormal nuclear stress test 12/7/2015    Cancer ([facility] [address]     colon    Cardiomyopathy ([facility] [address] 2010    EF 45%    Chronic obstructive pulmonary disease (HCC)     Chronic systolic heart failure (HCC)     GERD (gastroesophageal reflux disease)     HTN (hypertension)     PAC (premature atrial contraction)     Tobacco use disorder       Past Surgical History:   Procedure Laterality Date    COLONOSCOPY N/A 7/10/2019    COLONOSCOPY AND ESOPHAGOGASTRODUODENOSCOPY (EGD) performed by Sherron Nobles MD at [address] [facility] HX COLECTOMY      HX SPLENECTOMY      IR INSERT TUNL CVC W PORT OVER 5 YEARS  7/19/2019      Social History     Tobacco Use    Smoking status: Former Smoker     Packs/day: 0.50     Types: Cigarettes     Last attempt to quit: 4/22/2016     Years since quitting: 3.4    Smokeless tobacco: Never Used   Substance Use Topics    Alcohol use: Not Currently     Comment: rarely      Family History   Problem Relation Age of Onset    Stroke Mother     Coronary Artery Disease Sister     Heart Disease Paternal Grandfather      Current Outpatient Medications   Medication Sig    gabapentin (NEURONTIN) 100 mg capsule Take 1 Cap by mouth two (2) times a day. Max Daily Amount: 200 mg.    zolpidem (AMBIEN) 5 mg tablet Take 1 Tab by mouth nightly as needed for Sleep. Max Daily Amount: 5 mg.  HYDROcodone-acetaminophen (NORCO) 5-325 mg per tablet Take 1 Tab by mouth two (2) times a day for 21 days. Max Daily Amount: 2 Tabs.  senna-docusate (PERICOLACE) 8.6-50 mg per tablet Take 1 Tab by mouth nightly.  dexAMETHasone (DECADRON) 4 mg tablet Take 4mg (1 tab) with breakfast on days 2 and 3 after chemotherapy to prevent nausea.  ondansetron hcl (ZOFRAN) 4 mg tablet Take 2 Tabs by mouth every eight (8) hours as needed for Nausea.  prochlorperazine (COMPAZINE) 10 mg tablet Take 1 Tab by mouth every six (6) hours as needed for Nausea.  lidocaine-prilocaine (EMLA) topical cream Apply  to affected area as needed for Pain. Apply quarter size amount to port prior to chemotherapy    tamsulosin (FLOMAX) 0.4 mg capsule Take 0.4 mg by mouth daily.  OTHER Take  by inhalation.  Alero Inhaler    lisinopril (PRINIVIL, ZESTRIL) 5 mg tablet TAKE 1 TABLET BY MOUTH DAILY    metoprolol succinate (TOPROL-XL) 25 mg XL tablet Take 1 Tab by mouth nightly. No current facility-administered medications for this visit. No Known Allergies     Review of Systems: A complete review of systems was obtained, negative except as described above. Physical Exam:     Visit Vitals  /79   Pulse 64   Temp 98.2 °F (36.8 °C) (Oral)   Resp 16   Ht 5' 10\" (1.778 m)   Wt 165 lb (74.8 kg)   BMI 23.68 kg/m²     ECOG PS: 0  General: walking with a cane, slow gait, thin, no acute distress  Eyes: PERRLA, EOMI, anicteric sclerae  HENT: Atraumatic, OP clear, TMs intact without erythema  Neck: Supple  Lymphatic: No cervical, supraclavicular, axillary or inguinal adenopathy  Respiratory: CTAB, normal respiratory effort  CV: Normal rate, regular rhythm, no murmurs, no peripheral edema, port in place. GI: Soft, nontender, nondistended, no masses, no hepatomegaly, no splenomegaly  MS: Normal gait and station. Digits without clubbing or cyanosis. Skin: No rashes, ecchymoses, or petechiae. Normal temperature, turgor, and texture. Neuro/Psych: Alert, oriented. 5/5 strength in all 4 extremities. Appropriate affect, normal judgment/insight. Results:     Lab Results   Component Value Date/Time    WBC 5.5 10/07/2019 11:12 AM    HGB 10.8 (L) 10/07/2019 11:12 AM    HCT 31.5 (L) 10/07/2019 11:12 AM    PLATELET 519 61/71/6479 11:12 AM    MCV 88.5 10/07/2019 11:12 AM    ABS.  NEUTROPHILS 2.4 10/07/2019 11:12 AM     Lab Results   Component Value Date/Time    Sodium 142 10/07/2019 11:12 AM    Potassium 4.3 10/07/2019 11:12 AM    Chloride 109 (H) 10/07/2019 11:12 AM    CO2 26 10/07/2019 11:12 AM    Glucose 85 10/07/2019 11:12 AM    BUN 15 10/07/2019 11:12 AM    Creatinine 0.75 10/07/2019 11:12 AM    GFR est AA >60 10/07/2019 11:12 AM    GFR est non-AA >60 10/07/2019 11:12 AM    Calcium 9.2 10/07/2019 11:12 AM     Lab Results   Component Value Date/Time    Bilirubin, total 0.4 10/07/2019 11:12 AM    ALT (SGPT) 13 10/07/2019 11:12 AM    AST (SGOT) 13 (L) 10/07/2019 11:12 AM    Alk. phosphatase 69 10/07/2019 11:12 AM    Protein, total 7.2 10/07/2019 11:12 AM    Albumin 3.6 10/07/2019 11:12 AM    Globulin 3.6 10/07/2019 11:12 AM     No results found for: IRON, FE, TIBC, IBCT, PSAT, FERR    No results found for: B12LT, FOL, RBCF  No results found for: TSH, TSH2, TSH3, TSHP, TSHEXT, TSHEXT  No results found for: HAMAT, HAAB, HABT, HAAT, HBSAG, HBSB, HBSAT, HBABN, HBCM, HBCAB, HBCAT, XBCABS, [address] [facility], [address] [place] [address], [facility], [id number], [facility], [facility], HBCLT, HBEBLT, [id number], [id number], [address] [facility], [id number], HBCMLT, [id number], HCGAT      Imaging:     Chest CT 5/10/19:  FINDINGS:  THYROID: No nodule. MEDIASTINUM: There are enlarged lymph nodes in the right paratracheal region  with 3 enlarged lymph nodes in this area. The largest is inferiorly in the right  paratracheal region measuring 2.5 x 1.7 cm. There are also enlarged lymph nodes  in the medial and lateral AP window with the largest lymph node measuring 18 mm. These lymph nodes are worrisome for neoplastic process. There is a normal size  subcarinal lymph node. Juliaette Kelp REBECCA: No mass or lymphadenopathy. THORACIC AORTA: No aneurysm. MAIN PULMONARY ARTERY: Normal in caliber. TRACHEA/BRONCHI: Patent. ESOPHAGUS: No wall thickening or dilatation. HEART: Normal in size. PLEURA: No effusion or pneumothorax. LUNGS: There are moderate changes of centrilobular emphysema. There are bullous  lesions at each apex left greater than right. There is a calcified granuloma in the left upper lobe.   There is volume loss in the medial segment right middle lobe that extends to a  oval-shaped opacity measures 1.8 x 1.1 cm this may all be atelectasis. Pulmonary  nodule within the atelectasis is not excluded. No abnormality corresponds with the rounded opacity seen on the chest  radiograph. Possibly that was a nipple shadow. .  Mild scarring is seen medially in the lingula.   INCIDENTALLY IMAGED UPPER ABDOMEN: The spleen has been removed. No adrenal  masses. BONES: No destructive bone lesion. IMPRESSION:  1. There are are multiple enlarged lymph nodes in the right paratracheal region  as well as adenopathy and AP window region. These enlarged lymph nodes are  worrisome for neoplastic process. 2. There is volume loss in right middle lobe with a nodular area of opacity that  could be atelectasis but a superimposed nodule within the areas of atelectasis  is not excluded. Further evaluation is suggested. Tissue diagnosis is suggested. PET CT scan may yield more information. . 23X     5/28/19 PET:  FINDINGS:  HEAD/NECK: No apparent foci of abnormal hypermetabolism. Cerebral evaluation is  limited by normal intense activity. CHEST: A hypermetabolic right paratracheal lymph node SUV is 8.5. There are  hypermetabolic lymph nodes anterior to the main bronchi bilaterally. There is  centrilobular emphysema. There is volume loss in the right middle lobe but a  central obstructing mass is not identified on this PET scan. ABDOMEN/PELVIS: No foci of abnormal hypermetabolism. The prostate gland is  enlarged. SKELETON: No foci of abnormal hypermetabolism in the axial and visualized  appendicular skeleton. IMPRESSION: Hypermetabolic mediastinal lymph nodes concerning for neoplastic process. Chest/abd/pelvis CT 7/30/19:  FINDINGS:   THYROID: No nodule. MEDIASTINUM: Mediastinal lymphadenopathy has loosely decreased with upper  paratracheal node measuring 1.3 x 1.9 cm, previously 1.4 x 1.8 cm (2, 24), right  paratracheal node at the level of the aortic arch measuring 1.4 x 2.1 cm,  previously 1.7 x 2.6 cm of (2, 27) series, precarinal node on the right  measuring 1.7 x 2.2 cm, previously 1.7 x 2.5 cm (2, 31), and left pericarinal  noted measuring 1.2 x 1.8 cm, previously 1.8 x 2.1 cm of (2, 30).  However, there  is an enlarged left preaortic node just lateral to left mainstem bronchus  measuring 1.3 x 2.1 cm which previously measured 8 x 8 mm (2, 33). REBECCA: No mass or lymphadenopathy. THORACIC AORTA: No dissection or aneurysm. MAIN PULMONARY ARTERY: Normal in caliber. TRACHEA/BRONCHI: Patent. ESOPHAGUS: No wall thickening or dilatation. HEART: Normal in size. PLEURA: No effusion or pneumothorax. LUNGS: Centrilobular bullous emphysematous change. Right middle lobe ovoid  entity most likely reflective of atelectasis adjacent to the right heart margin  appears unchanged. Left upper lobe granuloma. No acute infiltrate, nodule, or  mass. LIVER: No mass or biliary dilatation. GALLBLADDER: Unremarkable. SPLEEN: Surgically absent. PANCREAS: No mass or ductal dilatation. ADRENALS: Right adrenal mass not seen previously measures 2.0 x 3.0 cm (2, 62). Left adrenal appears unremarkable. KIDNEYS: No mass, calculus, or hydronephrosis. STOMACH: Unremarkable. SMALL BOWEL: No dilatation or wall thickening. COLON: No dilatation or wall thickening. APPENDIX: Unremarkable. PERITONEUM: No ascites or pneumoperitoneum. RETROPERITONEUM: Right retroperitoneal adenopathy posterior to the inferior vena  cava at the level of the renal hilum measures 1.3 x 1.9 cm, not previously seen  (2, 69). REPRODUCTIVE ORGANS: The prostate and seminal vesicles appear unremarkable. URINARY BLADDER: No mass or calculus. BONES: No destructive bone lesion. ADDITIONAL COMMENTS: Right Port-A-Cath in place with catheter traversing to the  atriocaval junction region. IMPRESSION:  1. Mixed response to therapy with most mediastinal lymphadenopathy diminishing  in size, however, a left mediastinal lymph node has shown increased size and  there is a new right adrenal mass and new right retroperitoneal adenopathy  suspicious for aggressive metastatic disease. 2. Additional incidental findings as above including centrilobular bullous  emphysema with probable right middle lobe atelectasis.     Procedures:     EGD 7/10/19:   Esophagus:normal  Stomach: diffuse enlargement gastric folds with nodular erythema and erosion throughout stomach  Duodenum/jejunum: normal    Colonoscopy 7/10/19: Diverticulosis    CT c/a/p 9/3/19: IMPRESSION:  1. Improvement in adenopathy in the chest. No acute findings or parenchymal abnormalities in the chest.  2. Interval increase in right adrenal mass. 3. Minimal decrease in size of right retroperitoneal node. Assessment & Plan:   Yulia Alves is a 60 y.o. male with COPD, remote h/o colon cancer comes in for evaluation and management of adenocarcinoma of unknown primary. 1. Adenocarcinoma of unknown primary: Involving mediastinal LNs, with no other PET findings. Per Thoracic Tumor Board discussion, this could represent upper GI origin, urothelial origin, germ cell tumor, or lung origin. Search for primary lesion was overall negative: Cystoscopy, EGD, colonoscopy negative for malignancy although EGD abnormal with diffusely enlarged gastric folds. Despite h/o colon cancer in 2002, it would be very odd to see a recurrence in the mediastinal LNs. CEA 26.8. Other tumor markers negative (AFP, hCG, CA 19-9). Unfortunately, this disease is considered incurable, but is treatable. At this time, I recommend chemotherapy treatment using the Carboplatin-Paclitaxel regimen (which covers both lung and GI primaries). We discussed the risks and benefits of chemotherapy with Carboplatin and Paclitaxel. Potential side effects include but are not limited to: nausea, vomiting, diarrhea, constipation, taste changes, allergic reactions, myelosuppression, infection, fatigue, alopecia, neuropathy, mucositis, renal failure, infertility, and rarely, death. Additionally, chemotherapy leads to radiosensitization which can intensify the side effects of radiation therapy. The patient has consented to beginning chemotherapy and chemo ed packet given.  CT on 9/3/19 showed mixed response with improvement in adenopathy in the chest, but increase in right adrenal mass, minimal decrease in size of right retroperitoneal node. Supportive care medications include: Zofran, Compazine, Emla cream, dexamethasone 4 mg on days 2 and 3 after treatment. -- Proceed with cycle 4 of Carbo-Taxol today with plan to treat every 3 weeks with plan to repeat imaging after cycle 4.  -- Fantasma counts showed severe neutropenia after both cycle 1 and 2. Added Neulasta OBI to remaining cycles. -- Return in 3 weeks for cycle 5, MD visit, labs, CT results. -- Repeat imaging in 2 weeks (If adrenal mass still increasing, will need to biopsy this) - ordered. -- Send PCP note Dr. Mike Remy. 2. Neoplasm pain: Affecting the upper back and now shoulders. Perhaps related to the mediastinal LNs, but unclear. Pt is mainly taking pain medicines at night to ease pain enough to sleep. Discussed stool softeners prn, but pt currently having loose stools rather than constipation. -- Placed palliative medicine referral on 10/7/19.   -- Stop hydrocodone on 10/7/19.    -- Start Oxycodone 5-10 mg q6h PRN pain x 21 days, written 10/7 for #42 tabs. 3. H/o Stage I [T1NxMx] sigmoid colon cancer: Found in sigmoid adenoma during a colonoscopy; went on to undergo segmented resection of the sigmoid colon in 2002. Pathology per outside records:  2/6/2002 sigmoid colon polyp: Well differentiated adenocarcinoma arising in an adenoma, involving the mucosa and invading into the upper half of the submucosal stalk. No vascular space involvement is identified. 2/21/2002 Sigmoidectomy, liver biopsy:  -Sigmoid colon, segmented resection: No residual adenocarcinoma present. Polypectomy site with granulation tissue and vascular congestion. No LNs recovered. Distal and proximal margins benign.  -Liver biopsy: Negative for metastatic carcinoma. No significant inflammation or obvious cirrhosis identified. Very minimal steatosis (rare cells with fat globules).   -Addendum: Reblocks of adipose tissue; three small benign lymphoid aggregates (< 0.1cm). 4. COPD: Quit smoking in approx 2016. SOB improved after starting inhaler. 5. HTN: Well controlled on Lisinopril and Metoprolol. 6. BPH: On Flomax. 7. Chemotherapy induced peripheral neuropathy: Increased Numbness in feet, tingling and pain in left hand. Reports mild difficulty with buttoning buttons. Increased Gabapentin 100mg TID on 10/7/19.   -- Refilled gabapentin 100 mg TID #90 x 30 days on 10/7/19.     8. Anemia: likely 2/2 to chemotherapy. Hgb decreased from 12.3 to 10.8. Denies blood in stool. Check iron profile, ferritin today- call with results. 9. Insomnia: Started on Ambien 5mg nightly. Patient reports he only sleeps 3 hours a night since starting this medication. Will readdress after pain is better controlled. Goals of care: Palliative to improve quality and duration of life, but no cure. Emotional well being: Pt is coping well with his/her disease and has excellent support. I appreciate the opportunity to participate in Mr. Jj solo.     Signed By: Deshawn Davies MD     October 7, 2019

## 2019-10-07 ENCOUNTER — HOSPITAL ENCOUNTER (OUTPATIENT)
Dept: INFUSION THERAPY | Age: 59
Discharge: HOME | End: 2019-10-07
Payer: OTHER GOVERNMENT

## 2019-10-07 ENCOUNTER — OFFICE VISIT (OUTPATIENT)
Dept: ONCOLOGY | Age: 59
End: 2019-10-07

## 2019-10-07 VITALS
TEMPERATURE: 98.3 F | WEIGHT: 165.4 LBS | RESPIRATION RATE: 16 BRPM | BODY MASS INDEX: 23.68 KG/M2 | SYSTOLIC BLOOD PRESSURE: 121 MMHG | HEIGHT: 70 IN | HEART RATE: 77 BPM | OXYGEN SATURATION: 96 % | DIASTOLIC BLOOD PRESSURE: 79 MMHG

## 2019-10-07 VITALS
TEMPERATURE: 98.2 F | RESPIRATION RATE: 16 BRPM | HEIGHT: 70 IN | BODY MASS INDEX: 23.62 KG/M2 | HEART RATE: 64 BPM | SYSTOLIC BLOOD PRESSURE: 130 MMHG | WEIGHT: 165 LBS | DIASTOLIC BLOOD PRESSURE: 79 MMHG

## 2019-10-07 DIAGNOSIS — T45.1X5A CHEMOTHERAPY-INDUCED NEUTROPENIA (HCC): Primary | ICD-10-CM

## 2019-10-07 DIAGNOSIS — D64.81 ANTINEOPLASTIC CHEMOTHERAPY INDUCED ANEMIA: ICD-10-CM

## 2019-10-07 DIAGNOSIS — C80.1 ADENOCARCINOMA OF UNKNOWN ORIGIN (HCC): ICD-10-CM

## 2019-10-07 DIAGNOSIS — G62.0 NEUROPATHY DUE TO CHEMOTHERAPEUTIC DRUG (HCC): Primary | ICD-10-CM

## 2019-10-07 DIAGNOSIS — Z51.11 CHEMOTHERAPY MANAGEMENT, ENCOUNTER FOR: ICD-10-CM

## 2019-10-07 DIAGNOSIS — T45.1X5A ANTINEOPLASTIC CHEMOTHERAPY INDUCED ANEMIA: ICD-10-CM

## 2019-10-07 DIAGNOSIS — C80.1 ADENOCARCINOMA OF UNKNOWN PRIMARY (HCC): ICD-10-CM

## 2019-10-07 DIAGNOSIS — T45.1X5A CHEMOTHERAPY INDUCED NAUSEA AND VOMITING: ICD-10-CM

## 2019-10-07 DIAGNOSIS — D70.1 CHEMOTHERAPY-INDUCED NEUTROPENIA (HCC): Primary | ICD-10-CM

## 2019-10-07 DIAGNOSIS — R11.2 CHEMOTHERAPY INDUCED NAUSEA AND VOMITING: ICD-10-CM

## 2019-10-07 DIAGNOSIS — T45.1X5A NEUROPATHY DUE TO CHEMOTHERAPEUTIC DRUG (HCC): Primary | ICD-10-CM

## 2019-10-07 DIAGNOSIS — G89.3 ACUTE NEOPLASM-RELATED PAIN: ICD-10-CM

## 2019-10-07 LAB
ALBUMIN SERPL-MCNC: 3.6 G/DL (ref 3.5–5)
ALBUMIN/GLOB SERPL: 1 {RATIO} (ref 1.1–2.2)
ALP SERPL-CCNC: 69 U/L (ref 45–117)
ALT SERPL-CCNC: 13 U/L (ref 12–78)
ANION GAP SERPL CALC-SCNC: 7 MMOL/L (ref 5–15)
AST SERPL-CCNC: 13 U/L (ref 15–37)
BASOPHILS # BLD: 0.1 K/UL (ref 0–0.1)
BASOPHILS NFR BLD: 1 % (ref 0–1)
BILIRUB SERPL-MCNC: 0.4 MG/DL (ref 0.2–1)
BUN SERPL-MCNC: 15 MG/DL (ref 6–20)
BUN/CREAT SERPL: 20 (ref 12–20)
CALCIUM SERPL-MCNC: 9.2 MG/DL (ref 8.5–10.1)
CEA SERPL-MCNC: 14.1 NG/ML
CHLORIDE SERPL-SCNC: 109 MMOL/L (ref 97–108)
CO2 SERPL-SCNC: 26 MMOL/L (ref 21–32)
CREAT SERPL-MCNC: 0.75 MG/DL (ref 0.7–1.3)
DIFFERENTIAL METHOD BLD: ABNORMAL
EOSINOPHIL # BLD: 0 K/UL (ref 0–0.4)
EOSINOPHIL NFR BLD: 0 % (ref 0–7)
ERYTHROCYTE [DISTWIDTH] IN BLOOD BY AUTOMATED COUNT: 18.6 % (ref 11.5–14.5)
FERRITIN SERPL-MCNC: 880 NG/ML (ref 26–388)
GLOBULIN SER CALC-MCNC: 3.6 G/DL (ref 2–4)
GLUCOSE SERPL-MCNC: 85 MG/DL (ref 65–100)
HCT VFR BLD AUTO: 31.5 % (ref 36.6–50.3)
HGB BLD-MCNC: 10.8 G/DL (ref 12.1–17)
IMM GRANULOCYTES # BLD AUTO: 0 K/UL (ref 0–0.04)
IMM GRANULOCYTES NFR BLD AUTO: 0 % (ref 0–0.5)
IRON SATN MFR SERPL: 22 % (ref 20–50)
IRON SERPL-MCNC: 52 UG/DL (ref 35–150)
LYMPHOCYTES # BLD: 1.8 K/UL (ref 0.8–3.5)
LYMPHOCYTES NFR BLD: 33 % (ref 12–49)
MCH RBC QN AUTO: 30.3 PG (ref 26–34)
MCHC RBC AUTO-ENTMCNC: 34.3 G/DL (ref 30–36.5)
MCV RBC AUTO: 88.5 FL (ref 80–99)
MONOCYTES # BLD: 1.2 K/UL (ref 0–1)
MONOCYTES NFR BLD: 21 % (ref 5–13)
NEUTS SEG # BLD: 2.4 K/UL (ref 1.8–8)
NEUTS SEG NFR BLD: 45 % (ref 32–75)
NRBC # BLD: 0 K/UL (ref 0–0.01)
NRBC BLD-RTO: 0 PER 100 WBC
PLATELET # BLD AUTO: 209 K/UL (ref 150–400)
PMV BLD AUTO: 10.9 FL (ref 8.9–12.9)
POTASSIUM SERPL-SCNC: 4.3 MMOL/L (ref 3.5–5.1)
PROT SERPL-MCNC: 7.2 G/DL (ref 6.4–8.2)
RBC # BLD AUTO: 3.56 M/UL (ref 4.1–5.7)
RBC MORPH BLD: ABNORMAL
SODIUM SERPL-SCNC: 142 MMOL/L (ref 136–145)
TIBC SERPL-MCNC: 235 UG/DL (ref 250–450)
WBC # BLD AUTO: 5.5 K/UL (ref 4.1–11.1)

## 2019-10-07 PROCEDURE — 96375 TX/PRO/DX INJ NEW DRUG ADDON: CPT

## 2019-10-07 PROCEDURE — 80053 COMPREHEN METABOLIC PANEL: CPT

## 2019-10-07 PROCEDURE — 83540 ASSAY OF IRON: CPT

## 2019-10-07 PROCEDURE — 74011250636 HC RX REV CODE- 250/636: Performed by: NURSE PRACTITIONER

## 2019-10-07 PROCEDURE — 36415 COLL VENOUS BLD VENIPUNCTURE: CPT

## 2019-10-07 PROCEDURE — 85025 COMPLETE CBC W/AUTO DIFF WBC: CPT

## 2019-10-07 PROCEDURE — 74011000250 HC RX REV CODE- 250: Performed by: NURSE PRACTITIONER

## 2019-10-07 PROCEDURE — 74011000258 HC RX REV CODE- 258: Performed by: NURSE PRACTITIONER

## 2019-10-07 PROCEDURE — 82378 CARCINOEMBRYONIC ANTIGEN: CPT

## 2019-10-07 PROCEDURE — 36591 DRAW BLOOD OFF VENOUS DEVICE: CPT

## 2019-10-07 PROCEDURE — 82728 ASSAY OF FERRITIN: CPT

## 2019-10-07 PROCEDURE — 96377 APPLICATON ON-BODY INJECTOR: CPT

## 2019-10-07 PROCEDURE — 96413 CHEMO IV INFUSION 1 HR: CPT

## 2019-10-07 PROCEDURE — 96417 CHEMO IV INFUS EACH ADDL SEQ: CPT

## 2019-10-07 PROCEDURE — 77030012965 HC NDL HUBR BBMI -A

## 2019-10-07 PROCEDURE — 96367 TX/PROPH/DG ADDL SEQ IV INF: CPT

## 2019-10-07 RX ORDER — GABAPENTIN 100 MG/1
100 CAPSULE ORAL 3 TIMES DAILY
Qty: 90 CAP | Refills: 0 | Status: SHIPPED | OUTPATIENT
Start: 2019-10-07 | End: 2019-10-16 | Stop reason: DRUGHIGH

## 2019-10-07 RX ORDER — SODIUM CHLORIDE 9 MG/ML
25 INJECTION, SOLUTION INTRAVENOUS CONTINUOUS
Status: DISCONTINUED | OUTPATIENT
Start: 2019-10-07 | End: 2019-10-08 | Stop reason: HOSPADM

## 2019-10-07 RX ORDER — PALONOSETRON 0.05 MG/ML
0.25 INJECTION, SOLUTION INTRAVENOUS ONCE
Status: COMPLETED | OUTPATIENT
Start: 2019-10-07 | End: 2019-10-07

## 2019-10-07 RX ORDER — OXYCODONE HYDROCHLORIDE 5 MG/1
5 TABLET ORAL
Qty: 42 TAB | Refills: 0 | Status: SHIPPED | OUTPATIENT
Start: 2019-10-07 | End: 2019-10-16 | Stop reason: DRUGHIGH

## 2019-10-07 RX ORDER — DIPHENHYDRAMINE HYDROCHLORIDE 50 MG/ML
50 INJECTION, SOLUTION INTRAMUSCULAR; INTRAVENOUS ONCE
Status: COMPLETED | OUTPATIENT
Start: 2019-10-07 | End: 2019-10-07

## 2019-10-07 RX ORDER — SODIUM CHLORIDE 9 MG/ML
10 INJECTION INTRAMUSCULAR; INTRAVENOUS; SUBCUTANEOUS AS NEEDED
Status: DISCONTINUED | OUTPATIENT
Start: 2019-10-07 | End: 2019-10-08 | Stop reason: HOSPADM

## 2019-10-07 RX ORDER — HEPARIN 100 UNIT/ML
300-500 SYRINGE INTRAVENOUS AS NEEDED
Status: DISCONTINUED | OUTPATIENT
Start: 2019-10-07 | End: 2019-10-08 | Stop reason: HOSPADM

## 2019-10-07 RX ORDER — SODIUM CHLORIDE 0.9 % (FLUSH) 0.9 %
10 SYRINGE (ML) INJECTION AS NEEDED
Status: DISCONTINUED | OUTPATIENT
Start: 2019-10-07 | End: 2019-10-08 | Stop reason: HOSPADM

## 2019-10-07 RX ADMIN — DEXAMETHASONE SODIUM PHOSPHATE 12 MG: 4 INJECTION, SOLUTION INTRA-ARTICULAR; INTRALESIONAL; INTRAMUSCULAR; INTRAVENOUS; SOFT TISSUE at 13:25

## 2019-10-07 RX ADMIN — DIPHENHYDRAMINE HYDROCHLORIDE 50 MG: 50 INJECTION INTRAMUSCULAR; INTRAVENOUS at 13:08

## 2019-10-07 RX ADMIN — Medication 10 ML: at 18:07

## 2019-10-07 RX ADMIN — PACLITAXEL 352 MG: 6 INJECTION, SOLUTION INTRAVENOUS at 14:21

## 2019-10-07 RX ADMIN — Medication 500 UNITS: at 18:07

## 2019-10-07 RX ADMIN — SODIUM CHLORIDE 150 MG: 900 INJECTION, SOLUTION INTRAVENOUS at 13:25

## 2019-10-07 RX ADMIN — SODIUM CHLORIDE 25 ML/HR: 900 INJECTION, SOLUTION INTRAVENOUS at 13:00

## 2019-10-07 RX ADMIN — FAMOTIDINE 20 MG: 10 INJECTION INTRAVENOUS at 13:11

## 2019-10-07 RX ADMIN — PEGFILGRASTIM 6 MG: KIT SUBCUTANEOUS at 18:12

## 2019-10-07 RX ADMIN — CARBOPLATIN 681 MG: 10 INJECTION, SOLUTION INTRAVENOUS at 17:33

## 2019-10-07 RX ADMIN — PALONSETRON HYDROCHLORIDE 0.25 MG: 0.25 INJECTION, SOLUTION INTRAVENOUS at 13:06

## 2019-10-07 NOTE — PROGRESS NOTES
Jovanni Joyce is a 60 y.o. male here today for follow up of adenocarcinoma of unknown primary. He is asking for something else for pain. States hydrocodone/acet. only works for about 1-2 hours, is taking it twice daily. States gabapentin is not fully effective for relief for leg & foot numbness/discomfort. Is asking if dose can be increased. States Verdene [place] is not effective for sleep, only sleeping 3 or 4 hours each night.

## 2019-10-07 NOTE — PROGRESS NOTES
[facility] - Chemotherapy Progress Note 1100 Pt admit to [place] for C4 D1 Taxol/carbo ambulatory with cane  in stable condition. Assessment completed. No new concerns voiced. PAC with positive blood return. Chemotherapy Flowsheet 10/7/2019 Cycle C4 D1 Date 10/7/2019 Drug / Regimen taxol/carbo Pre Meds -  
Notes - Patient Vitals for the past 12 hrs: 
 Temp Pulse Resp BP  
10/07/19 1115 98.3 °F (36.8 °C) 64 18 130/79 Medications: 
Emend IV Aloxi IVP Decadron IVP Benadryl IVP Pepcid IVP Taxol Carboplatin Neulasta OBI Education provided to patient about Neulasta On Body Injector including: side effects, how/when to remove the device, as well as what to do in the event of device malfunction. Opportunity for questions was provided and all questions were answered. Patient verbalized understanding. Pt aware of next appointment scheduled for 10/24 at 09. Recent Results (from the past 12 hour(s)) CBC WITH AUTOMATED DIFF Collection Time: 10/07/19 11:12 AM  
Result Value Ref Range WBC 5.5 4.1 - 11.1 K/uL  
 RBC 3.56 (L) 4.10 - 5.70 M/uL  
 HGB 10.8 (L) 12.1 - 17.0 g/dL HCT 31.5 (L) 36.6 - 50.3 % MCV 88.5 80.0 - 99.0 FL  
 MCH 30.3 26.0 - 34.0 PG  
 MCHC 34.3 30.0 - 36.5 g/dL  
 RDW 18.6 (H) 11.5 - 14.5 % PLATELET 056 620 - 710 K/uL MPV 10.9 8.9 - 12.9 FL  
 NRBC 0.0 0  WBC ABSOLUTE NRBC 0.00 0.00 - 0.01 K/uL NEUTROPHILS 45 32 - 75 % LYMPHOCYTES 33 12 - 49 % MONOCYTES 21 (H) 5 - 13 % EOSINOPHILS 0 0 - 7 % BASOPHILS 1 0 - 1 % IMMATURE GRANULOCYTES 0 0.0 - 0.5 % ABS. NEUTROPHILS 2.4 1.8 - 8.0 K/UL  
 ABS. LYMPHOCYTES 1.8 0.8 - 3.5 K/UL  
 ABS. MONOCYTES 1.2 (H) 0.0 - 1.0 K/UL  
 ABS. EOSINOPHILS 0.0 0.0 - 0.4 K/UL  
 ABS. BASOPHILS 0.1 0.0 - 0.1 K/UL  
 ABS. IMM. GRANS. 0.0 0.00 - 0.04 K/UL  
 DF SMEAR SCANNED    
 RBC COMMENTS ANISOCYTOSIS 1+ 
    
 RBC COMMENTS ACANTHOCYTES PRESENT 
TARGET CELLS 
PRESENT 
    
 RBC COMMENTS POIKILOCYTOSIS 1+ 
    
 RBC COMMENTS SCHISTOCYTES 
FEW METABOLIC PANEL, COMPREHENSIVE Collection Time: 10/07/19 11:12 AM  
Result Value Ref Range Sodium 142 136 - 145 mmol/L Potassium 4.3 3.5 - 5.1 mmol/L Chloride 109 (H) 97 - 108 mmol/L  
 CO2 26 21 - 32 mmol/L Anion gap 7 5 - 15 mmol/L Glucose 85 65 - 100 mg/dL BUN 15 6 - 20 MG/DL Creatinine 0.75 0.70 - 1.30 MG/DL  
 BUN/Creatinine ratio 20 12 - 20 GFR est AA >60 >60 ml/min/1.73m2 GFR est non-AA >60 >60 ml/min/1.73m2 Calcium 9.2 8.5 - 10.1 MG/DL Bilirubin, total 0.4 0.2 - 1.0 MG/DL  
 ALT (SGPT) 13 12 - 78 U/L  
 AST (SGOT) 13 (L) 15 - 37 U/L Alk. phosphatase 69 45 - 117 U/L Protein, total 7.2 6.4 - 8.2 g/dL Albumin 3.6 3.5 - 5.0 g/dL Globulin 3.6 2.0 - 4.0 g/dL A-G Ratio 1.0 (L) 1.1 - 2.2 IRON PROFILE Collection Time: 10/07/19 12:53 PM  
Result Value Ref Range Iron 52 35 - 150 ug/dL TIBC 235 (L) 250 - 450 ug/dL Iron % saturation 22 20 - 50 % FERRITIN Collection Time: 10/07/19 12:53 PM  
Result Value Ref Range Ferritin 880 (H) 26 - 388 NG/ML  
CEA Collection Time: 10/07/19 12:53 PM  
Result Value Ref Range CEA 14.1 ng/mL

## 2019-10-07 NOTE — PATIENT INSTRUCTIONS
1. STOP taking hydrocodone-tylenol (norco) and START taking OXYCODONE 5 mg every 6 hours as needed for pain. You can increase OXYCODONE to 10mg (2 tabs) every 6 hours if the 5 mg is not working. Oxycodone is stronger than the hydrocodone so please be careful when taking this medication. It can cause drowsiness. 2. Please increase your gabapentin dose from 100 mg twice daily, to 100mg three times a day. You can take it in the morning, afternoon and evening. 3. You are due for a CT, please get this a week before your next treatment. The schedulers should be calling to set up an appointment. 4. I put in a referral to palliative medicine, Dr. Raul Castanon is excellent and will help manage all of your cancer symptoms. 5. Please take dexamethasone the first two days after treatment, take 4mg in the morning with breakfast. This will help prevent nausea. You can take this medication as well as the zofran and compazine you have been taking for nausea.

## 2019-10-09 DIAGNOSIS — T45.1X5A CHEMOTHERAPY INDUCED NAUSEA AND VOMITING: ICD-10-CM

## 2019-10-09 DIAGNOSIS — R11.2 CHEMOTHERAPY INDUCED NAUSEA AND VOMITING: ICD-10-CM

## 2019-10-09 RX ORDER — DEXAMETHASONE 4 MG/1
TABLET ORAL
Qty: 20 TAB | Refills: 0 | Status: SHIPPED | OUTPATIENT
Start: 2019-10-09 | End: 2020-01-01 | Stop reason: ALTCHOICE

## 2019-10-14 ENCOUNTER — TELEPHONE (OUTPATIENT)
Dept: PALLATIVE CARE | Age: 59
End: 2019-10-14

## 2019-10-14 NOTE — TELEPHONE ENCOUNTER
[employer] Palliative Medicine Office  Nursing Note  [phone]  Fax [phone]      Name:  Galileo Joyce  YOB: 1960    Received outpatient Palliative Medicine referral from Dr. Nacho Burns to see pt for symptom management and supportive care. Chart  reviewed. Galileo Joyce is a 60 y.o. 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 Pt's most recent office visit with Dr. Lieutenant Paz was on 10/7/19. See her note for complete HPI, treatment history, and plan. ACP:   Not on file                                                                                                                                                        Nurse called pt to introduce Palliative Medicine services and to schedule appt. No answer, left message requesting call back. Pt returned call. He states that his [employer] requires that he have a referral prior to scheduling an appt. Nurse advised pt that PSR in our office will call his insurance company regarding the referral and we will call him back. Nurse gave information to [facility].     CHERYL Elder, RN-BC  Clinical Referral Navigator  [phone]

## 2019-10-15 ENCOUNTER — TELEPHONE (OUTPATIENT)
Dept: ONCOLOGY | Age: 59
End: 2019-10-15

## 2019-10-15 NOTE — TELEPHONE ENCOUNTER
Patient has concern about his Prescriptions. Gabapentin he has plenty. Oxycodone is running out. He is feeling like he is managing the pills instead of the pain. Currently he takes Gabapentin and Oxycodone together to make him feel better. He also wants more information about Neuropathy? To help manage these feeling he is having. He is looking for more mobility.     Nausea has gotten worst      Please call patient

## 2019-10-15 NOTE — TELEPHONE ENCOUNTER
[address] [facility] [address]  [phone]      10/15/19 1:34 PM Called patient. He states that he feels that his Oxycodone should be written for increased frequency to better manage his leg pain. Verified with patient that he is usually taking two tablets of Oxycodone daily, at most will take three tablets. Patient verbalized that he is trying to \"stretch\" medication. Prescription last written 10/07 for #42 and has 26 tablets left. Patient has also attempted Aspercream, Motrin, and heating pad to help alleviate symptoms. Patient also states his neuropathy in his feet is the same, feels like he is \"walking on rocks. \" Has been taking gabapentin 100mg three times daily. Patient inquiring if there is anything else he can do to help manage his neuropathy. Patient also with complaints of persistent nausea which effects his appetite. The smell of certain foods will make patient nauseous. Patient says he just ran out of Zofran (last filled 07/25). Reviewed that there are refills on current prescription and advised that patient contact pharmacy for refill. Patient verbalized understanding. Also advised that this nurse would forward above to Dr. Marina Gregg and Emmanuel Hickman for their recommendations and call patient back. 10/15/19 3:44 PM Called patient back. Per Emmanuel Hickman, advised that patient can increase his Gabapentin to 300 mg twice daily and that he can use his remaining pills prior to obtaining new prescription. Reviewed that patient to take 3 tablets in the morning and 3 tablets in the evening. Once patient needs refill, he will need to  a new prescription. Also advised that patient can increase his Oxycodone to 10mg (2 tablets) every six hours as needed for pain, but that he would also need to  a new prescription for this as well when needed. Patient agreeable to above. While on phone, he also mentioned that he is out of Ambien.  Currently taking 5 mg nightly (between 1 and 3 AM), but is usually awake again 3 hours later. Per Kristopher Matos, advised that patient can increase to 10 mg nightly and that a new prescription will be placed at  for pick-up. Patient verbalized understanding. No further questions or concerns at this time.

## 2019-10-16 DIAGNOSIS — G47.09 OTHER INSOMNIA: Primary | ICD-10-CM

## 2019-10-16 DIAGNOSIS — G62.0 NEUROPATHY DUE TO CHEMOTHERAPEUTIC DRUG (HCC): ICD-10-CM

## 2019-10-16 DIAGNOSIS — T45.1X5A NEUROPATHY DUE TO CHEMOTHERAPEUTIC DRUG (HCC): ICD-10-CM

## 2019-10-16 DIAGNOSIS — F11.90 OPIOID USE: ICD-10-CM

## 2019-10-16 DIAGNOSIS — G89.3 ACUTE NEOPLASM-RELATED PAIN: ICD-10-CM

## 2019-10-16 DIAGNOSIS — C80.1 ADENOCARCINOMA OF UNKNOWN PRIMARY (HCC): ICD-10-CM

## 2019-10-16 RX ORDER — ZOLPIDEM TARTRATE 5 MG/1
5 TABLET ORAL
Qty: 30 TAB | Refills: 0 | Status: SHIPPED | OUTPATIENT
Start: 2019-10-16 | End: 2019-01-01 | Stop reason: SDUPTHER

## 2019-10-16 RX ORDER — OXYCODONE HYDROCHLORIDE 10 MG/1
10 TABLET ORAL
Qty: 42 TAB | Refills: 0 | Status: SHIPPED | OUTPATIENT
Start: 2019-10-19 | End: 2019-11-02

## 2019-10-16 RX ORDER — NALOXONE HYDROCHLORIDE 4 MG/.1ML
SPRAY NASAL
Qty: 1 EACH | Refills: 0 | Status: SHIPPED | OUTPATIENT
Start: 2019-10-16 | End: 2020-01-01

## 2019-10-16 RX ORDER — GABAPENTIN 300 MG/1
300 CAPSULE ORAL 2 TIMES DAILY
Qty: 60 CAP | Refills: 0 | Status: SHIPPED | OUTPATIENT
Start: 2019-10-25 | End: 2019-11-11 | Stop reason: SDUPTHER

## 2019-10-16 NOTE — TELEPHONE ENCOUNTER
10/16/19 10:49 AM Refilled Ambien, will not go up on dose due to increased dose of oxycodone and gabapentin. Refilled gabapentin and increased dose to 300 mg BID #60 tab fill date 10/25/19. Refilled oxycodone and increased dose to 10 mg every 6 hours #42 fill date 10/19/19. Given PRN narcan and will instruct patient/patient family member how to use in the event of an accidental overdose/over sedation.

## 2019-10-22 ENCOUNTER — HOSPITAL ENCOUNTER (OUTPATIENT)
Dept: CT IMAGING | Age: 59
Discharge: HOME | End: 2019-10-22
Attending: NURSE PRACTITIONER
Payer: OTHER GOVERNMENT

## 2019-10-22 DIAGNOSIS — C80.1 ADENOCARCINOMA OF UNKNOWN PRIMARY (HCC): ICD-10-CM

## 2019-10-22 PROCEDURE — 74011636320 HC RX REV CODE- 636/320

## 2019-10-22 PROCEDURE — 74011000255 HC RX REV CODE- 255: Performed by: NURSE PRACTITIONER

## 2019-10-22 PROCEDURE — 74177 CT ABD & PELVIS W/CONTRAST: CPT

## 2019-10-22 PROCEDURE — 74011636320 HC RX REV CODE- 636/320: Performed by: NURSE PRACTITIONER

## 2019-10-22 RX ORDER — BARIUM SULFATE 20 MG/ML
900 SUSPENSION ORAL
Status: COMPLETED | OUTPATIENT
Start: 2019-10-22 | End: 2019-10-22

## 2019-10-22 RX ORDER — SODIUM CHLORIDE 0.9 % (FLUSH) 0.9 %
5-10 SYRINGE (ML) INJECTION
Status: COMPLETED | OUTPATIENT
Start: 2019-10-22 | End: 2019-10-22

## 2019-10-22 RX ADMIN — Medication 10 ML: at 11:35

## 2019-10-22 RX ADMIN — IOPAMIDOL 100 ML: 755 INJECTION, SOLUTION INTRAVENOUS at 11:31

## 2019-10-22 RX ADMIN — BARIUM SULFATE 900 ML: 20 SUSPENSION ORAL at 11:26

## 2019-10-23 ENCOUNTER — TELEPHONE (OUTPATIENT)
Dept: CARDIOLOGY CLINIC | Age: 59
End: 2019-10-23

## 2019-10-23 ENCOUNTER — TELEPHONE (OUTPATIENT)
Dept: PALLATIVE CARE | Age: 59
End: 2019-10-23

## 2019-10-23 DIAGNOSIS — C80.1 ADENOCARCINOMA OF UNKNOWN PRIMARY (HCC): ICD-10-CM

## 2019-10-23 RX ORDER — LISINOPRIL 5 MG/1
TABLET ORAL
Qty: 30 TAB | Refills: 0 | Status: SHIPPED | OUTPATIENT
Start: 2019-10-23

## 2019-10-23 RX ORDER — ONDANSETRON 4 MG/1
8 TABLET, FILM COATED ORAL
Qty: 45 TAB | Refills: 3 | Status: SHIPPED | OUTPATIENT
Start: 2019-10-23

## 2019-10-23 RX ORDER — PROCHLORPERAZINE MALEATE 10 MG
10 TABLET ORAL
Qty: 45 TAB | Refills: 3 | Status: SHIPPED | OUTPATIENT
Start: 2019-10-23

## 2019-10-23 NOTE — TELEPHONE ENCOUNTER
Patient is calling for a refill on his nausea medication. He would like it sent to [facility] on file. Refill of Ondansetron hcl and prochlorperazine. 2nd thing:  Patient is not happy about the change in his Chemo appt from tomorrow to Monday the 28th @ 10:30. He wants a call back in regard to why we are changing his treatment plan.     # [phone]

## 2019-10-23 NOTE — PROGRESS NOTES
[address] [facility]  [phone]    Hematology / Oncology Established Visit    Reason for Visit:   Diego Levy is a 60 y.o. male who comes in for f/u of adenocarcinoma of unknown primary. Initially referred by Dr. Reynaldo Bridges. Hematology Oncology Treatment History:     Diagnosis: Adenocarcinoma of unknown primary    Stage: N/A    Pathology:   6/4/19 4R LN biopsy: rare malignant cells admixed with abundant blood clot  6/4/19 4L LN FNA and core biopsy: Adenocarcinoma. 6/4/19 2R LN FNA and core biopsy: Adenocarcinoma. Comment: IHC stains negative for GATA3, AR, ER, p40. Immunohistochemistry shows that the tumor cells express CK7 with focal expression of CDX2. Tumor cells lack expression of CK20, TTF-1 and Napsin A. These findings are not entirely diagnostic and could be seen in either a primary pulmonary malignancy or a primary upper gastrointestinal tract malignancy. Other sites are also not excluded. Clinical and radiographic correlation is recommended. 6/25/19 2R LN, mediastinum: Metastatic adenocarcinoma (see Comment). Comment - The tumor appears poorly differentiated with areas of necrosis. Immunohistochemical staining reveals positive staining for cytokeratin 7 (strong, diffuse), CDX-2 (focal, weak to moderate) and CEA (focal, moderate to strong). The tumor cells are negative for cytokeratin 20, cytokeratin 5/6, p40, p63, TTF-1, napsin-A, PLAP, HCG, AFP and c-KIT (). The findings are not entirely specific with regard to primary site but would be consistent with upper GI/pancreaticobiliary tract. A pulmonary primary is less likely, but still in the differential.   -PDL1 30%? , [facility] identified high tumor mutational burden - which is predictive of higher response to immunotherapy agents. Prior Treatment: None    Current Treatment: Carbo-Taxol every 3 weeks until disease progression or toxicity.      History of Present Illness:   Mr. Teodoro Espinosa is a 60 y.o. male with COPD, remote h/o colon cancer comes in for evaluation of mediastinal lymphadenopathy. Pt had recently p/w shortness of breath, dyspnea on exertion, and was found on chest CT to have R mediastinal lymphadenopathy, which also were PET avid. Case was discussed at Thoracic Tumor Board with thought that this could be pancreatic, urothelial, or germ cell origin. More tissue is recommended. Pt had EGD 3 yrs ago and was told he had GERD. Patient has h/o colon cancer in 2002. He states a cancerous polyp was found and then he underwent colectomy in 2002. This was done by Dr. Gus Loomis at [facility]. Patient had colonoscopies regularly afterwards, last one was approx 2015. Last EGD was in 2018. As part of search for primary lesion, patient underwent EGD, colonoscopy by Dr. Jose D Thrasher, notable for diffusely enlarged gastric folds, but biopsies negative. Pt underwent cytoscopy on 7/17/19 with findings negative for malignancy. Interval History: Patient here for follow up and cycle 5 of treatment. He is ambulating with a cane and his gait has improved. On 10/16/19 refilled Ambien, will not go up on dose due to increased dose of oxycodone and gabapentin. Refilled gabapentin and increased dose to 300 mg BID. Refilled oxycodone and increased dose to 10 mg every 6 hours. Since starting gabapentin he still has the numbness but the burning has subsided as of 10/28/19. Since increasing the oxycodone his pain is controlled for about 4 hours. The pain is present in his upper back. He felt extremely fatigued and moderately nauseated on day 3 after treatment. He lost another 4 lbs due to nausea, a total of 10 lbs since starting treatment. Reports he is eating and drinking less on the days he has nausea. Denies constipation or diarrhea. Denies chest pain or sob. Denies fevers or chills.        Past Medical History:   Diagnosis Date    Abnormal nuclear stress test 12/7/2015    Cancer [facility])     colon    Cardiomyopathy ([facility] [address] 2010    EF 45%    Chronic obstructive pulmonary disease (HCC)     Chronic systolic heart failure (HCC)     GERD (gastroesophageal reflux disease)     HTN (hypertension)     PAC (premature atrial contraction)     Tobacco use disorder       Past Surgical History:   Procedure Laterality Date    COLONOSCOPY N/A 7/10/2019    COLONOSCOPY AND ESOPHAGOGASTRODUODENOSCOPY (EGD) performed by Tabatha Mckoy MD at [address] [facility] HX COLECTOMY      HX SPLENECTOMY      IR INSERT TUNL CVC W PORT OVER 5 YEARS  7/19/2019      Social History     Tobacco Use    Smoking status: Former Smoker     Packs/day: 0.50     Types: Cigarettes     Last attempt to quit: 4/22/2016     Years since quitting: 3.5    Smokeless tobacco: Never Used   Substance Use Topics    Alcohol use: Not Currently     Comment: rarely      Family History   Problem Relation Age of Onset    Stroke Mother     Coronary Artery Disease Sister     Heart Disease Paternal Grandfather      Current Outpatient Medications   Medication Sig    ondansetron hcl (ZOFRAN) 4 mg tablet Take 2 Tabs by mouth every eight (8) hours as needed for Nausea.  prochlorperazine (COMPAZINE) 10 mg tablet Take 1 Tab by mouth every six (6) hours as needed for Nausea.  lisinopril (PRINIVIL, ZESTRIL) 5 mg tablet TAKE 1 TABLET BY MOUTH DAILY    naloxone (NARCAN) 4 mg/actuation nasal spray Use 1 spray intranasally, then discard. Repeat with new spray every 2 min as needed for opioid overdose symptoms, alternating nostrils.  [START ON 10/25/2019] gabapentin (NEURONTIN) 300 mg capsule Take 1 Cap by mouth two (2) times a day. Max Daily Amount: 600 mg.    oxyCODONE IR (ROXICODONE) 10 mg tab immediate release tablet Take 1 Tab by mouth every six (6) hours as needed for Pain for up to 14 days. Max Daily Amount: 40 mg.    zolpidem (AMBIEN) 5 mg tablet Take 1 Tab by mouth nightly as needed for Sleep. Max Daily Amount: 5 mg.     dexAMETHasone (DECADRON) 4 mg tablet Take 4mg (1 tab) with breakfast on days 2 and 3 after chemotherapy to prevent nausea.  senna-docusate (PERICOLACE) 8.6-50 mg per tablet Take 1 Tab by mouth nightly.  lidocaine-prilocaine (EMLA) topical cream Apply  to affected area as needed for Pain. Apply quarter size amount to port prior to chemotherapy    tamsulosin (FLOMAX) 0.4 mg capsule Take 0.4 mg by mouth daily.  OTHER Take  by inhalation. Alero Inhaler    metoprolol succinate (TOPROL-XL) 25 mg XL tablet Take 1 Tab by mouth nightly. No current facility-administered medications for this visit. No Known Allergies     Review of Systems: A complete review of systems was obtained, negative except as described above. Physical Exam:     Visit Vitals  /71   Pulse 72   Temp 98.1 °F (36.7 °C)   Ht 5' 10\" (1.778 m)   Wt 161 lb (73 kg)   SpO2 99%   BMI 23.10 kg/m²     ECOG PS: 0  General: walking with a cane, thin, no acute distress  Eyes: PERRLA, EOMI, anicteric sclerae  HENT: Atraumatic, OP clear, TMs intact without erythema  Neck: Supple  Lymphatic: No cervical, supraclavicular, axillary or inguinal adenopathy  Respiratory: CTAB, normal respiratory effort  CV: Normal rate, regular rhythm, no murmurs, no peripheral edema, port in place. GI: Soft, nontender, nondistended, no masses, no hepatomegaly, no splenomegaly  MS: Normal gait and station. Digits without clubbing or cyanosis. Skin: No rashes, ecchymoses, or petechiae. Normal temperature, turgor, and texture. Neuro/Psych: Alert, oriented. 5/5 strength in all 4 extremities. Appropriate affect, normal judgment/insight. Results:     Lab Results   Component Value Date/Time    WBC 3.5 (L) 10/28/2019 10:41 AM    HGB 10.5 (L) 10/28/2019 10:41 AM    HCT 32.2 (L) 10/28/2019 10:41 AM    PLATELET 90 (L) 08/23/1233 10:41 AM    MCV 91.2 10/28/2019 10:41 AM    ABS.  NEUTROPHILS 1.6 (L) 10/28/2019 10:41 AM     Lab Results   Component Value Date/Time    Sodium 142 10/28/2019 10:41 AM Potassium 4.0 10/28/2019 10:41 AM    Chloride 110 (H) 10/28/2019 10:41 AM    CO2 26 10/28/2019 10:41 AM    Glucose 128 (H) 10/28/2019 10:41 AM    BUN 11 10/28/2019 10:41 AM    Creatinine 0.88 10/28/2019 10:41 AM    GFR est AA >60 10/28/2019 10:41 AM    GFR est non-AA >60 10/28/2019 10:41 AM    Calcium 9.0 10/28/2019 10:41 AM     Lab Results   Component Value Date/Time    Bilirubin, total 0.3 10/28/2019 10:41 AM    ALT (SGPT) 13 10/28/2019 10:41 AM    AST (SGOT) 11 (L) 10/28/2019 10:41 AM    Alk. phosphatase 78 10/28/2019 10:41 AM    Protein, total 7.3 10/28/2019 10:41 AM    Albumin 3.4 (L) 10/28/2019 10:41 AM    Globulin 3.9 10/28/2019 10:41 AM     Lab Results   Component Value Date/Time    Iron 52 10/07/2019 12:53 PM    TIBC 235 (L) 10/07/2019 12:53 PM    Iron % saturation 22 10/07/2019 12:53 PM    Ferritin 880 (H) 10/07/2019 12:53 PM       No results found for: B12LT, FOL, RBCF  No results found for: TSH, TSH2, TSH3, TSHP, TSHEXT, TSHEXT  No results found for: HAMAT, HAAB, HABT, HAAT, HBSAG, HBSB, HBSAT, HBABN, HBCM, HBCAB, HBCAT, XBCABS, [address] [facility], [address] [place] [address], [facility], [id number], [address] [facility], [facility], [facility], [address] [facility], [id number], [id number], [address] [place] [address], [id number], [facility], [id number], HCGAT      Imaging:     Chest CT 5/10/19:  FINDINGS:  THYROID: No nodule. MEDIASTINUM: There are enlarged lymph nodes in the right paratracheal region  with 3 enlarged lymph nodes in this area. The largest is inferiorly in the right  paratracheal region measuring 2.5 x 1.7 cm. There are also enlarged lymph nodes  in the medial and lateral AP window with the largest lymph node measuring 18 mm. These lymph nodes are worrisome for neoplastic process. There is a normal size  subcarinal lymph node. Juliaette Kelp REBECCA: No mass or lymphadenopathy. THORACIC AORTA: No aneurysm. MAIN PULMONARY ARTERY: Normal in caliber. TRACHEA/BRONCHI: Patent. ESOPHAGUS: No wall thickening or dilatation. HEART: Normal in size. PLEURA: No effusion or pneumothorax.   LUNGS: There are moderate changes of centrilobular emphysema. There are bullous  lesions at each apex left greater than right. There is a calcified granuloma in the left upper lobe.   There is volume loss in the medial segment right middle lobe that extends to a  oval-shaped opacity measures 1.8 x 1.1 cm this may all be atelectasis. Pulmonary  nodule within the atelectasis is not excluded. No abnormality corresponds with the rounded opacity seen on the chest  radiograph. Possibly that was a nipple shadow. .  Mild scarring is seen medially in the lingula. INCIDENTALLY IMAGED UPPER ABDOMEN: The spleen has been removed. No adrenal  masses. BONES: No destructive bone lesion. IMPRESSION:  1. There are are multiple enlarged lymph nodes in the right paratracheal region  as well as adenopathy and AP window region. These enlarged lymph nodes are  worrisome for neoplastic process. 2. There is volume loss in right middle lobe with a nodular area of opacity that  could be atelectasis but a superimposed nodule within the areas of atelectasis  is not excluded. Further evaluation is suggested. Tissue diagnosis is suggested. PET CT scan may yield more information. . 23X     5/28/19 PET:  FINDINGS:  HEAD/NECK: No apparent foci of abnormal hypermetabolism. Cerebral evaluation is  limited by normal intense activity. CHEST: A hypermetabolic right paratracheal lymph node SUV is 8.5. There are  hypermetabolic lymph nodes anterior to the main bronchi bilaterally. There is  centrilobular emphysema. There is volume loss in the right middle lobe but a  central obstructing mass is not identified on this PET scan. ABDOMEN/PELVIS: No foci of abnormal hypermetabolism. The prostate gland is  enlarged. SKELETON: No foci of abnormal hypermetabolism in the axial and visualized  appendicular skeleton. IMPRESSION: Hypermetabolic mediastinal lymph nodes concerning for neoplastic process. Chest/abd/pelvis CT 7/30/19:  FINDINGS:   THYROID: No nodule.   MEDIASTINUM: Mediastinal lymphadenopathy has loosely decreased with upper  paratracheal node measuring 1.3 x 1.9 cm, previously 1.4 x 1.8 cm (2, 24), right  paratracheal node at the level of the aortic arch measuring 1.4 x 2.1 cm,  previously 1.7 x 2.6 cm of (2, 27) series, precarinal node on the right  measuring 1.7 x 2.2 cm, previously 1.7 x 2.5 cm (2, 31), and left pericarinal  noted measuring 1.2 x 1.8 cm, previously 1.8 x 2.1 cm of (2, 30). However, there  is an enlarged left preaortic node just lateral to left mainstem bronchus  measuring 1.3 x 2.1 cm which previously measured 8 x 8 mm (2, 33). REBECCA: No mass or lymphadenopathy. THORACIC AORTA: No dissection or aneurysm. MAIN PULMONARY ARTERY: Normal in caliber. TRACHEA/BRONCHI: Patent. ESOPHAGUS: No wall thickening or dilatation. HEART: Normal in size. PLEURA: No effusion or pneumothorax. LUNGS: Centrilobular bullous emphysematous change. Right middle lobe ovoid  entity most likely reflective of atelectasis adjacent to the right heart margin  appears unchanged. Left upper lobe granuloma. No acute infiltrate, nodule, or  mass. LIVER: No mass or biliary dilatation. GALLBLADDER: Unremarkable. SPLEEN: Surgically absent. PANCREAS: No mass or ductal dilatation. ADRENALS: Right adrenal mass not seen previously measures 2.0 x 3.0 cm (2, 62). Left adrenal appears unremarkable. KIDNEYS: No mass, calculus, or hydronephrosis. STOMACH: Unremarkable. SMALL BOWEL: No dilatation or wall thickening. COLON: No dilatation or wall thickening. APPENDIX: Unremarkable. PERITONEUM: No ascites or pneumoperitoneum. RETROPERITONEUM: Right retroperitoneal adenopathy posterior to the inferior vena  cava at the level of the renal hilum measures 1.3 x 1.9 cm, not previously seen  (2, 69). REPRODUCTIVE ORGANS: The prostate and seminal vesicles appear unremarkable. URINARY BLADDER: No mass or calculus. BONES: No destructive bone lesion.   ADDITIONAL COMMENTS: Right Port-A-Cath in place with catheter traversing to the  atriocaval junction region. IMPRESSION:  1. Mixed response to therapy with most mediastinal lymphadenopathy diminishing  in size, however, a left mediastinal lymph node has shown increased size and  there is a new right adrenal mass and new right retroperitoneal adenopathy  suspicious for aggressive metastatic disease. 2. Additional incidental findings as above including centrilobular bullous  emphysema with probable right middle lobe atelectasis. Procedures:     EGD 7/10/19:   Esophagus:normal  Stomach: diffuse enlargement gastric folds with nodular erythema and erosion throughout stomach  Duodenum/jejunum: normal    Colonoscopy 7/10/19: Diverticulosis    CT c/a/p 9/3/19: IMPRESSION:  1. Improvement in adenopathy in the chest. No acute findings or parenchymal abnormalities in the chest.  2. Interval increase in right adrenal mass. 3. Minimal decrease in size of right retroperitoneal node. CT c/a/p 10/6/19:  RECIST   TARGET LESIONS:          Lesion (description)         Location (series/slice)                Size  (cm)     1. Right inferior paratracheal node    3/24                               1.2 x  1.7 cm      2. AP window node                              3/24                                1.1 x 0.8 cm     (previously within size range for a target lesion)      3. Right adrenal mass                           3/57                                3.5 x 2.5 cm     Impression:  Decrease in mediastinal adenopathy and right adrenal mass, compatible with  response to treatment. Incidental emphysema and prostate enlargement. Assessment & Plan:   Corin Garcia is a 25[address] [facility] y.o. male with COPD, remote h/o colon cancer comes in for evaluation and management of adenocarcinoma of unknown primary. 1. Adenocarcinoma of unknown primary: Involving mediastinal LNs, with no other PET findings.  Per Thoracic Tumor Board discussion, this could represent upper GI origin, urothelial origin, germ cell tumor, or lung origin. Search for primary lesion was overall negative: Cystoscopy, EGD, colonoscopy negative for malignancy although EGD abnormal with diffusely enlarged gastric folds. Despite h/o colon cancer in 2002, it would be very odd to see a recurrence in the mediastinal LNs. CEA 26.8. Other tumor markers negative (AFP, hCG, CA 19-9). Unfortunately, this disease is considered incurable, but is treatable. At this time, I recommend chemotherapy treatment using the Carboplatin-Paclitaxel regimen (which covers both lung and GI primaries). We discussed the risks and benefits of chemotherapy with Carboplatin and Paclitaxel. Potential side effects include but are not limited to: nausea, vomiting, diarrhea, constipation, taste changes, allergic reactions, myelosuppression, infection, fatigue, alopecia, neuropathy, mucositis, renal failure, infertility, and rarely, death. Additionally, chemotherapy leads to radiosensitization which can intensify the side effects of radiation therapy. The patient has consented to beginning chemotherapy and chemo ed packet given. CT on 10/6/19 shows good response to treatment with decrease in mediastinal adenopathy and right adrenal mass. Supportive care medications include: Zofran, Compazine, Emla cream, dexamethasone 4 mg on days 2 and 3 after treatment. -- Hold cycle 5 of Carbo-Taxol today due to thrombocytopenia; with plan to treat every 3 weeks with plan to repeat imaging after cycle 4.  -- Added Neulasta OBI starting with cycle 3 given severe neutropenia after both cycle 1 and 2.   -- Return in 1 week to retry cycle 5, MD visit, labs. -- Repeat imaging after cycle 7.   -- Send PCP note Dr. Kelli Sheppard. -- Consented for STRATA on 10/28/19.    -- Flu shot given on 10/28/19    2. Neoplasm pain: Affecting the upper back and now shoulders. Perhaps related to the mediastinal LNs, but unclear.   Discussed stool softeners prn, but pt currently having loose stools rather than constipation. Advised opioids can cause sedation, constipation and nausea. Patient requiring increased dose of pain medication. Stopped hydrocodone on 10/7/19. Currently prescribed oxycodone 10mg every 6 hours, can increase to every 4 hours if needed. -- f/u with palliative medicine on 11/4/19 and 11/26/19.    -- Refilled and increased dose: Oxycodone 10 mg q6h PRN pain, written 10/16/19 for #42 tabs. 3. H/o Stage I [T1NxMx] sigmoid colon cancer: Found in sigmoid adenoma during a colonoscopy; went on to undergo segmented resection of the sigmoid colon in 2002. Pathology per outside records:  2/6/2002 sigmoid colon polyp: Well differentiated adenocarcinoma arising in an adenoma, involving the mucosa and invading into the upper half of the submucosal stalk. No vascular space involvement is identified. 2/21/2002 Sigmoidectomy, liver biopsy:  -Sigmoid colon, segmented resection: No residual adenocarcinoma present. Polypectomy site with granulation tissue and vascular congestion. No LNs recovered. Distal and proximal margins benign.  -Liver biopsy: Negative for metastatic carcinoma. No significant inflammation or obvious cirrhosis identified. Very minimal steatosis (rare cells with fat globules). -Addendum: Reblocks of adipose tissue; three small benign lymphoid aggregates (< 0.1cm). 4. COPD: Quit smoking in approx 2016. SOB improved after starting inhaler. 5. HTN: Well controlled on Lisinopril and Metoprolol. 6. BPH: On Flomax. 7. Chemotherapy induced peripheral neuropathy: Increased Numbness in feet, tingling and pain in left hand. Reports mild difficulty with buttoning buttons. Increased Gabapentin 300mg BID on 10/16/19.   -- Refilled gabapentin 300 mg BID #60 x 30 days on 10/16/19     8. Anemia: likely 2/2 to chemotherapy. Hgb decreased from 12.3 to 10.5. Denies blood in stool. 10/7/19  Iron profile consistent with anemia of chronic disease and ferritin elevated 880.     9.  Insomnia: Started on Ambien 5mg nightly. Patient reports he only sleeps 3 hours a night since starting this medication. Will readdress after pain is better controlled. 10. Nausea: 2/2 chemotherapy. Worse on day 3 after treatment. Taking zofran, compazine and dexamethasone as directed. Advised he take dexamethasone on days 3 and 4 of treatment as well. 11. Weight-loss: 171 lb -->161 lb now. Reports unable to eat a large amount due to nausea after treatment. He is trying high calorie protein drinks but these cause nausea as well. Will increase steroids to help with nausea and hopefully appetite. -- Stephanie Calloway RD to discuss options to increasing weight. Goals of care: Palliative to improve quality and duration of life, but no cure. Emotional well being: Pt is coping well with his/her disease and has excellent support. I appreciate the opportunity to participate in Mr. Ernestine solo.     Signed By: Alvy Fleischer, MD     October 28, 2019

## 2019-10-23 NOTE — TELEPHONE ENCOUNTER
[address] at [place]  [phone]      10/23/19 11:54 AM Spoke with patient. Reviewed that he last received treatment on 10/07 and that frequency of treatment is every 3 weeks. Per Aaron Chawla, advised that tomorrow (10/24) would be too soon to receive treatment. Patient verbalized understanding. He voiced concerns about finding transportation. Advised patient contact office if he is unable to find transportation for Mondays appointments. Escribed anti-emetics to pharmacy after provider review as well. No further questions or concerns at this time.

## 2019-10-23 NOTE — TELEPHONE ENCOUNTER
Called PCP office at [phone], spoke to Ms. Chavesinga Pam, provided her with Codes and faxed note for referral.  She was sending to referral coordinator for PCP . Ms. Godwin called me back at [phone] states that since patient's DX has not changed that the referral needs to come from Oncology since they are the one's referring him. Advised that Talita told me the PCP office could do referral.  She states PCP will not do if primary DX has not changed. Citlali also advise me that once referral is done for Palliative that patient will need to call number to advise [facility] which MD he wants to see. States can call:  Ms. Pretty Soler at [phone] for benefits for patient or call: Kiki Grier at [phone] . Called Oncology, spoke to Karen(nurse) and advised of situation who will update Deisy(nurse) and also send request to their referral coordinator to obtain referral.  Advised that Dr. Felicia Sales will be one seeing patient.

## 2019-10-23 NOTE — TELEPHONE ENCOUNTER
Received fax to refill metoprolol. Called patient and left a message for a call back. LOV: 1/16/18  Overdue for an appt.

## 2019-10-24 ENCOUNTER — TELEPHONE (OUTPATIENT)
Dept: PALLATIVE CARE | Age: 59
End: 2019-10-24

## 2019-10-24 NOTE — TELEPHONE ENCOUNTER
Palliative Medicine  Nursing Note  [phone]  Fax [phone]      Telephone Call  Patient Name: Alex Pro  YOB: 1960    10/24/2019         Advance Care Planning 9/12/2019   Patient's Healthcare Decision Maker is: Legal Next of Kin   Primary Decision Maker Name -   Primary Decision Maker Phone Number -   Primary Decision Maker Relationship to Patient -   Secondary Decision Maker Name -   Confirm Advance Directive None   Patient Would Like to Complete Advance Directive No     Call placed to Mr. Gabriela Franco. Discussed Palliative Medicine appt to help with symptom management. He stated that due to the peripheral neuropathy in his feet, he needs to be seen on the same day as his infusion. He said he lives near [facility] which is [place] long drive, and he requires someone to drive him. He agreed to be scheduled on 11/14/19 at 1:30 which coincides with his Oncology Infusion on the same date. He shared that the IV Benedryl they give him \"knocks him out\" so it's best to speak with him at the beginning of the Infusion or at the end. Dr. Kishore Lewis will be seeing him towards the end of the Infusion. He did not have any additional questions or concerns.     Martín Adams RN  Palliative Medicine

## 2019-10-25 ENCOUNTER — TELEPHONE (OUTPATIENT)
Dept: ONCOLOGY | Age: 59
End: 2019-10-25

## 2019-10-25 RX ORDER — ONDANSETRON 2 MG/ML
8 INJECTION INTRAMUSCULAR; INTRAVENOUS AS NEEDED
Status: CANCELLED | OUTPATIENT
Start: 2019-10-28

## 2019-10-25 RX ORDER — HEPARIN 100 UNIT/ML
300-500 SYRINGE INTRAVENOUS AS NEEDED
Status: CANCELLED
Start: 2019-10-28

## 2019-10-25 RX ORDER — SODIUM CHLORIDE 9 MG/ML
25 INJECTION, SOLUTION INTRAVENOUS CONTINUOUS
Status: CANCELLED | OUTPATIENT
Start: 2019-10-28

## 2019-10-25 RX ORDER — HYDROCORTISONE SODIUM SUCCINATE 100 MG/2ML
100 INJECTION, POWDER, FOR SOLUTION INTRAMUSCULAR; INTRAVENOUS AS NEEDED
Status: CANCELLED | OUTPATIENT
Start: 2019-10-28

## 2019-10-25 RX ORDER — EPINEPHRINE 1 MG/ML
0.3 INJECTION, SOLUTION, CONCENTRATE INTRAVENOUS AS NEEDED
Status: CANCELLED | OUTPATIENT
Start: 2019-10-28

## 2019-10-25 RX ORDER — DIPHENHYDRAMINE HYDROCHLORIDE 50 MG/ML
50 INJECTION, SOLUTION INTRAMUSCULAR; INTRAVENOUS AS NEEDED
Status: CANCELLED
Start: 2019-10-28

## 2019-10-25 RX ORDER — ACETAMINOPHEN 325 MG/1
650 TABLET ORAL AS NEEDED
Status: CANCELLED
Start: 2019-10-28

## 2019-10-25 RX ORDER — DIPHENHYDRAMINE HYDROCHLORIDE 50 MG/ML
50 INJECTION, SOLUTION INTRAMUSCULAR; INTRAVENOUS ONCE
Status: CANCELLED
Start: 2019-10-28

## 2019-10-25 RX ORDER — SODIUM CHLORIDE 9 MG/ML
10 INJECTION INTRAMUSCULAR; INTRAVENOUS; SUBCUTANEOUS AS NEEDED
Status: CANCELLED | OUTPATIENT
Start: 2019-10-28

## 2019-10-25 RX ORDER — LORAZEPAM 2 MG/ML
0.5 INJECTION INTRAMUSCULAR
Status: CANCELLED
Start: 2019-10-28

## 2019-10-25 RX ORDER — SODIUM CHLORIDE 0.9 % (FLUSH) 0.9 %
10 SYRINGE (ML) INJECTION AS NEEDED
Status: CANCELLED
Start: 2019-10-28

## 2019-10-25 RX ORDER — PALONOSETRON 0.05 MG/ML
0.25 INJECTION, SOLUTION INTRAVENOUS ONCE
Status: CANCELLED
Start: 2019-10-28

## 2019-10-25 RX ORDER — ALBUTEROL SULFATE 0.83 MG/ML
2.5 SOLUTION RESPIRATORY (INHALATION) AS NEEDED
Status: CANCELLED
Start: 2019-10-28

## 2019-10-25 NOTE — TELEPHONE ENCOUNTER
Patients daughter called and stated she is having a hard time getting the patients treatment details. Stated he is starting to distance himself from his family because he doesn't want to be a burden to anyone, and she is worried he is not following the care plan. She would also like to know if we can recommend a therapist for her to talk through this time since she is the only one taking on her fathers care with him.  #[phone]

## 2019-10-25 NOTE — TELEPHONE ENCOUNTER
Spoke to patient using 2 identifiers. Patient declined refill renewal for metoprolol and declined an appt.

## 2019-10-25 NOTE — TELEPHONE ENCOUNTER
[employer]  Social Work Navigator Encounter     Patient Name:  Luiz Sigala    Medical History: adenocarcinoma of unknown primary    Advance Directives: none on file; conversation deferred    Narrative: Return call placed to patient's daughter, Kait Alanis (). Explained as she is not on patient's PHI I am not able to discuss any information about her fathers care but offered to listen to her concerns and provide her with support resources. She reports mood changes in her father to include increased agitation, anger, tearfulness and isolations. She tells me he is \"pushing family away\". Patient has 4 children ages 32, 34, 34 and 40. She indorsed feeling overwhelmed as she is the only one he is currently speaking to. She asked that I not tell that she called as she is worried he will spot speaking to her too. She tells me she lives in [place] and is uninsured. Advised I do not know many resources in [place] but offered to look into some counseling and support groups for her. She voiced appreciation and ask that I e-mail them to her at [email or web address]    Barriers to Care: relationship stressors    Assessment/Action:  1. Provided supportive listening to Andree Benedict (daughter) and provided support resources to her. 2. Will meet with patient when he RTC to assess for depression and need for support resources.      Plan/Referral:   Caregiver Support referral    Thank you,  Ben Ontiveros LCSW

## 2019-10-28 ENCOUNTER — DOCUMENTATION ONLY (OUTPATIENT)
Dept: ONCOLOGY | Age: 59
End: 2019-10-28

## 2019-10-28 ENCOUNTER — OFFICE VISIT (OUTPATIENT)
Dept: ONCOLOGY | Age: 59
End: 2019-10-28

## 2019-10-28 ENCOUNTER — HOSPITAL ENCOUNTER (OUTPATIENT)
Dept: INFUSION THERAPY | Age: 59
Discharge: HOME | End: 2019-10-28
Payer: OTHER GOVERNMENT

## 2019-10-28 VITALS
HEIGHT: 70 IN | HEART RATE: 72 BPM | WEIGHT: 161 LBS | SYSTOLIC BLOOD PRESSURE: 106 MMHG | TEMPERATURE: 98.1 F | BODY MASS INDEX: 23.05 KG/M2 | OXYGEN SATURATION: 99 % | DIASTOLIC BLOOD PRESSURE: 71 MMHG

## 2019-10-28 VITALS
OXYGEN SATURATION: 99 % | HEIGHT: 70 IN | DIASTOLIC BLOOD PRESSURE: 71 MMHG | TEMPERATURE: 98.1 F | SYSTOLIC BLOOD PRESSURE: 106 MMHG | RESPIRATION RATE: 18 BRPM | BODY MASS INDEX: 23.05 KG/M2 | HEART RATE: 72 BPM | WEIGHT: 161 LBS

## 2019-10-28 DIAGNOSIS — T45.1X5A CHEMOTHERAPY INDUCED NAUSEA AND VOMITING: ICD-10-CM

## 2019-10-28 DIAGNOSIS — Z51.11 CHEMOTHERAPY MANAGEMENT, ENCOUNTER FOR: ICD-10-CM

## 2019-10-28 DIAGNOSIS — C80.1 ADENOCARCINOMA OF UNKNOWN PRIMARY (HCC): Primary | ICD-10-CM

## 2019-10-28 DIAGNOSIS — D64.81 ANTINEOPLASTIC CHEMOTHERAPY INDUCED ANEMIA: ICD-10-CM

## 2019-10-28 DIAGNOSIS — D70.1 CHEMOTHERAPY-INDUCED NEUTROPENIA (HCC): Primary | ICD-10-CM

## 2019-10-28 DIAGNOSIS — R11.2 CHEMOTHERAPY INDUCED NAUSEA AND VOMITING: ICD-10-CM

## 2019-10-28 DIAGNOSIS — T45.1X5A CHEMOTHERAPY-INDUCED NEUTROPENIA (HCC): Primary | ICD-10-CM

## 2019-10-28 DIAGNOSIS — C80.1 ADENOCARCINOMA OF UNKNOWN ORIGIN (HCC): ICD-10-CM

## 2019-10-28 DIAGNOSIS — R63.4 WEIGHT LOSS: ICD-10-CM

## 2019-10-28 DIAGNOSIS — G89.3 ACUTE NEOPLASM-RELATED PAIN: ICD-10-CM

## 2019-10-28 DIAGNOSIS — G62.0 NEUROPATHY DUE TO CHEMOTHERAPEUTIC DRUG (HCC): ICD-10-CM

## 2019-10-28 DIAGNOSIS — T45.1X5A ANTINEOPLASTIC CHEMOTHERAPY INDUCED ANEMIA: ICD-10-CM

## 2019-10-28 DIAGNOSIS — T45.1X5A NEUROPATHY DUE TO CHEMOTHERAPEUTIC DRUG (HCC): ICD-10-CM

## 2019-10-28 LAB
ALBUMIN SERPL-MCNC: 3.4 G/DL (ref 3.5–5)
ALBUMIN/GLOB SERPL: 0.9 {RATIO} (ref 1.1–2.2)
ALP SERPL-CCNC: 78 U/L (ref 45–117)
ALT SERPL-CCNC: 13 U/L (ref 12–78)
ANION GAP SERPL CALC-SCNC: 6 MMOL/L (ref 5–15)
AST SERPL-CCNC: 11 U/L (ref 15–37)
BASOPHILS # BLD: 0 K/UL (ref 0–0.1)
BASOPHILS NFR BLD: 0 % (ref 0–1)
BILIRUB SERPL-MCNC: 0.3 MG/DL (ref 0.2–1)
BUN SERPL-MCNC: 11 MG/DL (ref 6–20)
BUN/CREAT SERPL: 13 (ref 12–20)
CALCIUM SERPL-MCNC: 9 MG/DL (ref 8.5–10.1)
CHLORIDE SERPL-SCNC: 110 MMOL/L (ref 97–108)
CO2 SERPL-SCNC: 26 MMOL/L (ref 21–32)
CREAT SERPL-MCNC: 0.88 MG/DL (ref 0.7–1.3)
DIFFERENTIAL METHOD BLD: ABNORMAL
EOSINOPHIL # BLD: 0 K/UL (ref 0–0.4)
EOSINOPHIL NFR BLD: 0 % (ref 0–7)
ERYTHROCYTE [DISTWIDTH] IN BLOOD BY AUTOMATED COUNT: 18.7 % (ref 11.5–14.5)
GLOBULIN SER CALC-MCNC: 3.9 G/DL (ref 2–4)
GLUCOSE SERPL-MCNC: 128 MG/DL (ref 65–100)
HCT VFR BLD AUTO: 32.2 % (ref 36.6–50.3)
HGB BLD-MCNC: 10.5 G/DL (ref 12.1–17)
IMM GRANULOCYTES # BLD AUTO: 0 K/UL (ref 0–0.04)
IMM GRANULOCYTES NFR BLD AUTO: 0 % (ref 0–0.5)
LYMPHOCYTES # BLD: 1.4 K/UL (ref 0.8–3.5)
LYMPHOCYTES NFR BLD: 39 % (ref 12–49)
MCH RBC QN AUTO: 29.7 PG (ref 26–34)
MCHC RBC AUTO-ENTMCNC: 32.6 G/DL (ref 30–36.5)
MCV RBC AUTO: 91.2 FL (ref 80–99)
MONOCYTES # BLD: 0.5 K/UL (ref 0–1)
MONOCYTES NFR BLD: 15 % (ref 5–13)
NEUTS SEG # BLD: 1.6 K/UL (ref 1.8–8)
NEUTS SEG NFR BLD: 46 % (ref 32–75)
NRBC # BLD: 0 K/UL (ref 0–0.01)
NRBC BLD-RTO: 0 PER 100 WBC
PLATELET # BLD AUTO: 90 K/UL (ref 150–400)
PMV BLD AUTO: 10.6 FL (ref 8.9–12.9)
POTASSIUM SERPL-SCNC: 4 MMOL/L (ref 3.5–5.1)
PROT SERPL-MCNC: 7.3 G/DL (ref 6.4–8.2)
RBC # BLD AUTO: 3.53 M/UL (ref 4.1–5.7)
SODIUM SERPL-SCNC: 142 MMOL/L (ref 136–145)
WBC # BLD AUTO: 3.5 K/UL (ref 4.1–11.1)

## 2019-10-28 PROCEDURE — 74011000250 HC RX REV CODE- 250: Performed by: NURSE PRACTITIONER

## 2019-10-28 PROCEDURE — 36415 COLL VENOUS BLD VENIPUNCTURE: CPT

## 2019-10-28 PROCEDURE — 85025 COMPLETE CBC W/AUTO DIFF WBC: CPT

## 2019-10-28 PROCEDURE — 36591 DRAW BLOOD OFF VENOUS DEVICE: CPT

## 2019-10-28 PROCEDURE — 74011250636 HC RX REV CODE- 250/636: Performed by: NURSE PRACTITIONER

## 2019-10-28 PROCEDURE — 90471 IMMUNIZATION ADMIN: CPT

## 2019-10-28 PROCEDURE — 77030012965 HC NDL HUBR BBMI -A

## 2019-10-28 PROCEDURE — 80053 COMPREHEN METABOLIC PANEL: CPT

## 2019-10-28 PROCEDURE — 90686 IIV4 VACC NO PRSV 0.5 ML IM: CPT | Performed by: NURSE PRACTITIONER

## 2019-10-28 RX ORDER — HEPARIN 100 UNIT/ML
300-500 SYRINGE INTRAVENOUS AS NEEDED
Status: ACTIVE | OUTPATIENT
Start: 2019-10-28 | End: 2019-10-28

## 2019-10-28 RX ORDER — SODIUM CHLORIDE 0.9 % (FLUSH) 0.9 %
10 SYRINGE (ML) INJECTION AS NEEDED
Status: ACTIVE | OUTPATIENT
Start: 2019-10-28 | End: 2019-10-28

## 2019-10-28 RX ORDER — SODIUM CHLORIDE 9 MG/ML
10 INJECTION INTRAMUSCULAR; INTRAVENOUS; SUBCUTANEOUS AS NEEDED
Status: ACTIVE | OUTPATIENT
Start: 2019-10-28 | End: 2019-10-28

## 2019-10-28 RX ADMIN — SODIUM CHLORIDE 10 ML: 9 INJECTION, SOLUTION INTRAMUSCULAR; INTRAVENOUS; SUBCUTANEOUS at 10:41

## 2019-10-28 RX ADMIN — Medication 500 UNITS: at 12:43

## 2019-10-28 RX ADMIN — INFLUENZA VIRUS VACCINE 0.5 ML: 15; 15; 15; 15 SUSPENSION INTRAMUSCULAR at 12:46

## 2019-10-28 RX ADMIN — Medication 10 ML: at 10:41

## 2019-10-28 RX ADMIN — Medication 10 ML: at 12:42

## 2019-10-28 NOTE — PATIENT INSTRUCTIONS
On the week of treatment, please take dexamethasone 4mg (in the morning with food) on days 2,3,4,5 after treatment to see if this helps improve your nausea.

## 2019-10-28 NOTE — PROGRESS NOTES
Nancy Whaley is a 60 y.o. male here for follow up of adenocarcinoma of unknown primary. Patient with complaints of shoulder and bilateral foot pain, rates as an 8 out of 10.

## 2019-10-28 NOTE — PROGRESS NOTES
[address] [facility]  [phone]        10/28/19 12:12 PM Pt has been screened for all eligibility/ineligibility criteria for Strata and has been determined eligible for this protocol. Informed consent was reviewed by RN with patient prior to signing. All questions were answered adequately by RN. Patient signed consent today for study strata. A copy of ICF given to patient. No additional questions at this time.

## 2019-10-28 NOTE — PROGRESS NOTES
[facility] Progress Note    Date: 2019    Name: Sudhakar Palmer    MRN: [id number]         : 1960    Mr. Lord Tobias Arrived ambulatory and in no distress for C5D1 of Carbo/Taxol Regimen HELD. Assessment was completed, no acute issues at this time, no new complaints voiced. Right chest wall port accessed without difficulty, labs drawn & sent for processing. Patient proceed to appointment with Dr. Ashkan Ramirez. Mr. Caroline Miguel vitals were reviewed. Visit Vitals  /71   Pulse 72   Temp 98.1 °F (36.7 °C)   Resp 18   Ht 5' 10\" (1.778 m)   Wt 73 kg (161 lb)   SpO2 99%   BMI 23.10 kg/m²       Lab results were obtained and reviewed. Recent Results (from the past 12 hour(s))   CBC WITH AUTOMATED DIFF    Collection Time: 10/28/19 10:41 AM   Result Value Ref Range    WBC 3.5 (L) 4.1 - 11.1 K/uL    RBC 3.53 (L) 4.10 - 5.70 M/uL    HGB 10.5 (L) 12.1 - 17.0 g/dL    HCT 32.2 (L) 36.6 - 50.3 %    MCV 91.2 80.0 - 99.0 FL    MCH 29.7 26.0 - 34.0 PG    MCHC 32.6 30.0 - 36.5 g/dL    RDW 18.7 (H) 11.5 - 14.5 %    PLATELET 90 (L) 650 - 400 K/uL    MPV 10.6 8.9 - 12.9 FL    NRBC 0.0 0  WBC    ABSOLUTE NRBC 0.00 0.00 - 0.01 K/uL    NEUTROPHILS 46 32 - 75 %    LYMPHOCYTES 39 12 - 49 %    MONOCYTES 15 (H) 5 - 13 %    EOSINOPHILS 0 0 - 7 %    BASOPHILS 0 0 - 1 %    IMMATURE GRANULOCYTES 0 0.0 - 0.5 %    ABS. NEUTROPHILS 1.6 (L) 1.8 - 8.0 K/UL    ABS. LYMPHOCYTES 1.4 0.8 - 3.5 K/UL    ABS. MONOCYTES 0.5 0.0 - 1.0 K/UL    ABS. EOSINOPHILS 0.0 0.0 - 0.4 K/UL    ABS. BASOPHILS 0.0 0.0 - 0.1 K/UL    ABS. IMM.  GRANS. 0.0 0.00 - 0.04 K/UL    DF AUTOMATED     METABOLIC PANEL, COMPREHENSIVE    Collection Time: 10/28/19 10:41 AM   Result Value Ref Range    Sodium 142 136 - 145 mmol/L    Potassium 4.0 3.5 - 5.1 mmol/L    Chloride 110 (H) 97 - 108 mmol/L    CO2 26 21 - 32 mmol/L    Anion gap 6 5 - 15 mmol/L    Glucose 128 (H) 65 - 100 mg/dL    BUN 11 6 - 20 MG/DL    Creatinine 0.88 0.70 - 1.30 MG/DL    BUN/Creatinine ratio 13 12 - 20 GFR est AA >60 >60 ml/min/1.73m2    GFR est non-AA >60 >60 ml/min/1.73m2    Calcium 9.0 8.5 - 10.1 MG/DL    Bilirubin, total 0.3 0.2 - 1.0 MG/DL    ALT (SGPT) 13 12 - 78 U/L    AST (SGOT) 11 (L) 15 - 37 U/L    Alk. phosphatase 78 45 - 117 U/L    Protein, total 7.3 6.4 - 8.2 g/dL    Albumin 3.4 (L) 3.5 - 5.0 g/dL    Globulin 3.9 2.0 - 4.0 g/dL    A-G Ratio 0.9 (L) 1.1 - 2.2       Called MD regarding platelet count being out of treatment parameters. MD ordered to Hold treatment    Medications:  Flu vaccine IM      Mr. Sanchez Mcclellan was discharged from [place] [id number] in stable condition. Port de-accessed, flushed & heparinized per protocol. He is to return on 11/4/19 at 9:00 for his next appointment.     Arsalan Walker RN  October 28, 2019

## 2019-10-29 NOTE — PROGRESS NOTES
[employer]  Social Work Navigator Encounter     Patient Name:  Adebayo Gallegos    Medical History: adenocarcinoma of unknown primary    Advance Directives: none on file; conversation on file    Narrative: Patient here for follow up. Supportive visit with patient. Offered supportive listening as patient ventilates feelings regarding diagnosis and treatment. Patient reports feeling hopeful as his recent scans showed a good response to treatment. He also tells me his pain is also better managed. He tells me [place] might have to rethink things\" when discussing planning for the future. Explored ways to engage in meaningful actives and the importance of connection with others. [place]on is not \"ready\" to have his girlfriend visit him from [place]. However his neighbors are very supportive and his daughter in [address] [place] [address] [facility]. He also tells me he gets at least 10 phone calls a day from friends checking in on him. Patient asked several questions about Social Security Disability and longterm. Answered all of his questions and provided guidance. Patient's work contract ends this years and he is contemplating apply for disability at that time. Barriers to Care:     Assessment/Action:  1. Patient is actively coping with diagnosis and treatment. Encouraged patient to utilize family and friends as a source of practical support. 2. Provided information and guidance on Social Security disability.      Plan/Referral:   Insurance/Entitlements referral    Thank you,  Wilfrido Pretty LCSW

## 2019-10-30 ENCOUNTER — TELEPHONE (OUTPATIENT)
Dept: ONCOLOGY | Age: 59
End: 2019-10-30

## 2019-10-30 ENCOUNTER — PATIENT MESSAGE (OUTPATIENT)
Dept: ONCOLOGY | Age: 59
End: 2019-10-30

## 2019-10-30 ENCOUNTER — TELEPHONE (OUTPATIENT)
Dept: PALLATIVE CARE | Age: 59
End: 2019-10-30

## 2019-10-30 NOTE — TELEPHONE ENCOUNTER
[facility] at [address], [address] [place] [id number]  W: [phone]  F: [phone]    Medical Nutrition Therapy    Nutrition Referral:    Referral received regarding weight loss. Called and spoke with patient, explained that RD is available to address nutrition throughout the spectrum of care. He reports he now more interested in food and has a good appetite currently. Increased nausea for 3 days after treatment, attempted to review staying ahead of nausea. He feels that he can try to make up for his poor appetite during that period of nausea. Stressed importance of minimizing weight loss. Reviewed:  · Eating small amounts several times a day verses large meals. · Add protein and calories to favorite foods  (Ex. adding non-fat dry milk powder, butters, oils, whole milk, etc)  · Keep nutrient-dense foods close at hand and snack frequently   · Discussed consumption of nutrition supplements per day - increase as needed. · Ideas to make more calorically dense without increasing volume. · Think of food as medicine, eating based on time/schedule verses hunger cues. Wt Readings from Last 5 Encounters:   10/28/19 161 lb (73 kg)   10/28/19 161 lb (73 kg)   10/07/19 165 lb 6.4 oz (75 kg)   10/07/19 165 lb (74.8 kg)   09/12/19 168 lb 8 oz (76.4 kg)        Provided contact information and days available at oncology office.     Signed By: Enedina Martinez, [address], [address] [place] , [address] [facility] [address]

## 2019-10-30 NOTE — PROGRESS NOTES
[id number] [facility] Oncology at [address] [place] [address]  [phone]    Hematology / Oncology Established Visit    Reason for Visit:   Suha Puente is a 60 y.o. male who comes in for f/u of adenocarcinoma of unknown primary. Initially referred by Dr. Komal Chu. Hematology Oncology Treatment History:     Diagnosis: Adenocarcinoma of unknown primary    Stage: N/A    Pathology:   6/4/19 4R LN biopsy: rare malignant cells admixed with abundant blood clot  6/4/19 4L LN FNA and core biopsy: Adenocarcinoma. 6/4/19 2R LN FNA and core biopsy: Adenocarcinoma. Comment: IHC stains negative for GATA3, AR, ER, p40. Immunohistochemistry shows that the tumor cells express CK7 with focal expression of CDX2. Tumor cells lack expression of CK20, TTF-1 and Napsin A. These findings are not entirely diagnostic and could be seen in either a primary pulmonary malignancy or a primary upper gastrointestinal tract malignancy. Other sites are also not excluded. Clinical and radiographic correlation is recommended. 6/25/19 2R LN, mediastinum: Metastatic adenocarcinoma (see Comment). Comment - The tumor appears poorly differentiated with areas of necrosis. Immunohistochemical staining reveals positive staining for cytokeratin 7 (strong, diffuse), CDX-2 (focal, weak to moderate) and CEA (focal, moderate to strong). The tumor cells are negative for cytokeratin 20, cytokeratin 5/6, p40, p63, TTF-1, napsin-A, PLAP, HCG, AFP and c-KIT (). The findings are not entirely specific with regard to primary site but would be consistent with upper GI/pancreaticobiliary tract. A pulmonary primary is less likely, but still in the differential.   -PDL1 30%? , [facility] identified high tumor mutational burden - which is predictive of higher response to immunotherapy agents. Prior Treatment: None    Current Treatment: Carbo-Taxol every 3 weeks until disease progression or toxicity.      History of Present Illness:   Mr. Kannan Coelho is a 60 y.o. male with COPD, remote h/o colon cancer comes in for evaluation of mediastinal lymphadenopathy. Pt had recently p/w shortness of breath, dyspnea on exertion, and was found on chest CT to have R mediastinal lymphadenopathy, which also were PET avid. Case was discussed at Thoracic Tumor Board with thought that this could be pancreatic, urothelial, or germ cell origin. More tissue is recommended. Pt had EGD 3 yrs ago and was told he had GERD. Patient has h/o colon cancer in 2002. He states a cancerous polyp was found and then he underwent colectomy in 2002. This was done by Dr. Alexandra Pulido at [facility]. Patient had colonoscopies regularly afterwards, last one was approx 2015. Last EGD was in 2018. As part of search for primary lesion, patient underwent EGD, colonoscopy by Dr. Sebas Hsieh, notable for diffusely enlarged gastric folds, but biopsies negative. Pt underwent cytoscopy on 7/17/19 with findings negative for malignancy. Interval History: Patient here for follow up and cycle 5 of treatment. He is ambulating with a cane and his gait has improved. Since starting gabapentin he still has the numbness but the burning has subsided as of 10/28/19. Reports the neuropathy is present hands, worse in his left hand. He is left hand dominant. Denies difficulty with zippers or buttons. Reports neuropathy is present in bilateral feet. Still able to ambulate without difficulty. Since increasing the oxycodone his pain is controlled for about 4-6 hours. The pain is present in his upper back. He continues to feel fatigued and moderately nauseated on day 3 after treatment. His weight is steady at 162 lb but he has been off treatment due to low counts. Reports he is eating and drinking less on the days he has nausea after treatment. Reports constipation. He is not taking the senna-docusate combination pill that was ordered for him. He plans to start taking it today. Denies diarrhea. Denies chest pain or sob.  Denies fevers or chills. Past Medical History:   Diagnosis Date    Abnormal nuclear stress test 12/7/2015    Cancer ([facility] [address]     colon    Cardiomyopathy ([facility] 2010    EF 45%    Chronic obstructive pulmonary disease (HCC)     Chronic systolic heart failure (HCC)     GERD (gastroesophageal reflux disease)     HTN (hypertension)     PAC (premature atrial contraction)     Tobacco use disorder       Past Surgical History:   Procedure Laterality Date    COLONOSCOPY N/A 7/10/2019    COLONOSCOPY AND ESOPHAGOGASTRODUODENOSCOPY (EGD) performed by Alex Lance MD at [facility] HX COLECTOMY      HX SPLENECTOMY      IR INSERT TUNL CVC W PORT OVER 5 YEARS  7/19/2019      Social History     Tobacco Use    Smoking status: Former Smoker     Packs/day: 0.50     Types: Cigarettes     Last attempt to quit: 4/22/2016     Years since quitting: 3.5    Smokeless tobacco: Never Used   Substance Use Topics    Alcohol use: Not Currently     Comment: rarely      Family History   Problem Relation Age of Onset    Stroke Mother     Coronary Artery Disease Sister     Heart Disease Paternal Grandfather      Current Outpatient Medications   Medication Sig    ondansetron hcl (ZOFRAN) 4 mg tablet Take 2 Tabs by mouth every eight (8) hours as needed for Nausea.  prochlorperazine (COMPAZINE) 10 mg tablet Take 1 Tab by mouth every six (6) hours as needed for Nausea.  lisinopril (PRINIVIL, ZESTRIL) 5 mg tablet TAKE 1 TABLET BY MOUTH DAILY    naloxone (NARCAN) 4 mg/actuation nasal spray Use 1 spray intranasally, then discard. Repeat with new spray every 2 min as needed for opioid overdose symptoms, alternating nostrils.  gabapentin (NEURONTIN) 300 mg capsule Take 1 Cap by mouth two (2) times a day. Max Daily Amount: 600 mg.    oxyCODONE IR (ROXICODONE) 10 mg tab immediate release tablet Take 1 Tab by mouth every six (6) hours as needed for Pain for up to 14 days.  Max Daily Amount: 40 mg.    zolpidem (AMBIEN) 5 mg tablet Take 1 Tab by mouth nightly as needed for Sleep. Max Daily Amount: 5 mg.  dexAMETHasone (DECADRON) 4 mg tablet Take 4mg (1 tab) with breakfast on days 2 and 3 after chemotherapy to prevent nausea.  senna-docusate (PERICOLACE) 8.6-50 mg per tablet Take 1 Tab by mouth nightly.  lidocaine-prilocaine (EMLA) topical cream Apply  to affected area as needed for Pain. Apply quarter size amount to port prior to chemotherapy    tamsulosin (FLOMAX) 0.4 mg capsule Take 0.4 mg by mouth daily.  OTHER Take  by inhalation. Alero Inhaler    metoprolol succinate (TOPROL-XL) 25 mg XL tablet Take 1 Tab by mouth nightly. No current facility-administered medications for this visit. No Known Allergies     Review of Systems: A complete review of systems was obtained, negative except as described above. Physical Exam:     Visit Vitals  /68   Pulse 91   Temp 97.6 °F (36.4 °C) (Oral)   Resp 16   Ht 5' 11\" (1.803 m)   Wt 162 lb (73.5 kg)   SpO2 100%   BMI 22.59 kg/m²     ECOG PS: 0  General:  thin, no acute distress  Eyes: PERRLA, EOMI, anicteric sclerae  HENT: Atraumatic, OP clear, TMs intact without erythema  Neck: Supple  Lymphatic: No cervical, supraclavicular, axillary or inguinal adenopathy  Respiratory: CTAB, normal respiratory effort  CV: Normal rate, regular rhythm, no murmurs, no peripheral edema, port in place. GI: Soft, nontender, nondistended, no masses, no hepatomegaly, no splenomegaly  MS: Normal gait and station. Digits without clubbing or cyanosis. Skin: No rashes, ecchymoses, or petechiae. Normal temperature, turgor, and texture. Neuro/Psych: Alert, oriented. 5/5 strength in all 4 extremities. Appropriate affect, normal judgment/insight. Results:     Lab Results   Component Value Date/Time    WBC 3.5 (L) 10/28/2019 10:41 AM    HGB 10.5 (L) 10/28/2019 10:41 AM    HCT 32.2 (L) 10/28/2019 10:41 AM    PLATELET 90 (L) 43/86/9500 10:41 AM    MCV 91.2 10/28/2019 10:41 AM    ABS.  NEUTROPHILS 1.6 (L) 10/28/2019 10:41 AM     Lab Results   Component Value Date/Time    Sodium 142 10/28/2019 10:41 AM    Potassium 4.0 10/28/2019 10:41 AM    Chloride 110 (H) 10/28/2019 10:41 AM    CO2 26 10/28/2019 10:41 AM    Glucose 128 (H) 10/28/2019 10:41 AM    BUN 11 10/28/2019 10:41 AM    Creatinine 0.88 10/28/2019 10:41 AM    GFR est AA >60 10/28/2019 10:41 AM    GFR est non-AA >60 10/28/2019 10:41 AM    Calcium 9.0 10/28/2019 10:41 AM     Lab Results   Component Value Date/Time    Bilirubin, total 0.3 10/28/2019 10:41 AM    ALT (SGPT) 13 10/28/2019 10:41 AM    AST (SGOT) 11 (L) 10/28/2019 10:41 AM    Alk. phosphatase 78 10/28/2019 10:41 AM    Protein, total 7.3 10/28/2019 10:41 AM    Albumin 3.4 (L) 10/28/2019 10:41 AM    Globulin 3.9 10/28/2019 10:41 AM     Lab Results   Component Value Date/Time    Iron 52 10/07/2019 12:53 PM    TIBC 235 (L) 10/07/2019 12:53 PM    Iron % saturation 22 10/07/2019 12:53 PM    Ferritin 880 (H) 10/07/2019 12:53 PM       No results found for: B12LT, FOL, RBCF  No results found for: TSH, TSH2, TSH3, TSHP, TSHEXT, TSHEXT  No results found for: HAMAT, HAAB, HABT, HAAT, HBSAG, HBSB, HBSAT, HBABN, HBCM, HBCAB, HBCAT, XBCABS, [address] [facility], [address] [place] [address], [facility], [id number], [address] [place] [address], [facility], [address] [place] [address], [id number], [id number], [address] [place] [address], [id number], HBCMLT, [id number], HCGAT      Imaging:     Chest CT 5/10/19:  FINDINGS:  THYROID: No nodule. MEDIASTINUM: There are enlarged lymph nodes in the right paratracheal region  with 3 enlarged lymph nodes in this area. The largest is inferiorly in the right  paratracheal region measuring 2.5 x 1.7 cm. There are also enlarged lymph nodes  in the medial and lateral AP window with the largest lymph node measuring 18 mm. These lymph nodes are worrisome for neoplastic process. There is a normal size  subcarinal lymph node. Huyen Ask REBECCA: No mass or lymphadenopathy. THORACIC AORTA: No aneurysm. MAIN PULMONARY ARTERY: Normal in caliber. TRACHEA/BRONCHI: Patent.   ESOPHAGUS: No wall thickening or dilatation. HEART: Normal in size. PLEURA: No effusion or pneumothorax. LUNGS: There are moderate changes of centrilobular emphysema. There are bullous  lesions at each apex left greater than right. There is a calcified granuloma in the left upper lobe.   There is volume loss in the medial segment right middle lobe that extends to a  oval-shaped opacity measures 1.8 x 1.1 cm this may all be atelectasis. Pulmonary  nodule within the atelectasis is not excluded. No abnormality corresponds with the rounded opacity seen on the chest  radiograph. Possibly that was a nipple shadow. .  Mild scarring is seen medially in the lingula. INCIDENTALLY IMAGED UPPER ABDOMEN: The spleen has been removed. No adrenal  masses. BONES: No destructive bone lesion. IMPRESSION:  1. There are are multiple enlarged lymph nodes in the right paratracheal region  as well as adenopathy and AP window region. These enlarged lymph nodes are  worrisome for neoplastic process. 2. There is volume loss in right middle lobe with a nodular area of opacity that  could be atelectasis but a superimposed nodule within the areas of atelectasis  is not excluded. Further evaluation is suggested. Tissue diagnosis is suggested. PET CT scan may yield more information. . 23X     5/28/19 PET:  FINDINGS:  HEAD/NECK: No apparent foci of abnormal hypermetabolism. Cerebral evaluation is  limited by normal intense activity. CHEST: A hypermetabolic right paratracheal lymph node SUV is 8.5. There are  hypermetabolic lymph nodes anterior to the main bronchi bilaterally. There is  centrilobular emphysema. There is volume loss in the right middle lobe but a  central obstructing mass is not identified on this PET scan. ABDOMEN/PELVIS: No foci of abnormal hypermetabolism. The prostate gland is  enlarged. SKELETON: No foci of abnormal hypermetabolism in the axial and visualized  appendicular skeleton.   IMPRESSION: Hypermetabolic mediastinal lymph nodes concerning for neoplastic process. Chest/abd/pelvis CT 7/30/19:  FINDINGS:   THYROID: No nodule. MEDIASTINUM: Mediastinal lymphadenopathy has loosely decreased with upper  paratracheal node measuring 1.3 x 1.9 cm, previously 1.4 x 1.8 cm (2, 24), right  paratracheal node at the level of the aortic arch measuring 1.4 x 2.1 cm,  previously 1.7 x 2.6 cm of (2, 27) series, precarinal node on the right  measuring 1.7 x 2.2 cm, previously 1.7 x 2.5 cm (2, 31), and left pericarinal  noted measuring 1.2 x 1.8 cm, previously 1.8 x 2.1 cm of (2, 30). However, there  is an enlarged left preaortic node just lateral to left mainstem bronchus  measuring 1.3 x 2.1 cm which previously measured 8 x 8 mm (2, 33). REBECCA: No mass or lymphadenopathy. THORACIC AORTA: No dissection or aneurysm. MAIN PULMONARY ARTERY: Normal in caliber. TRACHEA/BRONCHI: Patent. ESOPHAGUS: No wall thickening or dilatation. HEART: Normal in size. PLEURA: No effusion or pneumothorax. LUNGS: Centrilobular bullous emphysematous change. Right middle lobe ovoid  entity most likely reflective of atelectasis adjacent to the right heart margin  appears unchanged. Left upper lobe granuloma. No acute infiltrate, nodule, or  mass. LIVER: No mass or biliary dilatation. GALLBLADDER: Unremarkable. SPLEEN: Surgically absent. PANCREAS: No mass or ductal dilatation. ADRENALS: Right adrenal mass not seen previously measures 2.0 x 3.0 cm (2, 62). Left adrenal appears unremarkable. KIDNEYS: No mass, calculus, or hydronephrosis. STOMACH: Unremarkable. SMALL BOWEL: No dilatation or wall thickening. COLON: No dilatation or wall thickening. APPENDIX: Unremarkable. PERITONEUM: No ascites or pneumoperitoneum. RETROPERITONEUM: Right retroperitoneal adenopathy posterior to the inferior vena  cava at the level of the renal hilum measures 1.3 x 1.9 cm, not previously seen  (2, 69).   REPRODUCTIVE ORGANS: The prostate and seminal vesicles appear unremarkable. URINARY BLADDER: No mass or calculus. BONES: No destructive bone lesion. ADDITIONAL COMMENTS: Right Port-A-Cath in place with catheter traversing to the  atriocaval junction region. IMPRESSION:  1. Mixed response to therapy with most mediastinal lymphadenopathy diminishing  in size, however, a left mediastinal lymph node has shown increased size and  there is a new right adrenal mass and new right retroperitoneal adenopathy  suspicious for aggressive metastatic disease. 2. Additional incidental findings as above including centrilobular bullous  emphysema with probable right middle lobe atelectasis. Procedures:     EGD 7/10/19:   Esophagus:normal  Stomach: diffuse enlargement gastric folds with nodular erythema and erosion throughout stomach  Duodenum/jejunum: normal    Colonoscopy 7/10/19: Diverticulosis    CT c/a/p 9/3/19: IMPRESSION:  1. Improvement in adenopathy in the chest. No acute findings or parenchymal abnormalities in the chest.  2. Interval increase in right adrenal mass. 3. Minimal decrease in size of right retroperitoneal node. CT c/a/p 10/6/19:  RECIST   TARGET LESIONS:          Lesion (description)         Location (series/slice)                Size  (cm)     1. Right inferior paratracheal node    3/24                               1.2 x  1.7 cm      2. AP window node                              3/24                                1.1 x 0.8 cm     (previously within size range for a target lesion)      3. Right adrenal mass                           3/57                                3.5 x 2.5 cm     Impression:  Decrease in mediastinal adenopathy and right adrenal mass, compatible with  response to treatment. Incidental emphysema and prostate enlargement. Assessment & Plan:   Nancy Whaley is a 60 y.o. male with COPD, remote h/o colon cancer comes in for evaluation and management of adenocarcinoma of unknown primary.     1. Adenocarcinoma of unknown primary: Involving mediastinal LNs, with no other PET findings. Per Thoracic Tumor Board discussion, this could represent upper GI origin, urothelial origin, germ cell tumor, or lung origin. Search for primary lesion was overall negative: Cystoscopy, EGD, colonoscopy negative for malignancy although EGD abnormal with diffusely enlarged gastric folds. Despite h/o colon cancer in 2002, it would be very odd to see a recurrence in the mediastinal LNs. CEA 26.8. Other tumor markers negative (AFP, hCG, CA 19-9). Unfortunately, this disease is considered incurable, but is treatable. At this time, I recommend chemotherapy treatment using the Carboplatin-Paclitaxel regimen (which covers both lung and GI primaries). We discussed the risks and benefits of chemotherapy with Carboplatin and Paclitaxel. Potential side effects include but are not limited to: nausea, vomiting, diarrhea, constipation, taste changes, allergic reactions, myelosuppression, infection, fatigue, alopecia, neuropathy, mucositis, renal failure, infertility, and rarely, death. Additionally, chemotherapy leads to radiosensitization which can intensify the side effects of radiation therapy. The patient has consented to beginning chemotherapy and chemo ed packet given. CT on 10/6/19 shows good response to treatment with decrease in mediastinal adenopathy and right adrenal mass. Supportive care medications include: Zofran, Compazine, Emla cream, dexamethasone 4 mg on days 2 and 3 after treatment. -- Hold cycle 5 of Carbo-Taxol today due to thrombocytopenia; with plan to treat every 3 weeks. -- Added Neulasta OBI starting with cycle 3 given severe neutropenia after both cycle 1 and 2.   -- Return in 1 week to retry cycle 5 (second try), MD visit, labs. -- Repeat imaging after cycle 7.   -- Send PCP note Dr. Dafne Lee. -- Consented for STRATA on 10/28/19.    -- Flu shot given on 10/28/19    2. Neoplasm pain: Affecting the upper back and now shoulders.  Perhaps related to the mediastinal LNs, but unclear. Discussed stool softeners prn, but pt currently having loose stools rather than constipation. Advised opioids can cause sedation, constipation and nausea. Patient requiring increased dose of pain medication. Stopped hydrocodone on 10/7/19. Currently prescribed oxycodone 10mg every 6 hours, can increase to every 4 hours if needed. -- Patient declined palliative medicine because he does not want to switch to Tuesday appointments. -- Refilled Oxycodone 10 mg q6h PRN pain, written 10/4/19 for #42 tabs x 2 week supply     3. H/o Stage I [T1NxMx] sigmoid colon cancer: Found in sigmoid adenoma during a colonoscopy; went on to undergo segmented resection of the sigmoid colon in 2002. Pathology per outside records:  2/6/2002 sigmoid colon polyp: Well differentiated adenocarcinoma arising in an adenoma, involving the mucosa and invading into the upper half of the submucosal stalk. No vascular space involvement is identified. 2/21/2002 Sigmoidectomy, liver biopsy:  -Sigmoid colon, segmented resection: No residual adenocarcinoma present. Polypectomy site with granulation tissue and vascular congestion. No LNs recovered. Distal and proximal margins benign.  -Liver biopsy: Negative for metastatic carcinoma. No significant inflammation or obvious cirrhosis identified. Very minimal steatosis (rare cells with fat globules). -Addendum: Reblocks of adipose tissue; three small benign lymphoid aggregates (< 0.1cm). 4. COPD: Quit smoking in approx 2016. SOB improved after starting inhaler. 5. HTN: Well controlled on Lisinopril and Metoprolol. 6. BPH: On Flomax. 7. Chemotherapy induced peripheral neuropathy: Increased Numbness in feet, tingling and pain in left hand. Reports improvement since Increased Gabapentin 300mg BID on 10/16/19.   -- Refilled gabapentin 300 mg BID #60 x 30 days on 10/16/19    8. Anemia: likely 2/2 to chemotherapy.  Hgb decreased from 12.3 to 11.2 Denies blood in stool. 10/7/19  Iron profile consistent with anemia of chronic disease and ferritin elevated 880. Monitor. 9. Insomnia: Started on Ambien 5mg nightly. Patient reports he only sleeps 3 hours a night since starting this medication. Monitor. 10. Nausea: 2/2 chemotherapy. Worse on day 3 after treatment. Taking zofran, compazine and dexamethasone as directed. Advised he take dexamethasone on days 3 and 4 of treatment as well. 11. Weight-loss: 171 lb -->162 lb now. Reports unable to eat a large amount due to nausea after treatment. He is unable to tolerate boost/ensure with lactose due to lactose allergy. Will increase steroids to help with nausea and hopefully appetite. -- Megan Gu RD to discuss options to increasing weight. 12. Thrombocytopenia: 2/2 chemotherapy. Held cycle 5 due to platelet count of 90. With cycle 5 retry platelet count is lower at 66. Monitor. Goals of care: Palliative to improve quality and duration of life, but no cure. Emotional well being: Pt is coping well with his/her disease and has excellent support. I appreciate the opportunity to participate in Mr. Norm solo.     Signed By: Sully Mccoy NP     October 30, 2019

## 2019-10-30 NOTE — TELEPHONE ENCOUNTER
Palliative Medicine  Nursing Note  [phone]  Fax [phone]      Telephone Call  Patient Name: Román Barnes  YOB: 1960    10/30/2019         Advance Care Planning 10/28/2019   Patient's Healthcare Decision Maker is: Legal Next of Kin   Primary Decision Maker Name -   Primary Decision Maker Phone Number -   Primary Decision Maker Relationship to Patient -   Secondary Decision Maker Name -   Confirm Advance Directive None   Patient Would Like to Complete Advance Directive No     Call to patient and left voice message regarding change in appt to 11/26/19 so it coincides with his Infusion appt for Oncology that recently changed. Palliative can see patient towards the end of his appt at 1:30. Requested he call Palliative for any questions or concerns.     Venkat Sandy RN  Palliative Medicine

## 2019-10-31 RX ORDER — EPINEPHRINE 1 MG/ML
0.3 INJECTION, SOLUTION, CONCENTRATE INTRAVENOUS AS NEEDED
Status: CANCELLED | OUTPATIENT
Start: 2019-11-04

## 2019-10-31 RX ORDER — DIPHENHYDRAMINE HYDROCHLORIDE 50 MG/ML
50 INJECTION, SOLUTION INTRAMUSCULAR; INTRAVENOUS AS NEEDED
Status: CANCELLED
Start: 2019-11-04

## 2019-10-31 RX ORDER — ONDANSETRON 2 MG/ML
8 INJECTION INTRAMUSCULAR; INTRAVENOUS AS NEEDED
Status: CANCELLED | OUTPATIENT
Start: 2019-11-04

## 2019-10-31 RX ORDER — SODIUM CHLORIDE 9 MG/ML
25 INJECTION, SOLUTION INTRAVENOUS CONTINUOUS
Status: CANCELLED | OUTPATIENT
Start: 2019-11-04

## 2019-10-31 RX ORDER — DIPHENHYDRAMINE HYDROCHLORIDE 50 MG/ML
50 INJECTION, SOLUTION INTRAMUSCULAR; INTRAVENOUS ONCE
Status: CANCELLED
Start: 2019-11-04

## 2019-10-31 RX ORDER — HYDROCORTISONE SODIUM SUCCINATE 100 MG/2ML
100 INJECTION, POWDER, FOR SOLUTION INTRAMUSCULAR; INTRAVENOUS AS NEEDED
Status: CANCELLED | OUTPATIENT
Start: 2019-11-04

## 2019-10-31 RX ORDER — ACETAMINOPHEN 325 MG/1
650 TABLET ORAL AS NEEDED
Status: CANCELLED
Start: 2019-11-04

## 2019-10-31 RX ORDER — HEPARIN 100 UNIT/ML
300-500 SYRINGE INTRAVENOUS AS NEEDED
Status: CANCELLED | OUTPATIENT
Start: 2019-11-04

## 2019-10-31 RX ORDER — LORAZEPAM 2 MG/ML
0.5 INJECTION INTRAMUSCULAR
Status: CANCELLED
Start: 2019-11-04

## 2019-10-31 RX ORDER — PALONOSETRON 0.05 MG/ML
0.25 INJECTION, SOLUTION INTRAVENOUS ONCE
Status: CANCELLED
Start: 2019-11-04

## 2019-10-31 RX ORDER — SODIUM CHLORIDE 0.9 % (FLUSH) 0.9 %
10 SYRINGE (ML) INJECTION AS NEEDED
Status: CANCELLED | OUTPATIENT
Start: 2019-11-04

## 2019-10-31 RX ORDER — SODIUM CHLORIDE 9 MG/ML
10 INJECTION INTRAMUSCULAR; INTRAVENOUS; SUBCUTANEOUS AS NEEDED
Status: CANCELLED | OUTPATIENT
Start: 2019-11-04

## 2019-10-31 RX ORDER — ALBUTEROL SULFATE 0.83 MG/ML
2.5 SOLUTION RESPIRATORY (INHALATION) AS NEEDED
Status: CANCELLED
Start: 2019-11-04

## 2019-11-04 ENCOUNTER — OFFICE VISIT (OUTPATIENT)
Dept: ONCOLOGY | Age: 59
End: 2019-11-04

## 2019-11-04 ENCOUNTER — HOSPITAL ENCOUNTER (OUTPATIENT)
Dept: INFUSION THERAPY | Age: 59
Discharge: HOME | End: 2019-11-04
Payer: OTHER GOVERNMENT

## 2019-11-04 VITALS
HEIGHT: 71 IN | TEMPERATURE: 97.6 F | DIASTOLIC BLOOD PRESSURE: 68 MMHG | BODY MASS INDEX: 22.75 KG/M2 | HEART RATE: 91 BPM | SYSTOLIC BLOOD PRESSURE: 103 MMHG | OXYGEN SATURATION: 100 % | RESPIRATION RATE: 18 BRPM | WEIGHT: 162.5 LBS

## 2019-11-04 VITALS
HEIGHT: 71 IN | SYSTOLIC BLOOD PRESSURE: 103 MMHG | BODY MASS INDEX: 22.68 KG/M2 | DIASTOLIC BLOOD PRESSURE: 68 MMHG | OXYGEN SATURATION: 100 % | WEIGHT: 162 LBS | HEART RATE: 91 BPM | TEMPERATURE: 97.6 F | RESPIRATION RATE: 16 BRPM

## 2019-11-04 DIAGNOSIS — K59.03 DRUG-INDUCED CONSTIPATION: ICD-10-CM

## 2019-11-04 DIAGNOSIS — D69.6 THROMBOCYTOPENIA (HCC): ICD-10-CM

## 2019-11-04 DIAGNOSIS — G89.3 ACUTE NEOPLASM-RELATED PAIN: ICD-10-CM

## 2019-11-04 DIAGNOSIS — G62.0 NEUROPATHY DUE TO CHEMOTHERAPEUTIC DRUG (HCC): ICD-10-CM

## 2019-11-04 DIAGNOSIS — C80.1 ADENOCARCINOMA OF UNKNOWN PRIMARY (HCC): Primary | ICD-10-CM

## 2019-11-04 DIAGNOSIS — D70.1 CHEMOTHERAPY-INDUCED NEUTROPENIA (HCC): ICD-10-CM

## 2019-11-04 DIAGNOSIS — T45.1X5A CHEMOTHERAPY-INDUCED NEUTROPENIA (HCC): ICD-10-CM

## 2019-11-04 DIAGNOSIS — T45.1X5A NEUROPATHY DUE TO CHEMOTHERAPEUTIC DRUG (HCC): ICD-10-CM

## 2019-11-04 DIAGNOSIS — C80.1 ADENOCARCINOMA OF UNKNOWN ORIGIN (HCC): ICD-10-CM

## 2019-11-04 DIAGNOSIS — Z51.11 CHEMOTHERAPY MANAGEMENT, ENCOUNTER FOR: ICD-10-CM

## 2019-11-04 LAB
ALBUMIN SERPL-MCNC: 3.5 G/DL (ref 3.5–5)
ALBUMIN/GLOB SERPL: 0.9 {RATIO} (ref 1.1–2.2)
ALP SERPL-CCNC: 76 U/L (ref 45–117)
ALT SERPL-CCNC: 14 U/L (ref 12–78)
ANION GAP SERPL CALC-SCNC: 5 MMOL/L (ref 5–15)
AST SERPL-CCNC: 9 U/L (ref 15–37)
BASOPHILS # BLD: 0 K/UL (ref 0–0.1)
BASOPHILS NFR BLD: 0 % (ref 0–1)
BILIRUB SERPL-MCNC: 0.5 MG/DL (ref 0.2–1)
BUN SERPL-MCNC: 15 MG/DL (ref 6–20)
BUN/CREAT SERPL: 18 (ref 12–20)
CALCIUM SERPL-MCNC: 8.9 MG/DL (ref 8.5–10.1)
CHLORIDE SERPL-SCNC: 108 MMOL/L (ref 97–108)
CO2 SERPL-SCNC: 27 MMOL/L (ref 21–32)
CREAT SERPL-MCNC: 0.85 MG/DL (ref 0.7–1.3)
DIFFERENTIAL METHOD BLD: ABNORMAL
EOSINOPHIL # BLD: 0 K/UL (ref 0–0.4)
EOSINOPHIL NFR BLD: 1 % (ref 0–7)
ERYTHROCYTE [DISTWIDTH] IN BLOOD BY AUTOMATED COUNT: 18.7 % (ref 11.5–14.5)
GLOBULIN SER CALC-MCNC: 3.9 G/DL (ref 2–4)
GLUCOSE SERPL-MCNC: 116 MG/DL (ref 65–100)
HCT VFR BLD AUTO: 34.4 % (ref 36.6–50.3)
HGB BLD-MCNC: 11.2 G/DL (ref 12.1–17)
IMM GRANULOCYTES # BLD AUTO: 0 K/UL (ref 0–0.04)
IMM GRANULOCYTES NFR BLD AUTO: 0 % (ref 0–0.5)
LYMPHOCYTES # BLD: 1.7 K/UL (ref 0.8–3.5)
LYMPHOCYTES NFR BLD: 46 % (ref 12–49)
MCH RBC QN AUTO: 30 PG (ref 26–34)
MCHC RBC AUTO-ENTMCNC: 32.6 G/DL (ref 30–36.5)
MCV RBC AUTO: 92.2 FL (ref 80–99)
MONOCYTES # BLD: 0.5 K/UL (ref 0–1)
MONOCYTES NFR BLD: 14 % (ref 5–13)
NEUTS SEG # BLD: 1.4 K/UL (ref 1.8–8)
NEUTS SEG NFR BLD: 38 % (ref 32–75)
NRBC # BLD: 0 K/UL (ref 0–0.01)
NRBC BLD-RTO: 0 PER 100 WBC
PLATELET # BLD AUTO: 66 K/UL (ref 150–400)
PMV BLD AUTO: 10.3 FL (ref 8.9–12.9)
POTASSIUM SERPL-SCNC: 3.8 MMOL/L (ref 3.5–5.1)
PROT SERPL-MCNC: 7.4 G/DL (ref 6.4–8.2)
RBC # BLD AUTO: 3.73 M/UL (ref 4.1–5.7)
SODIUM SERPL-SCNC: 140 MMOL/L (ref 136–145)
WBC # BLD AUTO: 3.6 K/UL (ref 4.1–11.1)

## 2019-11-04 PROCEDURE — 36415 COLL VENOUS BLD VENIPUNCTURE: CPT

## 2019-11-04 PROCEDURE — 80053 COMPREHEN METABOLIC PANEL: CPT

## 2019-11-04 PROCEDURE — 77030012965 HC NDL HUBR BBMI -A

## 2019-11-04 PROCEDURE — 85025 COMPLETE CBC W/AUTO DIFF WBC: CPT

## 2019-11-04 PROCEDURE — 36591 DRAW BLOOD OFF VENOUS DEVICE: CPT

## 2019-11-04 PROCEDURE — 74011250636 HC RX REV CODE- 250/636: Performed by: INTERNAL MEDICINE

## 2019-11-04 RX ORDER — OXYCODONE HYDROCHLORIDE 10 MG/1
10 TABLET ORAL
Qty: 42 TAB | Refills: 0 | Status: SHIPPED | OUTPATIENT
Start: 2019-11-04 | End: 2019-11-11 | Stop reason: SDUPTHER

## 2019-11-04 RX ORDER — SODIUM CHLORIDE 0.9 % (FLUSH) 0.9 %
5-10 SYRINGE (ML) INJECTION AS NEEDED
Status: DISCONTINUED | OUTPATIENT
Start: 2019-11-04 | End: 2019-11-05 | Stop reason: HOSPADM

## 2019-11-04 RX ORDER — AMOXICILLIN 250 MG
1 CAPSULE ORAL
Qty: 30 TAB | Refills: 3 | Status: SHIPPED | OUTPATIENT
Start: 2019-11-04 | End: 2019-01-01 | Stop reason: SDUPTHER

## 2019-11-04 RX ORDER — GABAPENTIN 300 MG/1
300 CAPSULE ORAL 2 TIMES DAILY
Qty: 60 CAP | Refills: 0 | Status: CANCELLED | OUTPATIENT
Start: 2019-11-13

## 2019-11-04 RX ORDER — SODIUM CHLORIDE 9 MG/ML
10 INJECTION INTRAMUSCULAR; INTRAVENOUS; SUBCUTANEOUS AS NEEDED
Status: DISCONTINUED | OUTPATIENT
Start: 2019-11-04 | End: 2019-11-05 | Stop reason: HOSPADM

## 2019-11-04 RX ORDER — HEPARIN 100 UNIT/ML
500 SYRINGE INTRAVENOUS AS NEEDED
Status: DISCONTINUED | OUTPATIENT
Start: 2019-11-04 | End: 2019-11-05 | Stop reason: HOSPADM

## 2019-11-04 RX ADMIN — Medication 10 ML: at 09:39

## 2019-11-04 RX ADMIN — HEPARIN 500 UNITS: 100 SYRINGE at 11:04

## 2019-11-04 RX ADMIN — Medication 10 ML: at 11:04

## 2019-11-04 RX ADMIN — SODIUM CHLORIDE 10 ML: 9 INJECTION INTRAMUSCULAR; INTRAVENOUS; SUBCUTANEOUS at 09:39

## 2019-11-04 NOTE — PROGRESS NOTES
[facility] - Chemotherapy Progress Note 
 
[id number] Pt admit to [facility] for c5d1 Taxol/Carbo TREATMENT HELD ambulatory in stable condition. Assessment completed. No new concerns voiced. PAC with positive blood return. TREATMENT HELD for continued low platelet count Chemotherapy Flowsheet 11/4/2019 Cycle C5D1 Date 11/4/2019 Drug / Regimen Taxol/Carbo Pre Meds -  
Notes - Patient Vitals for the past 12 hrs: 
 Temp Pulse Resp BP SpO2  
11/04/19 0938 97.6 °F (36.4 °C) 91 18 103/68 100 %  Pts treatment held today. PAC maintained positive blood return, flushed with positive blood return at conclusion. D/c home ambulatory in no distress. Pt aware of next appointment scheduled for 11/11 at 0900. Recent Results (from the past 12 hour(s)) CBC WITH AUTOMATED DIFF Collection Time: 11/04/19  9:35 AM  
Result Value Ref Range WBC 3.6 (L) 4.1 - 11.1 K/uL  
 RBC 3.73 (L) 4.10 - 5.70 M/uL  
 HGB 11.2 (L) 12.1 - 17.0 g/dL HCT 34.4 (L) 36.6 - 50.3 % MCV 92.2 80.0 - 99.0 FL  
 MCH 30.0 26.0 - 34.0 PG  
 MCHC 32.6 30.0 - 36.5 g/dL  
 RDW 18.7 (H) 11.5 - 14.5 % PLATELET 66 (L) 354 - 400 K/uL MPV 10.3 8.9 - 12.9 FL  
 NRBC 0.0 0  WBC ABSOLUTE NRBC 0.00 0.00 - 0.01 K/uL NEUTROPHILS 38 32 - 75 % LYMPHOCYTES 46 12 - 49 % MONOCYTES 14 (H) 5 - 13 % EOSINOPHILS 1 0 - 7 % BASOPHILS 0 0 - 1 % IMMATURE GRANULOCYTES 0 0.0 - 0.5 % ABS. NEUTROPHILS 1.4 (L) 1.8 - 8.0 K/UL  
 ABS. LYMPHOCYTES 1.7 0.8 - 3.5 K/UL  
 ABS. MONOCYTES 0.5 0.0 - 1.0 K/UL  
 ABS. EOSINOPHILS 0.0 0.0 - 0.4 K/UL  
 ABS. BASOPHILS 0.0 0.0 - 0.1 K/UL  
 ABS. IMM. GRANS. 0.0 0.00 - 0.04 K/UL  
 DF AUTOMATED METABOLIC PANEL, COMPREHENSIVE Collection Time: 11/04/19  9:35 AM  
Result Value Ref Range Sodium 140 136 - 145 mmol/L Potassium 3.8 3.5 - 5.1 mmol/L Chloride 108 97 - 108 mmol/L  
 CO2 27 21 - 32 mmol/L Anion gap 5 5 - 15 mmol/L Glucose 116 (H) 65 - 100 mg/dL BUN 15 6 - 20 MG/DL Creatinine 0.85 0.70 - 1.30 MG/DL  
 BUN/Creatinine ratio 18 12 - 20 GFR est AA >60 >60 ml/min/1.73m2 GFR est non-AA >60 >60 ml/min/1.73m2 Calcium 8.9 8.5 - 10.1 MG/DL Bilirubin, total 0.5 0.2 - 1.0 MG/DL  
 ALT (SGPT) 14 12 - 78 U/L  
 AST (SGOT) 9 (L) 15 - 37 U/L Alk. phosphatase 76 45 - 117 U/L Protein, total 7.4 6.4 - 8.2 g/dL Albumin 3.5 3.5 - 5.0 g/dL Globulin 3.9 2.0 - 4.0 g/dL A-G Ratio 0.9 (L) 1.1 - 2.2

## 2019-11-04 NOTE — PROGRESS NOTES
Chapin Dwyer is a 60 y.o. male here today for follow up of adenocarcinoma of unknown primary. He is asking for refill of oxycodone.

## 2019-11-05 ENCOUNTER — TELEPHONE (OUTPATIENT)
Dept: ONCOLOGY | Age: 59
End: 2019-11-05

## 2019-11-05 NOTE — TELEPHONE ENCOUNTER
[address] at [place]  [phone]      11/05/19 11:23 AM Left message for patient's daughter to return her phone call. Provided office phone number for her to call back.

## 2019-11-05 NOTE — TELEPHONE ENCOUNTER
Daughter called and wanted to ask questions about her father condition. Did explain that she wasn't listed on the HIPPA Form but only as a emergency contact. She is worried about his eating and feeling weak. I did explain that if she was with her dad that the nurse could obtain permission from him to talk to you. She didn't want to do this.   She stated that her father is a very private man

## 2019-11-06 RX ORDER — HEPARIN 100 UNIT/ML
300-500 SYRINGE INTRAVENOUS AS NEEDED
Status: CANCELLED | OUTPATIENT
Start: 2019-11-11

## 2019-11-06 RX ORDER — SODIUM CHLORIDE 9 MG/ML
25 INJECTION, SOLUTION INTRAVENOUS CONTINUOUS
Status: CANCELLED | OUTPATIENT
Start: 2019-11-11

## 2019-11-06 RX ORDER — DIPHENHYDRAMINE HYDROCHLORIDE 50 MG/ML
50 INJECTION, SOLUTION INTRAMUSCULAR; INTRAVENOUS AS NEEDED
Status: CANCELLED
Start: 2019-11-11

## 2019-11-06 RX ORDER — ONDANSETRON 2 MG/ML
8 INJECTION INTRAMUSCULAR; INTRAVENOUS AS NEEDED
Status: CANCELLED | OUTPATIENT
Start: 2019-11-11

## 2019-11-06 RX ORDER — ACETAMINOPHEN 325 MG/1
650 TABLET ORAL AS NEEDED
Status: CANCELLED
Start: 2019-11-11

## 2019-11-06 RX ORDER — LORAZEPAM 2 MG/ML
0.5 INJECTION INTRAMUSCULAR
Status: CANCELLED
Start: 2019-11-11

## 2019-11-06 RX ORDER — HYDROCORTISONE SODIUM SUCCINATE 100 MG/2ML
100 INJECTION, POWDER, FOR SOLUTION INTRAMUSCULAR; INTRAVENOUS AS NEEDED
Status: CANCELLED | OUTPATIENT
Start: 2019-11-11

## 2019-11-06 RX ORDER — DIPHENHYDRAMINE HYDROCHLORIDE 50 MG/ML
50 INJECTION, SOLUTION INTRAMUSCULAR; INTRAVENOUS ONCE
Status: CANCELLED
Start: 2019-11-11

## 2019-11-06 RX ORDER — SODIUM CHLORIDE 9 MG/ML
10 INJECTION INTRAMUSCULAR; INTRAVENOUS; SUBCUTANEOUS AS NEEDED
Status: CANCELLED | OUTPATIENT
Start: 2019-11-11

## 2019-11-06 RX ORDER — PALONOSETRON 0.05 MG/ML
0.25 INJECTION, SOLUTION INTRAVENOUS ONCE
Status: CANCELLED
Start: 2019-11-11

## 2019-11-06 RX ORDER — EPINEPHRINE 1 MG/ML
0.3 INJECTION, SOLUTION, CONCENTRATE INTRAVENOUS AS NEEDED
Status: CANCELLED | OUTPATIENT
Start: 2019-11-11

## 2019-11-06 RX ORDER — SODIUM CHLORIDE 0.9 % (FLUSH) 0.9 %
10 SYRINGE (ML) INJECTION AS NEEDED
Status: CANCELLED | OUTPATIENT
Start: 2019-11-11

## 2019-11-06 RX ORDER — ALBUTEROL SULFATE 0.83 MG/ML
2.5 SOLUTION RESPIRATORY (INHALATION) AS NEEDED
Status: CANCELLED
Start: 2019-11-11

## 2019-11-06 NOTE — PROGRESS NOTES
[address] [facility] at [facility]  [phone]    Hematology / Oncology Established Visit    Reason for Visit:   Yulia Alves is a 60 y.o. male who comes in for f/u of adenocarcinoma of unknown primary. Initially referred by Dr. Sandra Velázquez. Hematology Oncology Treatment History:     Diagnosis: Adenocarcinoma of unknown primary    Stage: N/A    Pathology:   6/4/19 4R LN biopsy: rare malignant cells admixed with abundant blood clot  6/4/19 4L LN FNA and core biopsy: Adenocarcinoma. 6/4/19 2R LN FNA and core biopsy: Adenocarcinoma. Comment: IHC stains negative for GATA3, AR, ER, p40. Immunohistochemistry shows that the tumor cells express CK7 with focal expression of CDX2. Tumor cells lack expression of CK20, TTF-1 and Napsin A. These findings are not entirely diagnostic and could be seen in either a primary pulmonary malignancy or a primary upper gastrointestinal tract malignancy. Other sites are also not excluded. Clinical and radiographic correlation is recommended. 6/25/19 2R LN, mediastinum: Metastatic adenocarcinoma (see Comment). Comment - The tumor appears poorly differentiated with areas of necrosis. Immunohistochemical staining reveals positive staining for cytokeratin 7 (strong, diffuse), CDX-2 (focal, weak to moderate) and CEA (focal, moderate to strong). The tumor cells are negative for cytokeratin 20, cytokeratin 5/6, p40, p63, TTF-1, napsin-A, PLAP, HCG, AFP and c-KIT (). The findings are not entirely specific with regard to primary site but would be consistent with upper GI/pancreaticobiliary tract. A pulmonary primary is less likely, but still in the differential.   -PDL1 30%? , [facility] identified high tumor mutational burden - which is predictive of higher response to immunotherapy agents. Prior Treatment: None    Current Treatment: Carbo-Taxol every 3 weeks until disease progression or toxicity.      History of Present Illness:   Mr. Jayde Raya is a 60 y.o. male with COPD, remote h/o colon cancer comes in for evaluation of mediastinal lymphadenopathy. Pt had recently p/w shortness of breath, dyspnea on exertion, and was found on chest CT to have R mediastinal lymphadenopathy, which also were PET avid. Case was discussed at Thoracic Tumor Board with thought that this could be pancreatic, urothelial, or germ cell origin. More tissue is recommended. Pt had EGD 3 yrs ago and was told he had GERD. Patient has h/o colon cancer in 2002. He states a cancerous polyp was found and then he underwent colectomy in 2002. This was done by Dr. Sharri Smith at [facility]. Patient had colonoscopies regularly afterwards, last one was approx 2015. Last EGD was in 2018. As part of search for primary lesion, patient underwent EGD, colonoscopy by Dr. Elsy Chaudhary, notable for diffusely enlarged gastric folds, but biopsies negative. Pt underwent cytoscopy on 7/17/19 with findings negative for malignancy. Interval History: Patient here for follow up and cycle 5 of treatment. He is ambulating with a cane and his gait has improved. Reports the neuropathy remains present hands, worse in his left hand. He is left hand dominant. Denies difficulty with zippers or buttons. Reports neuropathy is present in bilateral feet. Since increasing the oxycodone his pain is controlled for about 6 hours. Now the pain is only controlled taking oxycodone every 4 hours. The pain is present in his upper back. He continues to feel fatigued and moderately nauseated on day 3 after treatment. His weight is steady at 162 lb but he has been off treatment due to low counts. Reports he is eating and drinking less on the days he has nausea after treatment. Reports constipation but has started taking the senna once nightly with improvement. Denies diarrhea. Denies chest pain or sob. Denies fevers or chills.        Past Medical History:   Diagnosis Date    Abnormal nuclear stress test 12/7/2015    Cancer ([facility] [address] colon    Cardiomyopathy ([facility] [address] 2010    EF 45%    Chronic obstructive pulmonary disease (HCC)     Chronic systolic heart failure (HCC)     GERD (gastroesophageal reflux disease)     HTN (hypertension)     PAC (premature atrial contraction)     Tobacco use disorder       Past Surgical History:   Procedure Laterality Date    COLONOSCOPY N/A 7/10/2019    COLONOSCOPY AND ESOPHAGOGASTRODUODENOSCOPY (EGD) performed by Tej Arreola MD at [facility] HX COLECTOMY      HX SPLENECTOMY      IR INSERT TUNL CVC W PORT OVER 5 YEARS  7/19/2019      Social History     Tobacco Use    Smoking status: Former Smoker     Packs/day: 0.50     Types: Cigarettes     Last attempt to quit: 4/22/2016     Years since quitting: 3.5    Smokeless tobacco: Never Used   Substance Use Topics    Alcohol use: Not Currently     Comment: rarely      Family History   Problem Relation Age of Onset    Stroke Mother     Coronary Artery Disease Sister     Heart Disease Paternal Grandfather      Current Outpatient Medications   Medication Sig    oxyCODONE IR (ROXICODONE) 10 mg tab immediate release tablet Take 1 Tab by mouth every six (6) hours as needed for Pain for up to 14 days. Max Daily Amount: 40 mg.    senna-docusate (PERICOLACE) 8.6-50 mg per tablet Take 1 Tab by mouth nightly.  ondansetron hcl (ZOFRAN) 4 mg tablet Take 2 Tabs by mouth every eight (8) hours as needed for Nausea.  prochlorperazine (COMPAZINE) 10 mg tablet Take 1 Tab by mouth every six (6) hours as needed for Nausea.  lisinopril (PRINIVIL, ZESTRIL) 5 mg tablet TAKE 1 TABLET BY MOUTH DAILY    naloxone (NARCAN) 4 mg/actuation nasal spray Use 1 spray intranasally, then discard. Repeat with new spray every 2 min as needed for opioid overdose symptoms, alternating nostrils.  gabapentin (NEURONTIN) 300 mg capsule Take 1 Cap by mouth two (2) times a day.  Max Daily Amount: 600 mg.    zolpidem (AMBIEN) 5 mg tablet Take 1 Tab by mouth nightly as needed for Sleep. Max Daily Amount: 5 mg.  dexAMETHasone (DECADRON) 4 mg tablet Take 4mg (1 tab) with breakfast on days 2 and 3 after chemotherapy to prevent nausea.  lidocaine-prilocaine (EMLA) topical cream Apply  to affected area as needed for Pain. Apply quarter size amount to port prior to chemotherapy    tamsulosin (FLOMAX) 0.4 mg capsule Take 0.4 mg by mouth daily.  OTHER Take  by inhalation. Alero Inhaler    metoprolol succinate (TOPROL-XL) 25 mg XL tablet Take 1 Tab by mouth nightly. No current facility-administered medications for this visit. No Known Allergies     Review of Systems: A complete review of systems was obtained, negative except as described above. Physical Exam:     Visit Vitals  /73 (BP 1 Location: Right arm, BP Patient Position: Sitting) Comment: . Pulse 77   Temp 98.1 °F (36.7 °C) (Oral)   Resp 18   Ht 5' 11\" (1.803 m)   Wt 162 lb (73.5 kg)   SpO2 93%   BMI 22.59 kg/m²     ECOG PS: 0  General:  thin, no acute distress,ambulating with a cane. Eyes: PERRLA, EOMI, anicteric sclerae  HENT: Atraumatic, OP clear, TMs intact without erythema  Neck: Supple  Lymphatic: No cervical, supraclavicular, axillary or inguinal adenopathy  Respiratory: CTAB, normal respiratory effort  CV: Normal rate, regular rhythm, no murmurs, no peripheral edema, port in place. GI: Soft, nontender, nondistended, no masses, no hepatomegaly, no splenomegaly  MS: Normal gait and station. Digits without clubbing or cyanosis. Skin: No rashes, ecchymoses, or petechiae. Normal temperature, turgor, and texture. Neuro/Psych: Alert, oriented. 5/5 strength in all 4 extremities. Appropriate affect, normal judgment/insight. Results:     Lab Results   Component Value Date/Time    WBC 3.6 (L) 11/04/2019 09:35 AM    HGB 11.2 (L) 11/04/2019 09:35 AM    HCT 34.4 (L) 11/04/2019 09:35 AM    PLATELET 66 (L) 03/44/6425 09:35 AM    MCV 92.2 11/04/2019 09:35 AM    ABS.  NEUTROPHILS 1.4 (L) 11/04/2019 09:35 AM Lab Results   Component Value Date/Time    Sodium 140 11/04/2019 09:35 AM    Potassium 3.8 11/04/2019 09:35 AM    Chloride 108 11/04/2019 09:35 AM    CO2 27 11/04/2019 09:35 AM    Glucose 116 (H) 11/04/2019 09:35 AM    BUN 15 11/04/2019 09:35 AM    Creatinine 0.85 11/04/2019 09:35 AM    GFR est AA >60 11/04/2019 09:35 AM    GFR est non-AA >60 11/04/2019 09:35 AM    Calcium 8.9 11/04/2019 09:35 AM     Lab Results   Component Value Date/Time    Bilirubin, total 0.5 11/04/2019 09:35 AM    ALT (SGPT) 14 11/04/2019 09:35 AM    AST (SGOT) 9 (L) 11/04/2019 09:35 AM    Alk. phosphatase 76 11/04/2019 09:35 AM    Protein, total 7.4 11/04/2019 09:35 AM    Albumin 3.5 11/04/2019 09:35 AM    Globulin 3.9 11/04/2019 09:35 AM     Lab Results   Component Value Date/Time    Iron 52 10/07/2019 12:53 PM    TIBC 235 (L) 10/07/2019 12:53 PM    Iron % saturation 22 10/07/2019 12:53 PM    Ferritin 880 (H) 10/07/2019 12:53 PM       No results found for: B12LT, FOL, RBCF  No results found for: TSH, TSH2, TSH3, TSHP, TSHEXT, TSHEXT  No results found for: HAMAT, HAAB, HABT, HAAT, HBSAG, HBSB, HBSAT, HBABN, HBCM, HBCAB, HBCAT, XBCABS, [facility], [address] [place] [address], [facility], [id number], [address] [place] [address], [facility], [facility], [id number], [id number], [address] [place] [address], [id number], HBCMLT, [id number], HCGAT      Imaging:     Chest CT 5/10/19:  FINDINGS:  THYROID: No nodule. MEDIASTINUM: There are enlarged lymph nodes in the right paratracheal region  with 3 enlarged lymph nodes in this area. The largest is inferiorly in the right  paratracheal region measuring 2.5 x 1.7 cm. There are also enlarged lymph nodes  in the medial and lateral AP window with the largest lymph node measuring 18 mm. These lymph nodes are worrisome for neoplastic process. There is a normal size  subcarinal lymph node. Leonard Rook REBECCA: No mass or lymphadenopathy. THORACIC AORTA: No aneurysm. MAIN PULMONARY ARTERY: Normal in caliber. TRACHEA/BRONCHI: Patent. ESOPHAGUS: No wall thickening or dilatation.   HEART: Normal in size.  PLEURA: No effusion or pneumothorax. LUNGS: There are moderate changes of centrilobular emphysema. There are bullous  lesions at each apex left greater than right. There is a calcified granuloma in the left upper lobe.   There is volume loss in the medial segment right middle lobe that extends to a  oval-shaped opacity measures 1.8 x 1.1 cm this may all be atelectasis. Pulmonary  nodule within the atelectasis is not excluded. No abnormality corresponds with the rounded opacity seen on the chest  radiograph. Possibly that was a nipple shadow. .  Mild scarring is seen medially in the lingula. INCIDENTALLY IMAGED UPPER ABDOMEN: The spleen has been removed. No adrenal  masses. BONES: No destructive bone lesion. IMPRESSION:  1. There are are multiple enlarged lymph nodes in the right paratracheal region  as well as adenopathy and AP window region. These enlarged lymph nodes are  worrisome for neoplastic process. 2. There is volume loss in right middle lobe with a nodular area of opacity that  could be atelectasis but a superimposed nodule within the areas of atelectasis  is not excluded. Further evaluation is suggested. Tissue diagnosis is suggested. PET CT scan may yield more information. . 23X     5/28/19 PET:  FINDINGS:  HEAD/NECK: No apparent foci of abnormal hypermetabolism. Cerebral evaluation is  limited by normal intense activity. CHEST: A hypermetabolic right paratracheal lymph node SUV is 8.5. There are  hypermetabolic lymph nodes anterior to the main bronchi bilaterally. There is  centrilobular emphysema. There is volume loss in the right middle lobe but a  central obstructing mass is not identified on this PET scan. ABDOMEN/PELVIS: No foci of abnormal hypermetabolism. The prostate gland is  enlarged. SKELETON: No foci of abnormal hypermetabolism in the axial and visualized  appendicular skeleton.   IMPRESSION: Hypermetabolic mediastinal lymph nodes concerning for neoplastic process. Chest/abd/pelvis CT 7/30/19:  FINDINGS:   THYROID: No nodule. MEDIASTINUM: Mediastinal lymphadenopathy has loosely decreased with upper  paratracheal node measuring 1.3 x 1.9 cm, previously 1.4 x 1.8 cm (2, 24), right  paratracheal node at the level of the aortic arch measuring 1.4 x 2.1 cm,  previously 1.7 x 2.6 cm of (2, 27) series, precarinal node on the right  measuring 1.7 x 2.2 cm, previously 1.7 x 2.5 cm (2, 31), and left pericarinal  noted measuring 1.2 x 1.8 cm, previously 1.8 x 2.1 cm of (2, 30). However, there  is an enlarged left preaortic node just lateral to left mainstem bronchus  measuring 1.3 x 2.1 cm which previously measured 8 x 8 mm (2, 33). REBECCA: No mass or lymphadenopathy. THORACIC AORTA: No dissection or aneurysm. MAIN PULMONARY ARTERY: Normal in caliber. TRACHEA/BRONCHI: Patent. ESOPHAGUS: No wall thickening or dilatation. HEART: Normal in size. PLEURA: No effusion or pneumothorax. LUNGS: Centrilobular bullous emphysematous change. Right middle lobe ovoid  entity most likely reflective of atelectasis adjacent to the right heart margin  appears unchanged. Left upper lobe granuloma. No acute infiltrate, nodule, or  mass. LIVER: No mass or biliary dilatation. GALLBLADDER: Unremarkable. SPLEEN: Surgically absent. PANCREAS: No mass or ductal dilatation. ADRENALS: Right adrenal mass not seen previously measures 2.0 x 3.0 cm (2, 62). Left adrenal appears unremarkable. KIDNEYS: No mass, calculus, or hydronephrosis. STOMACH: Unremarkable. SMALL BOWEL: No dilatation or wall thickening. COLON: No dilatation or wall thickening. APPENDIX: Unremarkable. PERITONEUM: No ascites or pneumoperitoneum. RETROPERITONEUM: Right retroperitoneal adenopathy posterior to the inferior vena  cava at the level of the renal hilum measures 1.3 x 1.9 cm, not previously seen  (2, 69). REPRODUCTIVE ORGANS: The prostate and seminal vesicles appear unremarkable.   URINARY BLADDER: No mass or calculus. BONES: No destructive bone lesion. ADDITIONAL COMMENTS: Right Port-A-Cath in place with catheter traversing to the  atriocaval junction region. IMPRESSION:  1. Mixed response to therapy with most mediastinal lymphadenopathy diminishing  in size, however, a left mediastinal lymph node has shown increased size and  there is a new right adrenal mass and new right retroperitoneal adenopathy  suspicious for aggressive metastatic disease. 2. Additional incidental findings as above including centrilobular bullous  emphysema with probable right middle lobe atelectasis. Procedures:     EGD 7/10/19:   Esophagus:normal  Stomach: diffuse enlargement gastric folds with nodular erythema and erosion throughout stomach  Duodenum/jejunum: normal    Colonoscopy 7/10/19: Diverticulosis    CT c/a/p 9/3/19: IMPRESSION:  1. Improvement in adenopathy in the chest. No acute findings or parenchymal abnormalities in the chest.  2. Interval increase in right adrenal mass. 3. Minimal decrease in size of right retroperitoneal node. CT c/a/p 10/6/19:  RECIST   TARGET LESIONS:          Lesion (description)         Location (series/slice)                Size  (cm)     1. Right inferior paratracheal node    3/24                               1.2 x  1.7 cm      2. AP window node                              3/24                                1.1 x 0.8 cm     (previously within size range for a target lesion)      3. Right adrenal mass                           3/57                                3.5 x 2.5 cm     Impression:  Decrease in mediastinal adenopathy and right adrenal mass, compatible with  response to treatment. Incidental emphysema and prostate enlargement. Assessment & Plan:   Román Barnes is a 60 y.o. male with COPD, remote h/o colon cancer comes in for evaluation and management of adenocarcinoma of unknown primary.     1. Adenocarcinoma of unknown primary: Involving mediastinal LNs, with no other PET findings. Per Thoracic Tumor Board discussion, this could represent upper GI origin, urothelial origin, germ cell tumor, or lung origin. Search for primary lesion was overall negative: Cystoscopy, EGD, colonoscopy negative for malignancy although EGD abnormal with diffusely enlarged gastric folds. Despite h/o colon cancer in 2002, it would be very odd to see a recurrence in the mediastinal LNs. CEA 26.8. Other tumor markers negative (AFP, hCG, CA 19-9). Unfortunately, this disease is considered incurable, but is treatable. At this time, I recommend chemotherapy treatment using the Carboplatin-Paclitaxel regimen (which covers both lung and GI primaries). We discussed the risks and benefits of chemotherapy with Carboplatin and Paclitaxel. Potential side effects include but are not limited to: nausea, vomiting, diarrhea, constipation, taste changes, allergic reactions, myelosuppression, infection, fatigue, alopecia, neuropathy, mucositis, renal failure, infertility, and rarely, death. Additionally, chemotherapy leads to radiosensitization which can intensify the side effects of radiation therapy. The patient has consented to beginning chemotherapy and chemo ed packet given. CT on 10/6/19 shows good response to treatment with decrease in mediastinal adenopathy and right adrenal mass. Will plan on treating for a total of 6 cycles and go to [facility] in 2nd line. Supportive care medications include: Zofran, Compazine, Emla cream, dexamethasone 4 mg on days 2 and 3 after treatment. -- Proceed with cycle 5 of Carbo-Taxol; with plan to treat every 3 weeks. -- Added Neulasta OBI starting with cycle 3 given severe neutropenia after both cycle 1 and 2.   -- Return in 3 weeks for cycle 6, MD visit, labs. -- Repeat imaging after cycle 6.    -- Send PCP note Dr. Raymond Rhodes. -- f/u [facility] ([facility] included); pt was consented on 10/28/19. Will likely plan on Keytruda in 2nd line without a chemotherapy holiday.   -- Flu shot given on 10/28/19    2. Neoplasm pain: Affecting the upper back and now shoulders. Perhaps related to the mediastinal LNs, but unclear. Advised opioids can cause sedation, constipation and nausea. Patient requiring increased dose of pain medication. Stopped hydrocodone on 10/7/19. Currently prescribed oxycodone 10mg every 6 hours but stated his pain is not controlled, advised he can increase to every 4 hours if needed. -- Patient declined palliative medicine because he does not want to switch to Tuesday appointments. -- Refilled Oxycodone 10 mg q6h PRN pain, written 11/4/19 for #42 tabs x 2 week supply   -- Gave future dated oxycodone 10 mg [id number], for 11/18/19 for #42 tabs x 2 week supply. -- Senna daily to prevent constipation. 3. H/o Stage I [T1NxMx] sigmoid colon cancer: Found in sigmoid adenoma during a colonoscopy; went on to undergo segmented resection of the sigmoid colon in 2002. Pathology per outside records:  2/6/2002 sigmoid colon polyp: Well differentiated adenocarcinoma arising in an adenoma, involving the mucosa and invading into the upper half of the submucosal stalk. No vascular space involvement is identified. 2/21/2002 Sigmoidectomy, liver biopsy:  -Sigmoid colon, segmented resection: No residual adenocarcinoma present. Polypectomy site with granulation tissue and vascular congestion. No LNs recovered. Distal and proximal margins benign.  -Liver biopsy: Negative for metastatic carcinoma. No significant inflammation or obvious cirrhosis identified. Very minimal steatosis (rare cells with fat globules). -Addendum: Reblocks of adipose tissue; three small benign lymphoid aggregates (< 0.1cm). 4. COPD: Quit smoking in approx 2016. SOB improved after starting inhaler. 5. HTN: Well controlled on Lisinopril and Metoprolol. 6. BPH: On Flomax. 7. Chemotherapy induced peripheral neuropathy: Increased numbness in feet, tingling and pain in left hand. Left hand dominant.  Increased Gabapentin 300mg BID on 10/16/19.   -- Increase gabapentin to 300 mg TID, #90 tabs with 2 refills e-scribed on 11/11/19.     8. Anemia: likely 2/2 to chemotherapy. Hgb decreased from 12.3 to 11.2 Denies blood in stool. 10/7/19  Iron profile consistent with anemia of chronic disease and ferritin elevated 880. Monitor. 9. Insomnia: Started on Ambien 5mg nightly. Patient reports he only sleeps 3 hours a night since starting this medication. Monitor. 10. Nausea: 2/2 chemotherapy. Worse on day 3 after treatment. Taking zofran, compazine and dexamethasone as directed. Advised he take dexamethasone on days 3 and 4 of treatment as well. 11. Weight-loss: 171 lb -->162 lb now. Reports unable to eat a large amount due to nausea after treatment. He is unable to tolerate boost/ensure with lactose due to lactose allergy. Will increase steroids to help with nausea and hopefully appetite. -- Vasile Deras, ABDIEL to discuss options to increasing weight. 12. Thrombocytopenia: 2/2 chemotherapy. Held cycle 5 x 2 due to platelet count of 90 and 66. Improved to 105 on 11/11/19.     13. Hypokalemia: Potassium 3.4 with 11/11/19 labs. Add K supplement at home and increase foods high in potassium. -- Repleting in [facility] with 40 meq K today. -- Foods high in potassium  -- Start 20 meq K daily       Goals of care: Palliative to improve quality and duration of life, but no cure. Emotional well being: Pt is coping well with his/her disease and has excellent support. I appreciate the opportunity to participate in Mr. Billy solo.     Signed By: Nicholas Watson MD     November 11, 2019

## 2019-11-06 NOTE — PROGRESS NOTES
[facility] Medical Oncology at [address] [facility] [phone] Hematology / Oncology Established Visit Reason for Visit:  
Mariela Gant is a 60 y.o. male who comes in for f/u of adenocarcinoma of unknown primary. Initially referred by Dr. Burke Garcia. Hematology Oncology Treatment History:  
 
Diagnosis: Adenocarcinoma of unknown primary Stage: N/A Pathology:  
6/4/19 4R LN biopsy: rare malignant cells admixed with abundant blood clot 6/4/19 4L LN FNA and core biopsy: Adenocarcinoma. 6/4/19 2R LN FNA and core biopsy: Adenocarcinoma. Comment: IHC stains negative for GATA3, AR, ER, p40. Immunohistochemistry shows that the tumor cells express CK7 with focal expression of CDX2. Tumor cells lack expression of CK20, TTF-1 and Napsin A. These findings are not entirely diagnostic and could be seen in either a primary pulmonary malignancy or a primary upper gastrointestinal tract malignancy. Other sites are also not excluded. Clinical and radiographic correlation is recommended. 6/25/19 2R LN, mediastinum: Metastatic adenocarcinoma (see Comment). Comment - The tumor appears poorly differentiated with areas of necrosis. Immunohistochemical staining reveals positive staining for cytokeratin 7 (strong, diffuse), CDX-2 (focal, weak to moderate) and CEA (focal, moderate to strong). The tumor cells are negative for cytokeratin 20, cytokeratin 5/6, p40, p63, TTF-1, napsin-A, PLAP, HCG, AFP and c-KIT (). The findings are not entirely specific with regard to primary site but would be consistent with upper GI/pancreaticobiliary tract. A pulmonary primary is less likely, but still in the differential.  
-PDL1 30%? , [facility] identified high tumor mutational burden - which is predictive of higher response to immunotherapy agents. Prior Treatment: None Current Treatment: Carbo-Taxol every 3 weeks until disease progression or toxicity. History of Present Illness: Mr. Rocio Kline is a 60 y.o. male with COPD, remote h/o colon cancer comes in for evaluation of mediastinal lymphadenopathy. Pt had recently p/w shortness of breath, dyspnea on exertion, and was found on chest CT to have R mediastinal lymphadenopathy, which also were PET avid. Case was discussed at Thoracic Tumor Board with thought that this could be pancreatic, urothelial, or germ cell origin. More tissue is recommended. Pt had EGD 3 yrs ago and was told he had GERD. Patient has h/o colon cancer in 2002. He states a cancerous polyp was found and then he underwent colectomy in 2002. This was done by Dr. Belem Reynolds at [facility]. Patient had colonoscopies regularly afterwards, last one was approx 2015. Last EGD was in 2018. As part of search for primary lesion, patient underwent EGD, colonoscopy by Dr. Jaylon Chavarria, notable for diffusely enlarged gastric folds, but biopsies negative. Pt underwent cytoscopy on 7/17/19 with findings negative for malignancy. Interval History: Patient here for follow up and cycle 5 of treatment. He is ambulating with a cane and his gait has improved. On 10/16/19 refilled Ambien, will not go up on dose due to increased dose of oxycodone and gabapentin. Refilled gabapentin and increased dose to 300 mg BID. Refilled oxycodone and increased dose to 10 mg every 6 hours. Since starting gabapentin he still has the numbness but the burning has subsided as of 10/28/19. Since increasing the oxycodone his pain is controlled for about 4 hours. The pain is present in his upper back. He felt extremely fatigued and moderately nauseated on day 3 after treatment. He lost another 4 lbs due to nausea, a total of 10 lbs since starting treatment. Reports he is eating and drinking less on the days he has nausea. Denies constipation or diarrhea. Denies chest pain or sob. Denies fevers or chills. Past Medical History:  
Diagnosis Date  Abnormal nuclear stress test 12/7/2015  Cancer ([facility] colon  Cardiomyopathy ([facility] [address] [address] 2010 EF 45%  [facility] pulmonary disease ([facility]  Chronic systolic heart failure ([facility]  GERD (gastroesophageal reflux disease)  HTN (hypertension)  PAC (premature atrial contraction)  Tobacco use disorder Past Surgical History:  
Procedure Laterality Date  COLONOSCOPY N/A 7/10/2019 COLONOSCOPY AND ESOPHAGOGASTRODUODENOSCOPY (EGD) performed by Gennaro Nesbitt MD at [facility]  HX SPLENECTOMY  IR INSERT TUNL CVC W PORT OVER 5 YEARS  7/19/2019 Social History Tobacco Use  Smoking status: Former Smoker Packs/day: 0.50 Types: Cigarettes Last attempt to quit: 4/22/2016 Years since quitting: 3.5  Smokeless tobacco: Never Used Substance Use Topics  Alcohol use: Not Currently Comment: rarely Family History Problem Relation Age of Onset  Stroke Mother  Coronary Artery Disease Sister  Heart Disease Paternal Grandfather Current Outpatient Medications Medication Sig  
 oxyCODONE IR (ROXICODONE) 10 mg tab immediate release tablet Take 1 Tab by mouth every six (6) hours as needed for Pain for up to 14 days. Max Daily Amount: 40 mg.  
 senna-docusate (PERICOLACE) 8.6-50 mg per tablet Take 1 Tab by mouth nightly.  ondansetron hcl (ZOFRAN) 4 mg tablet Take 2 Tabs by mouth every eight (8) hours as needed for Nausea.  prochlorperazine (COMPAZINE) 10 mg tablet Take 1 Tab by mouth every six (6) hours as needed for Nausea.  lisinopril (PRINIVIL, ZESTRIL) 5 mg tablet TAKE 1 TABLET BY MOUTH DAILY  naloxone (NARCAN) 4 mg/actuation nasal spray Use 1 spray intranasally, then discard. Repeat with new spray every 2 min as needed for opioid overdose symptoms, alternating nostrils.  gabapentin (NEURONTIN) 300 mg capsule Take 1 Cap by mouth two (2) times a day.  Max Daily Amount: 600 mg.  
 zolpidem (AMBIEN) 5 mg tablet Take 1 Tab by mouth nightly as needed for Sleep. Max Daily Amount: 5 mg.  dexAMETHasone (DECADRON) 4 mg tablet Take 4mg (1 tab) with breakfast on days 2 and 3 after chemotherapy to prevent nausea.  lidocaine-prilocaine (EMLA) topical cream Apply  to affected area as needed for Pain. Apply quarter size amount to port prior to chemotherapy  tamsulosin (FLOMAX) 0.4 mg capsule Take 0.4 mg by mouth daily.  OTHER Take  by inhalation. Alero Inhaler  metoprolol succinate (TOPROL-XL) 25 mg XL tablet Take 1 Tab by mouth nightly. No current facility-administered medications for this visit. No Known Allergies Review of Systems: A complete review of systems was obtained, negative except as described above. Physical Exam:  
 
There were no vitals taken for this visit. ECOG PS: 0 General: walking with a cane, thin, no acute distress Eyes: PERRLA, EOMI, anicteric sclerae HENT: Atraumatic, OP clear, TMs intact without erythema Neck: Supple Lymphatic: No cervical, supraclavicular, axillary or inguinal adenopathy Respiratory: CTAB, normal respiratory effort CV: Normal rate, regular rhythm, no murmurs, no peripheral edema, port in place. GI: Soft, nontender, nondistended, no masses, no hepatomegaly, no splenomegaly MS: Normal gait and station. Digits without clubbing or cyanosis. Skin: No rashes, ecchymoses, or petechiae. Normal temperature, turgor, and texture. Neuro/Psych: Alert, oriented. 5/5 strength in all 4 extremities. Appropriate affect, normal judgment/insight. Results:  
 
Lab Results Component Value Date/Time WBC 3.6 (L) 11/04/2019 09:35 AM  
 HGB 11.2 (L) 11/04/2019 09:35 AM  
 HCT 34.4 (L) 11/04/2019 09:35 AM  
 PLATELET 66 (L) 20/03/0846 09:35 AM  
 MCV 92.2 11/04/2019 09:35 AM  
 ABS. NEUTROPHILS 1.4 (L) 11/04/2019 09:35 AM  
 
Lab Results Component Value Date/Time  Sodium 140 11/04/2019 09:35 AM  
 Potassium 3.8 11/04/2019 09:35 AM  
 Chloride 108 11/04/2019 09:35 AM  
 CO2 27 11/04/2019 09:35 AM  
 Glucose 116 (H) 11/04/2019 09:35 AM  
 BUN 15 11/04/2019 09:35 AM  
 Creatinine 0.85 11/04/2019 09:35 AM  
 GFR est AA >60 11/04/2019 09:35 AM  
 GFR est non-AA >60 11/04/2019 09:35 AM  
 Calcium 8.9 11/04/2019 09:35 AM  
 
Lab Results Component Value Date/Time Bilirubin, total 0.5 11/04/2019 09:35 AM  
 ALT (SGPT) 14 11/04/2019 09:35 AM  
 AST (SGOT) 9 (L) 11/04/2019 09:35 AM  
 Alk. phosphatase 76 11/04/2019 09:35 AM  
 Protein, total 7.4 11/04/2019 09:35 AM  
 Albumin 3.5 11/04/2019 09:35 AM  
 Globulin 3.9 11/04/2019 09:35 AM  
 
Lab Results Component Value Date/Time Iron 52 10/07/2019 12:53 PM  
 TIBC 235 (L) 10/07/2019 12:53 PM  
 Iron % saturation 22 10/07/2019 12:53 PM  
 Ferritin 880 (H) 10/07/2019 12:53 PM  
 
 
No results found for: B12LT, FOL, RBCF No results found for: TSH, TSH2, TSH3, TSHP, TSHEXT, TSHEXT No results found for: HAMAT, HAAB, HABT, HAAT, HBSAG, HBSB, HBSAT, HBABN, HBCM, HBCAB, HBCAT, John Hang, HBEAB, HBEAG, XHEPCS, [id number], [address] [facility], [facility], [address] [facility], [id number], [id number], [address] [place] [address] [id number] [place] [address], [id number], HCGAT Imaging:  
 
Chest CT 5/10/19: 
FINDINGS: 
THYROID: No nodule. MEDIASTINUM: There are enlarged lymph nodes in the right paratracheal region 
with 3 enlarged lymph nodes in this area. The largest is inferiorly in the right 
paratracheal region measuring 2.5 x 1.7 cm. There are also enlarged lymph nodes 
in the medial and lateral AP window with the largest lymph node measuring 18 mm. These lymph nodes are worrisome for neoplastic process. There is a normal size 
subcarinal lymph node. Geni Bulls REBECCA: No mass or lymphadenopathy. THORACIC AORTA: No aneurysm. MAIN PULMONARY ARTERY: Normal in caliber. TRACHEA/BRONCHI: Patent. ESOPHAGUS: No wall thickening or dilatation. HEART: Normal in size. PLEURA: No effusion or pneumothorax. LUNGS: There are moderate changes of centrilobular emphysema. There are bullous lesions at each apex left greater than right. There is a calcified granuloma in the left upper lobe. 
 There is volume loss in the medial segment right middle lobe that extends to a 
oval-shaped opacity measures 1.8 x 1.1 cm this may all be atelectasis. Pulmonary 
nodule within the atelectasis is not excluded. No abnormality corresponds with the rounded opacity seen on the chest 
radiograph. Possibly that was a nipple shadow. Zoe Nims Mild scarring is seen medially in the lingula. INCIDENTALLY IMAGED UPPER ABDOMEN: The spleen has been removed. No adrenal 
masses. BONES: No destructive bone lesion. IMPRESSION: 
1. There are are multiple enlarged lymph nodes in the right paratracheal region 
as well as adenopathy and AP window region. These enlarged lymph nodes are 
worrisome for neoplastic process. 2. There is volume loss in right middle lobe with a nodular area of opacity that 
could be atelectasis but a superimposed nodule within the areas of atelectasis 
is not excluded. Further evaluation is suggested. Tissue diagnosis is suggested. PET CT scan may yield more information. . 23X 
  
5/28/19 PET: 
FINDINGS: 
HEAD/NECK: No apparent foci of abnormal hypermetabolism. Cerebral evaluation is 
limited by normal intense activity. CHEST: A hypermetabolic right paratracheal lymph node SUV is 8.5. There are 
hypermetabolic lymph nodes anterior to the main bronchi bilaterally. There is 
centrilobular emphysema. There is volume loss in the right middle lobe but a 
central obstructing mass is not identified on this PET scan. ABDOMEN/PELVIS: No foci of abnormal hypermetabolism. The prostate gland is 
enlarged. SKELETON: No foci of abnormal hypermetabolism in the axial and visualized 
appendicular skeleton. IMPRESSION: Hypermetabolic mediastinal lymph nodes concerning for neoplastic process. Chest/abd/pelvis CT 7/30/19: 
FINDINGS:  
THYROID: No nodule. MEDIASTINUM: Mediastinal lymphadenopathy has loosely decreased with upper 
paratracheal node measuring 1.3 x 1.9 cm, previously 1.4 x 1.8 cm (2, 24), right 
paratracheal node at the level of the aortic arch measuring 1.4 x 2.1 cm, 
previously 1.7 x 2.6 cm of (2, 27) series, precarinal node on the right 
measuring 1.7 x 2.2 cm, previously 1.7 x 2.5 cm (2, 31), and left pericarinal 
noted measuring 1.2 x 1.8 cm, previously 1.8 x 2.1 cm of (2, 30). However, there 
is an enlarged left preaortic node just lateral to left mainstem bronchus 
measuring 1.3 x 2.1 cm which previously measured 8 x 8 mm (2, 33). REBECCA: No mass or lymphadenopathy. THORACIC AORTA: No dissection or aneurysm. MAIN PULMONARY ARTERY: Normal in caliber. TRACHEA/BRONCHI: Patent. ESOPHAGUS: No wall thickening or dilatation. HEART: Normal in size. PLEURA: No effusion or pneumothorax. LUNGS: Centrilobular bullous emphysematous change. Right middle lobe ovoid 
entity most likely reflective of atelectasis adjacent to the right heart margin 
appears unchanged. Left upper lobe granuloma. No acute infiltrate, nodule, or 
mass. LIVER: No mass or biliary dilatation. GALLBLADDER: Unremarkable. SPLEEN: Surgically absent. PANCREAS: No mass or ductal dilatation. ADRENALS: Right adrenal mass not seen previously measures 2.0 x 3.0 cm (2, 62). Left adrenal appears unremarkable. KIDNEYS: No mass, calculus, or hydronephrosis. STOMACH: Unremarkable. SMALL BOWEL: No dilatation or wall thickening. COLON: No dilatation or wall thickening. APPENDIX: Unremarkable. PERITONEUM: No ascites or pneumoperitoneum. RETROPERITONEUM: Right retroperitoneal adenopathy posterior to the inferior vena 
cava at the level of the renal hilum measures 1.3 x 1.9 cm, not previously seen 
(2, 69). REPRODUCTIVE ORGANS: The prostate and seminal vesicles appear unremarkable. URINARY BLADDER: No mass or calculus. BONES: No destructive bone lesion. ADDITIONAL COMMENTS: Right Port-A-Cath in place with catheter traversing to the 
atriocaval junction region. IMPRESSION: 
1. Mixed response to therapy with most mediastinal lymphadenopathy diminishing 
in size, however, a left mediastinal lymph node has shown increased size and 
there is a new right adrenal mass and new right retroperitoneal adenopathy 
suspicious for aggressive metastatic disease. 2. Additional incidental findings as above including centrilobular bullous 
emphysema with probable right middle lobe atelectasis. Procedures: EGD 7/10/19:  
Esophagus:normal 
Stomach: diffuse enlargement gastric folds with nodular erythema and erosion throughout stomach Duodenum/jejunum: normal 
 
Colonoscopy 7/10/19: Diverticulosis CT c/a/p 9/3/19: IMPRESSION: 
1. Improvement in adenopathy in the chest. No acute findings or parenchymal abnormalities in the chest. 
2. Interval increase in right adrenal mass. 3. Minimal decrease in size of right retroperitoneal node. CT c/a/p 10/6/19: 
RECIST  
TARGET LESIONS:  
  
    Lesion (description)         Location (series/slice)                Size (cm) 
  
1. Right inferior paratracheal node    3/24                               1.2 x 
1.7 cm 2. AP window node                              3/24                              
 1.1 x 0.8 cm    
(previously within size range for a target lesion) 3. Right adrenal mass                           3/57                             
  3.5 x 2.5 cm 
  
Impression: 
Decrease in mediastinal adenopathy and right adrenal mass, compatible with 
response to treatment. Incidental emphysema and prostate enlargement. Assessment & Plan:  
Nancy Whaley is a 60 y.o. male with COPD, remote h/o colon cancer comes in for evaluation and management of adenocarcinoma of unknown primary.  
 
1. Adenocarcinoma of unknown primary: Involving mediastinal LNs, with no other PET findings. Per Thoracic Tumor Board discussion, this could represent upper GI origin, urothelial origin, germ cell tumor, or lung origin. Search for primary lesion was overall negative: Cystoscopy, EGD, colonoscopy negative for malignancy although EGD abnormal with diffusely enlarged gastric folds. Despite h/o colon cancer in 2002, it would be very odd to see a recurrence in the mediastinal LNs. CEA 26.8. Other tumor markers negative (AFP, hCG, CA 19-9). Unfortunately, this disease is considered incurable, but is treatable. At this time, I recommend chemotherapy treatment using the Carboplatin-Paclitaxel regimen (which covers both lung and GI primaries). We discussed the risks and benefits of chemotherapy with Carboplatin and Paclitaxel. Potential side effects include but are not limited to: nausea, vomiting, diarrhea, constipation, taste changes, allergic reactions, myelosuppression, infection, fatigue, alopecia, neuropathy, mucositis, renal failure, infertility, and rarely, death. Additionally, chemotherapy leads to radiosensitization which can intensify the side effects of radiation therapy. The patient has consented to beginning chemotherapy and chemo ed packet given. CT on 10/6/19 shows good response to treatment with decrease in mediastinal adenopathy and right adrenal mass. Supportive care medications include: Zofran, Compazine, Emla cream, dexamethasone 4 mg on days 2 and 3 after treatment. -- Proceed/Hold cycle 5 of Carbo-Taxol today due to thrombocytopenia; with plan to treat every 3 weeks with plan to repeat imaging after cycle 4. 
-- Added Neulasta OBI starting with cycle 3 given severe neutropenia after both cycle 1 and 2.  
-- Return in 3 weeks for cycle 6, MD visit, labs. -- Repeat imaging after cycle 7.  
-- Send PCP note Dr. Tami Pederson. -- Consented for STRATA on 10/28/19.   
-- Flu shot given on 10/28/19 2. Neoplasm pain: Affecting the upper back and now shoulders.  Perhaps related to the mediastinal LNs, but unclear. Discussed stool softeners prn, but pt currently having loose stools rather than constipation. Advised opioids can cause sedation, constipation and nausea. Patient requiring increased dose of pain medication. Stopped hydrocodone on 10/7/19. Currently prescribed oxycodone 10mg every 6 hours, can increase to every 4 hours if needed. -- f/u with palliative medicine on 11/4/19 and 11/26/19.   
-- Refilled: Oxycodone 10 mg q6h PRN pain, written 10/16/19 for #42 tabs. 3. H/o Stage I [T1NxMx] sigmoid colon cancer: Found in sigmoid adenoma during a colonoscopy; went on to undergo segmented resection of the sigmoid colon in 2002. Pathology per outside records: 
2/6/2002 sigmoid colon polyp: Well differentiated adenocarcinoma arising in an adenoma, involving the mucosa and invading into the upper half of the submucosal stalk. No vascular space involvement is identified. 2/21/2002 Sigmoidectomy, liver biopsy: 
-Sigmoid colon, segmented resection: No residual adenocarcinoma present. Polypectomy site with granulation tissue and vascular congestion. No LNs recovered. Distal and proximal margins benign. 
-Liver biopsy: Negative for metastatic carcinoma. No significant inflammation or obvious cirrhosis identified. Very minimal steatosis (rare cells with fat globules). -Addendum: Reblocks of adipose tissue; three small benign lymphoid aggregates (< 0.1cm). 4. COPD: Quit smoking in approx 2016. SOB improved after starting inhaler. 5. HTN: Well controlled on Lisinopril and Metoprolol. 6. BPH: On Flomax. 7. Chemotherapy induced peripheral neuropathy: Increased Numbness in feet, tingling and pain in left hand. Reports mild difficulty with buttoning buttons. Increased Gabapentin 300mg BID on 10/16/19.  
-- Refilled gabapentin 300 mg BID #60 x 30 days on 10/16/19  
 
8. Anemia: likely 2/2 to chemotherapy. Hgb decreased from 12.3 to 10.5. Denies blood in stool. 10/7/19  Iron profile consistent with anemia of chronic disease and ferritin elevated 880.  
 
9. Insomnia: Started on Ambien 5mg nightly. Patient reports he only sleeps 3 hours a night since starting this medication. Will readdress after pain is better controlled. 10. Nausea: 2/2 chemotherapy. Worse on day 3 after treatment. Taking zofran, compazine and dexamethasone as directed. Advised he take dexamethasone on days 3 and 4 of treatment as well. 11. Weight-loss: 171 lb -->161 lb now. Reports unable to eat a large amount due to nausea after treatment. He is trying high calorie protein drinks but these cause nausea as well. Will increase steroids to help with nausea and hopefully appetite. -- Jc Escalante RD to discuss options to increasing weight. Goals of care: Palliative to improve quality and duration of life, but no cure. Emotional well being: Pt is coping well with his/her disease and has excellent support. I appreciate the opportunity to participate in Mr. John solo. Signed By: Natalie Perez NP November 6, 2019

## 2019-11-11 ENCOUNTER — HOSPITAL ENCOUNTER (OUTPATIENT)
Dept: INFUSION THERAPY | Age: 59
Discharge: HOME | End: 2019-11-11
Payer: OTHER GOVERNMENT

## 2019-11-11 ENCOUNTER — OFFICE VISIT (OUTPATIENT)
Dept: ONCOLOGY | Age: 59
End: 2019-11-11

## 2019-11-11 VITALS
WEIGHT: 162 LBS | BODY MASS INDEX: 22.68 KG/M2 | HEART RATE: 77 BPM | RESPIRATION RATE: 18 BRPM | OXYGEN SATURATION: 93 % | TEMPERATURE: 98.1 F | DIASTOLIC BLOOD PRESSURE: 73 MMHG | HEIGHT: 71 IN | SYSTOLIC BLOOD PRESSURE: 101 MMHG

## 2019-11-11 VITALS
RESPIRATION RATE: 18 BRPM | WEIGHT: 162.3 LBS | DIASTOLIC BLOOD PRESSURE: 80 MMHG | OXYGEN SATURATION: 95 % | BODY MASS INDEX: 22.72 KG/M2 | HEIGHT: 71 IN | SYSTOLIC BLOOD PRESSURE: 120 MMHG | TEMPERATURE: 98.1 F | HEART RATE: 70 BPM

## 2019-11-11 DIAGNOSIS — D69.6 THROMBOCYTOPENIA (HCC): ICD-10-CM

## 2019-11-11 DIAGNOSIS — G89.3 ACUTE NEOPLASM-RELATED PAIN: ICD-10-CM

## 2019-11-11 DIAGNOSIS — T45.1X5A NEUROPATHY DUE TO CHEMOTHERAPEUTIC DRUG (HCC): ICD-10-CM

## 2019-11-11 DIAGNOSIS — T45.1X5A CHEMOTHERAPY-INDUCED NEUTROPENIA (HCC): Primary | ICD-10-CM

## 2019-11-11 DIAGNOSIS — G62.0 NEUROPATHY DUE TO CHEMOTHERAPEUTIC DRUG (HCC): ICD-10-CM

## 2019-11-11 DIAGNOSIS — C80.1 ADENOCARCINOMA OF UNKNOWN PRIMARY (HCC): Primary | ICD-10-CM

## 2019-11-11 DIAGNOSIS — Z51.11 CHEMOTHERAPY MANAGEMENT, ENCOUNTER FOR: ICD-10-CM

## 2019-11-11 DIAGNOSIS — D70.1 CHEMOTHERAPY-INDUCED NEUTROPENIA (HCC): Primary | ICD-10-CM

## 2019-11-11 DIAGNOSIS — E87.6 HYPOKALEMIA: ICD-10-CM

## 2019-11-11 DIAGNOSIS — C80.1 ADENOCARCINOMA OF UNKNOWN ORIGIN (HCC): ICD-10-CM

## 2019-11-11 LAB
ALBUMIN SERPL-MCNC: 3.7 G/DL (ref 3.5–5)
ALBUMIN/GLOB SERPL: 1 {RATIO} (ref 1.1–2.2)
ALP SERPL-CCNC: 66 U/L (ref 45–117)
ALT SERPL-CCNC: 16 U/L (ref 12–78)
ANION GAP SERPL CALC-SCNC: 4 MMOL/L (ref 5–15)
AST SERPL-CCNC: 9 U/L (ref 15–37)
BASOPHILS # BLD: 0 K/UL (ref 0–0.1)
BASOPHILS NFR BLD: 0 % (ref 0–1)
BILIRUB SERPL-MCNC: 0.5 MG/DL (ref 0.2–1)
BUN SERPL-MCNC: 14 MG/DL (ref 6–20)
BUN/CREAT SERPL: 17 (ref 12–20)
CALCIUM SERPL-MCNC: 9.3 MG/DL (ref 8.5–10.1)
CHLORIDE SERPL-SCNC: 107 MMOL/L (ref 97–108)
CO2 SERPL-SCNC: 29 MMOL/L (ref 21–32)
CREAT SERPL-MCNC: 0.81 MG/DL (ref 0.7–1.3)
DIFFERENTIAL METHOD BLD: ABNORMAL
EOSINOPHIL # BLD: 0 K/UL (ref 0–0.4)
EOSINOPHIL NFR BLD: 1 % (ref 0–7)
ERYTHROCYTE [DISTWIDTH] IN BLOOD BY AUTOMATED COUNT: 18.6 % (ref 11.5–14.5)
GLOBULIN SER CALC-MCNC: 3.7 G/DL (ref 2–4)
GLUCOSE SERPL-MCNC: 101 MG/DL (ref 65–100)
HCT VFR BLD AUTO: 33.2 % (ref 36.6–50.3)
HGB BLD-MCNC: 11.2 G/DL (ref 12.1–17)
IMM GRANULOCYTES # BLD AUTO: 0 K/UL (ref 0–0.04)
IMM GRANULOCYTES NFR BLD AUTO: 0 % (ref 0–0.5)
LYMPHOCYTES # BLD: 3.1 K/UL (ref 0.8–3.5)
LYMPHOCYTES NFR BLD: 53 % (ref 12–49)
MCH RBC QN AUTO: 31.1 PG (ref 26–34)
MCHC RBC AUTO-ENTMCNC: 33.7 G/DL (ref 30–36.5)
MCV RBC AUTO: 92.2 FL (ref 80–99)
MONOCYTES # BLD: 0.6 K/UL (ref 0–1)
MONOCYTES NFR BLD: 10 % (ref 5–13)
NEUTS SEG # BLD: 2.1 K/UL (ref 1.8–8)
NEUTS SEG NFR BLD: 36 % (ref 32–75)
NRBC # BLD: 0 K/UL (ref 0–0.01)
NRBC BLD-RTO: 0 PER 100 WBC
PLATELET # BLD AUTO: 105 K/UL (ref 150–400)
PMV BLD AUTO: 10.5 FL (ref 8.9–12.9)
POTASSIUM SERPL-SCNC: 3.4 MMOL/L (ref 3.5–5.1)
PROT SERPL-MCNC: 7.4 G/DL (ref 6.4–8.2)
RBC # BLD AUTO: 3.6 M/UL (ref 4.1–5.7)
SODIUM SERPL-SCNC: 140 MMOL/L (ref 136–145)
WBC # BLD AUTO: 5.8 K/UL (ref 4.1–11.1)

## 2019-11-11 PROCEDURE — 96375 TX/PRO/DX INJ NEW DRUG ADDON: CPT

## 2019-11-11 PROCEDURE — 74011250637 HC RX REV CODE- 250/637: Performed by: NURSE PRACTITIONER

## 2019-11-11 PROCEDURE — 96413 CHEMO IV INFUSION 1 HR: CPT

## 2019-11-11 PROCEDURE — 36415 COLL VENOUS BLD VENIPUNCTURE: CPT

## 2019-11-11 PROCEDURE — 74011000250 HC RX REV CODE- 250: Performed by: NURSE PRACTITIONER

## 2019-11-11 PROCEDURE — 74011250636 HC RX REV CODE- 250/636: Performed by: NURSE PRACTITIONER

## 2019-11-11 PROCEDURE — 85025 COMPLETE CBC W/AUTO DIFF WBC: CPT

## 2019-11-11 PROCEDURE — 96377 APPLICATON ON-BODY INJECTOR: CPT

## 2019-11-11 PROCEDURE — 96415 CHEMO IV INFUSION ADDL HR: CPT

## 2019-11-11 PROCEDURE — 74011000258 HC RX REV CODE- 258: Performed by: NURSE PRACTITIONER

## 2019-11-11 PROCEDURE — 96417 CHEMO IV INFUS EACH ADDL SEQ: CPT

## 2019-11-11 PROCEDURE — 80053 COMPREHEN METABOLIC PANEL: CPT

## 2019-11-11 PROCEDURE — 96367 TX/PROPH/DG ADDL SEQ IV INF: CPT

## 2019-11-11 PROCEDURE — 77030012965 HC NDL HUBR BBMI -A

## 2019-11-11 RX ORDER — OXYCODONE HYDROCHLORIDE 10 MG/1
10 TABLET ORAL
Qty: 42 TAB | Refills: 0 | Status: SHIPPED | OUTPATIENT
Start: 2019-11-18 | End: 2019-01-01 | Stop reason: SDUPTHER

## 2019-11-11 RX ORDER — SODIUM CHLORIDE 9 MG/ML
25 INJECTION, SOLUTION INTRAVENOUS CONTINUOUS
Status: DISPENSED | OUTPATIENT
Start: 2019-11-11 | End: 2019-11-11

## 2019-11-11 RX ORDER — PALONOSETRON 0.05 MG/ML
0.25 INJECTION, SOLUTION INTRAVENOUS ONCE
Status: COMPLETED | OUTPATIENT
Start: 2019-11-11 | End: 2019-11-11

## 2019-11-11 RX ORDER — SODIUM CHLORIDE 9 MG/ML
10 INJECTION INTRAMUSCULAR; INTRAVENOUS; SUBCUTANEOUS AS NEEDED
Status: ACTIVE | OUTPATIENT
Start: 2019-11-11 | End: 2019-11-11

## 2019-11-11 RX ORDER — GABAPENTIN 300 MG/1
300 CAPSULE ORAL 3 TIMES DAILY
Qty: 90 CAP | Refills: 2 | Status: SHIPPED | OUTPATIENT
Start: 2019-11-11 | End: 2020-01-01 | Stop reason: SDUPTHER

## 2019-11-11 RX ORDER — SODIUM CHLORIDE 0.9 % (FLUSH) 0.9 %
10 SYRINGE (ML) INJECTION AS NEEDED
Status: ACTIVE | OUTPATIENT
Start: 2019-11-11 | End: 2019-11-11

## 2019-11-11 RX ORDER — POTASSIUM CHLORIDE 750 MG/1
40 TABLET, FILM COATED, EXTENDED RELEASE ORAL
Status: COMPLETED | OUTPATIENT
Start: 2019-11-11 | End: 2019-11-11

## 2019-11-11 RX ORDER — POTASSIUM CHLORIDE 20 MEQ/1
20 TABLET, EXTENDED RELEASE ORAL DAILY
Qty: 30 TAB | Refills: 0 | Status: SHIPPED | OUTPATIENT
Start: 2019-11-11

## 2019-11-11 RX ORDER — HEPARIN 100 UNIT/ML
300-500 SYRINGE INTRAVENOUS AS NEEDED
Status: ACTIVE | OUTPATIENT
Start: 2019-11-11 | End: 2019-11-11

## 2019-11-11 RX ORDER — DIPHENHYDRAMINE HYDROCHLORIDE 50 MG/ML
50 INJECTION, SOLUTION INTRAMUSCULAR; INTRAVENOUS ONCE
Status: COMPLETED | OUTPATIENT
Start: 2019-11-11 | End: 2019-11-11

## 2019-11-11 RX ADMIN — PALONOSETRON 0.25 MG: 0.25 INJECTION, SOLUTION INTRAVENOUS at 11:18

## 2019-11-11 RX ADMIN — SODIUM CHLORIDE 12 MG: 900 INJECTION, SOLUTION INTRAVENOUS at 11:40

## 2019-11-11 RX ADMIN — PEGFILGRASTIM 6 MG: KIT SUBCUTANEOUS at 16:40

## 2019-11-11 RX ADMIN — Medication 500 UNITS: at 16:39

## 2019-11-11 RX ADMIN — SODIUM CHLORIDE 150 MG: 900 INJECTION, SOLUTION INTRAVENOUS at 11:38

## 2019-11-11 RX ADMIN — FAMOTIDINE 20 MG: 10 INJECTION, SOLUTION INTRAVENOUS at 11:16

## 2019-11-11 RX ADMIN — POTASSIUM CHLORIDE 40 MEQ: 750 TABLET, FILM COATED, EXTENDED RELEASE ORAL at 11:11

## 2019-11-11 RX ADMIN — PACLITAXEL 332 MG: 6 INJECTION, SOLUTION INTRAVENOUS at 12:50

## 2019-11-11 RX ADMIN — SODIUM CHLORIDE 25 ML/HR: 900 INJECTION, SOLUTION INTRAVENOUS at 11:12

## 2019-11-11 RX ADMIN — CARBOPLATIN 630 MG: 10 INJECTION INTRAVENOUS at 15:59

## 2019-11-11 RX ADMIN — Medication 10 ML: at 16:39

## 2019-11-11 RX ADMIN — SODIUM CHLORIDE 10 ML: 9 INJECTION, SOLUTION INTRAMUSCULAR; INTRAVENOUS; SUBCUTANEOUS at 09:41

## 2019-11-11 RX ADMIN — Medication 10 ML: at 09:41

## 2019-11-11 RX ADMIN — DIPHENHYDRAMINE HYDROCHLORIDE 50 MG: 50 INJECTION INTRAMUSCULAR; INTRAVENOUS at 11:18

## 2019-11-11 NOTE — PROGRESS NOTES
Lucas Fay is a 60 y.o. male follow up for adenocarcinoma of unknown primary. 1. Have you been to the ER, urgent care clinic since your last visit? Hospitalized since your last visit?no     2. Have you seen or consulted any other health care providers outside of the [address] [place] [address] since your last visit? Include any pap smears or colon screening.  no

## 2019-11-11 NOTE — PROGRESS NOTES
[facility] VISIT NOTE  Date: 2019    Name: Chey Silva    MRN: [id number]         : 1960    920  Mr. Susie Sanders Arrived ambulatory and in no distress for C5 D1 of Carbo/Taxol Regimen. Assessment was completed, no acute issues at this time, no new complaints voiced. Right chest wall port accessed without difficulty, labs drawn & sent for processing. Patient to see MD      Chemotherapy Flowsheet 2019   Cycle C5D1   Date 2019   Drug / Regimen Taxol/Carbo   Pre Meds given   Notes given       Vitals:  Patient Vitals for the past 12 hrs:   Temp Pulse Resp BP SpO2   19 1631 98.1 °F (36.7 °C) 70 -- 120/80 --   19 0924 97.4 °F (36.3 °C) 83 18 106/71 95 %        Lab results were obtained and reviewed. Recent Results (from the past 12 hour(s))   CBC WITH AUTOMATED DIFF    Collection Time: 19  9:30 AM   Result Value Ref Range    WBC 5.8 4.1 - 11.1 K/uL    RBC 3.60 (L) 4.10 - 5.70 M/uL    HGB 11.2 (L) 12.1 - 17.0 g/dL    HCT 33.2 (L) 36.6 - 50.3 %    MCV 92.2 80.0 - 99.0 FL    MCH 31.1 26.0 - 34.0 PG    MCHC 33.7 30.0 - 36.5 g/dL    RDW 18.6 (H) 11.5 - 14.5 %    PLATELET 449 (L) 084 - 400 K/uL    MPV 10.5 8.9 - 12.9 FL    NRBC 0.0 0  WBC    ABSOLUTE NRBC 0.00 0.00 - 0.01 K/uL    NEUTROPHILS 36 32 - 75 %    LYMPHOCYTES 53 (H) 12 - 49 %    MONOCYTES 10 5 - 13 %    EOSINOPHILS 1 0 - 7 %    BASOPHILS 0 0 - 1 %    IMMATURE GRANULOCYTES 0 0.0 - 0.5 %    ABS. NEUTROPHILS 2.1 1.8 - 8.0 K/UL    ABS. LYMPHOCYTES 3.1 0.8 - 3.5 K/UL    ABS. MONOCYTES 0.6 0.0 - 1.0 K/UL    ABS. EOSINOPHILS 0.0 0.0 - 0.4 K/UL    ABS. BASOPHILS 0.0 0.0 - 0.1 K/UL    ABS. IMM.  GRANS. 0.0 0.00 - 0.04 K/UL    DF AUTOMATED     METABOLIC PANEL, COMPREHENSIVE    Collection Time: 19  9:30 AM   Result Value Ref Range    Sodium 140 136 - 145 mmol/L    Potassium 3.4 (L) 3.5 - 5.1 mmol/L    Chloride 107 97 - 108 mmol/L    CO2 29 21 - 32 mmol/L    Anion gap 4 (L) 5 - 15 mmol/L    Glucose 101 (H) 65 - 100 mg/dL    BUN 14 6 - 20 MG/DL    Creatinine 0.81 0.70 - 1.30 MG/DL    BUN/Creatinine ratio 17 12 - 20      GFR est AA >60 >60 ml/min/1.73m2    GFR est non-AA >60 >60 ml/min/1.73m2    Calcium 9.3 8.5 - 10.1 MG/DL    Bilirubin, total 0.5 0.2 - 1.0 MG/DL    ALT (SGPT) 16 12 - 78 U/L    AST (SGOT) 9 (L) 15 - 37 U/L    Alk.  phosphatase 66 45 - 117 U/L    Protein, total 7.4 6.4 - 8.2 g/dL    Albumin 3.7 3.5 - 5.0 g/dL    Globulin 3.7 2.0 - 4.0 g/dL    A-G Ratio 1.0 (L) 1.1 - 2.2         Medications received:  Medications Administered     0.9% sodium chloride infusion     Admin Date  11/11/2019 Action  New Bag Dose  25 mL/hr Rate  25 mL/hr Route  IntraVENous Administered By  Anabell Guallpa RN          CARBOplatin (PARAPLATIN) 630 mg in 0.9% sodium chloride 250 mL, overfill volume 25 mL chemo infusion     Admin Date  11/11/2019 Action  New Bag Dose  630 mg Rate  676 mL/hr Route  IntraVENous Administered By  Anabell Guallpa RN          dexamethasone (DECADRON) 12 mg in 0.9% sodium chloride 50 mL IVPB     Admin Date  11/11/2019 Action  Given Dose  12 mg Route  IntraVENous Administered By  Anabell Guallpa RN          diphenhydrAMINE (BENADRYL) injection 50 mg     Admin Date  11/11/2019 Action  Given Dose  50 mg Route  IntraVENous Administered By  Anabell Guallpa RN          famotidine (PF) (PEPCID) 20 mg in sodium chloride 0.9% 10 mL injection     Admin Date  11/11/2019 Action  Given Dose  20 mg Route  IntraVENous Administered By  Anabell Guallpa RN          fosaprepitant (EMEND) 150 mg in 0.9% sodium chloride 150 mL IVPB     Admin Date  11/11/2019 Action  Given Dose  150 mg Rate  450 mL/hr Route  IntraVENous Administered By  Anabell Guallpa RN          heparin (porcine) pf 300-500 Units     Admin Date  11/11/2019 Action  Given Dose  500 Units Route  InterCATHeter Administered By  Anabell Guallpa RN          PACLitaxel (TAXOL) 332 mg in 0.9% sodium chloride 500 mL, overfill volume 50 mL chemo infusion     Admin Date  11/11/2019 Action  New Bag Dose  332 mg Rate  201.8 mL/hr Route  IntraVENous Administered By  Orly Kaplan RN          palonosetron HCl (ALOXI) injection 0.25 mg     Admin Date  11/11/2019 Action  Given Dose  0.25 mg Route  IntraVENous Administered By  Orly Kaplan RN          pegfilgrastim (NEULASTA) wearable SQ injector 6 mg     Admin Date  11/11/2019 Action  Given Dose  6 mg Route  SubCUTAneous Administered By  Orly Kaplan RN          potassium chloride SR (KLOR-CON 10) tablet 40 mEq     Admin Date  11/11/2019 Action  Given Dose  40 mEq Route  Oral Administered By  Orly Kaplan RN          saline peripheral flush soln 10 mL     Admin Date  11/11/2019 Action  Given Dose  10 mL Route  InterCATHeter Administered By  Orly Kaplan RN           Admin Date  11/11/2019 Action  Given Dose  10 mL Route  InterCATHeter Administered By  Orly Kaplan RN          sodium chloride 0.9% injection 10 mL     Admin Date  11/11/2019 Action  Given Dose  10 mL Route  IntraVENous Administered By  Oryl Kaplan RN              Education provided to patient about Neulasta On Body Injector including: side effects, how/when to remove the device, as well as what to do in the event of device malfunction. Opportunity for questions was provided and all questions were answered. Patient verbalized understanding. Mr. Florencia Ragsdale tolerated treatment well and was discharged from [place] [address] in stable condition at 1450. Port de-accessed, flushed & heparinized per protocol. He is to return on 12/2/19 at 9:00 for his next appointment.     Lizzie Smith RN  November 11, 2019    Future Appointments:  Future Appointments   Date Time Provider Carmen Wynn   12/2/2019  9:00 AM [facility]   12/2/2019  9:45 AM Rikki Hayes NP [facility] SCHED   12/23/2019  9:00 AM SS INF1 CH3 <4H [facility]   1/13/2020  9:00 AM SS [facility]4 [facility]

## 2019-11-14 ENCOUNTER — APPOINTMENT (OUTPATIENT)
Dept: INFUSION THERAPY | Age: 59
End: 2019-11-14
Payer: OTHER GOVERNMENT

## 2019-11-18 ENCOUNTER — APPOINTMENT (OUTPATIENT)
Dept: CT IMAGING | Age: 59
End: 2019-11-18
Attending: EMERGENCY MEDICINE
Payer: OTHER GOVERNMENT

## 2019-11-18 ENCOUNTER — TELEPHONE (OUTPATIENT)
Dept: ONCOLOGY | Age: 59
End: 2019-11-18

## 2019-11-18 ENCOUNTER — HOSPITAL ENCOUNTER (EMERGENCY)
Age: 59
Discharge: HOME | End: 2019-11-18
Attending: EMERGENCY MEDICINE
Payer: OTHER GOVERNMENT

## 2019-11-18 VITALS
OXYGEN SATURATION: 96 % | HEIGHT: 71 IN | HEART RATE: 72 BPM | WEIGHT: 150 LBS | BODY MASS INDEX: 21 KG/M2 | DIASTOLIC BLOOD PRESSURE: 79 MMHG | TEMPERATURE: 98 F | RESPIRATION RATE: 16 BRPM | SYSTOLIC BLOOD PRESSURE: 131 MMHG

## 2019-11-18 DIAGNOSIS — R10.32 ABDOMINAL PAIN, LLQ (LEFT LOWER QUADRANT): Primary | ICD-10-CM

## 2019-11-18 DIAGNOSIS — D69.6 THROMBOCYTOPENIA (HCC): ICD-10-CM

## 2019-11-18 DIAGNOSIS — R19.7 DIARRHEA, UNSPECIFIED TYPE: ICD-10-CM

## 2019-11-18 LAB
ALBUMIN SERPL-MCNC: 4 G/DL (ref 3.5–5)
ALBUMIN/GLOB SERPL: 1 {RATIO} (ref 1.1–2.2)
ALP SERPL-CCNC: 76 U/L (ref 45–117)
ALT SERPL-CCNC: 14 U/L (ref 12–78)
ANION GAP SERPL CALC-SCNC: 7 MMOL/L (ref 5–15)
APPEARANCE UR: CLEAR
AST SERPL-CCNC: 9 U/L (ref 15–37)
BACTERIA URNS QL MICRO: NEGATIVE /HPF
BASOPHILS # BLD: 0.1 K/UL (ref 0–0.1)
BASOPHILS NFR BLD: 1 % (ref 0–1)
BILIRUB SERPL-MCNC: 0.9 MG/DL (ref 0.2–1)
BILIRUB UR QL: NEGATIVE
BUN SERPL-MCNC: 20 MG/DL (ref 6–20)
BUN/CREAT SERPL: 22 (ref 12–20)
CALCIUM SERPL-MCNC: 10.5 MG/DL (ref 8.5–10.1)
CHLORIDE SERPL-SCNC: 100 MMOL/L (ref 97–108)
CO2 SERPL-SCNC: 29 MMOL/L (ref 21–32)
COLOR UR: NORMAL
CREAT SERPL-MCNC: 0.93 MG/DL (ref 0.7–1.3)
DIFFERENTIAL METHOD BLD: ABNORMAL
EOSINOPHIL # BLD: 0 K/UL (ref 0–0.4)
EOSINOPHIL NFR BLD: 0 % (ref 0–7)
EPITH CASTS URNS QL MICRO: NORMAL /LPF
ERYTHROCYTE [DISTWIDTH] IN BLOOD BY AUTOMATED COUNT: 17.2 % (ref 11.5–14.5)
GLOBULIN SER CALC-MCNC: 4 G/DL (ref 2–4)
GLUCOSE SERPL-MCNC: 99 MG/DL (ref 65–100)
GLUCOSE UR STRIP.AUTO-MCNC: NEGATIVE MG/DL
HCT VFR BLD AUTO: 37 % (ref 36.6–50.3)
HGB BLD-MCNC: 12.6 G/DL (ref 12.1–17)
HGB UR QL STRIP: NEGATIVE
IMM GRANULOCYTES # BLD AUTO: 0.5 K/UL
IMM GRANULOCYTES NFR BLD AUTO: 7 %
KETONES UR QL STRIP.AUTO: NEGATIVE MG/DL
LACTATE SERPL-SCNC: 1.9 MMOL/L (ref 0.4–2)
LACTATE SERPL-SCNC: 2.5 MMOL/L (ref 0.4–2)
LEUKOCYTE ESTERASE UR QL STRIP.AUTO: NEGATIVE
LYMPHOCYTES # BLD: 2.5 K/UL (ref 0.8–3.5)
LYMPHOCYTES NFR BLD: 38 % (ref 12–49)
MCH RBC QN AUTO: 31.2 PG (ref 26–34)
MCHC RBC AUTO-ENTMCNC: 34.1 G/DL (ref 30–36.5)
MCV RBC AUTO: 91.6 FL (ref 80–99)
MONOCYTES # BLD: 1 K/UL (ref 0–1)
MONOCYTES NFR BLD: 15 % (ref 5–13)
NEUTS SEG # BLD: 2.5 K/UL (ref 1.8–8)
NEUTS SEG NFR BLD: 39 % (ref 32–75)
NITRITE UR QL STRIP.AUTO: NEGATIVE
NRBC # BLD: 0 K/UL (ref 0–0.01)
NRBC BLD-RTO: 0 PER 100 WBC
PH UR STRIP: 6 [PH] (ref 5–8)
PLATELET # BLD AUTO: 50 K/UL (ref 150–400)
POTASSIUM SERPL-SCNC: 4.3 MMOL/L (ref 3.5–5.1)
PROT SERPL-MCNC: 8 G/DL (ref 6.4–8.2)
PROT UR STRIP-MCNC: NEGATIVE MG/DL
RBC # BLD AUTO: 4.04 M/UL (ref 4.1–5.7)
RBC #/AREA URNS HPF: NORMAL /HPF (ref 0–5)
RBC MORPH BLD: ABNORMAL
SODIUM SERPL-SCNC: 136 MMOL/L (ref 136–145)
SP GR UR REFRACTOMETRY: 1.01 (ref 1–1.03)
UA: UC IF INDICATED,UAUC: NORMAL
UROBILINOGEN UR QL STRIP.AUTO: 0.2 EU/DL (ref 0.2–1)
WBC # BLD AUTO: 6.6 K/UL (ref 4.1–11.1)
WBC URNS QL MICRO: NORMAL /HPF (ref 0–4)

## 2019-11-18 PROCEDURE — 85025 COMPLETE CBC W/AUTO DIFF WBC: CPT

## 2019-11-18 PROCEDURE — 96376 TX/PRO/DX INJ SAME DRUG ADON: CPT

## 2019-11-18 PROCEDURE — 80053 COMPREHEN METABOLIC PANEL: CPT

## 2019-11-18 PROCEDURE — 83605 ASSAY OF LACTIC ACID: CPT

## 2019-11-18 PROCEDURE — 74011250636 HC RX REV CODE- 250/636: Performed by: EMERGENCY MEDICINE

## 2019-11-18 PROCEDURE — 74177 CT ABD & PELVIS W/CONTRAST: CPT

## 2019-11-18 PROCEDURE — 74011636320 HC RX REV CODE- 636/320: Performed by: EMERGENCY MEDICINE

## 2019-11-18 PROCEDURE — 96374 THER/PROPH/DIAG INJ IV PUSH: CPT

## 2019-11-18 PROCEDURE — 81001 URINALYSIS AUTO W/SCOPE: CPT

## 2019-11-18 PROCEDURE — 96361 HYDRATE IV INFUSION ADD-ON: CPT

## 2019-11-18 PROCEDURE — 36415 COLL VENOUS BLD VENIPUNCTURE: CPT

## 2019-11-18 PROCEDURE — 96375 TX/PRO/DX INJ NEW DRUG ADDON: CPT

## 2019-11-18 PROCEDURE — 74011000250 HC RX REV CODE- 250: Performed by: EMERGENCY MEDICINE

## 2019-11-18 PROCEDURE — 99285 EMERGENCY DEPT VISIT HI MDM: CPT

## 2019-11-18 PROCEDURE — 74011250637 HC RX REV CODE- 250/637: Performed by: EMERGENCY MEDICINE

## 2019-11-18 RX ORDER — ONDANSETRON 2 MG/ML
4 INJECTION INTRAMUSCULAR; INTRAVENOUS
Status: COMPLETED | OUTPATIENT
Start: 2019-11-18 | End: 2019-11-18

## 2019-11-18 RX ORDER — SODIUM CHLORIDE 0.9 % (FLUSH) 0.9 %
10 SYRINGE (ML) INJECTION
Status: COMPLETED | OUTPATIENT
Start: 2019-11-18 | End: 2019-11-18

## 2019-11-18 RX ORDER — MORPHINE SULFATE 2 MG/ML
2 INJECTION, SOLUTION INTRAMUSCULAR; INTRAVENOUS
Status: COMPLETED | OUTPATIENT
Start: 2019-11-18 | End: 2019-11-18

## 2019-11-18 RX ADMIN — SODIUM CHLORIDE 1000 ML: 900 INJECTION, SOLUTION INTRAVENOUS at 13:32

## 2019-11-18 RX ADMIN — IOPAMIDOL 100 ML: 755 INJECTION, SOLUTION INTRAVENOUS at 14:36

## 2019-11-18 RX ADMIN — MORPHINE SULFATE 2 MG: 2 INJECTION, SOLUTION INTRAMUSCULAR; INTRAVENOUS at 14:52

## 2019-11-18 RX ADMIN — SODIUM CHLORIDE 1000 ML: 900 INJECTION, SOLUTION INTRAVENOUS at 16:06

## 2019-11-18 RX ADMIN — ONDANSETRON 4 MG: 2 SOLUTION INTRAMUSCULAR; INTRAVENOUS at 13:34

## 2019-11-18 RX ADMIN — LIDOCAINE HYDROCHLORIDE 40 ML: 20 SOLUTION ORAL; TOPICAL at 17:24

## 2019-11-18 RX ADMIN — MORPHINE SULFATE 2 MG: 2 INJECTION, SOLUTION INTRAMUSCULAR; INTRAVENOUS at 13:37

## 2019-11-18 RX ADMIN — Medication 10 ML: at 14:36

## 2019-11-18 NOTE — ED NOTES
Patient reports he is ready to go home, that it is cold in the room and he wants to go home, MD and I explained that we must re-draw one lab after fluid resuscitation and then he can be discharged if the lab result is an improvement over the initial result.

## 2019-11-18 NOTE — ED PROVIDER NOTES
EMERGENCY DEPARTMENT HISTORY AND PHYSICAL EXAM 
 
 
Date: 11/18/2019 Patient Name: Sachin Packer History of Presenting Illness Chief Complaint Patient presents with  Abdominal Pain History Provided By: Patient HPI: Sachin Packer, 60 y.o. male with past medical history significant for colon cancer (per chart review), congestive heart failure, COPD, GERD, hypertension, and an adenocarcinoma of unknown primary that he is currently being followed by oncology for and receiving chemotherapy who presents via private vehicle to the ED with cc of left lower quadrant abdominal pain as well as diarrhea for the past 2 days. Patient states he last had chemo approximately 1 week ago. He has been doing well at the end of last week and states his symptoms started on Saturday morning at around 11 AM.  He denies any vomiting, but does endorse some nausea. His pain is described as an sharp pressure pain in the left lower quadrant that does not radiate. He rates his pain as an 8.5 out of 10. His pain is worse when he needs to have a bowel movement and nothing makes it better. He called his oncologist today who told him to go to the emergency department to get some labs and imaging done. PMHx: Colon cancer, CHF, COPD, GERD, hypertension, adenocarcinoma of unknown primary Social Hx: Denies alcohol, tobacco, or illegal drug use PCP: Wilfred Michelle NP There are no other complaints, changes, or physical findings at this time. No current facility-administered medications on file prior to encounter. Current Outpatient Medications on File Prior to Encounter Medication Sig Dispense Refill  oxyCODONE IR (ROXICODONE) 10 mg tab immediate release tablet Take 1 Tab by mouth every four (4) hours as needed for Pain for up to 14 days. Max Daily Amount: 60 mg. 42 Tab 0  
 potassium chloride (K-DUR, KLOR-CON) 20 mEq tablet Take 1 Tab by mouth daily.  30 Tab 0  
  gabapentin (NEURONTIN) 300 mg capsule Take 1 Cap by mouth three (3) times daily. Max Daily Amount: 900 mg. Indications: Neuropathic Pain 90 Cap 2  
 senna-docusate (PERICOLACE) 8.6-50 mg per tablet Take 1 Tab by mouth nightly. 30 Tab 3  
 ondansetron hcl (ZOFRAN) 4 mg tablet Take 2 Tabs by mouth every eight (8) hours as needed for Nausea. 45 Tab 3  prochlorperazine (COMPAZINE) 10 mg tablet Take 1 Tab by mouth every six (6) hours as needed for Nausea. 45 Tab 3  
 lisinopril (PRINIVIL, ZESTRIL) 5 mg tablet TAKE 1 TABLET BY MOUTH DAILY 30 Tab 0  
 naloxone (NARCAN) 4 mg/actuation nasal spray Use 1 spray intranasally, then discard. Repeat with new spray every 2 min as needed for opioid overdose symptoms, alternating nostrils. 1 Each 0  
 zolpidem (AMBIEN) 5 mg tablet Take 1 Tab by mouth nightly as needed for Sleep. Max Daily Amount: 5 mg. 30 Tab 0  
 dexAMETHasone (DECADRON) 4 mg tablet Take 4mg (1 tab) with breakfast on days 2 and 3 after chemotherapy to prevent nausea. 20 Tab 0  
 lidocaine-prilocaine (EMLA) topical cream Apply  to affected area as needed for Pain. Apply quarter size amount to port prior to chemotherapy 30 g 1  
 tamsulosin (FLOMAX) 0.4 mg capsule Take 0.4 mg by mouth daily.  OTHER Take  by inhalation. Alero Inhaler  metoprolol succinate (TOPROL-XL) 25 mg XL tablet Take 1 Tab by mouth nightly. 30 Tab 5 Past History Past Medical History: 
Past Medical History:  
Diagnosis Date  Abnormal nuclear stress test 12/7/2015  Cancer [facility])   
 colon  Cardiomyopathy ([facility] [address] 2010 EF 45%  Chronic obstructive pulmonary disease ([facility] [address]  Chronic systolic heart failure ([address]  GERD (gastroesophageal reflux disease)  HTN (hypertension)  PAC (premature atrial contraction)  Tobacco use disorder Past Surgical History: 
Past Surgical History:  
Procedure Laterality Date  COLONOSCOPY N/A 7/10/2019 COLONOSCOPY AND ESOPHAGOGASTRODUODENOSCOPY (EGD) performed by Tabatha Mckoy MD at [facility]  HX SPLENECTOMY  IR INSERT TUNL CVC W PORT OVER 5 YEARS  7/19/2019 Family History: 
Family History Problem Relation Age of Onset  Stroke Mother  Coronary Artery Disease Sister  Heart Disease Paternal Grandfather Social History: 
Social History Tobacco Use  Smoking status: Former Smoker Packs/day: 0.50 Types: Cigarettes Last attempt to quit: 4/22/2016 Years since quitting: 3.5  Smokeless tobacco: Never Used Substance Use Topics  Alcohol use: Not Currently Comment: rarely  Drug use: No  
 
Allergies: 
No Known Allergies Review of Systems Review of Systems Constitutional: Negative for chills and fever. HENT: Negative for congestion, rhinorrhea, sneezing and sore throat. Eyes: Negative for redness and visual disturbance. Respiratory: Negative for shortness of breath. Cardiovascular: Negative for chest pain and leg swelling. Gastrointestinal: Positive for abdominal pain, diarrhea and nausea. Negative for vomiting. Genitourinary: Negative for difficulty urinating and frequency. Musculoskeletal: Negative for back pain, myalgias and neck stiffness. Skin: Negative for rash. Neurological: Negative for dizziness, syncope, weakness and headaches. Hematological: Negative for adenopathy. All other systems reviewed and are negative. Physical Exam  
Physical Exam  
Constitutional: He is oriented to person, place, and time. He appears well-developed and well-nourished. HENT:  
Head: Normocephalic and atraumatic. Mouth/Throat: Oropharynx is clear and moist and mucous membranes are normal.  
Eyes: EOM are normal.  
Neck: Normal range of motion and full passive range of motion without pain. Neck supple. Cardiovascular: Normal rate, regular rhythm, normal heart sounds, intact distal pulses and normal pulses. No murmur heard. Pulmonary/Chest: Effort normal and breath sounds normal. No respiratory distress. He exhibits no tenderness. Abdominal: Soft. Normal appearance and bowel sounds are normal. There is tenderness in the left lower quadrant. There is guarding. There is no rebound. Neurological: He is alert and oriented to person, place, and time. He has normal strength. Skin: Skin is warm, dry and intact. No rash noted. No erythema. Psychiatric: He has a normal mood and affect. His speech is normal and behavior is normal. Judgment and thought content normal.  
Nursing note and vitals reviewed. Diagnostic Study Results Labs - Recent Results (from the past 24 hour(s)) CBC WITH AUTOMATED DIFF Collection Time: 11/18/19  1:31 PM  
Result Value Ref Range WBC 6.6 4.1 - 11.1 K/uL  
 RBC 4.04 (L) 4.10 - 5.70 M/uL  
 HGB 12.6 12.1 - 17.0 g/dL HCT 37.0 36.6 - 50.3 % MCV 91.6 80.0 - 99.0 FL  
 MCH 31.2 26.0 - 34.0 PG  
 MCHC 34.1 30.0 - 36.5 g/dL  
 RDW 17.2 (H) 11.5 - 14.5 % PLATELET 50 (L) 752 - 400 K/uL NRBC 0.0 0  WBC ABSOLUTE NRBC 0.00 0.00 - 0.01 K/uL NEUTROPHILS 39 32 - 75 % LYMPHOCYTES 38 12 - 49 % MONOCYTES 15 (H) 5 - 13 % EOSINOPHILS 0 0 - 7 % BASOPHILS 1 0 - 1 % IMMATURE GRANULOCYTES 7 %  
 ABS. NEUTROPHILS 2.5 1.8 - 8.0 K/UL  
 ABS. LYMPHOCYTES 2.5 0.8 - 3.5 K/UL  
 ABS. MONOCYTES 1.0 0.0 - 1.0 K/UL  
 ABS. EOSINOPHILS 0.0 0.0 - 0.4 K/UL  
 ABS. BASOPHILS 0.1 0.0 - 0.1 K/UL  
 ABS. IMM. GRANS. 0.5 K/UL  
 DF MANUAL    
 RBC COMMENTS ANISOCYTOSIS 
1+ METABOLIC PANEL, COMPREHENSIVE Collection Time: 11/18/19  1:31 PM  
Result Value Ref Range Sodium 136 136 - 145 mmol/L Potassium 4.3 3.5 - 5.1 mmol/L Chloride 100 97 - 108 mmol/L  
 CO2 29 21 - 32 mmol/L Anion gap 7 5 - 15 mmol/L Glucose 99 65 - 100 mg/dL BUN 20 6 - 20 MG/DL  Creatinine 0.93 0.70 - 1.30 MG/DL  
 BUN/Creatinine ratio 22 (H) 12 - 20    
 GFR est AA >60 >60 ml/min/1.73m2 GFR est non-AA >60 >60 ml/min/1.73m2 Calcium 10.5 (H) 8.5 - 10.1 MG/DL Bilirubin, total 0.9 0.2 - 1.0 MG/DL  
 ALT (SGPT) 14 12 - 78 U/L  
 AST (SGOT) 9 (L) 15 - 37 U/L Alk. phosphatase 76 45 - 117 U/L Protein, total 8.0 6.4 - 8.2 g/dL Albumin 4.0 3.5 - 5.0 g/dL Globulin 4.0 2.0 - 4.0 g/dL A-G Ratio 1.0 (L) 1.1 - 2.2 LACTIC ACID Collection Time: 11/18/19  1:31 PM  
Result Value Ref Range Lactic acid 2.5 (HH) 0.4 - 2.0 MMOL/L  
URINALYSIS W/ REFLEX CULTURE Collection Time: 11/18/19  1:31 PM  
Result Value Ref Range Color YELLOW/STRAW Appearance CLEAR CLEAR Specific gravity 1.010 1.003 - 1.030    
 pH (UA) 6.0 5.0 - 8.0 Protein NEGATIVE  NEG mg/dL Glucose NEGATIVE  NEG mg/dL Ketone NEGATIVE  NEG mg/dL Bilirubin NEGATIVE  NEG Blood NEGATIVE  NEG Urobilinogen 0.2 0.2 - 1.0 EU/dL Nitrites NEGATIVE  NEG Leukocyte Esterase NEGATIVE  NEG    
 WBC 0-4 0 - 4 /hpf  
 RBC 0-5 0 - 5 /hpf Epithelial cells FEW FEW /lpf Bacteria NEGATIVE  NEG /hpf  
 UA:UC IF INDICATED CULTURE NOT INDICATED BY UA RESULT CNI    
LACTIC ACID Collection Time: 11/18/19  4:43 PM  
Result Value Ref Range Lactic acid 1.9 0.4 - 2.0 MMOL/L Radiologic Studies -  
CT ABD PELV W CONT Final Result IMPRESSION:  
  
1. Partial sigmoid resection with anastomosis but no bowel obstruction or left  
lower quadrant inflammatory stranding. No significant bowel wall thickening. 2. Unchanged right adrenal mass and hyperdense mildly enlarged retrocaval right  
upper quadrant lymph node. Guy Hall No results found. Medical Decision Making I am the first provider for this patient. I reviewed the vital signs, available nursing notes, past medical history, past surgical history, family history and social history. Vital Signs-Reviewed the patient's vital signs. Patient Vitals for the past 24 hrs: 
 Temp Pulse Resp BP SpO2 11/18/19 1700  72 16 131/79 96 % 11/18/19 1524 98 °F (36.7 °C) 70 16 118/83 97 % 11/18/19 1400  68 18 128/83 99 % 11/18/19 1344  74 18 119/83 99 % 11/18/19 1301 97.8 °F (36.6 °C) 90 22 (!) 131/95 100 % Pulse Oximetry Analysis - 99% on RA Records Reviewed: Nursing Notes, Old Medical Records, Previous Radiology Studies and Previous Laboratory Studies Provider Notes (Medical Decision Making):  
59-year-old male presents with left lower quadrant abdominal pain and diarrhea for the past 48 hours. Differential includes diverticulitis, colon cancer, UTI, dehydration, and electrolyte abnormality. Will check basic labs, treat with IV fluids, IV antiemetics, IV pain medications, and CT his abdomen pelvis to further assess. ED Course:  
Initial assessment performed. The patients presenting problems have been discussed, and they are in agreement with the care plan formulated and outlined with them. I have encouraged them to ask questions as they arise throughout their visit. Progress Note 2:48 PM 
I have re-evaluated pt and his pain was better controlled after the IV morphine. His labs are unremarkable with the exception of a lactate that is 2.5. He just got his CT done. I have reviewed his recent colonoscopy from July 10th of this year that showed diverticulosis, but no other abnormalities. ED Course as of Nov 19 1005 Mon Nov 18, 2019  
1530 BEDSIDE SIGN OUT: 
3:30 PM 
Discussed pt's hx, disposition, and available diagnostic and imaging results with Dr. Moisés Gurrola. Reviewed care plans. Both providers and patient are in agreement with care plan. Racquel Ling MD is transferring care at this time. [MS] 1720 CT scan negative for acute process. Lactic cleared after IVF Jamie Collins [address] Requesting d/c. Will d/c with outpatient follow up. Jamie Collins ED Course User Index Kassandra Brown MD 
[MS] Terra Painting MD  
 
 
Progress Note: Updated pt on all returned results and findings. Discussed the importance of proper follow up as referred below along with return precautions. Pt in agreement with the care plan and expresses agreement with and understanding of all items discussed. Disposition: 
Discharge Note: The pt is ready for discharge. The pt's signs, symptoms, diagnosis, and discharge instructions have been discussed and pt has conveyed their understanding. The pt is to follow up as recommended or return to ER should their symptoms worsen. Plan has been discussed and pt is in agreement. PLAN: 
1. Discharge Medication List as of 11/18/2019  5:33 PM  
  
 
2. Follow-up Information Follow up With Specialties Details Why Contact Info Martin Acosta NP Nurse Practitioner Schedule an appointment as soon as possible for a visit  [facility] [address]  
[phone] Michelle Gomez MD Hematology and Oncology, Internal Medicine, Hematology, Oncology Schedule an appointment as soon as possible for a visit  [address] [facility]. [address] [facility] 
[phone] [facility] EMERGENCY DEPT Emergency Medicine  As needed, If symptoms worsen Jasmyne Farley Return to ED if worse Diagnosis Clinical Impression: 1. Abdominal pain, LLQ (left lower quadrant) 2. Diarrhea, unspecified type 3. Thrombocytopenia ([address] Please note that this dictation was completed with Dragon, computer voice recognition software. Quite often unanticipated grammatical, syntax, homophones, and other interpretive errors are inadvertently transcribed by the computer software. Please disregard these errors. Additionally, please excuse any errors that have escaped final proofreading.

## 2019-11-18 NOTE — ED TRIAGE NOTES
Patient arrives with a neighbor that drove him here. Pt states, \"my oncologist told me to come in here and get some images of my abdomen and some fluids. \" Pt reports his last chemo was one week ago at [facility].

## 2019-11-18 NOTE — ED NOTES
Pt given printed discharge instructions and 0 script(s). Pt verbalized understanding of instructions and the importance of following up with Oncologist.  Pt alert and oriented, escorted to his friend's car by wheelchair. Pt advised not to take Colace until the diarrhea resolves.

## 2019-11-18 NOTE — DISCHARGE INSTRUCTIONS

## 2019-11-18 NOTE — TELEPHONE ENCOUNTER
[address] [place]  [phone]        11/18/19 12:00 PM Called patient, his cousin Jessenia Mayo was also on the line. He has complaints of LLQ abdominal pain and diarrhea that started Saturday. Patient moaning at time of call. Rates pain as an 8 out of 10. Patient unable to state how many stools he is having per day, but does mention that it is \"not that much\" at a time. Patient states he has not eaten anything since Saturday but has been drinking water. Patient denies fevers and blood in stool. Advised that this nurse would speak to Katja Hansen and Dr. Anthony Curtis regarding above and call him back with recommendations. Patient and Jessenia Mayo verbalized understanding. 12:10 PM Called patient back. Informed him, per Katja Hansen, that he should go to ED for evaluation of abdominal pain--including imaging of abdomen and IVF as he has not eaten x 2 days. Patient verbalized understanding. Jessenia Mayo will contact patient's neighbor, Al, to transport patient as Jessenia Mayo lives out of [facility]. Patient aware that he should go to a [employer]. No further questions or concerns at this time.

## 2019-11-18 NOTE — ED NOTES
Emergency Department Nursing Plan of Care The Nursing Plan of Care is developed from the Nursing assessment and Emergency Department Attending provider initial evaluation. The plan of care may be reviewed in the ED Provider note. The Plan of Care was developed with the following considerations:  
Patient / Family readiness to learn indicated by:verbalized understanding Persons(s) to be included in education: patient Barriers to Learning/Limitations:No 
 
Signed Jag Alvarado RN   
11/18/2019   2:56 PM

## 2019-11-18 NOTE — ED NOTES
Pt alert and oriented, in no acute distress, resting in bed with bed in low position and wheels locked, call bell in reach. When the pt returned from CT his abdominal pain was worse than before he left. Pt was moaning in pain, the same as he was on arrival. MD notified, pt given 2 mg Morphine IV. Pt stopped moaning, resting now, reports some pain relief. Pt's neighbor remains at bedside, neighbor given a small bottle of water.

## 2019-11-20 ENCOUNTER — TELEPHONE (OUTPATIENT)
Dept: ONCOLOGY | Age: 59
End: 2019-11-20

## 2019-11-20 NOTE — TELEPHONE ENCOUNTER
[address] [place]  [phone]        11/20/19 10:53 AM Spoke with patient. He states he continues to feel well--had an episode of abdominal \"discomfort\" this morning but states this resolved after having normal bowel movement. He shared that his appetite has increased and expects that he should start to gain weight. Patient stated that he was weighed while in the bed at the ED, weight 150lbs. Patient has a home scale but says that it is depressing to get on it and see his weight change. This nurse explained that knowing his weight, whether loss or gain, can help us better gauge how we can help him and give recommendations to help with weight gain if needed. Patient declines obtaining home weight. He also relayed that he has noticed worsening numbness in his hands. He says he is still able to perform tasks such as buttoning buttons or unscrewing lids. Currently taking gabapentin 300 mg three times daily. Advised this nurse would forward above to Eliel and Dr. Naye Morales to make aware. No further questions or concerns at this time.

## 2019-11-22 RX ORDER — PALONOSETRON 0.05 MG/ML
0.25 INJECTION, SOLUTION INTRAVENOUS ONCE
Status: CANCELLED
Start: 2019-01-01

## 2019-11-22 RX ORDER — SODIUM CHLORIDE 0.9 % (FLUSH) 0.9 %
10 SYRINGE (ML) INJECTION AS NEEDED
Status: CANCELLED
Start: 2019-01-01

## 2019-11-22 RX ORDER — ALBUTEROL SULFATE 0.83 MG/ML
2.5 SOLUTION RESPIRATORY (INHALATION) AS NEEDED
Status: CANCELLED
Start: 2019-01-01

## 2019-11-22 RX ORDER — HYDROCORTISONE SODIUM SUCCINATE 100 MG/2ML
100 INJECTION, POWDER, FOR SOLUTION INTRAMUSCULAR; INTRAVENOUS AS NEEDED
Status: CANCELLED | OUTPATIENT
Start: 2019-01-01

## 2019-11-22 RX ORDER — ACETAMINOPHEN 325 MG/1
650 TABLET ORAL AS NEEDED
Status: CANCELLED
Start: 2019-01-01

## 2019-11-22 RX ORDER — DIPHENHYDRAMINE HYDROCHLORIDE 50 MG/ML
50 INJECTION, SOLUTION INTRAMUSCULAR; INTRAVENOUS AS NEEDED
Status: CANCELLED
Start: 2019-01-01

## 2019-11-22 RX ORDER — SODIUM CHLORIDE 9 MG/ML
10 INJECTION INTRAMUSCULAR; INTRAVENOUS; SUBCUTANEOUS AS NEEDED
Status: CANCELLED | OUTPATIENT
Start: 2019-01-01

## 2019-11-22 RX ORDER — ONDANSETRON 2 MG/ML
8 INJECTION INTRAMUSCULAR; INTRAVENOUS AS NEEDED
Status: CANCELLED | OUTPATIENT
Start: 2019-01-01

## 2019-11-22 RX ORDER — SODIUM CHLORIDE 9 MG/ML
25 INJECTION, SOLUTION INTRAVENOUS CONTINUOUS
Status: CANCELLED | OUTPATIENT
Start: 2019-01-01

## 2019-11-22 RX ORDER — EPINEPHRINE 1 MG/ML
0.3 INJECTION, SOLUTION, CONCENTRATE INTRAVENOUS AS NEEDED
Status: CANCELLED | OUTPATIENT
Start: 2019-01-01

## 2019-11-22 RX ORDER — HEPARIN 100 UNIT/ML
300-500 SYRINGE INTRAVENOUS AS NEEDED
Status: CANCELLED
Start: 2019-01-01

## 2019-11-22 RX ORDER — DIPHENHYDRAMINE HYDROCHLORIDE 50 MG/ML
50 INJECTION, SOLUTION INTRAMUSCULAR; INTRAVENOUS ONCE
Status: CANCELLED
Start: 2019-01-01

## 2019-11-22 RX ORDER — LORAZEPAM 2 MG/ML
0.5 INJECTION INTRAMUSCULAR
Status: CANCELLED
Start: 2019-01-01

## 2019-11-27 NOTE — PROGRESS NOTES
[address] [facility] On[facility] at [facility]  [phone]    Hematology / Oncology Established Visit    Reason for Visit:   Mariela Gant is a 60 y.o. male who comes in for f/u of adenocarcinoma of unknown primary. Initially referred by Dr. Burke Garcia. Hematology Oncology Treatment History:     Diagnosis: Adenocarcinoma of unknown primary    Stage: N/A    Pathology:   6/4/19 4R LN biopsy: rare malignant cells admixed with abundant blood clot  6/4/19 4L LN FNA and core biopsy: Adenocarcinoma. 6/4/19 2R LN FNA and core biopsy: Adenocarcinoma. Comment: IHC stains negative for GATA3, AR, ER, p40. Immunohistochemistry shows that the tumor cells express CK7 with focal expression of CDX2. Tumor cells lack expression of CK20, TTF-1 and Napsin A. These findings are not entirely diagnostic and could be seen in either a primary pulmonary malignancy or a primary upper gastrointestinal tract malignancy. Other sites are also not excluded. Clinical and radiographic correlation is recommended. 6/25/19 2R LN, mediastinum: Metastatic adenocarcinoma (see Comment). Comment - The tumor appears poorly differentiated with areas of necrosis. Immunohistochemical staining reveals positive staining for cytokeratin 7 (strong, diffuse), CDX-2 (focal, weak to moderate) and CEA (focal, moderate to strong). The tumor cells are negative for cytokeratin 20, cytokeratin 5/6, p40, p63, TTF-1, napsin-A, PLAP, HCG, AFP and c-KIT (). The findings are not entirely specific with regard to primary site but would be consistent with upper GI/pancreaticobiliary tract. A pulmonary primary is less likely, but still in the differential.   -PDL1 30%, Foundation One identified high tumor mutational burden - which is predictive of higher response to immunotherapy agents.     Prior Treatment: None    Current Treatment: Carbo-Taxol every 3 weeks x 6 cycles     History of Present Illness:   Mr. Megha Roche is a 60 y.o. male with COPD, remote h/o colon cancer comes in for evaluation of mediastinal lymphadenopathy. Pt had recently p/w shortness of breath, dyspnea on exertion, and was found on chest CT to have R mediastinal lymphadenopathy, which also were PET avid. Case was discussed at Thoracic Tumor Board with thought that this could be pancreatic, urothelial, or germ cell origin. More tissue is recommended. Pt had EGD 3 yrs ago and was told he had GERD. Patient has h/o colon cancer in 2002. He states a cancerous polyp was found and then he underwent colectomy in 2002. This was done by Dr. Keaton Hernandez at [facility], [place], operated by [facility]. Patient had colonoscopies regularly afterwards, last one was approx 2015. Last EGD was in 2018. As part of search for primary lesion, patient underwent EGD, colonoscopy by Dr. Tonja Oviedo, notable for diffusely enlarged gastric folds, but biopsies negative. Pt underwent cytoscopy on 7/17/19 with findings negative for malignancy. Interval History: Patient here for follow up and cycle 6 of treatment. He had an ED visit for severe abdominal pain, ct abd/pelvis was unrevealing. He was having diarrhea, but each stool was painful. Currently denies abdominal pain. Reports he is only having a bowl movement once a week. He is taking senna daily. Reports he is now eating and drinking well. Neuropathy remains present in all extremities. Reports mild relief with gabapentin. Denies chest pain or sob. Denies fevers or chills.      Past Medical History:   Diagnosis Date    Abnormal nuclear stress test 12/7/2015    [facility]     colon    Cardiomyopathy ([facility] 2010    EF 45%    Chronic obstructive pulmonary disease (HCC)     Chronic systolic heart failure (HCC)     GERD (gastroesophageal reflux disease)     HTN (hypertension)     PAC (premature atrial contraction)     Tobacco use disorder       Past Surgical History:   Procedure Laterality Date    COLONOSCOPY N/A 7/10/2019    COLONOSCOPY AND ESOPHAGOGASTRODUODENOSCOPY (EGD) performed by Hakeem Portillo MD at [facility] ENDOSCOPY    HX COLECTOMY      HX SPLENECTOMY      IR INSERT TUNL CVC W PORT OVER 5 YEARS  7/19/2019      Social History     Tobacco Use    Smoking status: Former Smoker     Packs/day: 0.50     Types: Cigarettes     Last attempt to quit: 4/22/2016     Years since quitting: 3.6    Smokeless tobacco: Never Used   Substance Use Topics    Alcohol use: Not Currently     Comment: rarely      Family History   Problem Relation Age of Onset    Stroke Mother     Coronary Artery Disease Sister     Heart Disease Paternal Grandfather      Current Outpatient Medications   Medication Sig    oxyCODONE IR (ROXICODONE) 10 mg tab immediate release tablet Take 1 Tab by mouth every four (4) hours as needed for Pain for up to 14 days. Max Daily Amount: 60 mg.  potassium chloride (K-DUR, KLOR-CON) 20 mEq tablet Take 1 Tab by mouth daily.  gabapentin (NEURONTIN) 300 mg capsule Take 1 Cap by mouth three (3) times daily. Max Daily Amount: 900 mg. Indications: Neuropathic Pain    senna-docusate (PERICOLACE) 8.6-50 mg per tablet Take 1 Tab by mouth nightly.  ondansetron hcl (ZOFRAN) 4 mg tablet Take 2 Tabs by mouth every eight (8) hours as needed for Nausea.  prochlorperazine (COMPAZINE) 10 mg tablet Take 1 Tab by mouth every six (6) hours as needed for Nausea.  lisinopril (PRINIVIL, ZESTRIL) 5 mg tablet TAKE 1 TABLET BY MOUTH DAILY    naloxone (NARCAN) 4 mg/actuation nasal spray Use 1 spray intranasally, then discard. Repeat with new spray every 2 min as needed for opioid overdose symptoms, alternating nostrils.  zolpidem (AMBIEN) 5 mg tablet Take 1 Tab by mouth nightly as needed for Sleep. Max Daily Amount: 5 mg.  dexAMETHasone (DECADRON) 4 mg tablet Take 4mg (1 tab) with breakfast on days 2 and 3 after chemotherapy to prevent nausea.  lidocaine-prilocaine (EMLA) topical cream Apply  to affected area as needed for Pain.  Apply quarter size amount to port prior to chemotherapy    tamsulosin (FLOMAX) 0.4 mg capsule Take 0.4 mg by mouth daily.  OTHER Take  by inhalation. Alero Inhaler    metoprolol succinate (TOPROL-XL) 25 mg XL tablet Take 1 Tab by mouth nightly. No current facility-administered medications for this visit. No Known Allergies     Review of Systems: A complete review of systems was obtained, negative except as described above. Physical Exam:     Visit Vitals  /62   Pulse 78   Temp 98.2 °F (36.8 °C) (Oral)   Resp 16   Ht 5' 11\" (1.803 m)   Wt 157 lb (71.2 kg)   SpO2 97%   BMI 21.90 kg/m²     ECOG PS: 0  General:  thin, no acute distress,ambulating with a cane. Eyes: PERRLA, EOMI, anicteric sclerae  HENT: Atraumatic, OP clear, TMs intact without erythema  Neck: Supple  Lymphatic: No cervical, supraclavicular, axillary or inguinal adenopathy  Respiratory: CTAB, normal respiratory effort  CV: Normal rate, regular rhythm, no murmurs, no peripheral edema, port in place. GI: Soft, nontender, nondistended, no masses, no hepatomegaly, no splenomegaly  MS: Normal gait and station. Digits without clubbing or cyanosis. Skin: No rashes, ecchymoses, or petechiae. Normal temperature, turgor, and texture. Neuro/Psych: Alert, oriented. 5/5 strength in all 4 extremities. Appropriate affect, normal judgment/insight. Results:     Lab Results   Component Value Date/Time    WBC 5.8 12/02/2019 09:49 AM    HGB 11.7 (L) 12/02/2019 09:49 AM    HCT 35.4 (L) 12/02/2019 09:49 AM    PLATELET 625 37/68/1170 09:49 AM    MCV 96.2 12/02/2019 09:49 AM    ABS.  NEUTROPHILS 2.3 12/02/2019 09:49 AM     Lab Results   Component Value Date/Time    Sodium 140 12/02/2019 09:49 AM    Potassium 4.0 12/02/2019 09:49 AM    Chloride 108 12/02/2019 09:49 AM    CO2 27 12/02/2019 09:49 AM    Glucose 106 (H) 12/02/2019 09:49 AM    BUN 13 12/02/2019 09:49 AM    Creatinine 0.87 12/02/2019 09:49 AM    GFR est AA >60 12/02/2019 09:49 AM    GFR est non-AA >60 12/02/2019 09:49 AM    Calcium 9.3 12/02/2019 09:49 AM     Lab Results   Component Value Date/Time    Bilirubin, total 0.2 12/02/2019 09:49 AM    ALT (SGPT) 18 12/02/2019 09:49 AM    AST (SGOT) 11 (L) 12/02/2019 09:49 AM    Alk. phosphatase 78 12/02/2019 09:49 AM    Protein, total 7.2 12/02/2019 09:49 AM    Albumin 3.5 12/02/2019 09:49 AM    Globulin 3.7 12/02/2019 09:49 AM     Lab Results   Component Value Date/Time    Iron 52 10/07/2019 12:53 PM    TIBC 235 (L) 10/07/2019 12:53 PM    Iron % saturation 22 10/07/2019 12:53 PM    Ferritin 880 (H) 10/07/2019 12:53 PM       No results found for: B12LT, FOL, RBCF  No results found for: TSH, TSH2, TSH3, TSHP, TSHEXT, TSHEXT  No results found for: HAMAT, HAAB, HABT, HAAT, HBSAG, HBSB, HBSAT, HBABN, HBCM, HBCAB, HBCAT, XBCABS, [address] [facility], [address] [place] [address], [facility] [place], [id number], [address] [facility], HBCMLT, HBCLT, HBEBLT, [id number], [id number], [address] [place] [address], [id number], HBCMLT, [id number], HCGAT      Imaging:     Chest CT 5/10/19:  FINDINGS:  THYROID: No nodule. MEDIASTINUM: There are enlarged lymph nodes in the right paratracheal region  with 3 enlarged lymph nodes in this area. The largest is inferiorly in the right  paratracheal region measuring 2.5 x 1.7 cm. There are also enlarged lymph nodes  in the medial and lateral AP window with the largest lymph node measuring 18 mm. These lymph nodes are worrisome for neoplastic process. There is a normal size  subcarinal lymph node. Varsha Mookie REBECCA: No mass or lymphadenopathy. THORACIC AORTA: No aneurysm. MAIN PULMONARY ARTERY: Normal in caliber. TRACHEA/BRONCHI: Patent. ESOPHAGUS: No wall thickening or dilatation. HEART: Normal in size. PLEURA: No effusion or pneumothorax. LUNGS: There are moderate changes of centrilobular emphysema. There are bullous  lesions at each apex left greater than right. There is a calcified granuloma in the left upper lobe.   There is volume loss in the medial segment right middle lobe that extends to a  oval-shaped opacity measures 1.8 x 1.1 cm this may all be atelectasis. Pulmonary  nodule within the atelectasis is not excluded. No abnormality corresponds with the rounded opacity seen on the chest  radiograph. Possibly that was a nipple shadow. .  Mild scarring is seen medially in the lingula. INCIDENTALLY IMAGED UPPER ABDOMEN: The spleen has been removed. No adrenal  masses. BONES: No destructive bone lesion. IMPRESSION:  1. There are are multiple enlarged lymph nodes in the right paratracheal region  as well as adenopathy and AP window region. These enlarged lymph nodes are  worrisome for neoplastic process. 2. There is volume loss in right middle lobe with a nodular area of opacity that  could be atelectasis but a superimposed nodule within the areas of atelectasis  is not excluded. Further evaluation is suggested. Tissue diagnosis is suggested. PET CT scan may yield more information. . 23X     5/28/19 PET:  FINDINGS:  HEAD/NECK: No apparent foci of abnormal hypermetabolism. Cerebral evaluation is  limited by normal intense activity. CHEST: A hypermetabolic right paratracheal lymph node SUV is 8.5. There are  hypermetabolic lymph nodes anterior to the main bronchi bilaterally. There is  centrilobular emphysema. There is volume loss in the right middle lobe but a  central obstructing mass is not identified on this PET scan. ABDOMEN/PELVIS: No foci of abnormal hypermetabolism. The prostate gland is  enlarged. SKELETON: No foci of abnormal hypermetabolism in the axial and visualized  appendicular skeleton. IMPRESSION: Hypermetabolic mediastinal lymph nodes concerning for neoplastic process. Chest/abd/pelvis CT 7/30/19:  FINDINGS:   THYROID: No nodule.   MEDIASTINUM: Mediastinal lymphadenopathy has loosely decreased with upper  paratracheal node measuring 1.3 x 1.9 cm, previously 1.4 x 1.8 cm (2, 24), right  paratracheal node at the level of the aortic arch measuring 1.4 x 2.1 cm,  previously 1.7 x 2.6 cm of (2, 27) series, precarinal node on the right  measuring 1.7 x 2.2 cm, previously 1.7 x 2.5 cm (2, 31), and left pericarinal  noted measuring 1.2 x 1.8 cm, previously 1.8 x 2.1 cm of (2, 30). However, there  is an enlarged left preaortic node just lateral to left mainstem bronchus  measuring 1.3 x 2.1 cm which previously measured 8 x 8 mm (2, 33). REBECCA: No mass or lymphadenopathy. THORACIC AORTA: No dissection or aneurysm. MAIN PULMONARY ARTERY: Normal in caliber. TRACHEA/BRONCHI: Patent. ESOPHAGUS: No wall thickening or dilatation. HEART: Normal in size. PLEURA: No effusion or pneumothorax. LUNGS: Centrilobular bullous emphysematous change. Right middle lobe ovoid  entity most likely reflective of atelectasis adjacent to the right heart margin  appears unchanged. Left upper lobe granuloma. No acute infiltrate, nodule, or  mass. LIVER: No mass or biliary dilatation. GALLBLADDER: Unremarkable. SPLEEN: Surgically absent. PANCREAS: No mass or ductal dilatation. ADRENALS: Right adrenal mass not seen previously measures 2.0 x 3.0 cm (2, 62). Left adrenal appears unremarkable. KIDNEYS: No mass, calculus, or hydronephrosis. STOMACH: Unremarkable. SMALL BOWEL: No dilatation or wall thickening. COLON: No dilatation or wall thickening. APPENDIX: Unremarkable. PERITONEUM: No ascites or pneumoperitoneum. RETROPERITONEUM: Right retroperitoneal adenopathy posterior to the inferior vena  cava at the level of the renal hilum measures 1.3 x 1.9 cm, not previously seen  (2, 69). REPRODUCTIVE ORGANS: The prostate and seminal vesicles appear unremarkable. URINARY BLADDER: No mass or calculus. BONES: No destructive bone lesion. ADDITIONAL COMMENTS: Right Port-A-Cath in place with catheter traversing to the  atriocaval junction region. IMPRESSION:  1.  Mixed response to therapy with most mediastinal lymphadenopathy diminishing  in size, however, a left mediastinal lymph node has shown increased size and  there is a new right adrenal mass and new right retroperitoneal adenopathy  suspicious for aggressive metastatic disease. 2. Additional incidental findings as above including centrilobular bullous  emphysema with probable right middle lobe atelectasis. Procedures:     EGD 7/10/19:   Esophagus:normal  Stomach: diffuse enlargement gastric folds with nodular erythema and erosion throughout stomach  Duodenum/jejunum: normal    Colonoscopy 7/10/19: Diverticulosis    CT c/a/p 9/3/19: IMPRESSION:  1. Improvement in adenopathy in the chest. No acute findings or parenchymal abnormalities in the chest.  2. Interval increase in right adrenal mass. 3. Minimal decrease in size of right retroperitoneal node. CT c/a/p 10/6/19:  RECIST   TARGET LESIONS:       Lesion (description)         Location (series/slice)                Size  (cm)  1. Right inferior paratracheal node    3/24                               1.2 x  1.7 cm   2. AP window node                              3/24                                1.1 x 0.8 cm     (previously within size range for a target lesion)   3. Right adrenal mass                           3/57                                3.5 x 2.5 cm  Impression:  Decrease in mediastinal adenopathy and right adrenal mass, compatible with response to treatment. Incidental emphysema and prostate enlargement. Assessment & Plan:   Tiffanie Cleaning is a 60 y.o. male with COPD, remote h/o colon cancer comes in for evaluation and management of adenocarcinoma of unknown primary. 1. Adenocarcinoma of unknown primary: Involving mediastinal LNs, with no other PET findings. Per Thoracic Tumor Board discussion, this could represent upper GI origin, urothelial origin, germ cell tumor, or lung origin. Search for primary lesion was overall negative: Cystoscopy, EGD, colonoscopy negative for malignancy although EGD abnormal with diffusely enlarged gastric folds. Despite h/o colon cancer in 2002, it would be very odd to see a recurrence in the mediastinal LNs. CEA 26.8.  Other tumor markers negative (AFP, hCG, CA 19-9). Unfortunately, this disease is considered incurable, but is treatable. At this time, I recommend chemotherapy treatment using the Carboplatin-Paclitaxel regimen (which covers both lung and GI primaries). We discussed the risks and benefits of chemotherapy with Carboplatin and Paclitaxel. Potential side effects include but are not limited to: nausea, vomiting, diarrhea, constipation, taste changes, allergic reactions, myelosuppression, infection, fatigue, alopecia, neuropathy, mucositis, renal failure, infertility, and rarely, death. Additionally, chemotherapy leads to radiosensitization which can intensify the side effects of radiation therapy. The patient has consented to beginning chemotherapy and chemo ed packet given. CT on 10/6/19 shows good response to treatment with decrease in mediastinal adenopathy and right adrenal mass. Will plan on treating for a total of 6 cycles and go to [facility] in 2nd line. Supportive care medications include: Zofran, Compazine, Emla cream, dexamethasone 4 mg on days 2 and 3 after treatment. -- Proceed with cycle 6 of Carbo-Taxol. (Added Neulasta OBI starting with cycle 3 given severe neutropenia)  -- Repeat imaging after cycle 6.    -- Return on 12/20/19 to start 2nd line treatment with [facility]   -- Send PCP note Dr. Deshawn Pillai. -- Discussed STRATA ([facility]I included) results with patient. If pt wants to continue treatment locally rather than out of town clinical trial, will plan on Keytruda in 2nd line without a chemotherapy holiday. -- Flu shot given on 10/28/19    2. Neoplasm pain: Affecting the upper back and now shoulders. Perhaps related to the mediastinal LNs, but unclear. Advised opioids can cause sedation, constipation and nausea. Patient requiring increased dose of pain medication. Stopped hydrocodone on 10/7/19. Currently prescribed oxycodone  every 4 hours PRN.    -- Patient declined palliative medicine because he does not want to switch to Tuesday appointments. -- Refilled oxycodone 10 mg [id number], for 12/2/19 for #42 tabs x 2 week supply. -- Senna-docusate (pericolace) daily to prevent constipation. 3. H/o Stage I [T1NxMx] sigmoid colon cancer: Found in sigmoid adenoma during a colonoscopy; went on to undergo segmented resection of the sigmoid colon in 2002. Pathology per outside records:  2/6/2002 sigmoid colon polyp: Well differentiated adenocarcinoma arising in an adenoma, involving the mucosa and invading into the upper half of the submucosal stalk. No vascular space involvement is identified. 2/21/2002 Sigmoidectomy, liver biopsy:  -Sigmoid colon, segmented resection: No residual adenocarcinoma present. Polypectomy site with granulation tissue and vascular congestion. No LNs recovered. Distal and proximal margins benign.  -Liver biopsy: Negative for metastatic carcinoma. No significant inflammation or obvious cirrhosis identified. Very minimal steatosis (rare cells with fat globules). -Addendum: Reblocks of adipose tissue; three small benign lymphoid aggregates (< 0.1cm). 4. COPD: Quit smoking in approx 2016. SOB improved after starting inhaler. 5. HTN: Well controlled on Lisinopril and Metoprolol. 6. BPH: On Flomax. 7. 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 Increased numbness in feet, tingling and pain in left hand. Left hand dominant. Increased Gabapentin 300mg BID on 10/16/19.   -- Continue gabapentin to 300 mg TID, #90 tabs with 2 refills e-scribed on 11/11/19.     8. Anemia: likely 2/2 to chemotherapy. Hgb decreased from 12.3 to 11.2 Denies blood in stool. 10/7/19  Iron profile consistent with anemia of chronic disease and ferritin elevated 880. Monitor. 9. Insomnia: Started on Ambien 5mg nightly. Patient reports he only sleeps 3 hours a night since starting this medication. Monitor. 10. Thrombocytopenia: 2/2 chemotherapy. Required delay of cycle 5 for > 2 weeks.      11. Fluids/Electrolytes/nutrition/GI: Weight-loss 171 lb -->157 lb now. Reports unable to eat a large amount due to nausea after treatment. He is unable to tolerate boost/ensure with lactose due to lactose allergy. Hx of hypokalemia, on potassium 20 meq daily. Chronic nausea with treatment, worse day 3 after treatment, continue zofran, compazine and can take additional steroids on days 3/4 of treatment. Reports one stool per week. -- Senna-docusate BID for constipation. Goals of care: Palliative to improve quality and duration of life, but no cure. Emotional well being: Pt is coping well with his/her disease and has excellent support. I appreciate the opportunity to participate in Mr. Rucker [facility] care.     Signed By: Piedad Mcnair MD     December 2, 2019

## 2019-12-02 NOTE — PROGRESS NOTES
Outpatient Infusion Center - Chemotherapy Progress Note    0940 Pt admit to [facility] for C6D1 Carbo/Taxol ambulatory in stable condition. Assessment completed. No new concerns voiced. PAC with positive blood return. Chemotherapy Flowsheet 12/2/2019   Cycle C6D1   Date 12/2/2019   Drug / Regimen Taxol/Carbo   Pre Meds -   Notes -     Patient Vitals for the past 12 hrs:   Temp Pulse Resp BP SpO2   12/02/19 0944 98.2 °F (36.8 °C) (!) 51 16 117/62 92 %       Medications:  Emend IV  Aloxi IVP  Decadron IVP  Benadryl IVP  Pepcid IVP  Taxol IV  Carboplatin IV  Neulasta OBI    Education provided to patient about Neulasta On Body Injector including: side effects, how/when to remove the device, as well as what to do in the event of device malfunction. Opportunity for questions was provided and all questions were answered. Patient verbalized understanding. Pt aware of next appointment scheduled for 12/23/19 at 0900. SBAR report given To France Burkitt RN    Recent Results (from the past 12 hour(s))   CBC WITH AUTOMATED DIFF    Collection Time: 12/02/19  9:49 AM   Result Value Ref Range    WBC 5.8 4.1 - 11.1 K/uL    RBC 3.68 (L) 4.10 - 5.70 M/uL    HGB 11.7 (L) 12.1 - 17.0 g/dL    HCT 35.4 (L) 36.6 - 50.3 %    MCV 96.2 80.0 - 99.0 FL    MCH 31.8 26.0 - 34.0 PG    MCHC 33.1 30.0 - 36.5 g/dL    RDW 18.7 (H) 11.5 - 14.5 %    PLATELET 140 113 - 971 K/uL    MPV 11.0 8.9 - 12.9 FL    NRBC 0.0 0  WBC    ABSOLUTE NRBC 0.00 0.00 - 0.01 K/uL    NEUTROPHILS 39 32 - 75 %    LYMPHOCYTES 43 12 - 49 %    MONOCYTES 16 (H) 5 - 13 %    EOSINOPHILS 0 0 - 7 %    BASOPHILS 1 0 - 1 %    IMMATURE GRANULOCYTES 0 0.0 - 0.5 %    ABS. NEUTROPHILS 2.3 1.8 - 8.0 K/UL    ABS. LYMPHOCYTES 2.5 0.8 - 3.5 K/UL    ABS. MONOCYTES 0.9 0.0 - 1.0 K/UL    ABS. EOSINOPHILS 0.0 0.0 - 0.4 K/UL    ABS. BASOPHILS 0.0 0.0 - 0.1 K/UL    ABS. IMM.  GRANS. 0.0 0.00 - 0.04 K/UL    DF AUTOMATED     METABOLIC PANEL, COMPREHENSIVE    Collection Time: 12/02/19  9:49 AM Result Value Ref Range    Sodium 140 136 - 145 mmol/L    Potassium 4.0 3.5 - 5.1 mmol/L    Chloride 108 97 - 108 mmol/L    CO2 27 21 - 32 mmol/L    Anion gap 5 5 - 15 mmol/L    Glucose 106 (H) 65 - 100 mg/dL    BUN 13 6 - 20 MG/DL    Creatinine 0.87 0.70 - 1.30 MG/DL    BUN/Creatinine ratio 15 12 - 20      GFR est AA >60 >60 ml/min/1.73m2    GFR est non-AA >60 >60 ml/min/1.73m2    Calcium 9.3 8.5 - 10.1 MG/DL    Bilirubin, total 0.2 0.2 - 1.0 MG/DL    ALT (SGPT) 18 12 - 78 U/L    AST (SGOT) 11 (L) 15 - 37 U/L    Alk.  phosphatase 78 45 - 117 U/L    Protein, total 7.2 6.4 - 8.2 g/dL    Albumin 3.5 3.5 - 5.0 g/dL    Globulin 3.7 2.0 - 4.0 g/dL    A-G Ratio 0.9 (L) 1.1 - 2.2

## 2019-12-02 NOTE — PROGRESS NOTES
Chey Silva is a 60 y.o. male here today for follow up of adenocarcinoma of unknown primary. He reports pain to bilateral feet & hands, shoulder and back pain. 8/10 today.

## 2019-12-02 NOTE — PROGRESS NOTES
SBAR received from [facility] at 1640. Education provided to patient about Neulasta On Body Injector including: side effects, how/when to remove the device, as well as what to do in the event of device malfunction. Opportunity for questions was provided and all questions were answered. Patient verbalized understanding. Placed on right arm and handout given to instruct patient about removal.      Port de-accessed after flushing with saline and heparin. Patient discharged at 1700.

## 2019-12-09 NOTE — TELEPHONE ENCOUNTER
Spoke with [employer] pharmacist. Advised to cancel recent order for Ambien per Dr. Monie Hobbs. Pharmacist confirmed & repeated order back.

## 2019-12-16 NOTE — TELEPHONE ENCOUNTER
[facility] Office  Nursing Note  [phone]  Fax [phone]     Name:  New Trevino  YOB: 1960     Ronit Baird RN from Dr. Omar Hope office called requesting pt's outpatient Palliative appt be rescheduled. Pt will need to coordinate the Palliative appt with pt's infusion.   Appt scheduled for 1/9/20 at 1:300pm.      CHERYL Quevedo, RN  Clinical Referral Navigator

## 2019-12-16 NOTE — TELEPHONE ENCOUNTER
12/16/19 4:23 PM Patient called. Discussed lorazepam and how to appropriately take it. 1-2 times daily as needed for anxiety/insomnia. Advised this medication can make him drowsy so use with caution. Advised we discontinued Ambien. Patient verbalized understanding.

## 2019-12-17 NOTE — TELEPHONE ENCOUNTER
[facility] [address]  [phone]        12/17/19 11:15 AM Attempted to call patient via home/cell number listed to discuss change in appointments. Patient is now scheduled for treatment/MD visit on 01/09 (rather than 01/10). He will also see Dr. Navin Barnes that day at 1:30 PM. Would also like to check on patient to see how he tolerated ativan and if medication helped alleviate his symptoms. Left message requesting patient call back, provided office phone number. 1:39 PM Patient returned call. Reviewed appointment information per above. Patient states he took ativan last night and was able to sleep restfully from 11 PM until \"3 something. \" While on phone, patient had complaints of increased pain that started a week ago. Has complaints of pain in lower back, between shoulder blades, and bilateral legs. Patient unsure if pain is from not ambulating as much, patient says that he lays in bed Soosalu lot. \" Patient has tried OTC tylenol, excedrin, and motrin. Has also attempted to use leftover lidocaine patches and aspercreme with no relief. States that the oxycodone is the only medication that has helped. Says that he is now averaging about 4 pills per day and is requesting a refill. States that he has 1.5 pills left. Advised this nurse would forward above to Seamus Heard and that office would call patient back with update. He verbalized understanding.

## 2019-12-17 NOTE — TELEPHONE ENCOUNTER
12/17/19 2:26 PM Called in a new prescription for oxycodone 10 mg every 4 hours PRN pain #56 tabs x 2 week supply

## 2019-12-17 NOTE — TELEPHONE ENCOUNTER
Spoke with patient. Verified ID x 2. Advised refill for oxycodone sent to pharmacy. He verbalized understanding & appreciation. He yelled out twice with complaints of \"sharp pain to foot\" during brief conversation. States he has 2 refills remaining of Gabapentin. He is aware to call office when next refill of oxycodone is needed.

## 2019-12-18 PROBLEM — C34.90 NON-SMALL CELL LUNG CANCER (NSCLC) (HCC): Status: ACTIVE | Noted: 2019-01-01

## 2019-12-18 NOTE — PROGRESS NOTES
[address] [facility] at [address] [place] [address]  [phone]    Hematology / Oncology Established Visit    Reason for Visit:   Laverne Fitzgerald is a 60 y.o. male who comes in for f/u of adenocarcinoma of unknown primary. Initially referred by Dr. Kevin Zamarripa. Hematology Oncology Treatment History:     Diagnosis: Adenocarcinoma of unknown primary    Stage: N/A    Pathology:   6/4/19 4R LN biopsy: rare malignant cells admixed with abundant blood clot  6/4/19 4L LN FNA and core biopsy: Adenocarcinoma. 6/4/19 2R LN FNA and core biopsy: Adenocarcinoma. Comment: IHC stains negative for GATA3, AR, ER, p40. Immunohistochemistry shows that the tumor cells express CK7 with focal expression of CDX2. Tumor cells lack expression of CK20, TTF-1 and Napsin A. These findings are not entirely diagnostic and could be seen in either a primary pulmonary malignancy or a primary upper gastrointestinal tract malignancy. Other sites are also not excluded. Clinical and radiographic correlation is recommended. 6/25/19 2R LN, mediastinum: Metastatic adenocarcinoma (see Comment). Comment - The tumor appears poorly differentiated with areas of necrosis. Immunohistochemical staining reveals positive staining for cytokeratin 7 (strong, diffuse), CDX-2 (focal, weak to moderate) and CEA (focal, moderate to strong). The tumor cells are negative for cytokeratin 20, cytokeratin 5/6, p40, p63, TTF-1, napsin-A, PLAP, HCG, AFP and c-KIT (). The findings are not entirely specific with regard to primary site but would be consistent with upper GI/pancreaticobiliary tract. A pulmonary primary is less likely, but still in the differential.   -PDL1 30%, [facility] identified high tumor mutational burden - which is predictive of higher response to immunotherapy agents. Prior Treatment: Completed 6 cycles of Carbo-Taxol on 12/02/2019.      Current Treatment: Keytruda 200mg every 21 days, started 12/20/19    History of Present Illness:   Mr. Hakeem Luna is a 60 y.o. male with COPD, remote h/o colon cancer comes in for evaluation of mediastinal lymphadenopathy. Pt had recently p/w shortness of breath, dyspnea on exertion, and was found on chest CT to have R mediastinal lymphadenopathy, which also were PET avid. Case was discussed at Thoracic Tumor Board with thought that this could be pancreatic, urothelial, or germ cell origin. More tissue is recommended. Pt had EGD 3 yrs ago and was told he had GERD. Patient has h/o colon cancer in 2002. He states a cancerous polyp was found and then he underwent colectomy in 2002. This was done by Dr. Yonas German at [facility], [place], operated by [facility]. Patient had colonoscopies regularly afterwards, last one was approx 2015. Last EGD was in 2018. As part of search for primary lesion, patient underwent EGD, colonoscopy by Dr. Raymundo Heard, notable for diffusely enlarged gastric folds, but biopsies negative. Pt underwent cytoscopy on 7/17/19 with findings negative for malignancy. Interval History: Patient here for follow up and start of keytruda. He completed 6 cycles of carbo-taxol. He is experiencing many side effects from the chemotherapy and his cancer including the following:  Insomnia, ambien did not help. He started on lorazepam this week, which is helping. He is experiencing anxiety and depression. However, given use of opioids and Benzos, I am wary of starting an antidepressant. He does not wish to start one at this time. He continues to experience neuropathy in all extremities, worst in his feet. He is taking gabapentin 300 mg TID. He states the neuropathy is starting to improve slightly. He continues to experience pain in his shoulders and legs. He is taking oxycodone every 4 hours - during the day, usually using 4 tablets daily. Denies chest pain or sob. Denies fevers or chills.      Past Medical History:   Diagnosis Date    Abnormal nuclear stress test 12/7/2015    Cancer [facility])     colon    Cardiomyopathy ([facility] 2010    EF 45%    Chronic obstructive pulmonary disease (HCC)     Chronic systolic heart failure (HCC)     GERD (gastroesophageal reflux disease)     HTN (hypertension)     PAC (premature atrial contraction)     Tobacco use disorder       Past Surgical History:   Procedure Laterality Date    COLONOSCOPY N/A 7/10/2019    COLONOSCOPY AND ESOPHAGOGASTRODUODENOSCOPY (EGD) performed by Sharon Rivera MD at [address] HX COLECTOMY      HX SPLENECTOMY      IR INSERT TUNL CVC W PORT OVER 5 YEARS  7/19/2019      Social History     Tobacco Use    Smoking status: Former Smoker     Packs/day: 0.50     Types: Cigarettes     Last attempt to quit: 4/22/2016     Years since quitting: 3.6    Smokeless tobacco: Never Used   Substance Use Topics    Alcohol use: Not Currently     Comment: rarely      Family History   Problem Relation Age of Onset    Stroke Mother     Coronary Artery Disease Sister     Heart Disease Paternal Grandfather      Current Outpatient Medications   Medication Sig    oxyCODONE IR (ROXICODONE) 10 mg tab immediate release tablet Take 1 Tab by mouth every four (4) hours as needed for Pain for up to 14 days. Max Daily Amount: 60 mg.    LORazepam (ATIVAN) 1 mg tablet Take 1 Tab by mouth two (2) times daily as needed for Anxiety (or insomnia). Max Daily Amount: 2 mg.  senna-docusate (PERICOLACE) 8.6-50 mg per tablet Take 1 Tab by mouth nightly.  potassium chloride (K-DUR, KLOR-CON) 20 mEq tablet Take 1 Tab by mouth daily.  gabapentin (NEURONTIN) 300 mg capsule Take 1 Cap by mouth three (3) times daily. Max Daily Amount: 900 mg. Indications: Neuropathic Pain    ondansetron hcl (ZOFRAN) 4 mg tablet Take 2 Tabs by mouth every eight (8) hours as needed for Nausea.  prochlorperazine (COMPAZINE) 10 mg tablet Take 1 Tab by mouth every six (6) hours as needed for Nausea.     lisinopril (PRINIVIL, ZESTRIL) 5 mg tablet TAKE 1 TABLET BY MOUTH DAILY    naloxone (NARCAN) 4 mg/actuation nasal spray Use 1 spray intranasally, then discard. Repeat with new spray every 2 min as needed for opioid overdose symptoms, alternating nostrils.  dexAMETHasone (DECADRON) 4 mg tablet Take 4mg (1 tab) with breakfast on days 2 and 3 after chemotherapy to prevent nausea.  lidocaine-prilocaine (EMLA) topical cream Apply  to affected area as needed for Pain. Apply quarter size amount to port prior to chemotherapy    tamsulosin (FLOMAX) 0.4 mg capsule Take 0.4 mg by mouth daily.  OTHER Take  by inhalation. Alero Inhaler    metoprolol succinate (TOPROL-XL) 25 mg XL tablet Take 1 Tab by mouth nightly. No current facility-administered medications for this visit. No Known Allergies     Review of Systems: A complete review of systems was obtained, negative except as described above. Physical Exam:     Visit Vitals  /80   Pulse 95   Temp 97.9 °F (36.6 °C) (Oral)   Resp 16   Ht 5' 11\" (1.803 m)   Wt 158 lb (71.7 kg)   SpO2 97%   BMI 22.04 kg/m²     ECOG PS: 0  General:  thin, no acute distress,ambulating with a cane. Eyes: PERRLA, EOMI, anicteric sclerae  HENT: Atraumatic, OP clear, TMs intact without erythema  Neck: Supple  Lymphatic: No cervical, supraclavicular, axillary or inguinal adenopathy  Respiratory: CTAB, normal respiratory effort  CV: Normal rate, regular rhythm, no murmurs, no peripheral edema, port in place. GI: Soft, nontender, nondistended, no masses, no hepatomegaly, no splenomegaly  MS: Normal gait and station. Digits without clubbing or cyanosis. Skin: No rashes, ecchymoses, or petechiae. Normal temperature, turgor, and texture. Neuro/Psych: Alert, oriented. 5/5 strength in all 4 extremities. Appropriate affect, normal judgment/insight. Results:     Lab Results   Component Value Date/Time    WBC 5.4 12/20/2019 10:22 AM    HGB 10.9 (L) 12/20/2019 10:22 AM    HCT 32.8 (L) 12/20/2019 10:22 AM    PLATELET 860 97/04/1486 10:22 AM    MCV 95.3 12/20/2019 10:22 AM    ABS. NEUTROPHILS 1.5 (L) 12/20/2019 10:22 AM     Lab Results   Component Value Date/Time    Sodium 138 12/20/2019 10:22 AM    Potassium 4.0 12/20/2019 10:22 AM    Chloride 106 12/20/2019 10:22 AM    CO2 26 12/20/2019 10:22 AM    Glucose 134 (H) 12/20/2019 10:22 AM    BUN 11 12/20/2019 10:22 AM    Creatinine 0.87 12/20/2019 10:22 AM    GFR est AA >60 12/20/2019 10:22 AM    GFR est non-AA >60 12/20/2019 10:22 AM    Calcium 8.9 12/20/2019 10:22 AM     Lab Results   Component Value Date/Time    Bilirubin, total 0.3 12/20/2019 10:22 AM    ALT (SGPT) 18 12/20/2019 10:22 AM    AST (SGOT) 11 (L) 12/20/2019 10:22 AM    Alk. phosphatase 93 12/20/2019 10:22 AM    Protein, total 7.3 12/20/2019 10:22 AM    Albumin 3.3 (L) 12/20/2019 10:22 AM    Globulin 4.0 12/20/2019 10:22 AM     Lab Results   Component Value Date/Time    Iron 52 10/07/2019 12:53 PM    TIBC 235 (L) 10/07/2019 12:53 PM    Iron % saturation 22 10/07/2019 12:53 PM    Ferritin 880 (H) 10/07/2019 12:53 PM       No results found for: B12LT, FOL, RBCF  No results found for: TSH, TSH2, TSH3, TSHP, TSHEXT, TSHEXT  No results found for: HAMAT, HAAB, HABT, HAAT, HBSAG, HBSB, HBSAT, HBABN, HBCM, HBCAB, HBCAT, XBCABS, [facility], [address] [place] [address], [facility], [id number], [facility], [facility], [facility], [facility], [id number], [id number], [address] [place] [address], [id number], HBCMLT, [id number], HCGAT      Imaging:     Chest CT 5/10/19:  FINDINGS:  THYROID: No nodule. MEDIASTINUM: There are enlarged lymph nodes in the right paratracheal region  with 3 enlarged lymph nodes in this area. The largest is inferiorly in the right  paratracheal region measuring 2.5 x 1.7 cm. There are also enlarged lymph nodes  in the medial and lateral AP window with the largest lymph node measuring 18 mm. These lymph nodes are worrisome for neoplastic process. There is a normal size  subcarinal lymph node. Mckayla Chess REBECCA: No mass or lymphadenopathy. THORACIC AORTA: No aneurysm. MAIN PULMONARY ARTERY: Normal in caliber. TRACHEA/BRONCHI: Patent.   ESOPHAGUS: No wall thickening or dilatation. HEART: Normal in size. PLEURA: No effusion or pneumothorax. LUNGS: There are moderate changes of centrilobular emphysema. There are bullous  lesions at each apex left greater than right. There is a calcified granuloma in the left upper lobe.   There is volume loss in the medial segment right middle lobe that extends to a  oval-shaped opacity measures 1.8 x 1.1 cm this may all be atelectasis. Pulmonary  nodule within the atelectasis is not excluded. No abnormality corresponds with the rounded opacity seen on the chest  radiograph. Possibly that was a nipple shadow. .  Mild scarring is seen medially in the lingula. INCIDENTALLY IMAGED UPPER ABDOMEN: The spleen has been removed. No adrenal  masses. BONES: No destructive bone lesion. IMPRESSION:  1. There are are multiple enlarged lymph nodes in the right paratracheal region  as well as adenopathy and AP window region. These enlarged lymph nodes are  worrisome for neoplastic process. 2. There is volume loss in right middle lobe with a nodular area of opacity that  could be atelectasis but a superimposed nodule within the areas of atelectasis  is not excluded. Further evaluation is suggested. Tissue diagnosis is suggested. PET CT scan may yield more information. . 23X     5/28/19 PET:  FINDINGS:  HEAD/NECK: No apparent foci of abnormal hypermetabolism. Cerebral evaluation is  limited by normal intense activity. CHEST: A hypermetabolic right paratracheal lymph node SUV is 8.5. There are  hypermetabolic lymph nodes anterior to the main bronchi bilaterally. There is  centrilobular emphysema. There is volume loss in the right middle lobe but a  central obstructing mass is not identified on this PET scan. ABDOMEN/PELVIS: No foci of abnormal hypermetabolism. The prostate gland is  enlarged. SKELETON: No foci of abnormal hypermetabolism in the axial and visualized  appendicular skeleton.   IMPRESSION: Hypermetabolic mediastinal lymph nodes concerning for neoplastic process. Chest/abd/pelvis CT 7/30/19:  FINDINGS:   THYROID: No nodule. MEDIASTINUM: Mediastinal lymphadenopathy has loosely decreased with upper  paratracheal node measuring 1.3 x 1.9 cm, previously 1.4 x 1.8 cm (2, 24), right  paratracheal node at the level of the aortic arch measuring 1.4 x 2.1 cm,  previously 1.7 x 2.6 cm of (2, 27) series, precarinal node on the right  measuring 1.7 x 2.2 cm, previously 1.7 x 2.5 cm (2, 31), and left pericarinal  noted measuring 1.2 x 1.8 cm, previously 1.8 x 2.1 cm of (2, 30). However, there  is an enlarged left preaortic node just lateral to left mainstem bronchus  measuring 1.3 x 2.1 cm which previously measured 8 x 8 mm (2, 33). REBECCA: No mass or lymphadenopathy. THORACIC AORTA: No dissection or aneurysm. MAIN PULMONARY ARTERY: Normal in caliber. TRACHEA/BRONCHI: Patent. ESOPHAGUS: No wall thickening or dilatation. HEART: Normal in size. PLEURA: No effusion or pneumothorax. LUNGS: Centrilobular bullous emphysematous change. Right middle lobe ovoid  entity most likely reflective of atelectasis adjacent to the right heart margin  appears unchanged. Left upper lobe granuloma. No acute infiltrate, nodule, or  mass. LIVER: No mass or biliary dilatation. GALLBLADDER: Unremarkable. SPLEEN: Surgically absent. PANCREAS: No mass or ductal dilatation. ADRENALS: Right adrenal mass not seen previously measures 2.0 x 3.0 cm (2, 62). Left adrenal appears unremarkable. KIDNEYS: No mass, calculus, or hydronephrosis. STOMACH: Unremarkable. SMALL BOWEL: No dilatation or wall thickening. COLON: No dilatation or wall thickening. APPENDIX: Unremarkable. PERITONEUM: No ascites or pneumoperitoneum. RETROPERITONEUM: Right retroperitoneal adenopathy posterior to the inferior vena  cava at the level of the renal hilum measures 1.3 x 1.9 cm, not previously seen  (2, 69).   REPRODUCTIVE ORGANS: The prostate and seminal vesicles appear unremarkable. URINARY BLADDER: No mass or calculus. BONES: No destructive bone lesion. ADDITIONAL COMMENTS: Right Port-A-Cath in place with catheter traversing to the  atriocaval junction region. IMPRESSION:  1. Mixed response to therapy with most mediastinal lymphadenopathy diminishing  in size, however, a left mediastinal lymph node has shown increased size and  there is a new right adrenal mass and new right retroperitoneal adenopathy  suspicious for aggressive metastatic disease. 2. Additional incidental findings as above including centrilobular bullous  emphysema with probable right middle lobe atelectasis. CT c/a/p 12/16/19: IMPRESSION:  1. No significant change in mildly enlarged mediastinal and upper abdominal  lymph nodes. 2. Large right adrenal mass may have increased slightly in the interval.  3. Emphysema. Calcified granuloma left upper lobe. No suspicious pulmonary  nodule. RECIST    TARGET LESIONS:        Lesion (description)         Location (series/slice)                Size   (cm)   1. Right inferior paratracheal node    3/25                               1.2 x   1.7 cm    2. AP window node                              3/26                                1.1 x 0.8 cm      (previously within size range for a target lesion)    3. Right adrenal mass                           3/57                               4.5  x 2.6 cm   NONTARGET LESIONS: None    Procedures:     EGD 7/10/19:   Esophagus:normal  Stomach: diffuse enlargement gastric folds with nodular erythema and erosion throughout stomach  Duodenum/jejunum: normal    Colonoscopy 7/10/19: Diverticulosis    CT c/a/p 9/3/19: IMPRESSION:  1. Improvement in adenopathy in the chest. No acute findings or parenchymal abnormalities in the chest.  2. Interval increase in right adrenal mass. 3. Minimal decrease in size of right retroperitoneal node. CT c/a/p 12/16/19 (completion of carbo-taxol): THYROID: Unremarkable.   MEDIASTINUM/REBECCA: Mildly enlarged precarinal lymph node 17 x 12 mm, unchanged. Nonenlarged AP window, hilar and subcarinal lymph nodes without significant  change. Right IJ Port-A-Cath with tip in the distal superior vena cava. HEART/PERICARDIUM: Unremarkable. LUNGS/PLEURA: Extensive emphysema. No consolidation, edema, or effusion. Calcified granuloma left upper lobe. No suspicious pulmonary nodule. Mild  scarring in the right middle lobe and inferior lingula. BONES: No destructive bone lesion. ADDITIONAL COMMENTS: N/A     LIVER: No mass or biliary dilatation. GALLBLADDER: Unremarkable. SPLEEN: Surgically absent. PANCREAS: No mass or ductal dilatation. ADRENALS: Normal left adrenal gland. Masslike enlargement right adrenal gland,  increased slightly in the interval previously up to 4.0 x 2.5 cm, currently up  to 4.5 x 2.6 cm. KIDNEYS: No mass, calculus, or hydronephrosis. GI TRACT: No bowel obstruction. Postsurgical anastomosis sigmoid colon. Moderate  amount of stool  PERITONEUM: No free air or free fluid. APPENDIX: Unremarkable. RETROPERITONEUM: No aortic aneurysm. LYMPH NODES: Slightly hyperdense right retrocaval lymph node at the level of the  adrenal gland measures 15 x 10 mm, unchanged. No new or enlarging abdominal  lymph nodes. ADDITIONAL COMMENTS: N/A.     URINARY BLADDER: No mass or calculus. LYMPH NODES: None enlarged. REPRODUCTIVE ORGANS: Unremarkable. FREE FLUID: None. BONES: No destructive bone lesion. Small sclerotic foci right femur probable  bone island  IMPRESSION:    1. No significant change in mildly enlarged mediastinal and upper abdominal  lymph nodes. 2. Large right adrenal mass may have increased slightly in the interval.  3. Emphysema. Calcified granuloma left upper lobe. No suspicious pulmonary  nodule. RECIST    TARGET LESIONS:        Lesion (description)         Location (series/slice)                Size   (cm)   1.  Right inferior paratracheal node    3/25 1.2 x   1.7 cm    2. AP window node                              3/26                                1.1 x 0.8 cm      (previously within size range for a target lesion)    3. Right adrenal mass                           3/57                               4.5  x 2.6 cm     Assessment & Plan:   Jovanni Joyce is a 60 y.o. male with COPD, remote h/o colon cancer comes in for evaluation and management of adenocarcinoma of unknown primary. 1. Adenocarcinoma of unknown primary: Involving mediastinal LNs, with no other PET findings. Per Thoracic Tumor Board discussion, this could represent upper GI origin, urothelial origin, germ cell tumor, or lung origin. Search for primary lesion was overall negative: Cystoscopy, EGD, colonoscopy negative for malignancy although EGD abnormal with diffusely enlarged gastric folds. Despite h/o colon cancer in 2002, it would be very odd to see a recurrence in the mediastinal LNs. CEA 26.8. Other tumor markers negative (AFP, hCG, CA 19-9). Unfortunately, this disease is considered incurable, but is treatable. At this time, I recommend chemotherapy treatment using the Carboplatin-Paclitaxel regimen (which covers both lung and GI primaries). We discussed the risks and benefits of chemotherapy with Carboplatin and Paclitaxel. Potential side effects include but are not limited to: nausea, vomiting, diarrhea, constipation, taste changes, allergic reactions, myelosuppression, infection, fatigue, alopecia, neuropathy, mucositis, renal failure, infertility, and rarely, death. Additionally, chemotherapy leads to radiosensitization which can intensify the side effects of radiation therapy. The patient has consented to beginning chemotherapy and chemo ed packet given. Completed 6 cycles of Carbo-Taxol. CT on 12/16/19 shows a mixed response to treatment no change in mediastinal adenopathy and increased size of right adrenal mass. Starting Keytruda in 2nd line.  Discussed STRATA ([facility] included) results with patient. If pt wants to continue treatment locally rather than out of town clinical trial, will plan on Keytruda in 2nd line without a chemotherapy holiday. Supportive care medications include: Zofran, Compazine, Emla cream. Completed 6 cycles of Carbo-Taxol. -- Proceed with cycle 1 of 2nd line treatment with Keytruda   -- Return in 3 weeks for cycle 2 of Mervin Ashraf MD/NP visit, labs. -- Repeat imaging after cycle 3 or 4 of keytruda? -- Send PCP note Dr. Luis Angel Watt. -- Flu shot given on 10/28/19    2. Neoplasm pain: Affecting the upper back and now shoulders. Perhaps related to the mediastinal LNs, but unclear. Advised opioids can cause sedation, constipation and nausea. Patient requiring increased dose of pain medication. Currently prescribed oxycodone  every 4 hours PRN. -- Follow up with palliative on 1/9/20.   -- Added oxycontin 10 mg ER every 12 hours #60 filled on 12/20/19.   -- Refilled oxycodone 10 mg [id number], for 12/17/19 for #56 tabs x 2 week supply. -- Senna-docusate (pericolace) daily to prevent constipation. 3. H/o Stage I [T1NxMx] sigmoid colon cancer: Found in sigmoid adenoma during a colonoscopy; went on to undergo segmented resection of the sigmoid colon in 2002. Pathology per outside records:  2/6/2002 sigmoid colon polyp: Well differentiated adenocarcinoma arising in an adenoma, involving the mucosa and invading into the upper half of the submucosal stalk. No vascular space involvement is identified. 2/21/2002 Sigmoidectomy, liver biopsy:  -Sigmoid colon, segmented resection: No residual adenocarcinoma present. Polypectomy site with granulation tissue and vascular congestion. No LNs recovered. Distal and proximal margins benign.  -Liver biopsy: Negative for metastatic carcinoma. No significant inflammation or obvious cirrhosis identified. Very minimal steatosis (rare cells with fat globules).   -Addendum: Reblocks of adipose tissue; three small benign lymphoid aggregates (< 0.1cm). 4. COPD: Quit smoking in approx 2016. SOB improved after starting inhaler. 5. HTN: Well controlled on Lisinopril and Metoprolol. 6. BPH: On Flomax. 7. 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 Increased numbness in feet, tingling and pain in left hand. Left hand dominant. Increased Gabapentin 300mg BID on 10/16/19.   -- Continue gabapentin to 300 mg TID, #90 tabs with 2 refills e-scribed on 11/11/19.     8. Anemia: likely 2/2 to chemotherapy. Hgb decreased from 12.3 to 11.2 Denies blood in stool. 10/7/19  Iron profile consistent with anemia of chronic disease and ferritin elevated 880. Monitor. 9.Thrombocytopenia: 2/2 chemotherapy. Required delay of cycle 5 for > 2 weeks. 10. Fluids/Electrolytes/nutrition/GI: Weight-loss 171 lb -->157 lb now. Reports unable to eat a large amount due to nausea after treatment. He is unable to tolerate boost/ensure with lactose due to lactose allergy. Hx of hypokalemia, on potassium 20 meq daily. Chronic nausea with treatment, worse day 3 after treatment, continue zofran, compazine and can take additional steroids on days 3/4 of treatment. Reports one stool per week. -- Senna-docusate BID for constipation. 11. Insomnia: Tried Ambien which did not work. Started on lorazepam 1 mg QHS which he reports helped him sleep better than the Ambien. Continue to monitor. 12. Anxiety/Depression: Follows up with palliative on 1/9/20. Advised they have a clinical  who can explore psychological and spiritual responses to serious illness. Goals of care: Palliative to improve quality and duration of life, but no cure. Emotional well being: Pt is coping well with his/her disease and has excellent support. I appreciate the opportunity to participate in Mr. Jigna Zarate care. Addendum: Patient stated he was confused about why [address] had a different date listed on his most recent CT scan of ch/abd/pelvis.  I explained that there are both \"Order Date\" and \"Service Date\" listed and that he is likely seeing the Order date. I reviewed scan results with patient, but he became fixated on the date he saw listed on [facility]. I reassured him that the dates appear appropriate on his medical chart. He then proceeded to log on to [employer] to ask the same question again. I explained the same answer again. Pt became upset and wants to transfer to another doctor.     Signed By: Meagan Mtz MD     December 20, 2019

## 2019-12-20 NOTE — PROGRESS NOTES
[facility] Progress Note    Date: 2019    Name: Sudhakar Palmer    MRN: [id number]         : 1960    Mr. Lord Tobias Arrived ambulatory and in no distress for C1D1 of Keytruda Regimen. Assessment was completed, no acute issues at this time, no new complaints voiced. Right chest wall port accessed without difficulty, labs drawn & sent for processing. Chemotherapy Flowsheet 2019   Cycle C1D1   Date 2019   Drug / Regimen Keytruda   Pre Meds -   Notes -       Patient proceed to appointment with Dr. Ashkan Ramirez. Mr. Caroline Miguel vitals were reviewed. Visit Vitals  /80   Pulse 95   Temp 97.9 °F (36.6 °C)   Resp 18   Wt 72 kg (158 lb 12.8 oz)   SpO2 97%   BMI 22.15 kg/m²       Lab results were obtained and reviewed. Recent Results (from the past 12 hour(s))   CBC WITH AUTOMATED DIFF    Collection Time: 19 10:22 AM   Result Value Ref Range    WBC 5.4 4.1 - 11.1 K/uL    RBC 3.44 (L) 4.10 - 5.70 M/uL    HGB 10.9 (L) 12.1 - 17.0 g/dL    HCT 32.8 (L) 36.6 - 50.3 %    MCV 95.3 80.0 - 99.0 FL    MCH 31.7 26.0 - 34.0 PG    MCHC 33.2 30.0 - 36.5 g/dL    RDW 17.8 (H) 11.5 - 14.5 %    PLATELET 293 790 - 054 K/uL    MPV 11.8 8.9 - 12.9 FL    NRBC 0.0 0  WBC    ABSOLUTE NRBC 0.00 0.00 - 0.01 K/uL    NEUTROPHILS 28 (L) 32 - 75 %    LYMPHOCYTES 50 (H) 12 - 49 %    MONOCYTES 22 (H) 5 - 13 %    EOSINOPHILS 0 0 - 7 %    BASOPHILS 0 0 - 1 %    IMMATURE GRANULOCYTES 0 0.0 - 0.5 %    ABS. NEUTROPHILS 1.5 (L) 1.8 - 8.0 K/UL    ABS. LYMPHOCYTES 2.7 0.8 - 3.5 K/UL    ABS. MONOCYTES 1.2 (H) 0.0 - 1.0 K/UL    ABS. EOSINOPHILS 0.0 0.0 - 0.4 K/UL    ABS. BASOPHILS 0.0 0.0 - 0.1 K/UL    ABS. IMM.  GRANS. 0.0 0.00 - 0.04 K/UL    DF SMEAR SCANNED      RBC COMMENTS ANISOCYTOSIS  1+        RBC COMMENTS SCHISTOCYTES  PRESENT        RBC COMMENTS ACANTHOCYTES PRESENT    TSH 3RD GENERATION    Collection Time: 19 10:22 AM   Result Value Ref Range    TSH 2.20 0.36 - 7.40 uIU/mL   METABOLIC PANEL, COMPREHENSIVE Collection Time: 12/20/19 10:22 AM   Result Value Ref Range    Sodium 138 136 - 145 mmol/L    Potassium 4.0 3.5 - 5.1 mmol/L    Chloride 106 97 - 108 mmol/L    CO2 26 21 - 32 mmol/L    Anion gap 6 5 - 15 mmol/L    Glucose 134 (H) 65 - 100 mg/dL    BUN 11 6 - 20 MG/DL    Creatinine 0.87 0.70 - 1.30 MG/DL    BUN/Creatinine ratio 13 12 - 20      GFR est AA >60 >60 ml/min/1.73m2    GFR est non-AA >60 >60 ml/min/1.73m2    Calcium 8.9 8.5 - 10.1 MG/DL    Bilirubin, total 0.3 0.2 - 1.0 MG/DL    ALT (SGPT) 18 12 - 78 U/L    AST (SGOT) 11 (L) 15 - 37 U/L    Alk. phosphatase 93 45 - 117 U/L    Protein, total 7.3 6.4 - 8.2 g/dL    Albumin 3.3 (L) 3.5 - 5.0 g/dL    Globulin 4.0 2.0 - 4.0 g/dL    A-G Ratio 0.8 (L) 1.1 - 2.2           Medications:  Keytruda IV    Mr. Kannan Coelho tolerated treatment well and was discharged from [place] [address] in stable condition. Port de-accessed, flushed & heparinized per protocol. He is to return on 1/9/20 at 10:00 for his next appointment.     Lindsay Robles RN  December 20, 2019

## 2019-12-20 NOTE — PROGRESS NOTES
Lj Scott is a 60 y.o. male here today for follow up of Adenocarcinoma of unknown primary. He reports Ativan is helping with insomnia somewhat. He is able to get about 5 hours of sleep each night.

## 2019-12-20 NOTE — PROGRESS NOTES
[employer]  Social Work Navigator Encounter     Patient Name:  Ray Russ    Medical History: Adenocarcinoma of unknown primary    Advance Directives: none on file; conversation deferred    Narrative: Social work referral received from Julio César Gemma, [place] regarding transportation barriers. Met with patient in [address] [place] [address]. Offered supportive listening as patient ventilates frustrations. He reports feeling rushed during appointments which leaves him unable to asks his questions. He feels his providers have not been proactive in managing his symptoms. Acknowledged patient's frustrations and provided reassurance that we are here to support him during this process. Clarified the role of PM and advised he has declined all prior referrals to them. Noted patient has transportation barriers. He has family and friends who are able to occasionally assist. Referred patient to [facility]. Explained this is not emergency transportation and should ne schedule in advance. Patient tells plans to stop working in January and will apply for Social Security Disability at that time. Barriers to Care: transportation    Assessment/Action:  1. Provided supportive listening as patient ventilates frustration regarding care. Patient has shared concerns with providers and Clinical Supervisor. 2. Referred patient to [facility] for transportation. 3. Provided guidance regarding social security disability. 4. Noted patient is having a difficult time adjusting to diagnosis and treatment due uncertainty of unknown primary and loss of control. Concern for depression. PM to manage depression.      Plan/Referral:   Transportation referral  Behavioral health referral  Insurance/Entitlements referral    Thank you,  Kimi Terry LCSW

## 2019-12-23 NOTE — TELEPHONE ENCOUNTER
12/23/19 3:50 PM Called to check on patient. Confirmed with patient he is taking oxycodone 10 mg every 4 hours as needed for pain. He is only taking the ativan once at night as needed for sleep. He has not picked up the long acting oxycontin because of insurance reasons. Re-educated that oxycontin is a long acting medication that he has to take daily every 12 hours and he should only take the short acting pain medication as needed while on oxycontin ER. He plans to call his insurance company to determine cost. Re-educated that ativan with oxycodone can cause sedation and he should not take the two medications together if he becomes too drowsy. Asked patient to please call our practice if he has any concerns or difficulties. Patient verbalized understanding. No further questions at this time.

## 2020-01-01 ENCOUNTER — TELEPHONE (OUTPATIENT)
Dept: PALLATIVE CARE | Age: 60
End: 2020-01-01

## 2020-01-01 ENCOUNTER — HOSPITAL ENCOUNTER (EMERGENCY)
Age: 60
Discharge: HOME | End: 2020-07-26
Attending: EMERGENCY MEDICINE | Admitting: EMERGENCY MEDICINE
Payer: OTHER GOVERNMENT

## 2020-01-01 ENCOUNTER — APPOINTMENT (OUTPATIENT)
Dept: GENERAL RADIOLOGY | Age: 60
End: 2020-01-01
Attending: EMERGENCY MEDICINE
Payer: OTHER GOVERNMENT

## 2020-01-01 ENCOUNTER — PATIENT OUTREACH (OUTPATIENT)
Dept: INTERNAL MEDICINE CLINIC | Age: 60
End: 2020-01-01

## 2020-01-01 ENCOUNTER — VIRTUAL VISIT (OUTPATIENT)
Dept: PALLATIVE CARE | Age: 60
End: 2020-01-01

## 2020-01-01 ENCOUNTER — VIRTUAL VISIT (OUTPATIENT)
Dept: ONCOLOGY | Age: 60
End: 2020-01-01

## 2020-01-01 ENCOUNTER — APPOINTMENT (OUTPATIENT)
Dept: INFUSION THERAPY | Age: 60
End: 2020-01-01

## 2020-01-01 ENCOUNTER — TELEPHONE (OUTPATIENT)
Dept: ONCOLOGY | Age: 60
End: 2020-01-01

## 2020-01-01 ENCOUNTER — ANESTHESIA (OUTPATIENT)
Dept: SURGERY | Age: 60
End: 2020-01-01
Payer: OTHER GOVERNMENT

## 2020-01-01 ENCOUNTER — HOSPITAL ENCOUNTER (OUTPATIENT)
Dept: GENERAL RADIOLOGY | Age: 60
Discharge: HOME | End: 2020-08-10
Payer: OTHER GOVERNMENT

## 2020-01-01 ENCOUNTER — OFFICE VISIT (OUTPATIENT)
Dept: PALLATIVE CARE | Age: 60
End: 2020-01-01

## 2020-01-01 ENCOUNTER — DOCUMENTATION ONLY (OUTPATIENT)
Dept: ONCOLOGY | Age: 60
End: 2020-01-01

## 2020-01-01 ENCOUNTER — OFFICE VISIT (OUTPATIENT)
Dept: ONCOLOGY | Age: 60
End: 2020-01-01

## 2020-01-01 ENCOUNTER — PATIENT OUTREACH (OUTPATIENT)
Dept: CASE MANAGEMENT | Age: 60
End: 2020-01-01

## 2020-01-01 ENCOUNTER — APPOINTMENT (OUTPATIENT)
Dept: CT IMAGING | Age: 60
End: 2020-01-01
Attending: EMERGENCY MEDICINE
Payer: OTHER GOVERNMENT

## 2020-01-01 ENCOUNTER — OFFICE VISIT (OUTPATIENT)
Dept: SURGERY | Age: 60
End: 2020-01-01
Payer: OTHER GOVERNMENT

## 2020-01-01 ENCOUNTER — HOSPITAL ENCOUNTER (OUTPATIENT)
Dept: INFUSION THERAPY | Age: 60
Discharge: HOME | End: 2020-01-30
Payer: OTHER GOVERNMENT

## 2020-01-01 ENCOUNTER — APPOINTMENT (OUTPATIENT)
Dept: INFUSION THERAPY | Age: 60
End: 2020-01-01
Payer: OTHER GOVERNMENT

## 2020-01-01 ENCOUNTER — HOSPITAL ENCOUNTER (OUTPATIENT)
Dept: INFUSION THERAPY | Age: 60
End: 2020-01-01
Payer: OTHER GOVERNMENT

## 2020-01-01 ENCOUNTER — APPOINTMENT (OUTPATIENT)
Dept: GENERAL RADIOLOGY | Age: 60
DRG: 200 | End: 2020-01-01
Attending: PHYSICIAN ASSISTANT
Payer: OTHER GOVERNMENT

## 2020-01-01 ENCOUNTER — HOSPITAL ENCOUNTER (OUTPATIENT)
Dept: INFUSION THERAPY | Age: 60
Discharge: HOME | End: 2020-08-13
Payer: OTHER GOVERNMENT

## 2020-01-01 ENCOUNTER — HOSPITAL ENCOUNTER (OUTPATIENT)
Dept: INFUSION THERAPY | Age: 60
End: 2020-01-01

## 2020-01-01 ENCOUNTER — PATIENT MESSAGE (OUTPATIENT)
Dept: PALLATIVE CARE | Age: 60
End: 2020-01-01

## 2020-01-01 ENCOUNTER — TELEPHONE (OUTPATIENT)
Dept: FAMILY MEDICINE CLINIC | Age: 60
End: 2020-01-01

## 2020-01-01 ENCOUNTER — VIRTUAL VISIT (OUTPATIENT)
Dept: ONCOLOGY | Age: 60
End: 2020-01-01
Payer: OTHER GOVERNMENT

## 2020-01-01 ENCOUNTER — HOSPITAL ENCOUNTER (OUTPATIENT)
Dept: INFUSION THERAPY | Age: 60
Discharge: HOME | End: 2020-05-28
Payer: OTHER GOVERNMENT

## 2020-01-01 ENCOUNTER — HOSPITAL ENCOUNTER (OUTPATIENT)
Age: 60
Setting detail: OBSERVATION
Discharge: HOME | End: 2020-07-18
Attending: EMERGENCY MEDICINE | Admitting: SURGERY
Payer: OTHER GOVERNMENT

## 2020-01-01 ENCOUNTER — HOSPITAL ENCOUNTER (OUTPATIENT)
Dept: INFUSION THERAPY | Age: 60
Discharge: HOME | End: 2020-03-19
Payer: OTHER GOVERNMENT

## 2020-01-01 ENCOUNTER — HOSPITAL ENCOUNTER (EMERGENCY)
Age: 60
Discharge: HOME | End: 2020-08-19
Attending: EMERGENCY MEDICINE
Payer: OTHER GOVERNMENT

## 2020-01-01 ENCOUNTER — HOSPITAL ENCOUNTER (OUTPATIENT)
Dept: CT IMAGING | Age: 60
Discharge: HOME | End: 2020-07-17
Attending: PHYSICIAN ASSISTANT
Payer: OTHER GOVERNMENT

## 2020-01-01 ENCOUNTER — TELEPHONE (OUTPATIENT)
Dept: SURGERY | Age: 60
End: 2020-01-01

## 2020-01-01 ENCOUNTER — HOSPITAL ENCOUNTER (INPATIENT)
Age: 60
LOS: 4 days | Discharge: HOME | DRG: 200 | End: 2020-08-03
Attending: THORACIC SURGERY (CARDIOTHORACIC VASCULAR SURGERY) | Admitting: THORACIC SURGERY (CARDIOTHORACIC VASCULAR SURGERY)
Payer: OTHER GOVERNMENT

## 2020-01-01 ENCOUNTER — DOCUMENTATION ONLY (OUTPATIENT)
Dept: PALLATIVE CARE | Age: 60
End: 2020-01-01

## 2020-01-01 ENCOUNTER — APPOINTMENT (OUTPATIENT)
Dept: GENERAL RADIOLOGY | Age: 60
DRG: 200 | End: 2020-01-01
Attending: THORACIC SURGERY (CARDIOTHORACIC VASCULAR SURGERY)
Payer: OTHER GOVERNMENT

## 2020-01-01 ENCOUNTER — HOSPITAL ENCOUNTER (OUTPATIENT)
Dept: INFUSION THERAPY | Age: 60
Discharge: HOME | End: 2020-05-14
Payer: OTHER GOVERNMENT

## 2020-01-01 ENCOUNTER — HOSPITAL ENCOUNTER (OUTPATIENT)
Dept: GENERAL RADIOLOGY | Age: 60
Discharge: HOME | End: 2020-01-24
Payer: OTHER GOVERNMENT

## 2020-01-01 ENCOUNTER — HOSPITAL ENCOUNTER (OUTPATIENT)
Dept: INFUSION THERAPY | Age: 60
Discharge: HOME | End: 2020-04-02
Payer: OTHER GOVERNMENT

## 2020-01-01 ENCOUNTER — ANESTHESIA EVENT (OUTPATIENT)
Dept: SURGERY | Age: 60
End: 2020-01-01
Payer: OTHER GOVERNMENT

## 2020-01-01 ENCOUNTER — HOSPITAL ENCOUNTER (OUTPATIENT)
Dept: INFUSION THERAPY | Age: 60
Discharge: HOME | End: 2020-03-12
Payer: OTHER GOVERNMENT

## 2020-01-01 ENCOUNTER — HOSPITAL ENCOUNTER (OUTPATIENT)
Dept: INFUSION THERAPY | Age: 60
Discharge: HOME | End: 2020-09-10
Payer: OTHER GOVERNMENT

## 2020-01-01 ENCOUNTER — OFFICE VISIT (OUTPATIENT)
Dept: ONCOLOGY | Age: 60
End: 2020-01-01
Payer: OTHER GOVERNMENT

## 2020-01-01 ENCOUNTER — VIRTUAL VISIT (OUTPATIENT)
Dept: PALLATIVE CARE | Age: 60
End: 2020-01-01
Payer: OTHER GOVERNMENT

## 2020-01-01 ENCOUNTER — HOSPITAL ENCOUNTER (OUTPATIENT)
Dept: PREADMISSION TESTING | Age: 60
Discharge: HOME | End: 2020-09-28

## 2020-01-01 ENCOUNTER — HOSPITAL ENCOUNTER (EMERGENCY)
Age: 60
Discharge: HOME | End: 2020-03-28
Attending: EMERGENCY MEDICINE
Payer: OTHER GOVERNMENT

## 2020-01-01 ENCOUNTER — APPOINTMENT (OUTPATIENT)
Dept: GENERAL RADIOLOGY | Age: 60
DRG: 200 | End: 2020-01-01
Attending: NURSE PRACTITIONER
Payer: OTHER GOVERNMENT

## 2020-01-01 ENCOUNTER — HOSPITAL ENCOUNTER (OUTPATIENT)
Dept: INFUSION THERAPY | Age: 60
Discharge: HOME | End: 2020-08-27
Payer: OTHER GOVERNMENT

## 2020-01-01 ENCOUNTER — HOSPITAL ENCOUNTER (OUTPATIENT)
Dept: CT IMAGING | Age: 60
Discharge: HOME | End: 2020-02-13
Attending: INTERNAL MEDICINE
Payer: OTHER GOVERNMENT

## 2020-01-01 ENCOUNTER — HOSPITAL ENCOUNTER (OUTPATIENT)
Dept: INFUSION THERAPY | Age: 60
Discharge: HOME | End: 2020-04-16
Payer: OTHER GOVERNMENT

## 2020-01-01 ENCOUNTER — HOSPITAL ENCOUNTER (OUTPATIENT)
Dept: INFUSION THERAPY | Age: 60
Discharge: HOME | End: 2020-01-09
Payer: OTHER GOVERNMENT

## 2020-01-01 ENCOUNTER — HOSPITAL ENCOUNTER (OUTPATIENT)
Dept: INFUSION THERAPY | Age: 60
Discharge: HOME | End: 2020-03-05
Payer: OTHER GOVERNMENT

## 2020-01-01 ENCOUNTER — HOSPITAL ENCOUNTER (OUTPATIENT)
Dept: INFUSION THERAPY | Age: 60
Discharge: HOME | End: 2020-04-30
Payer: OTHER GOVERNMENT

## 2020-01-01 ENCOUNTER — HOSPITAL ENCOUNTER (EMERGENCY)
Age: 60
Discharge: ANOTHER HOSPITAL | End: 2020-07-29
Attending: EMERGENCY MEDICINE | Admitting: EMERGENCY MEDICINE
Payer: OTHER GOVERNMENT

## 2020-01-01 ENCOUNTER — HOSPITAL ENCOUNTER (EMERGENCY)
Age: 60
Discharge: HOME | End: 2020-04-15
Attending: EMERGENCY MEDICINE
Payer: OTHER GOVERNMENT

## 2020-01-01 VITALS
HEART RATE: 84 BPM | OXYGEN SATURATION: 96 % | BODY MASS INDEX: 21.36 KG/M2 | WEIGHT: 152.6 LBS | RESPIRATION RATE: 18 BRPM | SYSTOLIC BLOOD PRESSURE: 110 MMHG | TEMPERATURE: 98.2 F | DIASTOLIC BLOOD PRESSURE: 79 MMHG | HEIGHT: 71 IN

## 2020-01-01 VITALS
SYSTOLIC BLOOD PRESSURE: 132 MMHG | OXYGEN SATURATION: 98 % | HEART RATE: 94 BPM | TEMPERATURE: 97.9 F | HEIGHT: 71 IN | SYSTOLIC BLOOD PRESSURE: 114 MMHG | HEIGHT: 71 IN | RESPIRATION RATE: 16 BRPM | BODY MASS INDEX: 21.32 KG/M2 | WEIGHT: 152.3 LBS | WEIGHT: 152 LBS | OXYGEN SATURATION: 100 % | DIASTOLIC BLOOD PRESSURE: 94 MMHG | TEMPERATURE: 98.8 F | HEART RATE: 72 BPM | BODY MASS INDEX: 21.28 KG/M2 | DIASTOLIC BLOOD PRESSURE: 82 MMHG | RESPIRATION RATE: 18 BRPM

## 2020-01-01 VITALS
TEMPERATURE: 98.1 F | RESPIRATION RATE: 18 BRPM | HEIGHT: 71 IN | WEIGHT: 149.8 LBS | SYSTOLIC BLOOD PRESSURE: 111 MMHG | DIASTOLIC BLOOD PRESSURE: 71 MMHG | BODY MASS INDEX: 20.97 KG/M2 | HEART RATE: 85 BPM | OXYGEN SATURATION: 97 %

## 2020-01-01 VITALS
TEMPERATURE: 98.9 F | RESPIRATION RATE: 18 BRPM | HEART RATE: 71 BPM | BODY MASS INDEX: 20.65 KG/M2 | SYSTOLIC BLOOD PRESSURE: 117 MMHG | DIASTOLIC BLOOD PRESSURE: 70 MMHG | HEIGHT: 71 IN | WEIGHT: 147.5 LBS

## 2020-01-01 VITALS
DIASTOLIC BLOOD PRESSURE: 68 MMHG | SYSTOLIC BLOOD PRESSURE: 100 MMHG | HEART RATE: 79 BPM | WEIGHT: 132 LBS | RESPIRATION RATE: 18 BRPM | BODY MASS INDEX: 18.48 KG/M2 | HEIGHT: 71 IN | TEMPERATURE: 97.6 F | OXYGEN SATURATION: 99 %

## 2020-01-01 VITALS
RESPIRATION RATE: 18 BRPM | OXYGEN SATURATION: 95 % | BODY MASS INDEX: 19.46 KG/M2 | WEIGHT: 139 LBS | SYSTOLIC BLOOD PRESSURE: 109 MMHG | HEART RATE: 62 BPM | HEIGHT: 71 IN | DIASTOLIC BLOOD PRESSURE: 72 MMHG

## 2020-01-01 VITALS
OXYGEN SATURATION: 97 % | SYSTOLIC BLOOD PRESSURE: 122 MMHG | HEART RATE: 84 BPM | RESPIRATION RATE: 18 BRPM | WEIGHT: 145.2 LBS | WEIGHT: 152 LBS | BODY MASS INDEX: 21.28 KG/M2 | TEMPERATURE: 97.4 F | HEIGHT: 71 IN | BODY MASS INDEX: 20.33 KG/M2 | OXYGEN SATURATION: 98 % | DIASTOLIC BLOOD PRESSURE: 83 MMHG | RESPIRATION RATE: 18 BRPM | SYSTOLIC BLOOD PRESSURE: 114 MMHG | TEMPERATURE: 98.6 F | HEIGHT: 71 IN | HEART RATE: 93 BPM | DIASTOLIC BLOOD PRESSURE: 59 MMHG

## 2020-01-01 VITALS
BODY MASS INDEX: 19.25 KG/M2 | RESPIRATION RATE: 16 BRPM | DIASTOLIC BLOOD PRESSURE: 67 MMHG | OXYGEN SATURATION: 99 % | TEMPERATURE: 98 F | HEIGHT: 71 IN | WEIGHT: 137.5 LBS | HEART RATE: 69 BPM | SYSTOLIC BLOOD PRESSURE: 116 MMHG

## 2020-01-01 VITALS
TEMPERATURE: 98.8 F | SYSTOLIC BLOOD PRESSURE: 106 MMHG | HEART RATE: 64 BPM | HEIGHT: 72 IN | BODY MASS INDEX: 20.13 KG/M2 | WEIGHT: 148.6 LBS | RESPIRATION RATE: 18 BRPM | OXYGEN SATURATION: 92 % | DIASTOLIC BLOOD PRESSURE: 67 MMHG

## 2020-01-01 VITALS
OXYGEN SATURATION: 96 % | RESPIRATION RATE: 18 BRPM | WEIGHT: 144.5 LBS | HEIGHT: 71 IN | SYSTOLIC BLOOD PRESSURE: 119 MMHG | HEART RATE: 77 BPM | DIASTOLIC BLOOD PRESSURE: 73 MMHG | BODY MASS INDEX: 20.23 KG/M2

## 2020-01-01 VITALS
HEIGHT: 71 IN | TEMPERATURE: 98.5 F | HEART RATE: 58 BPM | WEIGHT: 161 LBS | OXYGEN SATURATION: 93 % | BODY MASS INDEX: 22.54 KG/M2 | RESPIRATION RATE: 16 BRPM | SYSTOLIC BLOOD PRESSURE: 127 MMHG | DIASTOLIC BLOOD PRESSURE: 78 MMHG

## 2020-01-01 VITALS
OXYGEN SATURATION: 95 % | SYSTOLIC BLOOD PRESSURE: 145 MMHG | WEIGHT: 139 LBS | TEMPERATURE: 98.2 F | HEIGHT: 71 IN | BODY MASS INDEX: 19.46 KG/M2 | HEART RATE: 71 BPM | RESPIRATION RATE: 17 BRPM | DIASTOLIC BLOOD PRESSURE: 80 MMHG

## 2020-01-01 VITALS
TEMPERATURE: 98 F | WEIGHT: 150.9 LBS | BODY MASS INDEX: 21.13 KG/M2 | OXYGEN SATURATION: 99 % | HEART RATE: 48 BPM | HEIGHT: 71 IN | SYSTOLIC BLOOD PRESSURE: 112 MMHG | RESPIRATION RATE: 18 BRPM | DIASTOLIC BLOOD PRESSURE: 97 MMHG

## 2020-01-01 VITALS
SYSTOLIC BLOOD PRESSURE: 123 MMHG | DIASTOLIC BLOOD PRESSURE: 84 MMHG | HEIGHT: 71 IN | RESPIRATION RATE: 16 BRPM | BODY MASS INDEX: 21.67 KG/M2 | WEIGHT: 154.8 LBS | TEMPERATURE: 98.6 F | OXYGEN SATURATION: 87 % | HEART RATE: 76 BPM

## 2020-01-01 VITALS
HEIGHT: 71 IN | SYSTOLIC BLOOD PRESSURE: 115 MMHG | WEIGHT: 152.34 LBS | DIASTOLIC BLOOD PRESSURE: 88 MMHG | BODY MASS INDEX: 21.33 KG/M2 | HEART RATE: 98 BPM | RESPIRATION RATE: 18 BRPM | TEMPERATURE: 97.9 F | OXYGEN SATURATION: 100 %

## 2020-01-01 VITALS
SYSTOLIC BLOOD PRESSURE: 100 MMHG | TEMPERATURE: 97.6 F | BODY MASS INDEX: 18.52 KG/M2 | WEIGHT: 132.3 LBS | HEART RATE: 79 BPM | RESPIRATION RATE: 18 BRPM | DIASTOLIC BLOOD PRESSURE: 68 MMHG | OXYGEN SATURATION: 99 % | HEIGHT: 71 IN

## 2020-01-01 VITALS
RESPIRATION RATE: 18 BRPM | TEMPERATURE: 97.4 F | SYSTOLIC BLOOD PRESSURE: 91 MMHG | WEIGHT: 149.2 LBS | OXYGEN SATURATION: 98 % | HEART RATE: 100 BPM | HEIGHT: 71 IN | DIASTOLIC BLOOD PRESSURE: 67 MMHG | BODY MASS INDEX: 20.89 KG/M2

## 2020-01-01 VITALS
TEMPERATURE: 98.4 F | DIASTOLIC BLOOD PRESSURE: 78 MMHG | HEIGHT: 71 IN | OXYGEN SATURATION: 97 % | BODY MASS INDEX: 20.86 KG/M2 | WEIGHT: 149 LBS | HEART RATE: 65 BPM | SYSTOLIC BLOOD PRESSURE: 116 MMHG | RESPIRATION RATE: 15 BRPM

## 2020-01-01 VITALS
TEMPERATURE: 98 F | HEIGHT: 71 IN | BODY MASS INDEX: 20.02 KG/M2 | HEART RATE: 84 BPM | WEIGHT: 143 LBS | RESPIRATION RATE: 18 BRPM | DIASTOLIC BLOOD PRESSURE: 73 MMHG | SYSTOLIC BLOOD PRESSURE: 108 MMHG | OXYGEN SATURATION: 97 %

## 2020-01-01 VITALS
RESPIRATION RATE: 18 BRPM | HEART RATE: 83 BPM | DIASTOLIC BLOOD PRESSURE: 82 MMHG | BODY MASS INDEX: 21 KG/M2 | TEMPERATURE: 98.1 F | HEIGHT: 71 IN | OXYGEN SATURATION: 94 % | SYSTOLIC BLOOD PRESSURE: 115 MMHG | WEIGHT: 150 LBS

## 2020-01-01 VITALS
HEART RATE: 93 BPM | BODY MASS INDEX: 22.16 KG/M2 | WEIGHT: 158.3 LBS | HEIGHT: 71 IN | SYSTOLIC BLOOD PRESSURE: 116 MMHG | RESPIRATION RATE: 18 BRPM | OXYGEN SATURATION: 93 % | TEMPERATURE: 98.3 F | DIASTOLIC BLOOD PRESSURE: 77 MMHG

## 2020-01-01 VITALS
WEIGHT: 143.8 LBS | SYSTOLIC BLOOD PRESSURE: 108 MMHG | HEIGHT: 71 IN | RESPIRATION RATE: 18 BRPM | BODY MASS INDEX: 20.13 KG/M2 | TEMPERATURE: 98 F | DIASTOLIC BLOOD PRESSURE: 73 MMHG | OXYGEN SATURATION: 97 % | HEART RATE: 84 BPM

## 2020-01-01 VITALS
HEART RATE: 73 BPM | SYSTOLIC BLOOD PRESSURE: 99 MMHG | WEIGHT: 151 LBS | TEMPERATURE: 97.4 F | BODY MASS INDEX: 21.14 KG/M2 | OXYGEN SATURATION: 94 % | DIASTOLIC BLOOD PRESSURE: 64 MMHG | HEIGHT: 71 IN

## 2020-01-01 VITALS
WEIGHT: 139.77 LBS | DIASTOLIC BLOOD PRESSURE: 62 MMHG | RESPIRATION RATE: 18 BRPM | SYSTOLIC BLOOD PRESSURE: 145 MMHG | BODY MASS INDEX: 19.49 KG/M2 | OXYGEN SATURATION: 94 % | TEMPERATURE: 98.5 F | HEART RATE: 80 BPM

## 2020-01-01 VITALS
TEMPERATURE: 98.5 F | SYSTOLIC BLOOD PRESSURE: 108 MMHG | BODY MASS INDEX: 20.98 KG/M2 | HEART RATE: 69 BPM | OXYGEN SATURATION: 100 % | HEIGHT: 71 IN | WEIGHT: 149.9 LBS | DIASTOLIC BLOOD PRESSURE: 73 MMHG | RESPIRATION RATE: 18 BRPM

## 2020-01-01 VITALS
WEIGHT: 150 LBS | DIASTOLIC BLOOD PRESSURE: 87 MMHG | RESPIRATION RATE: 18 BRPM | TEMPERATURE: 98 F | HEART RATE: 83 BPM | BODY MASS INDEX: 21 KG/M2 | SYSTOLIC BLOOD PRESSURE: 139 MMHG | OXYGEN SATURATION: 99 % | HEIGHT: 71 IN

## 2020-01-01 VITALS
TEMPERATURE: 97.5 F | WEIGHT: 139 LBS | BODY MASS INDEX: 19.46 KG/M2 | HEIGHT: 71 IN | SYSTOLIC BLOOD PRESSURE: 98 MMHG | RESPIRATION RATE: 18 BRPM | DIASTOLIC BLOOD PRESSURE: 65 MMHG | OXYGEN SATURATION: 98 % | HEART RATE: 76 BPM

## 2020-01-01 VITALS
TEMPERATURE: 98.4 F | HEART RATE: 85 BPM | SYSTOLIC BLOOD PRESSURE: 116 MMHG | HEIGHT: 71 IN | OXYGEN SATURATION: 97 % | BODY MASS INDEX: 20.86 KG/M2 | RESPIRATION RATE: 20 BRPM | DIASTOLIC BLOOD PRESSURE: 61 MMHG | WEIGHT: 149 LBS

## 2020-01-01 VITALS — RESPIRATION RATE: 18 BRPM | HEIGHT: 71 IN | WEIGHT: 145 LBS | BODY MASS INDEX: 20.3 KG/M2 | HEART RATE: 63 BPM

## 2020-01-01 VITALS
TEMPERATURE: 98.2 F | RESPIRATION RATE: 18 BRPM | HEIGHT: 71 IN | OXYGEN SATURATION: 97 % | BODY MASS INDEX: 21.59 KG/M2 | DIASTOLIC BLOOD PRESSURE: 75 MMHG | HEART RATE: 93 BPM | SYSTOLIC BLOOD PRESSURE: 103 MMHG

## 2020-01-01 VITALS
TEMPERATURE: 98.9 F | OXYGEN SATURATION: 97 % | SYSTOLIC BLOOD PRESSURE: 136 MMHG | HEART RATE: 79 BPM | DIASTOLIC BLOOD PRESSURE: 77 MMHG | RESPIRATION RATE: 18 BRPM

## 2020-01-01 VITALS
HEART RATE: 100 BPM | RESPIRATION RATE: 18 BRPM | WEIGHT: 149 LBS | HEIGHT: 71 IN | SYSTOLIC BLOOD PRESSURE: 91 MMHG | DIASTOLIC BLOOD PRESSURE: 67 MMHG | TEMPERATURE: 97.4 F | BODY MASS INDEX: 20.86 KG/M2 | OXYGEN SATURATION: 98 %

## 2020-01-01 DIAGNOSIS — C80.1 ADENOCARCINOMA OF UNKNOWN PRIMARY (HCC): ICD-10-CM

## 2020-01-01 DIAGNOSIS — G89.3 ACUTE NEOPLASM-RELATED PAIN: ICD-10-CM

## 2020-01-01 DIAGNOSIS — C80.1 ADENOCARCINOMA OF UNKNOWN PRIMARY (HCC): Primary | ICD-10-CM

## 2020-01-01 DIAGNOSIS — C79.9 METASTASIS FROM GALLBLADDER CANCER (HCC): Primary | ICD-10-CM

## 2020-01-01 DIAGNOSIS — F51.04 PSYCHOPHYSIOLOGICAL INSOMNIA: ICD-10-CM

## 2020-01-01 DIAGNOSIS — K22.4 ESOPHAGEAL SPASM: Primary | ICD-10-CM

## 2020-01-01 DIAGNOSIS — C79.49 SECONDARY MALIGNANT NEOPLASM OF BRAIN AND SPINAL CORD (HCC): ICD-10-CM

## 2020-01-01 DIAGNOSIS — E43 PROTEIN-CALORIE MALNUTRITION, SEVERE (HCC): ICD-10-CM

## 2020-01-01 DIAGNOSIS — C80.1 ADENOCARCINOMA OF UNKNOWN ORIGIN (HCC): ICD-10-CM

## 2020-01-01 DIAGNOSIS — T45.1X5A NEUROPATHY DUE TO CHEMOTHERAPEUTIC DRUG (HCC): ICD-10-CM

## 2020-01-01 DIAGNOSIS — M54.6 CHRONIC MIDLINE THORACIC BACK PAIN: Primary | ICD-10-CM

## 2020-01-01 DIAGNOSIS — G89.3 ACUTE NEOPLASM-RELATED PAIN: Primary | ICD-10-CM

## 2020-01-01 DIAGNOSIS — Z65.8 PSYCHOSOCIAL STRESSORS: ICD-10-CM

## 2020-01-01 DIAGNOSIS — D64.81 ANTINEOPLASTIC CHEMOTHERAPY INDUCED ANEMIA: ICD-10-CM

## 2020-01-01 DIAGNOSIS — G62.0 NEUROPATHY DUE TO CHEMOTHERAPEUTIC DRUG (HCC): ICD-10-CM

## 2020-01-01 DIAGNOSIS — C34.90 NON-SMALL CELL LUNG CANCER, UNSPECIFIED LATERALITY (HCC): Primary | ICD-10-CM

## 2020-01-01 DIAGNOSIS — C80.1 DILATED CARDIOMYOPATHY SECONDARY TO MALIGNANCY (HCC): ICD-10-CM

## 2020-01-01 DIAGNOSIS — I42.0 DILATED CARDIOMYOPATHY SECONDARY TO MALIGNANCY (HCC): ICD-10-CM

## 2020-01-01 DIAGNOSIS — D49.6 BRAIN TUMOR (HCC): ICD-10-CM

## 2020-01-01 DIAGNOSIS — R51.9 INTRACTABLE HEADACHE, UNSPECIFIED CHRONICITY PATTERN, UNSPECIFIED HEADACHE TYPE: Primary | ICD-10-CM

## 2020-01-01 DIAGNOSIS — R63.0 ANOREXIA: ICD-10-CM

## 2020-01-01 DIAGNOSIS — F41.9 ANXIETY: ICD-10-CM

## 2020-01-01 DIAGNOSIS — J93.9 PNEUMOTHORAX ON LEFT: Primary | ICD-10-CM

## 2020-01-01 DIAGNOSIS — R52 INTRACTABLE PAIN: Primary | ICD-10-CM

## 2020-01-01 DIAGNOSIS — F32.A DEPRESSION, UNSPECIFIED DEPRESSION TYPE: Primary | ICD-10-CM

## 2020-01-01 DIAGNOSIS — T45.1X5A ANTINEOPLASTIC CHEMOTHERAPY INDUCED ANEMIA: ICD-10-CM

## 2020-01-01 DIAGNOSIS — F41.8 ANXIETY ABOUT HEALTH: ICD-10-CM

## 2020-01-01 DIAGNOSIS — G89.3 NEOPLASM RELATED PAIN: ICD-10-CM

## 2020-01-01 DIAGNOSIS — G47.00 INSOMNIA, UNSPECIFIED TYPE: ICD-10-CM

## 2020-01-01 DIAGNOSIS — C23 METASTASIS FROM GALLBLADDER CANCER (HCC): Primary | ICD-10-CM

## 2020-01-01 DIAGNOSIS — R05.9 COUGH: ICD-10-CM

## 2020-01-01 DIAGNOSIS — I10 ESSENTIAL HYPERTENSION, MALIGNANT: ICD-10-CM

## 2020-01-01 DIAGNOSIS — I10 ACCELERATED HYPERTENSION: ICD-10-CM

## 2020-01-01 DIAGNOSIS — D33.0 BENIGN NEOPLASM OF SUPRATENTORIAL REGION OF BRAIN (HCC): ICD-10-CM

## 2020-01-01 DIAGNOSIS — G44.229 CHRONIC TENSION-TYPE HEADACHE, NOT INTRACTABLE: ICD-10-CM

## 2020-01-01 DIAGNOSIS — T45.1X5A CHEMOTHERAPY-INDUCED PERIPHERAL NEUROPATHY (HCC): ICD-10-CM

## 2020-01-01 DIAGNOSIS — S22.42XD CLOSED FRACTURE OF MULTIPLE RIBS OF LEFT SIDE WITH ROUTINE HEALING, SUBSEQUENT ENCOUNTER: Primary | ICD-10-CM

## 2020-01-01 DIAGNOSIS — G62.0 CHEMOTHERAPY-INDUCED PERIPHERAL NEUROPATHY (HCC): ICD-10-CM

## 2020-01-01 DIAGNOSIS — K21.9 GASTROESOPHAGEAL REFLUX DISEASE, ESOPHAGITIS PRESENCE NOT SPECIFIED: ICD-10-CM

## 2020-01-01 DIAGNOSIS — G62.9 NEUROPATHY: ICD-10-CM

## 2020-01-01 DIAGNOSIS — C79.31 SECONDARY MALIGNANT NEOPLASM OF BRAIN AND SPINAL CORD (HCC): ICD-10-CM

## 2020-01-01 DIAGNOSIS — F32.89 OTHER DEPRESSION: ICD-10-CM

## 2020-01-01 DIAGNOSIS — C23 METASTASIS FROM GALLBLADDER CANCER (HCC): ICD-10-CM

## 2020-01-01 DIAGNOSIS — R68.83 CHILLS: ICD-10-CM

## 2020-01-01 DIAGNOSIS — R07.9 CHEST PAIN, UNSPECIFIED TYPE: Primary | ICD-10-CM

## 2020-01-01 DIAGNOSIS — F05 DELIRIUM DUE TO GENERAL MEDICAL CONDITION: ICD-10-CM

## 2020-01-01 DIAGNOSIS — R06.00 DYSPNEA, UNSPECIFIED TYPE: Primary | ICD-10-CM

## 2020-01-01 DIAGNOSIS — F32.A DEPRESSION, UNSPECIFIED DEPRESSION TYPE: ICD-10-CM

## 2020-01-01 DIAGNOSIS — C79.9 METASTATIC CANCER (HCC): ICD-10-CM

## 2020-01-01 DIAGNOSIS — K22.4 ESOPHAGEAL SPASM: ICD-10-CM

## 2020-01-01 DIAGNOSIS — K21.9 ESOPHAGEAL REFLUX: ICD-10-CM

## 2020-01-01 DIAGNOSIS — C79.9 METASTASIS FROM GALLBLADDER CANCER (HCC): ICD-10-CM

## 2020-01-01 DIAGNOSIS — R63.0 ANOREXIA: Primary | ICD-10-CM

## 2020-01-01 DIAGNOSIS — S22.42XA CLOSED FRACTURE OF MULTIPLE RIBS OF LEFT SIDE, INITIAL ENCOUNTER: ICD-10-CM

## 2020-01-01 DIAGNOSIS — R07.89 ACUTE CHEST WALL PAIN: ICD-10-CM

## 2020-01-01 DIAGNOSIS — K21.9 GASTROESOPHAGEAL REFLUX DISEASE WITHOUT ESOPHAGITIS: ICD-10-CM

## 2020-01-01 DIAGNOSIS — G62.9 NEUROPATHY: Primary | ICD-10-CM

## 2020-01-01 DIAGNOSIS — R10.13 ABDOMINAL PAIN, EPIGASTRIC: ICD-10-CM

## 2020-01-01 DIAGNOSIS — N39.41 URGE INCONTINENCE: ICD-10-CM

## 2020-01-01 DIAGNOSIS — G89.29 CHRONIC MIDLINE THORACIC BACK PAIN: Primary | ICD-10-CM

## 2020-01-01 DIAGNOSIS — J93.83 OTHER PNEUMOTHORAX: Primary | ICD-10-CM

## 2020-01-01 DIAGNOSIS — Z85.038 PERSONAL HISTORY OF COLON CANCER: ICD-10-CM

## 2020-01-01 DIAGNOSIS — J93.83 OTHER PNEUMOTHORAX: ICD-10-CM

## 2020-01-01 DIAGNOSIS — C34.90 NON-SMALL CELL LUNG CANCER, UNSPECIFIED LATERALITY (HCC): ICD-10-CM

## 2020-01-01 DIAGNOSIS — R10.9 ACUTE ABDOMINAL PAIN: ICD-10-CM

## 2020-01-01 DIAGNOSIS — K35.80 ACUTE APPENDICITIS, UNSPECIFIED ACUTE APPENDICITIS TYPE: Primary | ICD-10-CM

## 2020-01-01 DIAGNOSIS — J93.9 PNEUMOTHORAX ON LEFT: ICD-10-CM

## 2020-01-01 DIAGNOSIS — K35.30 ACUTE APPENDICITIS WITH LOCALIZED PERITONITIS, WITHOUT PERFORATION, ABSCESS, OR GANGRENE: ICD-10-CM

## 2020-01-01 DIAGNOSIS — C34.90 MALIGNANT NEOPLASM OF LUNG, UNSPECIFIED LATERALITY, UNSPECIFIED PART OF LUNG (HCC): Primary | ICD-10-CM

## 2020-01-01 DIAGNOSIS — K21.9 GASTROESOPHAGEAL REFLUX DISEASE, ESOPHAGITIS PRESENCE NOT SPECIFIED: Primary | ICD-10-CM

## 2020-01-01 DIAGNOSIS — C34.90 MALIGNANT NEOPLASM OF LUNG, UNSPECIFIED LATERALITY, UNSPECIFIED PART OF LUNG (HCC): ICD-10-CM

## 2020-01-01 DIAGNOSIS — Z51.11 CHEMOTHERAPY MANAGEMENT, ENCOUNTER FOR: ICD-10-CM

## 2020-01-01 DIAGNOSIS — R07.81 PLEURITIC PAIN: ICD-10-CM

## 2020-01-01 LAB
ABO + RH BLD: NORMAL
ALBUMIN SERPL-MCNC: 2.8 G/DL (ref 3.5–5)
ALBUMIN SERPL-MCNC: 3.1 G/DL (ref 3.5–5)
ALBUMIN SERPL-MCNC: 3.2 G/DL (ref 3.5–5)
ALBUMIN SERPL-MCNC: 3.3 G/DL (ref 3.5–5)
ALBUMIN SERPL-MCNC: 3.3 G/DL (ref 3.5–5)
ALBUMIN SERPL-MCNC: 3.4 G/DL (ref 3.5–5)
ALBUMIN SERPL-MCNC: 3.5 G/DL (ref 3.5–5)
ALBUMIN/GLOB SERPL: 0.7 {RATIO} (ref 1.1–2.2)
ALBUMIN/GLOB SERPL: 0.8 {RATIO} (ref 1.1–2.2)
ALBUMIN/GLOB SERPL: 0.9 {RATIO} (ref 1.1–2.2)
ALP SERPL-CCNC: 71 U/L (ref 45–117)
ALP SERPL-CCNC: 74 U/L (ref 45–117)
ALP SERPL-CCNC: 74 U/L (ref 45–117)
ALP SERPL-CCNC: 75 U/L (ref 45–117)
ALP SERPL-CCNC: 78 U/L (ref 45–117)
ALP SERPL-CCNC: 79 U/L (ref 45–117)
ALP SERPL-CCNC: 80 U/L (ref 45–117)
ALP SERPL-CCNC: 81 U/L (ref 45–117)
ALP SERPL-CCNC: 82 U/L (ref 45–117)
ALP SERPL-CCNC: 82 U/L (ref 45–117)
ALP SERPL-CCNC: 83 U/L (ref 45–117)
ALP SERPL-CCNC: 84 U/L (ref 45–117)
ALP SERPL-CCNC: 86 U/L (ref 45–117)
ALP SERPL-CCNC: 90 U/L (ref 45–117)
ALP SERPL-CCNC: 93 U/L (ref 45–117)
ALT SERPL-CCNC: 12 U/L (ref 12–78)
ALT SERPL-CCNC: 12 U/L (ref 12–78)
ALT SERPL-CCNC: 13 U/L (ref 12–78)
ALT SERPL-CCNC: 14 U/L (ref 12–78)
ALT SERPL-CCNC: 14 U/L (ref 12–78)
ALT SERPL-CCNC: 15 U/L (ref 12–78)
ALT SERPL-CCNC: 16 U/L (ref 12–78)
ALT SERPL-CCNC: 16 U/L (ref 12–78)
ALT SERPL-CCNC: 18 U/L (ref 12–78)
ALT SERPL-CCNC: 19 U/L (ref 12–78)
ALT SERPL-CCNC: 19 U/L (ref 12–78)
ALT SERPL-CCNC: 20 U/L (ref 12–78)
ALT SERPL-CCNC: 23 U/L (ref 12–78)
AMPHET UR QL SCN: NEGATIVE
ANION GAP SERPL CALC-SCNC: 10 MMOL/L (ref 5–15)
ANION GAP SERPL CALC-SCNC: 11 MMOL/L (ref 5–15)
ANION GAP SERPL CALC-SCNC: 11 MMOL/L (ref 5–15)
ANION GAP SERPL CALC-SCNC: 12 MMOL/L (ref 5–15)
ANION GAP SERPL CALC-SCNC: 3 MMOL/L (ref 5–15)
ANION GAP SERPL CALC-SCNC: 4 MMOL/L (ref 5–15)
ANION GAP SERPL CALC-SCNC: 4 MMOL/L (ref 5–15)
ANION GAP SERPL CALC-SCNC: 7 MMOL/L (ref 5–15)
ANION GAP SERPL CALC-SCNC: 9 MMOL/L (ref 5–15)
APPEARANCE UR: CLEAR
AST SERPL-CCNC: 12 U/L (ref 15–37)
AST SERPL-CCNC: 12 U/L (ref 15–37)
AST SERPL-CCNC: 13 U/L (ref 15–37)
AST SERPL-CCNC: 14 U/L (ref 15–37)
AST SERPL-CCNC: 15 U/L (ref 15–37)
AST SERPL-CCNC: 17 U/L (ref 15–37)
AST SERPL-CCNC: 18 U/L (ref 15–37)
AST SERPL-CCNC: 24 U/L (ref 15–37)
AST SERPL-CCNC: 26 U/L (ref 15–37)
ATRIAL RATE: 101 BPM
ATRIAL RATE: 63 BPM
ATRIAL RATE: 69 BPM
ATRIAL RATE: 72 BPM
ATRIAL RATE: 74 BPM
ATRIAL RATE: 96 BPM
BACTERIA URNS QL MICRO: NEGATIVE /HPF
BARBITURATES UR QL SCN: NEGATIVE
BASOPHILS # BLD: 0 K/UL (ref 0–0.1)
BASOPHILS NFR BLD: 0 % (ref 0–1)
BASOPHILS NFR BLD: 1 % (ref 0–1)
BASOPHILS NFR BLD: 1 % (ref 0–1)
BENZODIAZ UR QL: NEGATIVE
BILIRUB SERPL-MCNC: 0.2 MG/DL (ref 0.2–1)
BILIRUB SERPL-MCNC: 0.3 MG/DL (ref 0.2–1)
BILIRUB SERPL-MCNC: 0.4 MG/DL (ref 0.2–1)
BILIRUB SERPL-MCNC: 0.5 MG/DL (ref 0.2–1)
BILIRUB SERPL-MCNC: 0.5 MG/DL (ref 0.2–1)
BILIRUB UR QL: NEGATIVE
BLOOD GROUP ANTIBODIES SERPL: NORMAL
BNP SERPL-MCNC: 507 PG/ML (ref 0–125)
BUN SERPL-MCNC: 10 MG/DL (ref 6–20)
BUN SERPL-MCNC: 10 MG/DL (ref 6–20)
BUN SERPL-MCNC: 11 MG/DL (ref 6–20)
BUN SERPL-MCNC: 12 MG/DL (ref 6–20)
BUN SERPL-MCNC: 13 MG/DL (ref 6–20)
BUN SERPL-MCNC: 14 MG/DL (ref 6–20)
BUN SERPL-MCNC: 14 MG/DL (ref 6–20)
BUN SERPL-MCNC: 15 MG/DL (ref 6–20)
BUN SERPL-MCNC: 9 MG/DL (ref 6–20)
BUN/CREAT SERPL: 10 (ref 12–20)
BUN/CREAT SERPL: 11 (ref 12–20)
BUN/CREAT SERPL: 12 (ref 12–20)
BUN/CREAT SERPL: 13 (ref 12–20)
BUN/CREAT SERPL: 14 (ref 12–20)
BUN/CREAT SERPL: 15 (ref 12–20)
BUN/CREAT SERPL: 17 (ref 12–20)
CALCIUM SERPL-MCNC: 8.6 MG/DL (ref 8.5–10.1)
CALCIUM SERPL-MCNC: 8.7 MG/DL (ref 8.5–10.1)
CALCIUM SERPL-MCNC: 8.7 MG/DL (ref 8.5–10.1)
CALCIUM SERPL-MCNC: 8.8 MG/DL (ref 8.5–10.1)
CALCIUM SERPL-MCNC: 8.9 MG/DL (ref 8.5–10.1)
CALCIUM SERPL-MCNC: 9.1 MG/DL (ref 8.5–10.1)
CALCIUM SERPL-MCNC: 9.2 MG/DL (ref 8.5–10.1)
CALCIUM SERPL-MCNC: 9.4 MG/DL (ref 8.5–10.1)
CALCIUM SERPL-MCNC: 9.4 MG/DL (ref 8.5–10.1)
CALCIUM SERPL-MCNC: 9.6 MG/DL (ref 8.5–10.1)
CALCIUM SERPL-MCNC: 9.6 MG/DL (ref 8.5–10.1)
CALCIUM SERPL-MCNC: 9.7 MG/DL (ref 8.5–10.1)
CALCULATED P AXIS, ECG09: 66 DEGREES
CALCULATED P AXIS, ECG09: 73 DEGREES
CALCULATED P AXIS, ECG09: 74 DEGREES
CALCULATED P AXIS, ECG09: 76 DEGREES
CALCULATED P AXIS, ECG09: 82 DEGREES
CALCULATED P AXIS, ECG09: 84 DEGREES
CALCULATED R AXIS, ECG10: 48 DEGREES
CALCULATED R AXIS, ECG10: 49 DEGREES
CALCULATED R AXIS, ECG10: 50 DEGREES
CALCULATED R AXIS, ECG10: 67 DEGREES
CALCULATED R AXIS, ECG10: 68 DEGREES
CALCULATED R AXIS, ECG10: 70 DEGREES
CALCULATED T AXIS, ECG11: 14 DEGREES
CALCULATED T AXIS, ECG11: 69 DEGREES
CALCULATED T AXIS, ECG11: 71 DEGREES
CALCULATED T AXIS, ECG11: 72 DEGREES
CALCULATED T AXIS, ECG11: 76 DEGREES
CALCULATED T AXIS, ECG11: 83 DEGREES
CANNABINOIDS UR QL SCN: NEGATIVE
CHLORIDE SERPL-SCNC: 102 MMOL/L (ref 97–108)
CHLORIDE SERPL-SCNC: 103 MMOL/L (ref 97–108)
CHLORIDE SERPL-SCNC: 104 MMOL/L (ref 97–108)
CHLORIDE SERPL-SCNC: 105 MMOL/L (ref 97–108)
CHLORIDE SERPL-SCNC: 105 MMOL/L (ref 97–108)
CHLORIDE SERPL-SCNC: 106 MMOL/L (ref 97–108)
CHLORIDE SERPL-SCNC: 107 MMOL/L (ref 97–108)
CHLORIDE SERPL-SCNC: 108 MMOL/L (ref 97–108)
CHLORIDE SERPL-SCNC: 108 MMOL/L (ref 97–108)
CHLORIDE SERPL-SCNC: 109 MMOL/L (ref 97–108)
CHLORIDE SERPL-SCNC: 109 MMOL/L (ref 97–108)
CK SERPL-CCNC: 63 U/L (ref 39–308)
CO2 SERPL-SCNC: 23 MMOL/L (ref 21–32)
CO2 SERPL-SCNC: 23 MMOL/L (ref 21–32)
CO2 SERPL-SCNC: 24 MMOL/L (ref 21–32)
CO2 SERPL-SCNC: 25 MMOL/L (ref 21–32)
CO2 SERPL-SCNC: 26 MMOL/L (ref 21–32)
CO2 SERPL-SCNC: 27 MMOL/L (ref 21–32)
CO2 SERPL-SCNC: 27 MMOL/L (ref 21–32)
CO2 SERPL-SCNC: 29 MMOL/L (ref 21–32)
COCAINE UR QL SCN: NEGATIVE
COLOR UR: NORMAL
CREAT BLD-MCNC: 0.9 MG/DL (ref 0.6–1.3)
CREAT SERPL-MCNC: 0.76 MG/DL (ref 0.7–1.3)
CREAT SERPL-MCNC: 0.78 MG/DL (ref 0.7–1.3)
CREAT SERPL-MCNC: 0.81 MG/DL (ref 0.7–1.3)
CREAT SERPL-MCNC: 0.83 MG/DL (ref 0.7–1.3)
CREAT SERPL-MCNC: 0.85 MG/DL (ref 0.7–1.3)
CREAT SERPL-MCNC: 0.86 MG/DL (ref 0.7–1.3)
CREAT SERPL-MCNC: 0.87 MG/DL (ref 0.7–1.3)
CREAT SERPL-MCNC: 0.88 MG/DL (ref 0.7–1.3)
CREAT SERPL-MCNC: 0.88 MG/DL (ref 0.7–1.3)
CREAT SERPL-MCNC: 0.9 MG/DL (ref 0.7–1.3)
CREAT SERPL-MCNC: 0.92 MG/DL (ref 0.7–1.3)
CREAT SERPL-MCNC: 0.94 MG/DL (ref 0.7–1.3)
CREAT SERPL-MCNC: 0.97 MG/DL (ref 0.7–1.3)
CREAT SERPL-MCNC: 0.98 MG/DL (ref 0.7–1.3)
CREAT SERPL-MCNC: 1 MG/DL (ref 0.7–1.3)
CREAT SERPL-MCNC: 1.03 MG/DL (ref 0.7–1.3)
CREAT SERPL-MCNC: 1.07 MG/DL (ref 0.7–1.3)
CREAT SERPL-MCNC: 1.15 MG/DL (ref 0.7–1.3)
DIAGNOSIS, 93000: NORMAL
DIFFERENTIAL METHOD BLD: ABNORMAL
DRUG SCRN COMMENT,DRGCM: ABNORMAL
EOSINOPHIL # BLD: 0 K/UL (ref 0–0.4)
EOSINOPHIL # BLD: 0.1 K/UL (ref 0–0.4)
EOSINOPHIL NFR BLD: 0 % (ref 0–7)
EOSINOPHIL NFR BLD: 0 % (ref 0–7)
EOSINOPHIL NFR BLD: 1 % (ref 0–7)
EOSINOPHIL NFR BLD: 2 % (ref 0–7)
EPITH CASTS URNS QL MICRO: NORMAL /LPF
ERYTHROCYTE [DISTWIDTH] IN BLOOD BY AUTOMATED COUNT: 15.3 % (ref 11.5–14.5)
ERYTHROCYTE [DISTWIDTH] IN BLOOD BY AUTOMATED COUNT: 15.4 % (ref 11.5–14.5)
ERYTHROCYTE [DISTWIDTH] IN BLOOD BY AUTOMATED COUNT: 15.6 % (ref 11.5–14.5)
ERYTHROCYTE [DISTWIDTH] IN BLOOD BY AUTOMATED COUNT: 15.9 % (ref 11.5–14.5)
ERYTHROCYTE [DISTWIDTH] IN BLOOD BY AUTOMATED COUNT: 15.9 % (ref 11.5–14.5)
ERYTHROCYTE [DISTWIDTH] IN BLOOD BY AUTOMATED COUNT: 16.8 % (ref 11.5–14.5)
ERYTHROCYTE [DISTWIDTH] IN BLOOD BY AUTOMATED COUNT: 17 % (ref 11.5–14.5)
ERYTHROCYTE [DISTWIDTH] IN BLOOD BY AUTOMATED COUNT: 17 % (ref 11.5–14.5)
ERYTHROCYTE [DISTWIDTH] IN BLOOD BY AUTOMATED COUNT: 17.1 % (ref 11.5–14.5)
ERYTHROCYTE [DISTWIDTH] IN BLOOD BY AUTOMATED COUNT: 17.2 % (ref 11.5–14.5)
ERYTHROCYTE [DISTWIDTH] IN BLOOD BY AUTOMATED COUNT: 17.5 % (ref 11.5–14.5)
ERYTHROCYTE [DISTWIDTH] IN BLOOD BY AUTOMATED COUNT: 17.5 % (ref 11.5–14.5)
ERYTHROCYTE [DISTWIDTH] IN BLOOD BY AUTOMATED COUNT: 18.4 % (ref 11.5–14.5)
ERYTHROCYTE [DISTWIDTH] IN BLOOD BY AUTOMATED COUNT: 18.7 % (ref 11.5–14.5)
ERYTHROCYTE [DISTWIDTH] IN BLOOD BY AUTOMATED COUNT: 18.7 % (ref 11.5–14.5)
ERYTHROCYTE [DISTWIDTH] IN BLOOD BY AUTOMATED COUNT: 19.2 % (ref 11.5–14.5)
ERYTHROCYTE [DISTWIDTH] IN BLOOD BY AUTOMATED COUNT: 19.7 % (ref 11.5–14.5)
ERYTHROCYTE [DISTWIDTH] IN BLOOD BY AUTOMATED COUNT: 21 % (ref 11.5–14.5)
ERYTHROCYTE [DISTWIDTH] IN BLOOD BY AUTOMATED COUNT: 21.7 % (ref 11.5–14.5)
GLOBULIN SER CALC-MCNC: 3.6 G/DL (ref 2–4)
GLOBULIN SER CALC-MCNC: 3.6 G/DL (ref 2–4)
GLOBULIN SER CALC-MCNC: 3.7 G/DL (ref 2–4)
GLOBULIN SER CALC-MCNC: 3.8 G/DL (ref 2–4)
GLOBULIN SER CALC-MCNC: 3.9 G/DL (ref 2–4)
GLOBULIN SER CALC-MCNC: 3.9 G/DL (ref 2–4)
GLOBULIN SER CALC-MCNC: 4.1 G/DL (ref 2–4)
GLOBULIN SER CALC-MCNC: 4.1 G/DL (ref 2–4)
GLOBULIN SER CALC-MCNC: 4.2 G/DL (ref 2–4)
GLOBULIN SER CALC-MCNC: 4.3 G/DL (ref 2–4)
GLOBULIN SER CALC-MCNC: 4.3 G/DL (ref 2–4)
GLOBULIN SER CALC-MCNC: 4.4 G/DL (ref 2–4)
GLUCOSE SERPL-MCNC: 107 MG/DL (ref 65–100)
GLUCOSE SERPL-MCNC: 111 MG/DL (ref 65–100)
GLUCOSE SERPL-MCNC: 111 MG/DL (ref 65–100)
GLUCOSE SERPL-MCNC: 112 MG/DL (ref 65–100)
GLUCOSE SERPL-MCNC: 113 MG/DL (ref 65–100)
GLUCOSE SERPL-MCNC: 124 MG/DL (ref 65–100)
GLUCOSE SERPL-MCNC: 127 MG/DL (ref 65–100)
GLUCOSE SERPL-MCNC: 140 MG/DL (ref 65–100)
GLUCOSE SERPL-MCNC: 141 MG/DL (ref 65–100)
GLUCOSE SERPL-MCNC: 168 MG/DL (ref 65–100)
GLUCOSE SERPL-MCNC: 185 MG/DL (ref 65–100)
GLUCOSE SERPL-MCNC: 205 MG/DL (ref 65–100)
GLUCOSE SERPL-MCNC: 78 MG/DL (ref 65–100)
GLUCOSE SERPL-MCNC: 88 MG/DL (ref 65–100)
GLUCOSE SERPL-MCNC: 89 MG/DL (ref 65–100)
GLUCOSE SERPL-MCNC: 91 MG/DL (ref 65–100)
GLUCOSE SERPL-MCNC: 95 MG/DL (ref 65–100)
GLUCOSE SERPL-MCNC: 96 MG/DL (ref 65–100)
GLUCOSE UR STRIP.AUTO-MCNC: NEGATIVE MG/DL
HCT VFR BLD AUTO: 27.7 % (ref 36.6–50.3)
HCT VFR BLD AUTO: 28.5 % (ref 36.6–50.3)
HCT VFR BLD AUTO: 30.1 % (ref 36.6–50.3)
HCT VFR BLD AUTO: 30.3 % (ref 36.6–50.3)
HCT VFR BLD AUTO: 30.9 % (ref 36.6–50.3)
HCT VFR BLD AUTO: 31.1 % (ref 36.6–50.3)
HCT VFR BLD AUTO: 32.1 % (ref 36.6–50.3)
HCT VFR BLD AUTO: 32.2 % (ref 36.6–50.3)
HCT VFR BLD AUTO: 32.5 % (ref 36.6–50.3)
HCT VFR BLD AUTO: 32.7 % (ref 36.6–50.3)
HCT VFR BLD AUTO: 32.8 % (ref 36.6–50.3)
HCT VFR BLD AUTO: 33.2 % (ref 36.6–50.3)
HCT VFR BLD AUTO: 34 % (ref 36.6–50.3)
HCT VFR BLD AUTO: 34.2 % (ref 36.6–50.3)
HCT VFR BLD AUTO: 34.2 % (ref 36.6–50.3)
HCT VFR BLD AUTO: 34.6 % (ref 36.6–50.3)
HCT VFR BLD AUTO: 37.1 % (ref 36.6–50.3)
HCT VFR BLD AUTO: 39.8 % (ref 36.6–50.3)
HCT VFR BLD AUTO: 40.5 % (ref 36.6–50.3)
HGB BLD-MCNC: 10.1 G/DL (ref 12.1–17)
HGB BLD-MCNC: 10.2 G/DL (ref 12.1–17)
HGB BLD-MCNC: 10.3 G/DL (ref 12.1–17)
HGB BLD-MCNC: 10.4 G/DL (ref 12.1–17)
HGB BLD-MCNC: 10.6 G/DL (ref 12.1–17)
HGB BLD-MCNC: 10.9 G/DL (ref 12.1–17)
HGB BLD-MCNC: 10.9 G/DL (ref 12.1–17)
HGB BLD-MCNC: 11 G/DL (ref 12.1–17)
HGB BLD-MCNC: 11.1 G/DL (ref 12.1–17)
HGB BLD-MCNC: 11.1 G/DL (ref 12.1–17)
HGB BLD-MCNC: 11.4 G/DL (ref 12.1–17)
HGB BLD-MCNC: 11.6 G/DL (ref 12.1–17)
HGB BLD-MCNC: 11.6 G/DL (ref 12.1–17)
HGB BLD-MCNC: 11.8 G/DL (ref 12.1–17)
HGB BLD-MCNC: 12.3 G/DL (ref 12.1–17)
HGB BLD-MCNC: 13.1 G/DL (ref 12.1–17)
HGB BLD-MCNC: 13.2 G/DL (ref 12.1–17)
HGB BLD-MCNC: 9.4 G/DL (ref 12.1–17)
HGB BLD-MCNC: 9.8 G/DL (ref 12.1–17)
HGB UR QL STRIP: NEGATIVE
HYALINE CASTS URNS QL MICRO: NORMAL /LPF (ref 0–5)
IMM GRANULOCYTES # BLD AUTO: 0 K/UL
IMM GRANULOCYTES # BLD AUTO: 0 K/UL (ref 0–0.04)
IMM GRANULOCYTES NFR BLD AUTO: 0 %
IMM GRANULOCYTES NFR BLD AUTO: 0 % (ref 0–0.5)
IMM GRANULOCYTES NFR BLD AUTO: 1 % (ref 0–0.5)
KETONES UR QL STRIP.AUTO: NEGATIVE MG/DL
LEUKOCYTE ESTERASE UR QL STRIP.AUTO: NEGATIVE
LYMPHOCYTES # BLD: 1.4 K/UL (ref 0.8–3.5)
LYMPHOCYTES # BLD: 1.8 K/UL (ref 0.8–3.5)
LYMPHOCYTES # BLD: 1.8 K/UL (ref 0.8–3.5)
LYMPHOCYTES # BLD: 1.9 K/UL (ref 0.8–3.5)
LYMPHOCYTES # BLD: 2 K/UL (ref 0.8–3.5)
LYMPHOCYTES # BLD: 2.1 K/UL (ref 0.8–3.5)
LYMPHOCYTES # BLD: 2.4 K/UL (ref 0.8–3.5)
LYMPHOCYTES # BLD: 2.5 K/UL (ref 0.8–3.5)
LYMPHOCYTES # BLD: 2.5 K/UL (ref 0.8–3.5)
LYMPHOCYTES # BLD: 2.8 K/UL (ref 0.8–3.5)
LYMPHOCYTES # BLD: 2.9 K/UL (ref 0.8–3.5)
LYMPHOCYTES # BLD: 3 K/UL (ref 0.8–3.5)
LYMPHOCYTES # BLD: 3.2 K/UL (ref 0.8–3.5)
LYMPHOCYTES NFR BLD: 24 % (ref 12–49)
LYMPHOCYTES NFR BLD: 25 % (ref 12–49)
LYMPHOCYTES NFR BLD: 26 % (ref 12–49)
LYMPHOCYTES NFR BLD: 26 % (ref 12–49)
LYMPHOCYTES NFR BLD: 27 % (ref 12–49)
LYMPHOCYTES NFR BLD: 28 % (ref 12–49)
LYMPHOCYTES NFR BLD: 28 % (ref 12–49)
LYMPHOCYTES NFR BLD: 30 % (ref 12–49)
LYMPHOCYTES NFR BLD: 31 % (ref 12–49)
LYMPHOCYTES NFR BLD: 33 % (ref 12–49)
LYMPHOCYTES NFR BLD: 33 % (ref 12–49)
LYMPHOCYTES NFR BLD: 36 % (ref 12–49)
LYMPHOCYTES NFR BLD: 38 % (ref 12–49)
LYMPHOCYTES NFR BLD: 39 % (ref 12–49)
LYMPHOCYTES NFR BLD: 45 % (ref 12–49)
LYMPHOCYTES NFR BLD: 47 % (ref 12–49)
LYMPHOCYTES NFR BLD: 59 % (ref 12–49)
LYMPHOCYTES NFR BLD: 64 % (ref 12–49)
MCH RBC QN AUTO: 28.9 PG (ref 26–34)
MCH RBC QN AUTO: 29.2 PG (ref 26–34)
MCH RBC QN AUTO: 29.4 PG (ref 26–34)
MCH RBC QN AUTO: 29.6 PG (ref 26–34)
MCH RBC QN AUTO: 29.8 PG (ref 26–34)
MCH RBC QN AUTO: 29.8 PG (ref 26–34)
MCH RBC QN AUTO: 29.9 PG (ref 26–34)
MCH RBC QN AUTO: 30.1 PG (ref 26–34)
MCH RBC QN AUTO: 30.2 PG (ref 26–34)
MCH RBC QN AUTO: 30.2 PG (ref 26–34)
MCH RBC QN AUTO: 30.4 PG (ref 26–34)
MCH RBC QN AUTO: 30.5 PG (ref 26–34)
MCH RBC QN AUTO: 30.6 PG (ref 26–34)
MCH RBC QN AUTO: 30.8 PG (ref 26–34)
MCH RBC QN AUTO: 30.8 PG (ref 26–34)
MCH RBC QN AUTO: 31 PG (ref 26–34)
MCH RBC QN AUTO: 31.1 PG (ref 26–34)
MCHC RBC AUTO-ENTMCNC: 32.3 G/DL (ref 30–36.5)
MCHC RBC AUTO-ENTMCNC: 32.9 G/DL (ref 30–36.5)
MCHC RBC AUTO-ENTMCNC: 33.1 G/DL (ref 30–36.5)
MCHC RBC AUTO-ENTMCNC: 33.2 G/DL (ref 30–36.5)
MCHC RBC AUTO-ENTMCNC: 33.2 G/DL (ref 30–36.5)
MCHC RBC AUTO-ENTMCNC: 33.3 G/DL (ref 30–36.5)
MCHC RBC AUTO-ENTMCNC: 33.3 G/DL (ref 30–36.5)
MCHC RBC AUTO-ENTMCNC: 33.4 G/DL (ref 30–36.5)
MCHC RBC AUTO-ENTMCNC: 33.5 G/DL (ref 30–36.5)
MCHC RBC AUTO-ENTMCNC: 33.5 G/DL (ref 30–36.5)
MCHC RBC AUTO-ENTMCNC: 33.6 G/DL (ref 30–36.5)
MCHC RBC AUTO-ENTMCNC: 33.7 G/DL (ref 30–36.5)
MCHC RBC AUTO-ENTMCNC: 33.7 G/DL (ref 30–36.5)
MCHC RBC AUTO-ENTMCNC: 33.9 G/DL (ref 30–36.5)
MCHC RBC AUTO-ENTMCNC: 33.9 G/DL (ref 30–36.5)
MCHC RBC AUTO-ENTMCNC: 34.1 G/DL (ref 30–36.5)
MCHC RBC AUTO-ENTMCNC: 34.1 G/DL (ref 30–36.5)
MCHC RBC AUTO-ENTMCNC: 34.4 G/DL (ref 30–36.5)
MCHC RBC AUTO-ENTMCNC: 34.6 G/DL (ref 30–36.5)
MCV RBC AUTO: 86 FL (ref 80–99)
MCV RBC AUTO: 86.5 FL (ref 80–99)
MCV RBC AUTO: 87.5 FL (ref 80–99)
MCV RBC AUTO: 87.5 FL (ref 80–99)
MCV RBC AUTO: 87.7 FL (ref 80–99)
MCV RBC AUTO: 88.5 FL (ref 80–99)
MCV RBC AUTO: 88.8 FL (ref 80–99)
MCV RBC AUTO: 89.4 FL (ref 80–99)
MCV RBC AUTO: 89.6 FL (ref 80–99)
MCV RBC AUTO: 89.8 FL (ref 80–99)
MCV RBC AUTO: 89.8 FL (ref 80–99)
MCV RBC AUTO: 89.9 FL (ref 80–99)
MCV RBC AUTO: 90.1 FL (ref 80–99)
MCV RBC AUTO: 90.2 FL (ref 80–99)
MCV RBC AUTO: 91.1 FL (ref 80–99)
MCV RBC AUTO: 92.3 FL (ref 80–99)
MCV RBC AUTO: 92.4 FL (ref 80–99)
MCV RBC AUTO: 92.8 FL (ref 80–99)
MCV RBC AUTO: 94.2 FL (ref 80–99)
METHADONE UR QL: NEGATIVE
MONOCYTES # BLD: 0.1 K/UL (ref 0–1)
MONOCYTES # BLD: 0.2 K/UL (ref 0–1)
MONOCYTES # BLD: 0.3 K/UL (ref 0–1)
MONOCYTES # BLD: 0.7 K/UL (ref 0–1)
MONOCYTES # BLD: 0.7 K/UL (ref 0–1)
MONOCYTES # BLD: 0.8 K/UL (ref 0–1)
MONOCYTES # BLD: 0.9 K/UL (ref 0–1)
MONOCYTES # BLD: 0.9 K/UL (ref 0–1)
MONOCYTES # BLD: 1 K/UL (ref 0–1)
MONOCYTES # BLD: 1.1 K/UL (ref 0–1)
MONOCYTES # BLD: 1.2 K/UL (ref 0–1)
MONOCYTES # BLD: 1.3 K/UL (ref 0–1)
MONOCYTES # BLD: 1.3 K/UL (ref 0–1)
MONOCYTES NFR BLD: 10 % (ref 5–13)
MONOCYTES NFR BLD: 11 % (ref 5–13)
MONOCYTES NFR BLD: 12 % (ref 5–13)
MONOCYTES NFR BLD: 13 % (ref 5–13)
MONOCYTES NFR BLD: 16 % (ref 5–13)
MONOCYTES NFR BLD: 17 % (ref 5–13)
MONOCYTES NFR BLD: 17 % (ref 5–13)
MONOCYTES NFR BLD: 19 % (ref 5–13)
MONOCYTES NFR BLD: 19 % (ref 5–13)
MONOCYTES NFR BLD: 3 % (ref 5–13)
MONOCYTES NFR BLD: 3 % (ref 5–13)
MONOCYTES NFR BLD: 6 % (ref 5–13)
NEUTS BAND NFR BLD MANUAL: 1 % (ref 0–6)
NEUTS SEG # BLD: 0.7 K/UL (ref 1.8–8)
NEUTS SEG # BLD: 1 K/UL (ref 1.8–8)
NEUTS SEG # BLD: 1.8 K/UL (ref 1.8–8)
NEUTS SEG # BLD: 2.5 K/UL (ref 1.8–8)
NEUTS SEG # BLD: 2.8 K/UL (ref 1.8–8)
NEUTS SEG # BLD: 3 K/UL (ref 1.8–8)
NEUTS SEG # BLD: 3.2 K/UL (ref 1.8–8)
NEUTS SEG # BLD: 3.6 K/UL (ref 1.8–8)
NEUTS SEG # BLD: 3.6 K/UL (ref 1.8–8)
NEUTS SEG # BLD: 3.8 K/UL (ref 1.8–8)
NEUTS SEG # BLD: 4.2 K/UL (ref 1.8–8)
NEUTS SEG # BLD: 4.3 K/UL (ref 1.8–8)
NEUTS SEG # BLD: 4.3 K/UL (ref 1.8–8)
NEUTS SEG # BLD: 4.8 K/UL (ref 1.8–8)
NEUTS SEG # BLD: 6 K/UL (ref 1.8–8)
NEUTS SEG NFR BLD: 20 % (ref 32–75)
NEUTS SEG NFR BLD: 31 % (ref 32–75)
NEUTS SEG NFR BLD: 41 % (ref 32–75)
NEUTS SEG NFR BLD: 44 % (ref 32–75)
NEUTS SEG NFR BLD: 45 % (ref 32–75)
NEUTS SEG NFR BLD: 47 % (ref 32–75)
NEUTS SEG NFR BLD: 50 % (ref 32–75)
NEUTS SEG NFR BLD: 52 % (ref 32–75)
NEUTS SEG NFR BLD: 53 % (ref 32–75)
NEUTS SEG NFR BLD: 54 % (ref 32–75)
NEUTS SEG NFR BLD: 57 % (ref 32–75)
NEUTS SEG NFR BLD: 57 % (ref 32–75)
NEUTS SEG NFR BLD: 59 % (ref 32–75)
NEUTS SEG NFR BLD: 60 % (ref 32–75)
NEUTS SEG NFR BLD: 62 % (ref 32–75)
NEUTS SEG NFR BLD: 63 % (ref 32–75)
NITRITE UR QL STRIP.AUTO: NEGATIVE
NRBC # BLD: 0 K/UL (ref 0–0.01)
NRBC # BLD: 0.02 K/UL (ref 0–0.01)
NRBC # BLD: 0.02 K/UL (ref 0–0.01)
NRBC BLD-RTO: 0 PER 100 WBC
NRBC BLD-RTO: 0.3 PER 100 WBC
NRBC BLD-RTO: 0.3 PER 100 WBC
OPIATES UR QL: POSITIVE
P-R INTERVAL, ECG05: 130 MS
P-R INTERVAL, ECG05: 132 MS
P-R INTERVAL, ECG05: 138 MS
P-R INTERVAL, ECG05: 146 MS
P-R INTERVAL, ECG05: 148 MS
P-R INTERVAL, ECG05: 152 MS
PATH REV BLD -IMP: NORMAL
PCP UR QL: NEGATIVE
PH UR STRIP: 6 [PH] (ref 5–8)
PLATELET # BLD AUTO: 111 K/UL (ref 150–400)
PLATELET # BLD AUTO: 136 K/UL (ref 150–400)
PLATELET # BLD AUTO: 187 K/UL (ref 150–400)
PLATELET # BLD AUTO: 192 K/UL (ref 150–400)
PLATELET # BLD AUTO: 194 K/UL (ref 150–400)
PLATELET # BLD AUTO: 200 K/UL (ref 150–400)
PLATELET # BLD AUTO: 212 K/UL (ref 150–400)
PLATELET # BLD AUTO: 218 K/UL (ref 150–400)
PLATELET # BLD AUTO: 227 K/UL (ref 150–400)
PLATELET # BLD AUTO: 234 K/UL (ref 150–400)
PLATELET # BLD AUTO: 240 K/UL (ref 150–400)
PLATELET # BLD AUTO: 249 K/UL (ref 150–400)
PLATELET # BLD AUTO: 252 K/UL (ref 150–400)
PLATELET # BLD AUTO: 254 K/UL (ref 150–400)
PLATELET # BLD AUTO: 261 K/UL (ref 150–400)
PLATELET # BLD AUTO: 262 K/UL (ref 150–400)
PLATELET # BLD AUTO: 279 K/UL (ref 150–400)
PLATELET # BLD AUTO: 286 K/UL (ref 150–400)
PLATELET # BLD AUTO: 34 K/UL (ref 150–400)
PMV BLD AUTO: 10.4 FL (ref 8.9–12.9)
PMV BLD AUTO: 10.5 FL (ref 8.9–12.9)
PMV BLD AUTO: 11 FL (ref 8.9–12.9)
PMV BLD AUTO: 11 FL (ref 8.9–12.9)
PMV BLD AUTO: 11.2 FL (ref 8.9–12.9)
PMV BLD AUTO: 11.3 FL (ref 8.9–12.9)
PMV BLD AUTO: 11.6 FL (ref 8.9–12.9)
PMV BLD AUTO: 11.6 FL (ref 8.9–12.9)
PMV BLD AUTO: 11.7 FL (ref 8.9–12.9)
PMV BLD AUTO: 11.8 FL (ref 8.9–12.9)
PMV BLD AUTO: 11.9 FL (ref 8.9–12.9)
PMV BLD AUTO: 12.1 FL (ref 8.9–12.9)
PMV BLD AUTO: 12.3 FL (ref 8.9–12.9)
PMV BLD AUTO: 12.5 FL (ref 8.9–12.9)
PMV BLD AUTO: 12.5 FL (ref 8.9–12.9)
PMV BLD AUTO: 12.6 FL (ref 8.9–12.9)
PMV BLD AUTO: 12.8 FL (ref 8.9–12.9)
PMV BLD AUTO: 12.9 FL (ref 8.9–12.9)
POTASSIUM SERPL-SCNC: 3.4 MMOL/L (ref 3.5–5.1)
POTASSIUM SERPL-SCNC: 3.6 MMOL/L (ref 3.5–5.1)
POTASSIUM SERPL-SCNC: 3.6 MMOL/L (ref 3.5–5.1)
POTASSIUM SERPL-SCNC: 3.7 MMOL/L (ref 3.5–5.1)
POTASSIUM SERPL-SCNC: 3.8 MMOL/L (ref 3.5–5.1)
POTASSIUM SERPL-SCNC: 3.9 MMOL/L (ref 3.5–5.1)
POTASSIUM SERPL-SCNC: 4 MMOL/L (ref 3.5–5.1)
POTASSIUM SERPL-SCNC: 4.1 MMOL/L (ref 3.5–5.1)
POTASSIUM SERPL-SCNC: 4.2 MMOL/L (ref 3.5–5.1)
POTASSIUM SERPL-SCNC: 4.9 MMOL/L (ref 3.5–5.1)
PROT SERPL-MCNC: 6.6 G/DL (ref 6.4–8.2)
PROT SERPL-MCNC: 6.7 G/DL (ref 6.4–8.2)
PROT SERPL-MCNC: 6.8 G/DL (ref 6.4–8.2)
PROT SERPL-MCNC: 6.9 G/DL (ref 6.4–8.2)
PROT SERPL-MCNC: 7.1 G/DL (ref 6.4–8.2)
PROT SERPL-MCNC: 7.2 G/DL (ref 6.4–8.2)
PROT SERPL-MCNC: 7.3 G/DL (ref 6.4–8.2)
PROT SERPL-MCNC: 7.4 G/DL (ref 6.4–8.2)
PROT SERPL-MCNC: 7.4 G/DL (ref 6.4–8.2)
PROT SERPL-MCNC: 7.5 G/DL (ref 6.4–8.2)
PROT SERPL-MCNC: 7.6 G/DL (ref 6.4–8.2)
PROT SERPL-MCNC: 7.6 G/DL (ref 6.4–8.2)
PROT SERPL-MCNC: 7.7 G/DL (ref 6.4–8.2)
PROT SERPL-MCNC: 7.8 G/DL (ref 6.4–8.2)
PROT SERPL-MCNC: 7.9 G/DL (ref 6.4–8.2)
PROT UR STRIP-MCNC: NEGATIVE MG/DL
Q-T INTERVAL, ECG07: 376 MS
Q-T INTERVAL, ECG07: 382 MS
Q-T INTERVAL, ECG07: 388 MS
Q-T INTERVAL, ECG07: 402 MS
Q-T INTERVAL, ECG07: 404 MS
Q-T INTERVAL, ECG07: 412 MS
QRS DURATION, ECG06: 100 MS
QRS DURATION, ECG06: 84 MS
QRS DURATION, ECG06: 84 MS
QRS DURATION, ECG06: 86 MS
QRS DURATION, ECG06: 92 MS
QRS DURATION, ECG06: 98 MS
QTC CALCULATION (BEZET), ECG08: 415 MS
QTC CALCULATION (BEZET), ECG08: 421 MS
QTC CALCULATION (BEZET), ECG08: 440 MS
QTC CALCULATION (BEZET), ECG08: 448 MS
QTC CALCULATION (BEZET), ECG08: 482 MS
QTC CALCULATION (BEZET), ECG08: 487 MS
RBC # BLD AUTO: 3.22 M/UL (ref 4.1–5.7)
RBC # BLD AUTO: 3.25 M/UL (ref 4.1–5.7)
RBC # BLD AUTO: 3.39 M/UL (ref 4.1–5.7)
RBC # BLD AUTO: 3.44 M/UL (ref 4.1–5.7)
RBC # BLD AUTO: 3.45 M/UL (ref 4.1–5.7)
RBC # BLD AUTO: 3.49 M/UL (ref 4.1–5.7)
RBC # BLD AUTO: 3.55 M/UL (ref 4.1–5.7)
RBC # BLD AUTO: 3.58 M/UL (ref 4.1–5.7)
RBC # BLD AUTO: 3.65 M/UL (ref 4.1–5.7)
RBC # BLD AUTO: 3.66 M/UL (ref 4.1–5.7)
RBC # BLD AUTO: 3.67 M/UL (ref 4.1–5.7)
RBC # BLD AUTO: 3.77 M/UL (ref 4.1–5.7)
RBC # BLD AUTO: 3.8 M/UL (ref 4.1–5.7)
RBC # BLD AUTO: 3.81 M/UL (ref 4.1–5.7)
RBC # BLD AUTO: 3.81 M/UL (ref 4.1–5.7)
RBC # BLD AUTO: 3.84 M/UL (ref 4.1–5.7)
RBC # BLD AUTO: 4 M/UL (ref 4.1–5.7)
RBC # BLD AUTO: 4.3 M/UL (ref 4.1–5.7)
RBC # BLD AUTO: 4.31 M/UL (ref 4.1–5.7)
RBC #/AREA URNS HPF: NORMAL /HPF (ref 0–5)
RBC MORPH BLD: ABNORMAL
SODIUM SERPL-SCNC: 136 MMOL/L (ref 136–145)
SODIUM SERPL-SCNC: 138 MMOL/L (ref 136–145)
SODIUM SERPL-SCNC: 139 MMOL/L (ref 136–145)
SODIUM SERPL-SCNC: 140 MMOL/L (ref 136–145)
SODIUM SERPL-SCNC: 141 MMOL/L (ref 136–145)
SODIUM SERPL-SCNC: 142 MMOL/L (ref 136–145)
SODIUM SERPL-SCNC: 143 MMOL/L (ref 136–145)
SODIUM SERPL-SCNC: 145 MMOL/L (ref 136–145)
SP GR UR REFRACTOMETRY: 1.01 (ref 1–1.03)
SPECIMEN EXP DATE BLD: NORMAL
TROPONIN I BLD-MCNC: <0.04 NG/ML (ref 0–0.08)
TROPONIN I SERPL-MCNC: <0.05 NG/ML
TSH SERPL DL<=0.05 MIU/L-ACNC: 0.78 UIU/ML (ref 0.36–3.74)
UA: UC IF INDICATED,UAUC: NORMAL
UROBILINOGEN UR QL STRIP.AUTO: 0.2 EU/DL (ref 0.2–1)
VENTRICULAR RATE, ECG03: 101 BPM
VENTRICULAR RATE, ECG03: 63 BPM
VENTRICULAR RATE, ECG03: 69 BPM
VENTRICULAR RATE, ECG03: 72 BPM
VENTRICULAR RATE, ECG03: 74 BPM
VENTRICULAR RATE, ECG03: 96 BPM
WBC # BLD AUTO: 10.3 K/UL (ref 4.1–11.1)
WBC # BLD AUTO: 2.2 K/UL (ref 4.1–11.1)
WBC # BLD AUTO: 4.1 K/UL (ref 4.1–11.1)
WBC # BLD AUTO: 5.3 K/UL (ref 4.1–11.1)
WBC # BLD AUTO: 5.5 K/UL (ref 4.1–11.1)
WBC # BLD AUTO: 6 K/UL (ref 4.1–11.1)
WBC # BLD AUTO: 6.1 K/UL (ref 4.1–11.1)
WBC # BLD AUTO: 6.6 K/UL (ref 4.1–11.1)
WBC # BLD AUTO: 6.7 K/UL (ref 4.1–11.1)
WBC # BLD AUTO: 7 K/UL (ref 4.1–11.1)
WBC # BLD AUTO: 7.1 K/UL (ref 4.1–11.1)
WBC # BLD AUTO: 7.4 K/UL (ref 4.1–11.1)
WBC # BLD AUTO: 7.6 K/UL (ref 4.1–11.1)
WBC # BLD AUTO: 7.6 K/UL (ref 4.1–11.1)
WBC # BLD AUTO: 7.7 K/UL (ref 4.1–11.1)
WBC # BLD AUTO: 7.7 K/UL (ref 4.1–11.1)
WBC # BLD AUTO: 7.8 K/UL (ref 4.1–11.1)
WBC # BLD AUTO: 7.8 K/UL (ref 4.1–11.1)
WBC # BLD AUTO: 9.1 K/UL (ref 4.1–11.1)
WBC URNS QL MICRO: NORMAL /HPF (ref 0–4)

## 2020-01-01 PROCEDURE — 85025 COMPLETE CBC W/AUTO DIFF WBC: CPT

## 2020-01-01 PROCEDURE — 71046 X-RAY EXAM CHEST 2 VIEWS: CPT

## 2020-01-01 PROCEDURE — 96417 CHEMO IV INFUS EACH ADDL SEQ: CPT

## 2020-01-01 PROCEDURE — 86900 BLOOD TYPING SEROLOGIC ABO: CPT

## 2020-01-01 PROCEDURE — 74011000250 HC RX REV CODE- 250: Performed by: NURSE ANESTHETIST, CERTIFIED REGISTERED

## 2020-01-01 PROCEDURE — 80053 COMPREHEN METABOLIC PANEL: CPT

## 2020-01-01 PROCEDURE — 84443 ASSAY THYROID STIM HORMONE: CPT

## 2020-01-01 PROCEDURE — 74011000258 HC RX REV CODE- 258: Performed by: SURGERY

## 2020-01-01 PROCEDURE — 74011250636 HC RX REV CODE- 250/636

## 2020-01-01 PROCEDURE — 74011250636 HC RX REV CODE- 250/636: Performed by: EMERGENCY MEDICINE

## 2020-01-01 PROCEDURE — 96413 CHEMO IV INFUSION 1 HR: CPT

## 2020-01-01 PROCEDURE — 74011000258 HC RX REV CODE- 258: Performed by: INTERNAL MEDICINE

## 2020-01-01 PROCEDURE — 71101 X-RAY EXAM UNILAT RIBS/CHEST: CPT

## 2020-01-01 PROCEDURE — 84484 ASSAY OF TROPONIN QUANT: CPT

## 2020-01-01 PROCEDURE — 88304 TISSUE EXAM BY PATHOLOGIST: CPT

## 2020-01-01 PROCEDURE — 96375 TX/PRO/DX INJ NEW DRUG ADDON: CPT

## 2020-01-01 PROCEDURE — 96374 THER/PROPH/DIAG INJ IV PUSH: CPT

## 2020-01-01 PROCEDURE — 77030034628 HC LIGASURE LAP SEAL DIV MD COVD -F: Performed by: SURGERY

## 2020-01-01 PROCEDURE — 74011250636 HC RX REV CODE- 250/636: Performed by: NURSE PRACTITIONER

## 2020-01-01 PROCEDURE — 36591 DRAW BLOOD OFF VENOUS DEVICE: CPT

## 2020-01-01 PROCEDURE — 99285 EMERGENCY DEPT VISIT HI MDM: CPT

## 2020-01-01 PROCEDURE — 36415 COLL VENOUS BLD VENIPUNCTURE: CPT

## 2020-01-01 PROCEDURE — 65660000000 HC RM CCU STEPDOWN

## 2020-01-01 PROCEDURE — 77030040361 HC SLV COMPR DVT MDII -B: Performed by: SURGERY

## 2020-01-01 PROCEDURE — 74011250636 HC RX REV CODE- 250/636: Performed by: INTERNAL MEDICINE

## 2020-01-01 PROCEDURE — 77030008771 HC TU NG SALEM SUMP -A: Performed by: ANESTHESIOLOGY

## 2020-01-01 PROCEDURE — 74011636320 HC RX REV CODE- 636/320: Performed by: INTERNAL MEDICINE

## 2020-01-01 PROCEDURE — 77030008756 HC TU IRR SUC STRY -B: Performed by: SURGERY

## 2020-01-01 PROCEDURE — 71275 CT ANGIOGRAPHY CHEST: CPT

## 2020-01-01 PROCEDURE — 76060000032 HC ANESTHESIA 0.5 TO 1 HR: Performed by: SURGERY

## 2020-01-01 PROCEDURE — 81001 URINALYSIS AUTO W/SCOPE: CPT

## 2020-01-01 PROCEDURE — 77010033678 HC OXYGEN DAILY

## 2020-01-01 PROCEDURE — 77030012965 HC NDL HUBR BBMI -A

## 2020-01-01 PROCEDURE — 74011250636 HC RX REV CODE- 250/636: Performed by: THORACIC SURGERY (CARDIOTHORACIC VASCULAR SURGERY)

## 2020-01-01 PROCEDURE — 71260 CT THORAX DX C+: CPT

## 2020-01-01 PROCEDURE — 96376 TX/PRO/DX INJ SAME DRUG ADON: CPT

## 2020-01-01 PROCEDURE — 99218 HC RM OBSERVATION: CPT

## 2020-01-01 PROCEDURE — 74011000250 HC RX REV CODE- 250: Performed by: NURSE PRACTITIONER

## 2020-01-01 PROCEDURE — 73630 X-RAY EXAM OF FOOT: CPT

## 2020-01-01 PROCEDURE — 77030007955 HC PCH ENDOSC SPEC J&J -B: Performed by: SURGERY

## 2020-01-01 PROCEDURE — 71045 X-RAY EXAM CHEST 1 VIEW: CPT

## 2020-01-01 PROCEDURE — 94640 AIRWAY INHALATION TREATMENT: CPT

## 2020-01-01 PROCEDURE — 83880 ASSAY OF NATRIURETIC PEPTIDE: CPT

## 2020-01-01 PROCEDURE — 77030005513 HC CATH URETH FOL11 MDII -B: Performed by: SURGERY

## 2020-01-01 PROCEDURE — 74177 CT ABD & PELVIS W/CONTRAST: CPT

## 2020-01-01 PROCEDURE — 74011636320 HC RX REV CODE- 636/320: Performed by: EMERGENCY MEDICINE

## 2020-01-01 PROCEDURE — 94760 N-INVAS EAR/PLS OXIMETRY 1: CPT

## 2020-01-01 PROCEDURE — 97530 THERAPEUTIC ACTIVITIES: CPT

## 2020-01-01 PROCEDURE — 74011250637 HC RX REV CODE- 250/637: Performed by: THORACIC SURGERY (CARDIOTHORACIC VASCULAR SURGERY)

## 2020-01-01 PROCEDURE — 99213 OFFICE O/P EST LOW 20 MIN: CPT | Performed by: PHYSICIAN ASSISTANT

## 2020-01-01 PROCEDURE — 77030012799 HC TRCR GELPRT BLN AMR -B: Performed by: SURGERY

## 2020-01-01 PROCEDURE — 74011250637 HC RX REV CODE- 250/637: Performed by: NURSE PRACTITIONER

## 2020-01-01 PROCEDURE — 82550 ASSAY OF CK (CPK): CPT

## 2020-01-01 PROCEDURE — 77030008684 HC TU ET CUF COVD -B: Performed by: ANESTHESIOLOGY

## 2020-01-01 PROCEDURE — 97161 PT EVAL LOW COMPLEX 20 MIN: CPT

## 2020-01-01 PROCEDURE — 74011000258 HC RX REV CODE- 258: Performed by: NURSE PRACTITIONER

## 2020-01-01 PROCEDURE — 88342 IMHCHEM/IMCYTCHM 1ST ANTB: CPT

## 2020-01-01 PROCEDURE — 77030013079 HC BLNKT BAIR HGGR 3M -A: Performed by: ANESTHESIOLOGY

## 2020-01-01 PROCEDURE — 74011250636 HC RX REV CODE- 250/636: Performed by: NURSE ANESTHETIST, CERTIFIED REGISTERED

## 2020-01-01 PROCEDURE — 80048 BASIC METABOLIC PNL TOTAL CA: CPT

## 2020-01-01 PROCEDURE — 99283 EMERGENCY DEPT VISIT LOW MDM: CPT

## 2020-01-01 PROCEDURE — 77030031139 HC SUT VCRL2 J&J -A: Performed by: SURGERY

## 2020-01-01 PROCEDURE — 77030026438 HC STYL ET INTUB CARD -A: Performed by: ANESTHESIOLOGY

## 2020-01-01 PROCEDURE — 99215 OFFICE O/P EST HI 40 MIN: CPT | Performed by: INTERNAL MEDICINE

## 2020-01-01 PROCEDURE — 85027 COMPLETE CBC AUTOMATED: CPT

## 2020-01-01 PROCEDURE — 77030009963 HC RELD STPLR ECR J&J -C: Performed by: SURGERY

## 2020-01-01 PROCEDURE — 93005 ELECTROCARDIOGRAM TRACING: CPT

## 2020-01-01 PROCEDURE — 74011250637 HC RX REV CODE- 250/637: Performed by: EMERGENCY MEDICINE

## 2020-01-01 PROCEDURE — 77030029684 HC NEB SM VOL KT MONA -A

## 2020-01-01 PROCEDURE — 77030008595 HC TRCR ENDOSC AMR -A: Performed by: SURGERY

## 2020-01-01 PROCEDURE — 75810000165 HC THORACENTESIS

## 2020-01-01 PROCEDURE — 77030019908 HC STETH ESOPH SIMS -A: Performed by: ANESTHESIOLOGY

## 2020-01-01 PROCEDURE — 74011250636 HC RX REV CODE- 250/636: Performed by: SURGERY

## 2020-01-01 PROCEDURE — 77030002933 HC SUT MCRYL J&J -A: Performed by: SURGERY

## 2020-01-01 PROCEDURE — 74011000250 HC RX REV CODE- 250: Performed by: INTERNAL MEDICINE

## 2020-01-01 PROCEDURE — 74011000250 HC RX REV CODE- 250: Performed by: EMERGENCY MEDICINE

## 2020-01-01 PROCEDURE — 74011000255 HC RX REV CODE- 255: Performed by: INTERNAL MEDICINE

## 2020-01-01 PROCEDURE — 74011000250 HC RX REV CODE- 250: Performed by: SURGERY

## 2020-01-01 PROCEDURE — 82565 ASSAY OF CREATININE: CPT

## 2020-01-01 PROCEDURE — 74011250637 HC RX REV CODE- 250/637: Performed by: SURGERY

## 2020-01-01 PROCEDURE — 97116 GAIT TRAINING THERAPY: CPT

## 2020-01-01 PROCEDURE — 99232 SBSQ HOSP IP/OBS MODERATE 35: CPT | Performed by: NURSE PRACTITIONER

## 2020-01-01 PROCEDURE — 74011000258 HC RX REV CODE- 258: Performed by: EMERGENCY MEDICINE

## 2020-01-01 PROCEDURE — 74011250636 HC RX REV CODE- 250/636: Performed by: ANESTHESIOLOGY

## 2020-01-01 PROCEDURE — 76210000006 HC OR PH I REC 0.5 TO 1 HR: Performed by: SURGERY

## 2020-01-01 PROCEDURE — 74011636320 HC RX REV CODE- 636/320: Performed by: PHYSICIAN ASSISTANT

## 2020-01-01 PROCEDURE — 99284 EMERGENCY DEPT VISIT MOD MDM: CPT

## 2020-01-01 PROCEDURE — 77030008606 HC TRCR ENDOSC KII AMR -B: Performed by: SURGERY

## 2020-01-01 PROCEDURE — 76010000138 HC OR TIME 0.5 TO 1 HR: Performed by: SURGERY

## 2020-01-01 PROCEDURE — 77030010507 HC ADH SKN DERMBND J&J -B: Performed by: SURGERY

## 2020-01-01 PROCEDURE — 88341 IMHCHEM/IMCYTCHM EA ADD ANTB: CPT

## 2020-01-01 PROCEDURE — 80307 DRUG TEST PRSMV CHEM ANLYZR: CPT

## 2020-01-01 PROCEDURE — 99214 OFFICE O/P EST MOD 30 MIN: CPT | Performed by: INTERNAL MEDICINE

## 2020-01-01 RX ORDER — HEPARIN 100 UNIT/ML
300-500 SYRINGE INTRAVENOUS AS NEEDED
Status: CANCELLED | OUTPATIENT
Start: 2020-01-01

## 2020-01-01 RX ORDER — ACETAMINOPHEN 325 MG/1
650 TABLET ORAL AS NEEDED
Status: CANCELLED
Start: 2020-01-01

## 2020-01-01 RX ORDER — SODIUM CHLORIDE 0.9 % (FLUSH) 0.9 %
10 SYRINGE (ML) INJECTION AS NEEDED
Status: CANCELLED
Start: 2020-01-01

## 2020-01-01 RX ORDER — OXYCODONE HYDROCHLORIDE 10 MG/1
10 TABLET ORAL
Qty: 120 TAB | Refills: 0 | Status: SHIPPED | OUTPATIENT
Start: 2020-01-01 | End: 2020-01-01

## 2020-01-01 RX ORDER — GABAPENTIN 300 MG/1
600 CAPSULE ORAL 3 TIMES DAILY
Status: DISCONTINUED | OUTPATIENT
Start: 2020-01-01 | End: 2020-01-01 | Stop reason: HOSPADM

## 2020-01-01 RX ORDER — METOPROLOL SUCCINATE 25 MG/1
25 TABLET, EXTENDED RELEASE ORAL
Qty: 30 TAB | Refills: 5 | Status: SHIPPED | OUTPATIENT
Start: 2020-01-01

## 2020-01-01 RX ORDER — DEXAMETHASONE SODIUM PHOSPHATE 4 MG/ML
10 INJECTION, SOLUTION INTRA-ARTICULAR; INTRALESIONAL; INTRAMUSCULAR; INTRAVENOUS; SOFT TISSUE ONCE
Status: COMPLETED | OUTPATIENT
Start: 2020-01-01 | End: 2020-01-01

## 2020-01-01 RX ORDER — SERTRALINE HYDROCHLORIDE 50 MG/1
50 TABLET, FILM COATED ORAL DAILY
Qty: 30 TAB | Refills: 2 | Status: SHIPPED | OUTPATIENT
Start: 2020-01-01 | End: 2020-01-01 | Stop reason: SDUPTHER

## 2020-01-01 RX ORDER — DIPHENHYDRAMINE HYDROCHLORIDE 50 MG/ML
25 INJECTION, SOLUTION INTRAMUSCULAR; INTRAVENOUS AS NEEDED
Status: CANCELLED
Start: 2020-01-01

## 2020-01-01 RX ORDER — SODIUM CHLORIDE 9 MG/ML
10 INJECTION INTRAMUSCULAR; INTRAVENOUS; SUBCUTANEOUS AS NEEDED
Status: DISCONTINUED | OUTPATIENT
Start: 2020-01-01 | End: 2020-01-01 | Stop reason: HOSPADM

## 2020-01-01 RX ORDER — ALBUTEROL SULFATE 0.83 MG/ML
2.5 SOLUTION RESPIRATORY (INHALATION) AS NEEDED
Status: CANCELLED
Start: 2020-01-01

## 2020-01-01 RX ORDER — HEPARIN 100 UNIT/ML
300-500 SYRINGE INTRAVENOUS AS NEEDED
Status: CANCELLED
Start: 2020-01-01

## 2020-01-01 RX ORDER — BUTALBITAL, ACETAMINOPHEN AND CAFFEINE 50; 325; 40 MG/1; MG/1; MG/1
1 TABLET ORAL
COMMUNITY
End: 2020-01-01 | Stop reason: SDUPTHER

## 2020-01-01 RX ORDER — SERTRALINE HYDROCHLORIDE 25 MG/1
TABLET, FILM COATED ORAL
Qty: 30 TAB | Refills: 0 | Status: SHIPPED | OUTPATIENT
Start: 2020-01-01 | End: 2020-01-01

## 2020-01-01 RX ORDER — ONDANSETRON 2 MG/ML
8 INJECTION INTRAMUSCULAR; INTRAVENOUS AS NEEDED
Status: CANCELLED | OUTPATIENT
Start: 2020-01-01

## 2020-01-01 RX ORDER — SODIUM CHLORIDE 0.9 % (FLUSH) 0.9 %
10 SYRINGE (ML) INJECTION AS NEEDED
Status: DISCONTINUED | OUTPATIENT
Start: 2020-01-01 | End: 2020-01-01 | Stop reason: HOSPADM

## 2020-01-01 RX ORDER — DEXAMETHASONE SODIUM PHOSPHATE 100 MG/10ML
10 INJECTION INTRAMUSCULAR; INTRAVENOUS ONCE
Status: CANCELLED
Start: 2020-01-01

## 2020-01-01 RX ORDER — MORPHINE SULFATE 15 MG/1
15 TABLET ORAL
Qty: 90 TAB | Refills: 0 | Status: SHIPPED | OUTPATIENT
Start: 2020-01-01 | End: 2020-01-01 | Stop reason: SDUPTHER

## 2020-01-01 RX ORDER — ONDANSETRON 2 MG/ML
4 INJECTION INTRAMUSCULAR; INTRAVENOUS AS NEEDED
Status: DISCONTINUED | OUTPATIENT
Start: 2020-01-01 | End: 2020-01-01

## 2020-01-01 RX ORDER — HYDROCORTISONE SODIUM SUCCINATE 100 MG/2ML
100 INJECTION, POWDER, FOR SOLUTION INTRAMUSCULAR; INTRAVENOUS AS NEEDED
Status: CANCELLED | OUTPATIENT
Start: 2020-01-01

## 2020-01-01 RX ORDER — SODIUM CHLORIDE 9 MG/ML
25 INJECTION, SOLUTION INTRAVENOUS CONTINUOUS
Status: CANCELLED | OUTPATIENT
Start: 2020-01-01 | End: 2020-01-01

## 2020-01-01 RX ORDER — SODIUM CHLORIDE 9 MG/ML
10 INJECTION INTRAMUSCULAR; INTRAVENOUS; SUBCUTANEOUS AS NEEDED
Status: ACTIVE | OUTPATIENT
Start: 2020-01-01 | End: 2020-01-01

## 2020-01-01 RX ORDER — EPINEPHRINE 1 MG/ML
0.3 INJECTION, SOLUTION, CONCENTRATE INTRAVENOUS AS NEEDED
Status: CANCELLED | OUTPATIENT
Start: 2020-01-01

## 2020-01-01 RX ORDER — DEXAMETHASONE SODIUM PHOSPHATE 10 MG/ML
10 INJECTION INTRAMUSCULAR; INTRAVENOUS ONCE
Status: COMPLETED | OUTPATIENT
Start: 2020-01-01 | End: 2020-01-01

## 2020-01-01 RX ORDER — SODIUM CHLORIDE 9 MG/ML
10 INJECTION INTRAMUSCULAR; INTRAVENOUS; SUBCUTANEOUS AS NEEDED
Status: CANCELLED | OUTPATIENT
Start: 2020-01-01

## 2020-01-01 RX ORDER — ONDANSETRON 2 MG/ML
4 INJECTION INTRAMUSCULAR; INTRAVENOUS
Status: COMPLETED | OUTPATIENT
Start: 2020-01-01 | End: 2020-01-01

## 2020-01-01 RX ORDER — OXYCODONE HCL 10 MG/1
20 TABLET, FILM COATED, EXTENDED RELEASE ORAL EVERY 12 HOURS
Status: DISCONTINUED | OUTPATIENT
Start: 2020-01-01 | End: 2020-01-01

## 2020-01-01 RX ORDER — HYDROMORPHONE HYDROCHLORIDE 1 MG/ML
0.5 INJECTION, SOLUTION INTRAMUSCULAR; INTRAVENOUS; SUBCUTANEOUS
Status: DISCONTINUED | OUTPATIENT
Start: 2020-01-01 | End: 2020-01-01 | Stop reason: HOSPADM

## 2020-01-01 RX ORDER — HEPARIN 100 UNIT/ML
300-500 SYRINGE INTRAVENOUS AS NEEDED
Status: DISPENSED | OUTPATIENT
Start: 2020-01-01 | End: 2020-01-01

## 2020-01-01 RX ORDER — ONDANSETRON 2 MG/ML
8 INJECTION INTRAMUSCULAR; INTRAVENOUS ONCE
Status: COMPLETED | OUTPATIENT
Start: 2020-01-01 | End: 2020-01-01

## 2020-01-01 RX ORDER — MORPHINE SULFATE 20 MG/1
15 CAPSULE, EXTENDED RELEASE ORAL EVERY 12 HOURS
COMMUNITY
End: 2020-01-01 | Stop reason: ALTCHOICE

## 2020-01-01 RX ORDER — TRAZODONE HYDROCHLORIDE 50 MG/1
50 TABLET ORAL
Qty: 30 TAB | Refills: 2 | Status: SHIPPED | OUTPATIENT
Start: 2020-01-01 | End: 2020-01-01 | Stop reason: SDUPTHER

## 2020-01-01 RX ORDER — OXYCODONE HYDROCHLORIDE 10 MG/1
10 TABLET ORAL
Qty: 60 TAB | Refills: 0 | Status: SHIPPED | OUTPATIENT
Start: 2020-01-01 | End: 2020-01-01 | Stop reason: ALTCHOICE

## 2020-01-01 RX ORDER — TRAZODONE HYDROCHLORIDE 50 MG/1
TABLET ORAL
Qty: 30 TAB | Refills: 2 | Status: SHIPPED | OUTPATIENT
Start: 2020-01-01

## 2020-01-01 RX ORDER — ONDANSETRON 2 MG/ML
8 INJECTION INTRAMUSCULAR; INTRAVENOUS ONCE
Status: CANCELLED | OUTPATIENT
Start: 2020-01-01

## 2020-01-01 RX ORDER — SODIUM CHLORIDE 0.9 % (FLUSH) 0.9 %
10 SYRINGE (ML) INJECTION
Status: COMPLETED | OUTPATIENT
Start: 2020-01-01 | End: 2020-01-01

## 2020-01-01 RX ORDER — AMOXICILLIN 500 MG/1
500 CAPSULE ORAL 2 TIMES DAILY
Qty: 10 CAP | Refills: 0 | Status: SHIPPED | OUTPATIENT
Start: 2020-01-01 | End: 2020-01-01

## 2020-01-01 RX ORDER — OXYCODONE HYDROCHLORIDE 10 MG/1
10 TABLET ORAL
COMMUNITY
End: 2020-01-01 | Stop reason: SDUPTHER

## 2020-01-01 RX ORDER — HYDROMORPHONE HYDROCHLORIDE 1 MG/ML
1 INJECTION, SOLUTION INTRAMUSCULAR; INTRAVENOUS; SUBCUTANEOUS
Status: COMPLETED | OUTPATIENT
Start: 2020-01-01 | End: 2020-01-01

## 2020-01-01 RX ORDER — MORPHINE SULFATE 15 MG/1
15 TABLET, FILM COATED, EXTENDED RELEASE ORAL EVERY 12 HOURS
Qty: 60 TAB | Refills: 0 | Status: SHIPPED | OUTPATIENT
Start: 2020-01-01 | End: 2020-01-01

## 2020-01-01 RX ORDER — SODIUM CHLORIDE 9 MG/ML
25 INJECTION, SOLUTION INTRAVENOUS CONTINUOUS
Status: DISPENSED | OUTPATIENT
Start: 2020-01-01 | End: 2020-01-01

## 2020-01-01 RX ORDER — DEXAMETHASONE 2 MG/1
2 TABLET ORAL 2 TIMES DAILY WITH MEALS
Qty: 60 TAB | Refills: 0 | Status: SHIPPED | OUTPATIENT
Start: 2020-01-01 | End: 2020-01-01 | Stop reason: SDUPTHER

## 2020-01-01 RX ORDER — DIPHENHYDRAMINE HYDROCHLORIDE 50 MG/ML
50 INJECTION, SOLUTION INTRAMUSCULAR; INTRAVENOUS AS NEEDED
Status: CANCELLED
Start: 2020-01-01

## 2020-01-01 RX ORDER — MEGESTROL ACETATE 40 MG/1
40 TABLET ORAL 2 TIMES DAILY
Qty: 60 TAB | Refills: 0 | Status: SHIPPED | OUTPATIENT
Start: 2020-01-01 | End: 2020-01-01

## 2020-01-01 RX ORDER — HYDROMORPHONE HYDROCHLORIDE 1 MG/ML
1 INJECTION, SOLUTION INTRAMUSCULAR; INTRAVENOUS; SUBCUTANEOUS ONCE
Status: COMPLETED | OUTPATIENT
Start: 2020-01-01 | End: 2020-01-01

## 2020-01-01 RX ORDER — DIPHENHYDRAMINE HYDROCHLORIDE 50 MG/ML
25 INJECTION, SOLUTION INTRAMUSCULAR; INTRAVENOUS
Status: CANCELLED
Start: 2020-01-01

## 2020-01-01 RX ORDER — IPRATROPIUM BROMIDE AND ALBUTEROL SULFATE 2.5; .5 MG/3ML; MG/3ML
3 SOLUTION RESPIRATORY (INHALATION)
Status: DISCONTINUED | OUTPATIENT
Start: 2020-01-01 | End: 2020-01-01 | Stop reason: HOSPADM

## 2020-01-01 RX ORDER — DEXAMETHASONE 2 MG/1
2 TABLET ORAL EVERY 8 HOURS
Qty: 45 TAB | Refills: 0 | Status: SHIPPED | OUTPATIENT
Start: 2020-01-01 | End: 2020-01-01 | Stop reason: SDUPTHER

## 2020-01-01 RX ORDER — TRAZODONE HYDROCHLORIDE 50 MG/1
50 TABLET ORAL
Qty: 30 TAB | Refills: 2 | Status: SHIPPED | OUTPATIENT
Start: 2020-01-01 | End: 2020-01-01

## 2020-01-01 RX ORDER — SERTRALINE HYDROCHLORIDE 25 MG/1
TABLET, FILM COATED ORAL
Qty: 30 TAB | Refills: 0 | Status: CANCELLED | OUTPATIENT
Start: 2020-01-01

## 2020-01-01 RX ORDER — LORAZEPAM 2 MG/ML
0.5 INJECTION INTRAMUSCULAR
Status: CANCELLED
Start: 2020-01-01

## 2020-01-01 RX ORDER — ALBUTEROL SULFATE 90 UG/1
2 AEROSOL, METERED RESPIRATORY (INHALATION)
Qty: 1 INHALER | Refills: 0 | Status: SHIPPED | OUTPATIENT
Start: 2020-01-01 | End: 2020-01-01 | Stop reason: SDUPTHER

## 2020-01-01 RX ORDER — DICYCLOMINE HYDROCHLORIDE 10 MG/1
10 CAPSULE ORAL
Qty: 60 CAP | Refills: 1 | Status: SHIPPED | OUTPATIENT
Start: 2020-01-01 | End: 2020-01-01 | Stop reason: ALTCHOICE

## 2020-01-01 RX ORDER — HYDROMORPHONE HYDROCHLORIDE 2 MG/1
2 TABLET ORAL
Qty: 10 TAB | Refills: 0 | Status: SHIPPED | OUTPATIENT
Start: 2020-01-01 | End: 2020-01-01

## 2020-01-01 RX ORDER — SODIUM CHLORIDE 0.9 % (FLUSH) 0.9 %
10 SYRINGE (ML) INJECTION AS NEEDED
Status: DISPENSED | OUTPATIENT
Start: 2020-01-01 | End: 2020-01-01

## 2020-01-01 RX ORDER — METOCLOPRAMIDE HYDROCHLORIDE 5 MG/ML
10 INJECTION INTRAMUSCULAR; INTRAVENOUS
Status: COMPLETED | OUTPATIENT
Start: 2020-01-01 | End: 2020-01-01

## 2020-01-01 RX ORDER — DEXAMETHASONE 4 MG/1
2 TABLET ORAL 2 TIMES DAILY WITH MEALS
Status: DISCONTINUED | OUTPATIENT
Start: 2020-01-01 | End: 2020-01-01 | Stop reason: HOSPADM

## 2020-01-01 RX ORDER — TOLTERODINE 2 MG/1
2 CAPSULE, EXTENDED RELEASE ORAL DAILY
Qty: 30 CAP | Refills: 1 | Status: SHIPPED | OUTPATIENT
Start: 2020-01-01

## 2020-01-01 RX ORDER — MEGESTROL ACETATE 40 MG/1
20 TABLET ORAL 2 TIMES DAILY WITH MEALS
Status: DISCONTINUED | OUTPATIENT
Start: 2020-01-01 | End: 2020-01-01 | Stop reason: HOSPADM

## 2020-01-01 RX ORDER — MORPHINE SULFATE 10 MG/ML
2 INJECTION, SOLUTION INTRAMUSCULAR; INTRAVENOUS
Status: DISCONTINUED | OUTPATIENT
Start: 2020-01-01 | End: 2020-01-01

## 2020-01-01 RX ORDER — FENTANYL CITRATE 50 UG/ML
100 INJECTION, SOLUTION INTRAMUSCULAR; INTRAVENOUS
Status: COMPLETED | OUTPATIENT
Start: 2020-01-01 | End: 2020-01-01

## 2020-01-01 RX ORDER — ENOXAPARIN SODIUM 100 MG/ML
60 INJECTION SUBCUTANEOUS
COMMUNITY

## 2020-01-01 RX ORDER — OXYCODONE HYDROCHLORIDE 10 MG/1
10 TABLET ORAL
Qty: 60 TAB | Refills: 0 | Status: SHIPPED | OUTPATIENT
Start: 2020-01-01 | End: 2020-01-01 | Stop reason: SDUPTHER

## 2020-01-01 RX ORDER — GABAPENTIN 300 MG/1
300 CAPSULE ORAL 3 TIMES DAILY
Qty: 90 CAP | Refills: 3 | Status: SHIPPED | OUTPATIENT
Start: 2020-01-01 | End: 2020-01-01 | Stop reason: ALTCHOICE

## 2020-01-01 RX ORDER — LIDOCAINE 50 MG/G
1 PATCH TOPICAL EVERY 12 HOURS
Qty: 30 EACH | Refills: 3 | Status: SHIPPED | OUTPATIENT
Start: 2020-01-01 | End: 2020-01-01

## 2020-01-01 RX ORDER — BUPIVACAINE HYDROCHLORIDE 5 MG/ML
INJECTION, SOLUTION EPIDURAL; INTRACAUDAL AS NEEDED
Status: DISCONTINUED | OUTPATIENT
Start: 2020-01-01 | End: 2020-01-01 | Stop reason: HOSPADM

## 2020-01-01 RX ORDER — SODIUM CHLORIDE 9 MG/ML
25 INJECTION, SOLUTION INTRAVENOUS CONTINUOUS
Status: CANCELLED | OUTPATIENT
Start: 2020-01-01

## 2020-01-01 RX ORDER — OXYCODONE HYDROCHLORIDE 10 MG/1
10 TABLET ORAL
Qty: 60 TAB | Refills: 0 | OUTPATIENT
Start: 2020-01-01 | End: 2020-01-01

## 2020-01-01 RX ORDER — OXYCODONE HCL 10 MG/1
20 TABLET, FILM COATED, EXTENDED RELEASE ORAL EVERY 12 HOURS
Status: DISCONTINUED | OUTPATIENT
Start: 2020-01-01 | End: 2020-01-01 | Stop reason: HOSPADM

## 2020-01-01 RX ORDER — GLYCOPYRROLATE 0.2 MG/ML
INJECTION INTRAMUSCULAR; INTRAVENOUS AS NEEDED
Status: DISCONTINUED | OUTPATIENT
Start: 2020-01-01 | End: 2020-01-01 | Stop reason: HOSPADM

## 2020-01-01 RX ORDER — LISINOPRIL 5 MG/1
5 TABLET ORAL DAILY
Status: DISCONTINUED | OUTPATIENT
Start: 2020-01-01 | End: 2020-01-01 | Stop reason: HOSPADM

## 2020-01-01 RX ORDER — SODIUM CHLORIDE 0.9 % (FLUSH) 0.9 %
5-40 SYRINGE (ML) INJECTION EVERY 8 HOURS
Status: DISCONTINUED | OUTPATIENT
Start: 2020-01-01 | End: 2020-01-01 | Stop reason: HOSPADM

## 2020-01-01 RX ORDER — SUCRALFATE 1 G/10ML
SUSPENSION ORAL
Qty: 414 ML | Refills: 0 | Status: CANCELLED | OUTPATIENT
Start: 2020-01-01

## 2020-01-01 RX ORDER — GABAPENTIN 300 MG/1
600 CAPSULE ORAL 3 TIMES DAILY
Qty: 180 CAP | Refills: 3 | Status: SHIPPED | OUTPATIENT
Start: 2020-01-01

## 2020-01-01 RX ORDER — SUCRALFATE 1 G/10ML
SUSPENSION ORAL
Qty: 414 ML | Refills: 0 | Status: SHIPPED | OUTPATIENT
Start: 2020-01-01 | End: 2020-01-01 | Stop reason: SDUPTHER

## 2020-01-01 RX ORDER — HYDROCODONE BITARTRATE AND ACETAMINOPHEN 5; 325 MG/1; MG/1
1 TABLET ORAL
Status: DISCONTINUED | OUTPATIENT
Start: 2020-01-01 | End: 2020-01-01 | Stop reason: HOSPADM

## 2020-01-01 RX ORDER — SERTRALINE HYDROCHLORIDE 50 MG/1
TABLET, FILM COATED ORAL
Qty: 30 TAB | Refills: 1 | Status: SHIPPED | OUTPATIENT
Start: 2020-01-01

## 2020-01-01 RX ORDER — AMITRIPTYLINE HYDROCHLORIDE 25 MG/1
25 TABLET, FILM COATED ORAL
Qty: 30 TAB | Refills: 0 | Status: SHIPPED | OUTPATIENT
Start: 2020-01-01 | End: 2020-01-01

## 2020-01-01 RX ORDER — MEGESTROL ACETATE 40 MG/1
TABLET ORAL
Qty: 60 TAB | Refills: 0 | Status: SHIPPED | OUTPATIENT
Start: 2020-01-01 | End: 2020-01-01 | Stop reason: SDUPTHER

## 2020-01-01 RX ORDER — MEGESTROL ACETATE 40 MG/1
20 TABLET ORAL 2 TIMES DAILY WITH MEALS
Status: DISCONTINUED | OUTPATIENT
Start: 2020-01-01 | End: 2020-01-01

## 2020-01-01 RX ORDER — OXYCODONE HCL 10 MG/1
10 TABLET, FILM COATED, EXTENDED RELEASE ORAL EVERY 12 HOURS
Qty: 60 TAB | Refills: 0 | Status: SHIPPED | OUTPATIENT
Start: 2020-01-01 | End: 2020-01-01

## 2020-01-01 RX ORDER — SERTRALINE HYDROCHLORIDE 50 MG/1
50 TABLET, FILM COATED ORAL DAILY
Status: DISCONTINUED | OUTPATIENT
Start: 2020-01-01 | End: 2020-01-01 | Stop reason: HOSPADM

## 2020-01-01 RX ORDER — SERTRALINE HYDROCHLORIDE 50 MG/1
50 TABLET, FILM COATED ORAL DAILY
Status: DISCONTINUED | OUTPATIENT
Start: 2020-01-01 | End: 2020-01-01

## 2020-01-01 RX ORDER — IPRATROPIUM BROMIDE AND ALBUTEROL SULFATE 2.5; .5 MG/3ML; MG/3ML
3 SOLUTION RESPIRATORY (INHALATION)
Status: DISCONTINUED | OUTPATIENT
Start: 2020-01-01 | End: 2020-01-01

## 2020-01-01 RX ORDER — TRAZODONE HYDROCHLORIDE 50 MG/1
TABLET ORAL
Qty: 30 TAB | Refills: 2 | Status: SHIPPED | OUTPATIENT
Start: 2020-01-01 | End: 2020-01-01

## 2020-01-01 RX ORDER — ROCURONIUM BROMIDE 10 MG/ML
INJECTION, SOLUTION INTRAVENOUS AS NEEDED
Status: DISCONTINUED | OUTPATIENT
Start: 2020-01-01 | End: 2020-01-01 | Stop reason: HOSPADM

## 2020-01-01 RX ORDER — SODIUM CHLORIDE, SODIUM LACTATE, POTASSIUM CHLORIDE, CALCIUM CHLORIDE 600; 310; 30; 20 MG/100ML; MG/100ML; MG/100ML; MG/100ML
INJECTION, SOLUTION INTRAVENOUS
Status: DISCONTINUED | OUTPATIENT
Start: 2020-01-01 | End: 2020-01-01 | Stop reason: HOSPADM

## 2020-01-01 RX ORDER — METOPROLOL SUCCINATE 25 MG/1
25 TABLET, EXTENDED RELEASE ORAL DAILY
Status: DISCONTINUED | OUTPATIENT
Start: 2020-01-01 | End: 2020-01-01 | Stop reason: HOSPADM

## 2020-01-01 RX ORDER — MORPHINE SULFATE 15 MG/1
15 TABLET ORAL
Qty: 90 TAB | Refills: 0 | Status: SHIPPED | OUTPATIENT
Start: 2020-01-01 | End: 2020-01-01

## 2020-01-01 RX ORDER — SODIUM CHLORIDE 0.9 % (FLUSH) 0.9 %
5-40 SYRINGE (ML) INJECTION AS NEEDED
Status: DISCONTINUED | OUTPATIENT
Start: 2020-01-01 | End: 2020-01-01 | Stop reason: HOSPADM

## 2020-01-01 RX ORDER — MIDAZOLAM HYDROCHLORIDE 1 MG/ML
1 INJECTION, SOLUTION INTRAMUSCULAR; INTRAVENOUS AS NEEDED
Status: DISCONTINUED | OUTPATIENT
Start: 2020-01-01 | End: 2020-01-01 | Stop reason: HOSPADM

## 2020-01-01 RX ORDER — ALBUTEROL SULFATE 90 UG/1
2 AEROSOL, METERED RESPIRATORY (INHALATION)
Qty: 1 INHALER | Refills: 5 | Status: SHIPPED | OUTPATIENT
Start: 2020-01-01

## 2020-01-01 RX ORDER — FENTANYL CITRATE 50 UG/ML
INJECTION, SOLUTION INTRAMUSCULAR; INTRAVENOUS AS NEEDED
Status: DISCONTINUED | OUTPATIENT
Start: 2020-01-01 | End: 2020-01-01 | Stop reason: HOSPADM

## 2020-01-01 RX ORDER — OXYCODONE HCL 10 MG/1
10 TABLET, FILM COATED, EXTENDED RELEASE ORAL EVERY 12 HOURS
Qty: 60 TAB | Refills: 0 | Status: SHIPPED | OUTPATIENT
Start: 2020-01-01 | End: 2020-01-01 | Stop reason: SDUPTHER

## 2020-01-01 RX ORDER — OXYCODONE HYDROCHLORIDE 10 MG/1
10 TABLET ORAL
Qty: 120 TAB | Refills: 0 | Status: SHIPPED | OUTPATIENT
Start: 2020-01-01 | End: 2020-01-01 | Stop reason: SDUPTHER

## 2020-01-01 RX ORDER — ONDANSETRON 2 MG/ML
4 INJECTION INTRAMUSCULAR; INTRAVENOUS
Status: DISCONTINUED | OUTPATIENT
Start: 2020-01-01 | End: 2020-01-01 | Stop reason: HOSPADM

## 2020-01-01 RX ORDER — HEPARIN 100 UNIT/ML
300 SYRINGE INTRAVENOUS AS NEEDED
Status: DISCONTINUED | OUTPATIENT
Start: 2020-01-01 | End: 2020-01-01 | Stop reason: HOSPADM

## 2020-01-01 RX ORDER — ONDANSETRON 2 MG/ML
4 INJECTION INTRAMUSCULAR; INTRAVENOUS
Status: DISCONTINUED | OUTPATIENT
Start: 2020-01-01 | End: 2020-01-01

## 2020-01-01 RX ORDER — LIDOCAINE HYDROCHLORIDE 20 MG/ML
INJECTION, SOLUTION EPIDURAL; INFILTRATION; INTRACAUDAL; PERINEURAL AS NEEDED
Status: DISCONTINUED | OUTPATIENT
Start: 2020-01-01 | End: 2020-01-01 | Stop reason: HOSPADM

## 2020-01-01 RX ORDER — AMOXICILLIN 250 MG
1 CAPSULE ORAL 2 TIMES DAILY
Qty: 60 TAB | Refills: 3 | Status: SHIPPED | OUTPATIENT
Start: 2020-01-01

## 2020-01-01 RX ORDER — NEOSTIGMINE METHYLSULFATE 1 MG/ML
INJECTION, SOLUTION INTRAVENOUS AS NEEDED
Status: DISCONTINUED | OUTPATIENT
Start: 2020-01-01 | End: 2020-01-01 | Stop reason: HOSPADM

## 2020-01-01 RX ORDER — MORPHINE SULFATE 15 MG/1
15 TABLET, FILM COATED, EXTENDED RELEASE ORAL EVERY 12 HOURS
Qty: 60 TAB | Refills: 0 | Status: SHIPPED | OUTPATIENT
Start: 2020-01-01 | End: 2020-01-01 | Stop reason: SDUPTHER

## 2020-01-01 RX ORDER — NALOXONE HYDROCHLORIDE 4 MG/.1ML
SPRAY NASAL
Qty: 2 EACH | Refills: 2 | Status: SHIPPED | OUTPATIENT
Start: 2020-01-01

## 2020-01-01 RX ORDER — ESMOLOL HYDROCHLORIDE 10 MG/ML
INJECTION INTRAVENOUS AS NEEDED
Status: DISCONTINUED | OUTPATIENT
Start: 2020-01-01 | End: 2020-01-01 | Stop reason: HOSPADM

## 2020-01-01 RX ORDER — MORPHINE SULFATE 2 MG/ML
4 INJECTION, SOLUTION INTRAMUSCULAR; INTRAVENOUS ONCE
Status: COMPLETED | OUTPATIENT
Start: 2020-01-01 | End: 2020-01-01

## 2020-01-01 RX ORDER — DEXAMETHASONE 2 MG/1
2 TABLET ORAL EVERY 8 HOURS
COMMUNITY
End: 2020-01-01 | Stop reason: SDUPTHER

## 2020-01-01 RX ORDER — HYDROMORPHONE HYDROCHLORIDE 1 MG/ML
1 INJECTION, SOLUTION INTRAMUSCULAR; INTRAVENOUS; SUBCUTANEOUS
Status: DISCONTINUED | OUTPATIENT
Start: 2020-01-01 | End: 2020-01-01 | Stop reason: HOSPADM

## 2020-01-01 RX ORDER — MORPHINE SULFATE 2 MG/ML
4 INJECTION, SOLUTION INTRAMUSCULAR; INTRAVENOUS
Status: COMPLETED | OUTPATIENT
Start: 2020-01-01 | End: 2020-01-01

## 2020-01-01 RX ORDER — SERTRALINE HYDROCHLORIDE 50 MG/1
50 TABLET, FILM COATED ORAL 2 TIMES DAILY
Qty: 60 TAB | Refills: 2 | Status: SHIPPED | OUTPATIENT
Start: 2020-01-01 | End: 2020-01-01

## 2020-01-01 RX ORDER — LORAZEPAM 1 MG/1
1 TABLET ORAL
Qty: 30 TAB | Refills: 3 | Status: SHIPPED | OUTPATIENT
Start: 2020-01-01 | End: 2020-01-01 | Stop reason: SDUPTHER

## 2020-01-01 RX ORDER — PREDNISONE 50 MG/1
50 TABLET ORAL DAILY
Qty: 3 TAB | Refills: 0 | Status: SHIPPED | OUTPATIENT
Start: 2020-01-01 | End: 2020-01-01

## 2020-01-01 RX ORDER — LIDOCAINE 50 MG/G
PATCH TOPICAL
Qty: 30 PATCH | Refills: 1 | Status: SHIPPED | OUTPATIENT
Start: 2020-01-01

## 2020-01-01 RX ORDER — FENTANYL CITRATE 50 UG/ML
25 INJECTION, SOLUTION INTRAMUSCULAR; INTRAVENOUS
Status: DISCONTINUED | OUTPATIENT
Start: 2020-01-01 | End: 2020-01-01

## 2020-01-01 RX ORDER — FAMOTIDINE 20 MG/1
20 TABLET, FILM COATED ORAL 2 TIMES DAILY
Qty: 20 TAB | Refills: 0 | Status: SHIPPED | OUTPATIENT
Start: 2020-01-01 | End: 2020-01-01

## 2020-01-01 RX ORDER — SUCRALFATE 1 G/10ML
SUSPENSION ORAL
Qty: 414 ML | Refills: 0 | Status: SHIPPED | OUTPATIENT
Start: 2020-01-01

## 2020-01-01 RX ORDER — HYDROCODONE BITARTRATE AND ACETAMINOPHEN 5; 325 MG/1; MG/1
1 TABLET ORAL
Qty: 12 TAB | Refills: 0 | Status: SHIPPED | OUTPATIENT
Start: 2020-01-01 | End: 2020-01-01

## 2020-01-01 RX ORDER — FENTANYL CITRATE 50 UG/ML
100 INJECTION, SOLUTION INTRAMUSCULAR; INTRAVENOUS ONCE
Status: COMPLETED | OUTPATIENT
Start: 2020-01-01 | End: 2020-01-01

## 2020-01-01 RX ORDER — GABAPENTIN 300 MG/1
600 CAPSULE ORAL 3 TIMES DAILY
Qty: 180 CAP | Refills: 3 | Status: CANCELLED | OUTPATIENT
Start: 2020-01-01

## 2020-01-01 RX ORDER — TRAZODONE HYDROCHLORIDE 50 MG/1
50 TABLET ORAL
Qty: 30 TAB | Refills: 2 | Status: CANCELLED | OUTPATIENT
Start: 2020-01-01

## 2020-01-01 RX ORDER — OXYCODONE HYDROCHLORIDE 10 MG/1
10 TABLET ORAL
Qty: 60 TAB | Refills: 0 | Status: SHIPPED | OUTPATIENT
Start: 2020-01-01 | End: 2020-01-01

## 2020-01-01 RX ORDER — PANTOPRAZOLE SODIUM 40 MG/1
TABLET, DELAYED RELEASE ORAL
Qty: 30 TAB | Refills: 3 | Status: SHIPPED | OUTPATIENT
Start: 2020-01-01

## 2020-01-01 RX ORDER — HYDROCODONE BITARTRATE AND ACETAMINOPHEN 5; 325 MG/1; MG/1
1 TABLET ORAL
Status: COMPLETED | OUTPATIENT
Start: 2020-01-01 | End: 2020-01-01

## 2020-01-01 RX ORDER — FENTANYL CITRATE 50 UG/ML
200 INJECTION, SOLUTION INTRAMUSCULAR; INTRAVENOUS ONCE
Status: COMPLETED | OUTPATIENT
Start: 2020-01-01 | End: 2020-01-01

## 2020-01-01 RX ORDER — HEPARIN 100 UNIT/ML
300-500 SYRINGE INTRAVENOUS AS NEEDED
Status: DISCONTINUED | OUTPATIENT
Start: 2020-01-01 | End: 2020-01-01 | Stop reason: HOSPADM

## 2020-01-01 RX ORDER — NALOXONE HYDROCHLORIDE 4 MG/.1ML
SPRAY NASAL
Qty: 2 EACH | Refills: 2 | Status: SHIPPED | OUTPATIENT
Start: 2020-01-01 | End: 2020-01-01 | Stop reason: SDUPTHER

## 2020-01-01 RX ORDER — HEPARIN 100 UNIT/ML
300-500 SYRINGE INTRAVENOUS AS NEEDED
Status: ACTIVE | OUTPATIENT
Start: 2020-01-01 | End: 2020-01-01

## 2020-01-01 RX ORDER — PHENYLEPHRINE HCL IN 0.9% NACL 0.4MG/10ML
SYRINGE (ML) INTRAVENOUS AS NEEDED
Status: DISCONTINUED | OUTPATIENT
Start: 2020-01-01 | End: 2020-01-01 | Stop reason: HOSPADM

## 2020-01-01 RX ORDER — LISINOPRIL 5 MG/1
5 TABLET ORAL DAILY
Status: DISCONTINUED | OUTPATIENT
Start: 2020-01-01 | End: 2020-01-01

## 2020-01-01 RX ORDER — ALBUTEROL SULFATE 90 UG/1
2 AEROSOL, METERED RESPIRATORY (INHALATION)
Status: DISCONTINUED | OUTPATIENT
Start: 2020-01-01 | End: 2020-01-01 | Stop reason: CLARIF

## 2020-01-01 RX ORDER — SUCCINYLCHOLINE CHLORIDE 20 MG/ML
INJECTION INTRAMUSCULAR; INTRAVENOUS AS NEEDED
Status: DISCONTINUED | OUTPATIENT
Start: 2020-01-01 | End: 2020-01-01 | Stop reason: HOSPADM

## 2020-01-01 RX ORDER — DEXTROSE, SODIUM CHLORIDE, AND POTASSIUM CHLORIDE 5; .45; .15 G/100ML; G/100ML; G/100ML
125 INJECTION INTRAVENOUS CONTINUOUS
Status: DISCONTINUED | OUTPATIENT
Start: 2020-01-01 | End: 2020-01-01 | Stop reason: HOSPADM

## 2020-01-01 RX ORDER — SUCRALFATE 1 G/10ML
SUSPENSION ORAL
Qty: 414 ML | Refills: 0 | Status: SHIPPED | OUTPATIENT
Start: 2020-01-01 | End: 2020-01-01

## 2020-01-01 RX ORDER — TRAZODONE HYDROCHLORIDE 50 MG/1
50 TABLET ORAL
Status: DISCONTINUED | OUTPATIENT
Start: 2020-01-01 | End: 2020-01-01 | Stop reason: HOSPADM

## 2020-01-01 RX ORDER — DEXAMETHASONE SODIUM PHOSPHATE 4 MG/ML
INJECTION, SOLUTION INTRA-ARTICULAR; INTRALESIONAL; INTRAMUSCULAR; INTRAVENOUS; SOFT TISSUE AS NEEDED
Status: DISCONTINUED | OUTPATIENT
Start: 2020-01-01 | End: 2020-01-01 | Stop reason: HOSPADM

## 2020-01-01 RX ORDER — LISINOPRIL 5 MG/1
TABLET ORAL
Qty: 30 TAB | Refills: 0 | OUTPATIENT
Start: 2020-01-01

## 2020-01-01 RX ORDER — DEXAMETHASONE SODIUM PHOSPHATE 4 MG/ML
10 INJECTION, SOLUTION INTRA-ARTICULAR; INTRALESIONAL; INTRAMUSCULAR; INTRAVENOUS; SOFT TISSUE ONCE
Status: CANCELLED | OUTPATIENT
Start: 2020-01-01

## 2020-01-01 RX ORDER — OXYCODONE HYDROCHLORIDE 10 MG/1
10 TABLET ORAL
Qty: 12 TAB | Refills: 0 | Status: SHIPPED | OUTPATIENT
Start: 2020-01-01 | End: 2020-01-01 | Stop reason: SDUPTHER

## 2020-01-01 RX ORDER — LIDOCAINE HYDROCHLORIDE AND EPINEPHRINE 10; 10 MG/ML; UG/ML
1.5 INJECTION, SOLUTION INFILTRATION; PERINEURAL
Status: COMPLETED | OUTPATIENT
Start: 2020-01-01 | End: 2020-01-01

## 2020-01-01 RX ORDER — MIDAZOLAM HYDROCHLORIDE 1 MG/ML
INJECTION, SOLUTION INTRAMUSCULAR; INTRAVENOUS AS NEEDED
Status: DISCONTINUED | OUTPATIENT
Start: 2020-01-01 | End: 2020-01-01 | Stop reason: HOSPADM

## 2020-01-01 RX ORDER — PROPOFOL 10 MG/ML
INJECTION, EMULSION INTRAVENOUS AS NEEDED
Status: DISCONTINUED | OUTPATIENT
Start: 2020-01-01 | End: 2020-01-01 | Stop reason: HOSPADM

## 2020-01-01 RX ORDER — SENNOSIDES 8.6 MG/1
1 TABLET ORAL DAILY
COMMUNITY
End: 2020-01-01 | Stop reason: SDUPTHER

## 2020-01-01 RX ORDER — LIDOCAINE AND PRILOCAINE 25; 25 MG/G; MG/G
CREAM TOPICAL
Qty: 30 G | Refills: 1 | Status: SHIPPED | OUTPATIENT
Start: 2020-01-01

## 2020-01-01 RX ORDER — FENTANYL CITRATE 50 UG/ML
50 INJECTION, SOLUTION INTRAMUSCULAR; INTRAVENOUS AS NEEDED
Status: DISCONTINUED | OUTPATIENT
Start: 2020-01-01 | End: 2020-01-01 | Stop reason: HOSPADM

## 2020-01-01 RX ORDER — SODIUM CHLORIDE 9 MG/ML
25 INJECTION, SOLUTION INTRAVENOUS CONTINUOUS
Status: DISCONTINUED | OUTPATIENT
Start: 2020-01-01 | End: 2020-01-01 | Stop reason: HOSPADM

## 2020-01-01 RX ORDER — SODIUM CHLORIDE, SODIUM LACTATE, POTASSIUM CHLORIDE, CALCIUM CHLORIDE 600; 310; 30; 20 MG/100ML; MG/100ML; MG/100ML; MG/100ML
25 INJECTION, SOLUTION INTRAVENOUS CONTINUOUS
Status: DISCONTINUED | OUTPATIENT
Start: 2020-01-01 | End: 2020-01-01

## 2020-01-01 RX ORDER — DEXAMETHASONE 2 MG/1
2 TABLET ORAL 2 TIMES DAILY WITH MEALS
Qty: 60 TAB | Refills: 0 | Status: SHIPPED | OUTPATIENT
Start: 2020-01-01 | End: 2020-01-01 | Stop reason: ALTCHOICE

## 2020-01-01 RX ORDER — MEGESTROL ACETATE 40 MG/1
TABLET ORAL
Qty: 60 TAB | Refills: 0 | Status: SHIPPED | OUTPATIENT
Start: 2020-01-01

## 2020-01-01 RX ORDER — LORAZEPAM 1 MG/1
1 TABLET ORAL
Qty: 30 TAB | Refills: 3 | Status: SHIPPED | OUTPATIENT
Start: 2020-01-01 | End: 2020-01-01

## 2020-01-01 RX ORDER — SODIUM CHLORIDE 0.9 % (FLUSH) 0.9 %
10 SYRINGE (ML) INJECTION AS NEEDED
Status: CANCELLED | OUTPATIENT
Start: 2020-01-01

## 2020-01-01 RX ORDER — OXYCODONE HYDROCHLORIDE 10 MG/1
10 TABLET ORAL
Qty: 28 TAB | Refills: 0 | Status: SHIPPED | OUTPATIENT
Start: 2020-01-01 | End: 2020-01-01 | Stop reason: SDUPTHER

## 2020-01-01 RX ORDER — DICYCLOMINE HYDROCHLORIDE 10 MG/1
10 CAPSULE ORAL 2 TIMES DAILY
COMMUNITY
End: 2020-01-01 | Stop reason: SDDI

## 2020-01-01 RX ORDER — TRAZODONE HYDROCHLORIDE 50 MG/1
50 TABLET ORAL
Status: DISCONTINUED | OUTPATIENT
Start: 2020-01-01 | End: 2020-01-01

## 2020-01-01 RX ORDER — ONDANSETRON 2 MG/ML
INJECTION INTRAMUSCULAR; INTRAVENOUS AS NEEDED
Status: DISCONTINUED | OUTPATIENT
Start: 2020-01-01 | End: 2020-01-01 | Stop reason: HOSPADM

## 2020-01-01 RX ORDER — SERTRALINE HYDROCHLORIDE 25 MG/1
25 TABLET, FILM COATED ORAL DAILY
Qty: 30 TAB | Refills: 0 | Status: SHIPPED | OUTPATIENT
Start: 2020-01-01 | End: 2020-01-01

## 2020-01-01 RX ORDER — GABAPENTIN 300 MG/1
600 CAPSULE ORAL 3 TIMES DAILY
Qty: 180 CAP | Refills: 3 | Status: SHIPPED | OUTPATIENT
Start: 2020-01-01 | End: 2020-01-01 | Stop reason: SDUPTHER

## 2020-01-01 RX ORDER — BARIUM SULFATE 20 MG/ML
900 SUSPENSION ORAL
Status: COMPLETED | OUTPATIENT
Start: 2020-01-01 | End: 2020-01-01

## 2020-01-01 RX ORDER — DEXTROSE, SODIUM CHLORIDE, AND POTASSIUM CHLORIDE 5; .45; .15 G/100ML; G/100ML; G/100ML
INJECTION INTRAVENOUS
Status: COMPLETED
Start: 2020-01-01 | End: 2020-01-01

## 2020-01-01 RX ORDER — AMOXICILLIN AND CLAVULANATE POTASSIUM 875; 125 MG/1; MG/1
1 TABLET, FILM COATED ORAL EVERY 12 HOURS
Qty: 20 TAB | Refills: 0 | Status: SHIPPED | OUTPATIENT
Start: 2020-01-01 | End: 2020-01-01

## 2020-01-01 RX ORDER — PANTOPRAZOLE SODIUM 40 MG/1
40 TABLET, DELAYED RELEASE ORAL DAILY
Qty: 30 TAB | Refills: 3 | Status: SHIPPED | OUTPATIENT
Start: 2020-01-01 | End: 2020-01-01 | Stop reason: ALTCHOICE

## 2020-01-01 RX ORDER — HYDROMORPHONE HYDROCHLORIDE 1 MG/ML
0.5 INJECTION, SOLUTION INTRAMUSCULAR; INTRAVENOUS; SUBCUTANEOUS
Status: DISCONTINUED | OUTPATIENT
Start: 2020-01-01 | End: 2020-01-01

## 2020-01-01 RX ORDER — OXYCODONE HCL 10 MG/1
10 TABLET, FILM COATED, EXTENDED RELEASE ORAL EVERY 12 HOURS
Qty: 20 TAB | Refills: 0 | Status: SHIPPED | OUTPATIENT
Start: 2020-01-01 | End: 2020-01-01 | Stop reason: SDUPTHER

## 2020-01-01 RX ORDER — METOPROLOL SUCCINATE 25 MG/1
25 TABLET, EXTENDED RELEASE ORAL DAILY
Status: DISCONTINUED | OUTPATIENT
Start: 2020-01-01 | End: 2020-01-01

## 2020-01-01 RX ORDER — LIDOCAINE HYDROCHLORIDE 10 MG/ML
0.1 INJECTION, SOLUTION EPIDURAL; INFILTRATION; INTRACAUDAL; PERINEURAL AS NEEDED
Status: DISCONTINUED | OUTPATIENT
Start: 2020-01-01 | End: 2020-01-01 | Stop reason: HOSPADM

## 2020-01-01 RX ORDER — LORAZEPAM 1 MG/1
1 TABLET ORAL
Qty: 30 TAB | Refills: 0 | Status: SHIPPED | OUTPATIENT
Start: 2020-01-01 | End: 2020-01-01 | Stop reason: ALTCHOICE

## 2020-01-01 RX ORDER — SUCRALFATE 1 G/10ML
1 SUSPENSION ORAL 4 TIMES DAILY
Qty: 414 ML | Refills: 0 | Status: SHIPPED | OUTPATIENT
Start: 2020-01-01 | End: 2020-01-01

## 2020-01-01 RX ORDER — BUTALBITAL, ACETAMINOPHEN AND CAFFEINE 50; 325; 40 MG/1; MG/1; MG/1
1 TABLET ORAL 3 TIMES DAILY
Qty: 90 TAB | Refills: 1 | Status: SHIPPED | OUTPATIENT
Start: 2020-01-01

## 2020-01-01 RX ORDER — DIAZEPAM 5 MG/1
5 TABLET ORAL
Qty: 40 TAB | Refills: 0 | Status: SHIPPED | OUTPATIENT
Start: 2020-01-01 | End: 2020-01-01

## 2020-01-01 RX ADMIN — FENTANYL CITRATE 100 MCG: 50 INJECTION, SOLUTION INTRAMUSCULAR; INTRAVENOUS at 18:28

## 2020-01-01 RX ADMIN — MORPHINE SULFATE 4 MG: 2 INJECTION, SOLUTION INTRAMUSCULAR; INTRAVENOUS at 11:57

## 2020-01-01 RX ADMIN — FENTANYL CITRATE 100 MCG: 50 INJECTION, SOLUTION INTRAMUSCULAR; INTRAVENOUS at 18:14

## 2020-01-01 RX ADMIN — LIDOCAINE HYDROCHLORIDE,EPINEPHRINE BITARTRATE 15 MG: 10; .01 INJECTION, SOLUTION INFILTRATION; PERINEURAL at 18:25

## 2020-01-01 RX ADMIN — GABAPENTIN 600 MG: 300 CAPSULE ORAL at 21:09

## 2020-01-01 RX ADMIN — OXYCODONE HYDROCHLORIDE 20 MG: 10 TABLET, FILM COATED, EXTENDED RELEASE ORAL at 17:37

## 2020-01-01 RX ADMIN — SODIUM CHLORIDE 25 ML/HR: 900 INJECTION, SOLUTION INTRAVENOUS at 12:19

## 2020-01-01 RX ADMIN — LIDOCAINE HYDROCHLORIDE 40 ML: 20 SOLUTION ORAL; TOPICAL at 01:30

## 2020-01-01 RX ADMIN — HEPARIN 500 UNITS: 100 SYRINGE at 13:26

## 2020-01-01 RX ADMIN — HEPARIN 500 UNITS: 100 SYRINGE at 14:07

## 2020-01-01 RX ADMIN — Medication 10 ML: at 11:25

## 2020-01-01 RX ADMIN — SERTRALINE HYDROCHLORIDE 50 MG: 50 TABLET ORAL at 08:48

## 2020-01-01 RX ADMIN — HYDROMORPHONE HYDROCHLORIDE 0.5 MG: 1 INJECTION, SOLUTION INTRAMUSCULAR; INTRAVENOUS; SUBCUTANEOUS at 06:15

## 2020-01-01 RX ADMIN — HEPARIN 500 UNITS: 100 SYRINGE at 14:16

## 2020-01-01 RX ADMIN — GEMCITABINE 1850 MG: 38 INJECTION INTRAVENOUS at 13:31

## 2020-01-01 RX ADMIN — MORPHINE SULFATE 4 MG: 2 INJECTION, SOLUTION INTRAMUSCULAR; INTRAVENOUS at 18:18

## 2020-01-01 RX ADMIN — GEMCITABINE 925 MG: 38 INJECTION, SOLUTION INTRAVENOUS at 14:10

## 2020-01-01 RX ADMIN — IPRATROPIUM BROMIDE AND ALBUTEROL SULFATE 3 ML: .5; 3 SOLUTION RESPIRATORY (INHALATION) at 08:34

## 2020-01-01 RX ADMIN — SODIUM CHLORIDE 10 ML: 9 INJECTION, SOLUTION INTRAMUSCULAR; INTRAVENOUS; SUBCUTANEOUS at 10:48

## 2020-01-01 RX ADMIN — PIPERACILLIN AND TAZOBACTAM 3.38 G: 3; .375 INJECTION, POWDER, LYOPHILIZED, FOR SOLUTION INTRAVENOUS at 18:20

## 2020-01-01 RX ADMIN — GABAPENTIN 600 MG: 300 CAPSULE ORAL at 22:10

## 2020-01-01 RX ADMIN — SERTRALINE HYDROCHLORIDE 50 MG: 50 TABLET ORAL at 08:37

## 2020-01-01 RX ADMIN — MEGESTROL ACETATE 20 MG: 40 TABLET ORAL at 08:43

## 2020-01-01 RX ADMIN — LISINOPRIL 5 MG: 5 TABLET ORAL at 08:43

## 2020-01-01 RX ADMIN — GLYCOPYRROLATE 0.4 MG: 0.2 INJECTION, SOLUTION INTRAMUSCULAR; INTRAVENOUS at 21:45

## 2020-01-01 RX ADMIN — SODIUM CHLORIDE 25 ML/HR: 900 INJECTION, SOLUTION INTRAVENOUS at 11:59

## 2020-01-01 RX ADMIN — DEXAMETHASONE 2 MG: 4 TABLET ORAL at 09:44

## 2020-01-01 RX ADMIN — DEXAMETHASONE 2 MG: 4 TABLET ORAL at 17:11

## 2020-01-01 RX ADMIN — Medication 10 ML: at 14:40

## 2020-01-01 RX ADMIN — MEGESTROL ACETATE 20 MG: 40 TABLET ORAL at 08:37

## 2020-01-01 RX ADMIN — SODIUM CHLORIDE 10 ML: 9 INJECTION INTRAMUSCULAR; INTRAVENOUS; SUBCUTANEOUS at 14:15

## 2020-01-01 RX ADMIN — GABAPENTIN 600 MG: 300 CAPSULE ORAL at 17:09

## 2020-01-01 RX ADMIN — HYDROMORPHONE HYDROCHLORIDE 0.5 MG: 1 INJECTION, SOLUTION INTRAMUSCULAR; INTRAVENOUS; SUBCUTANEOUS at 14:49

## 2020-01-01 RX ADMIN — SODIUM CHLORIDE 25 ML/HR: 900 INJECTION, SOLUTION INTRAVENOUS at 11:05

## 2020-01-01 RX ADMIN — METOPROLOL SUCCINATE 25 MG: 25 TABLET, EXTENDED RELEASE ORAL at 08:49

## 2020-01-01 RX ADMIN — METOPROLOL SUCCINATE 25 MG: 25 TABLET, EXTENDED RELEASE ORAL at 08:43

## 2020-01-01 RX ADMIN — HEPARIN 500 UNITS: 100 SYRINGE at 12:57

## 2020-01-01 RX ADMIN — DEXAMETHASONE 2 MG: 4 TABLET ORAL at 16:23

## 2020-01-01 RX ADMIN — PROPOFOL 150 MG: 10 INJECTION, EMULSION INTRAVENOUS at 21:13

## 2020-01-01 RX ADMIN — HYDROMORPHONE HYDROCHLORIDE 1 MG: 1 INJECTION, SOLUTION INTRAMUSCULAR; INTRAVENOUS; SUBCUTANEOUS at 01:56

## 2020-01-01 RX ADMIN — LISINOPRIL 5 MG: 5 TABLET ORAL at 08:37

## 2020-01-01 RX ADMIN — HYDROMORPHONE HYDROCHLORIDE 0.5 MG: 1 INJECTION, SOLUTION INTRAMUSCULAR; INTRAVENOUS; SUBCUTANEOUS at 01:22

## 2020-01-01 RX ADMIN — SODIUM CHLORIDE, POTASSIUM CHLORIDE, SODIUM LACTATE AND CALCIUM CHLORIDE: 600; 310; 30; 20 INJECTION, SOLUTION INTRAVENOUS at 20:54

## 2020-01-01 RX ADMIN — METOPROLOL SUCCINATE 25 MG: 25 TABLET, EXTENDED RELEASE ORAL at 08:37

## 2020-01-01 RX ADMIN — GEMCITABINE HYDROCHLORIDE 925 MG: 1 INJECTION, POWDER, LYOPHILIZED, FOR SOLUTION INTRAVENOUS at 12:40

## 2020-01-01 RX ADMIN — METOPROLOL SUCCINATE 25 MG: 25 TABLET, EXTENDED RELEASE ORAL at 08:23

## 2020-01-01 RX ADMIN — LISINOPRIL 5 MG: 5 TABLET ORAL at 08:24

## 2020-01-01 RX ADMIN — DEXAMETHASONE SODIUM PHOSPHATE 10 MG: 4 INJECTION, SOLUTION INTRA-ARTICULAR; INTRALESIONAL; INTRAMUSCULAR; INTRAVENOUS; SOFT TISSUE at 12:09

## 2020-01-01 RX ADMIN — HYDROMORPHONE HYDROCHLORIDE 0.5 MG: 1 INJECTION, SOLUTION INTRAMUSCULAR; INTRAVENOUS; SUBCUTANEOUS at 21:09

## 2020-01-01 RX ADMIN — TRAZODONE HYDROCHLORIDE 50 MG: 50 TABLET ORAL at 22:21

## 2020-01-01 RX ADMIN — HEPARIN 300 UNITS: 100 SYRINGE at 14:54

## 2020-01-01 RX ADMIN — ONDANSETRON 8 MG: 2 INJECTION INTRAMUSCULAR; INTRAVENOUS at 12:03

## 2020-01-01 RX ADMIN — IPRATROPIUM BROMIDE AND ALBUTEROL SULFATE 3 ML: .5; 3 SOLUTION RESPIRATORY (INHALATION) at 11:03

## 2020-01-01 RX ADMIN — METOCLOPRAMIDE 10 MG: 5 INJECTION, SOLUTION INTRAMUSCULAR; INTRAVENOUS at 01:30

## 2020-01-01 RX ADMIN — SODIUM CHLORIDE 25 ML/HR: 900 INJECTION, SOLUTION INTRAVENOUS at 12:20

## 2020-01-01 RX ADMIN — ONDANSETRON 8 MG: 2 INJECTION INTRAMUSCULAR; INTRAVENOUS at 12:07

## 2020-01-01 RX ADMIN — GABAPENTIN 600 MG: 300 CAPSULE ORAL at 08:43

## 2020-01-01 RX ADMIN — DEXTROSE MONOHYDRATE, SODIUM CHLORIDE, AND POTASSIUM CHLORIDE 125 ML/HR: 50; 4.5; 1.49 INJECTION, SOLUTION INTRAVENOUS at 22:19

## 2020-01-01 RX ADMIN — Medication 10 ML: at 12:57

## 2020-01-01 RX ADMIN — MORPHINE SULFATE 4 MG: 2 INJECTION, SOLUTION INTRAMUSCULAR; INTRAVENOUS at 22:07

## 2020-01-01 RX ADMIN — DEXAMETHASONE SODIUM PHOSPHATE 10 MG: 4 INJECTION, SOLUTION INTRAMUSCULAR; INTRAVENOUS at 12:28

## 2020-01-01 RX ADMIN — GEMCITABINE HYDROCHLORIDE 925 MG: 1 INJECTION, SOLUTION INTRAVENOUS at 12:30

## 2020-01-01 RX ADMIN — HEPARIN 300 UNITS: 100 SYRINGE at 13:15

## 2020-01-01 RX ADMIN — LISINOPRIL 5 MG: 5 TABLET ORAL at 09:44

## 2020-01-01 RX ADMIN — DEXAMETHASONE SODIUM PHOSPHATE 4 MG: 4 INJECTION, SOLUTION INTRAMUSCULAR; INTRAVENOUS at 21:19

## 2020-01-01 RX ADMIN — SODIUM CHLORIDE 3.38 G: 900 INJECTION, SOLUTION INTRAVENOUS at 21:16

## 2020-01-01 RX ADMIN — IPRATROPIUM BROMIDE AND ALBUTEROL SULFATE 3 ML: .5; 3 SOLUTION RESPIRATORY (INHALATION) at 07:54

## 2020-01-01 RX ADMIN — MIDAZOLAM HYDROCHLORIDE 1 MG: 1 INJECTION, SOLUTION INTRAMUSCULAR; INTRAVENOUS at 21:07

## 2020-01-01 RX ADMIN — MEGESTROL ACETATE 20 MG: 40 TABLET ORAL at 17:37

## 2020-01-01 RX ADMIN — DEXAMETHASONE SODIUM PHOSPHATE 10 MG: 4 INJECTION, SOLUTION INTRAMUSCULAR; INTRAVENOUS at 11:38

## 2020-01-01 RX ADMIN — Medication 10 ML: at 09:47

## 2020-01-01 RX ADMIN — PACLITAXEL 115.5 MG: 100 INJECTION, POWDER, LYOPHILIZED, FOR SUSPENSION INTRAVENOUS at 13:14

## 2020-01-01 RX ADMIN — GEMCITABINE 925 MG: 38 INJECTION, SOLUTION INTRAVENOUS at 13:30

## 2020-01-01 RX ADMIN — SODIUM CHLORIDE 25 ML/HR: 900 INJECTION, SOLUTION INTRAVENOUS at 12:04

## 2020-01-01 RX ADMIN — SODIUM CHLORIDE 10 ML: 9 INJECTION INTRAMUSCULAR; INTRAVENOUS; SUBCUTANEOUS at 13:14

## 2020-01-01 RX ADMIN — PACLITAXEL 115.5 MG: 100 INJECTION, POWDER, LYOPHILIZED, FOR SUSPENSION INTRAVENOUS at 12:04

## 2020-01-01 RX ADMIN — DEXAMETHASONE SODIUM PHOSPHATE 10 MG: 4 INJECTION, SOLUTION INTRA-ARTICULAR; INTRALESIONAL; INTRAMUSCULAR; INTRAVENOUS; SOFT TISSUE at 12:11

## 2020-01-01 RX ADMIN — DEXAMETHASONE SODIUM PHOSPHATE 10 MG: 4 INJECTION, SOLUTION INTRAMUSCULAR; INTRAVENOUS at 12:30

## 2020-01-01 RX ADMIN — SERTRALINE HYDROCHLORIDE 50 MG: 50 TABLET ORAL at 08:43

## 2020-01-01 RX ADMIN — Medication 10 ML: at 11:56

## 2020-01-01 RX ADMIN — HYDROMORPHONE HYDROCHLORIDE 0.5 MG: 1 INJECTION, SOLUTION INTRAMUSCULAR; INTRAVENOUS; SUBCUTANEOUS at 08:52

## 2020-01-01 RX ADMIN — TRAZODONE HYDROCHLORIDE 50 MG: 50 TABLET ORAL at 01:22

## 2020-01-01 RX ADMIN — Medication 80 MCG: at 21:17

## 2020-01-01 RX ADMIN — HEPARIN 300 UNITS: 100 SYRINGE at 14:39

## 2020-01-01 RX ADMIN — SODIUM CHLORIDE 25 ML/HR: 900 INJECTION, SOLUTION INTRAVENOUS at 12:31

## 2020-01-01 RX ADMIN — DEXAMETHASONE SODIUM PHOSPHATE 10 MG: 4 INJECTION, SOLUTION INTRAMUSCULAR; INTRAVENOUS at 12:36

## 2020-01-01 RX ADMIN — ONDANSETRON 8 MG: 2 INJECTION INTRAMUSCULAR; INTRAVENOUS at 12:23

## 2020-01-01 RX ADMIN — GEMCITABINE HYDROCHLORIDE 925 MG: 1 INJECTION, POWDER, LYOPHILIZED, FOR SOLUTION INTRAVENOUS at 14:05

## 2020-01-01 RX ADMIN — ONDANSETRON HYDROCHLORIDE 8 MG: 2 SOLUTION INTRAMUSCULAR; INTRAVENOUS at 11:05

## 2020-01-01 RX ADMIN — METOPROLOL SUCCINATE 25 MG: 25 TABLET, EXTENDED RELEASE ORAL at 09:44

## 2020-01-01 RX ADMIN — HYDROCODONE BITARTRATE AND ACETAMINOPHEN 1 TABLET: 5; 325 TABLET ORAL at 01:22

## 2020-01-01 RX ADMIN — ONDANSETRON 4 MG: 2 INJECTION INTRAMUSCULAR; INTRAVENOUS at 09:34

## 2020-01-01 RX ADMIN — SODIUM CHLORIDE 1850 MG: 900 INJECTION, SOLUTION INTRAVENOUS at 14:06

## 2020-01-01 RX ADMIN — TRAZODONE HYDROCHLORIDE 50 MG: 50 TABLET ORAL at 21:34

## 2020-01-01 RX ADMIN — OXYCODONE HYDROCHLORIDE 20 MG: 10 TABLET, FILM COATED, EXTENDED RELEASE ORAL at 05:50

## 2020-01-01 RX ADMIN — Medication 3 MG: at 21:45

## 2020-01-01 RX ADMIN — IOPAMIDOL 100 ML: 755 INJECTION, SOLUTION INTRAVENOUS at 00:51

## 2020-01-01 RX ADMIN — HEPARIN 500 UNITS: 100 SYRINGE at 12:07

## 2020-01-01 RX ADMIN — POTASSIUM CHLORIDE, DEXTROSE MONOHYDRATE AND SODIUM CHLORIDE 125 ML/HR: 150; 5; 450 INJECTION, SOLUTION INTRAVENOUS at 22:19

## 2020-01-01 RX ADMIN — ROCURONIUM BROMIDE 10 MG: 10 INJECTION INTRAVENOUS at 21:13

## 2020-01-01 RX ADMIN — OXYCODONE HYDROCHLORIDE 20 MG: 10 TABLET, FILM COATED, EXTENDED RELEASE ORAL at 16:22

## 2020-01-01 RX ADMIN — Medication 10 ML: at 14:25

## 2020-01-01 RX ADMIN — SODIUM CHLORIDE 1000 ML: 900 INJECTION, SOLUTION INTRAVENOUS at 18:22

## 2020-01-01 RX ADMIN — POTASSIUM CHLORIDE, DEXTROSE MONOHYDRATE AND SODIUM CHLORIDE 125 ML/HR: 150; 5; 450 INJECTION, SOLUTION INTRAVENOUS at 09:25

## 2020-01-01 RX ADMIN — HYDROMORPHONE HYDROCHLORIDE 1 MG: 1 INJECTION, SOLUTION INTRAMUSCULAR; INTRAVENOUS; SUBCUTANEOUS at 22:54

## 2020-01-01 RX ADMIN — OXYCODONE HYDROCHLORIDE 20 MG: 10 TABLET, FILM COATED, EXTENDED RELEASE ORAL at 17:09

## 2020-01-01 RX ADMIN — GEMCITABINE HYDROCHLORIDE 925 MG: 1 INJECTION, POWDER, LYOPHILIZED, FOR SOLUTION INTRAVENOUS at 13:54

## 2020-01-01 RX ADMIN — PACLITAXEL 231.5 MG: 100 INJECTION, POWDER, LYOPHILIZED, FOR SUSPENSION INTRAVENOUS at 13:15

## 2020-01-01 RX ADMIN — HEPARIN 500 UNITS: 100 SYRINGE at 14:25

## 2020-01-01 RX ADMIN — IOPAMIDOL 100 ML: 755 INJECTION, SOLUTION INTRAVENOUS at 23:14

## 2020-01-01 RX ADMIN — DEXAMETHASONE 2 MG: 4 TABLET ORAL at 08:37

## 2020-01-01 RX ADMIN — DEXAMETHASONE SODIUM PHOSPHATE 10 MG: 10 INJECTION INTRAMUSCULAR; INTRAVENOUS at 10:51

## 2020-01-01 RX ADMIN — GABAPENTIN 600 MG: 300 CAPSULE ORAL at 15:33

## 2020-01-01 RX ADMIN — SODIUM CHLORIDE 25 ML/HR: 900 INJECTION, SOLUTION INTRAVENOUS at 10:50

## 2020-01-01 RX ADMIN — MEGESTROL ACETATE 20 MG: 40 TABLET ORAL at 08:50

## 2020-01-01 RX ADMIN — ONDANSETRON 8 MG: 2 INJECTION INTRAMUSCULAR; INTRAVENOUS at 12:25

## 2020-01-01 RX ADMIN — GABAPENTIN 600 MG: 300 CAPSULE ORAL at 08:37

## 2020-01-01 RX ADMIN — PACLITAXEL 115.5 MG: 100 INJECTION, POWDER, LYOPHILIZED, FOR SUSPENSION INTRAVENOUS at 12:46

## 2020-01-01 RX ADMIN — SODIUM CHLORIDE 200 MG: 9 INJECTION, SOLUTION INTRAVENOUS at 11:24

## 2020-01-01 RX ADMIN — OXYCODONE HYDROCHLORIDE 20 MG: 10 TABLET, FILM COATED, EXTENDED RELEASE ORAL at 09:07

## 2020-01-01 RX ADMIN — SODIUM CHLORIDE 25 ML/HR: 900 INJECTION, SOLUTION INTRAVENOUS at 12:05

## 2020-01-01 RX ADMIN — SODIUM CHLORIDE 10 ML: 9 INJECTION INTRAMUSCULAR; INTRAVENOUS; SUBCUTANEOUS at 13:26

## 2020-01-01 RX ADMIN — Medication 10 ML: at 14:07

## 2020-01-01 RX ADMIN — HYDROMORPHONE HYDROCHLORIDE 0.5 MG: 1 INJECTION, SOLUTION INTRAMUSCULAR; INTRAVENOUS; SUBCUTANEOUS at 12:10

## 2020-01-01 RX ADMIN — ESMOLOL HYDROCHLORIDE 20 MG: 10 INJECTION, SOLUTION INTRAVENOUS at 21:40

## 2020-01-01 RX ADMIN — SODIUM CHLORIDE 25 ML/HR: 900 INJECTION, SOLUTION INTRAVENOUS at 11:25

## 2020-01-01 RX ADMIN — DEXAMETHASONE 2 MG: 4 TABLET ORAL at 08:43

## 2020-01-01 RX ADMIN — IOPAMIDOL 100 ML: 755 INJECTION, SOLUTION INTRAVENOUS at 09:47

## 2020-01-01 RX ADMIN — PACLITAXEL 231.5 MG: 100 INJECTION, POWDER, LYOPHILIZED, FOR SUSPENSION INTRAVENOUS at 12:26

## 2020-01-01 RX ADMIN — GABAPENTIN 600 MG: 300 CAPSULE ORAL at 17:37

## 2020-01-01 RX ADMIN — GABAPENTIN 600 MG: 300 CAPSULE ORAL at 08:48

## 2020-01-01 RX ADMIN — ONDANSETRON 4 MG: 2 INJECTION INTRAMUSCULAR; INTRAVENOUS at 18:18

## 2020-01-01 RX ADMIN — PACLITAXEL 115.5 MG: 100 INJECTION, POWDER, LYOPHILIZED, FOR SUSPENSION INTRAVENOUS at 11:42

## 2020-01-01 RX ADMIN — PACLITAXEL 115.5 MG: 100 INJECTION, POWDER, LYOPHILIZED, FOR SUSPENSION INTRAVENOUS at 12:41

## 2020-01-01 RX ADMIN — Medication 300 UNITS: at 12:19

## 2020-01-01 RX ADMIN — Medication 80 MCG: at 21:20

## 2020-01-01 RX ADMIN — PROPOFOL 50 MG: 10 INJECTION, EMULSION INTRAVENOUS at 21:19

## 2020-01-01 RX ADMIN — GABAPENTIN 600 MG: 300 CAPSULE ORAL at 22:21

## 2020-01-01 RX ADMIN — MEPERIDINE HYDROCHLORIDE 6.25 MG: 25 INJECTION, SOLUTION INTRAMUSCULAR; INTRAVENOUS; SUBCUTANEOUS at 22:20

## 2020-01-01 RX ADMIN — Medication 10 ML: at 12:07

## 2020-01-01 RX ADMIN — ROCURONIUM BROMIDE 10 MG: 10 INJECTION INTRAVENOUS at 21:24

## 2020-01-01 RX ADMIN — LIDOCAINE HYDROCHLORIDE 100 MG: 20 INJECTION, SOLUTION INTRAVENOUS at 21:13

## 2020-01-01 RX ADMIN — HYDROMORPHONE HYDROCHLORIDE 1 MG: 1 INJECTION, SOLUTION INTRAMUSCULAR; INTRAVENOUS; SUBCUTANEOUS at 19:29

## 2020-01-01 RX ADMIN — HYDROMORPHONE HYDROCHLORIDE 0.5 MG: 1 INJECTION, SOLUTION INTRAMUSCULAR; INTRAVENOUS; SUBCUTANEOUS at 06:49

## 2020-01-01 RX ADMIN — IOPAMIDOL 100 ML: 755 INJECTION, SOLUTION INTRAVENOUS at 09:53

## 2020-01-01 RX ADMIN — OXYCODONE HYDROCHLORIDE 20 MG: 10 TABLET, FILM COATED, EXTENDED RELEASE ORAL at 04:06

## 2020-01-01 RX ADMIN — FENTANYL CITRATE 100 MCG: 50 INJECTION INTRAMUSCULAR; INTRAVENOUS at 09:34

## 2020-01-01 RX ADMIN — Medication 10 ML: at 10:48

## 2020-01-01 RX ADMIN — ONDANSETRON 8 MG: 2 INJECTION INTRAMUSCULAR; INTRAVENOUS at 11:34

## 2020-01-01 RX ADMIN — GEMCITABINE 925 MG: 38 INJECTION, SOLUTION INTRAVENOUS at 13:36

## 2020-01-01 RX ADMIN — SODIUM CHLORIDE 25 ML/HR: 900 INJECTION, SOLUTION INTRAVENOUS at 11:22

## 2020-01-01 RX ADMIN — Medication 500 UNITS: at 11:56

## 2020-01-01 RX ADMIN — TRAZODONE HYDROCHLORIDE 50 MG: 50 TABLET ORAL at 21:09

## 2020-01-01 RX ADMIN — ONDANSETRON 4 MG: 2 INJECTION INTRAMUSCULAR; INTRAVENOUS at 11:57

## 2020-01-01 RX ADMIN — Medication 10 ML: at 10:30

## 2020-01-01 RX ADMIN — GABAPENTIN 600 MG: 300 CAPSULE ORAL at 16:21

## 2020-01-01 RX ADMIN — DEXAMETHASONE 2 MG: 4 TABLET ORAL at 16:40

## 2020-01-01 RX ADMIN — IPRATROPIUM BROMIDE AND ALBUTEROL SULFATE 3 ML: .5; 3 SOLUTION RESPIRATORY (INHALATION) at 15:54

## 2020-01-01 RX ADMIN — GABAPENTIN 600 MG: 300 CAPSULE ORAL at 16:40

## 2020-01-01 RX ADMIN — SERTRALINE HYDROCHLORIDE 50 MG: 50 TABLET ORAL at 08:24

## 2020-01-01 RX ADMIN — HYDROMORPHONE HYDROCHLORIDE 0.5 MG: 1 INJECTION, SOLUTION INTRAMUSCULAR; INTRAVENOUS; SUBCUTANEOUS at 10:43

## 2020-01-01 RX ADMIN — Medication 10 ML: at 09:49

## 2020-01-01 RX ADMIN — HYDROMORPHONE HYDROCHLORIDE 0.5 MG: 1 INJECTION, SOLUTION INTRAMUSCULAR; INTRAVENOUS; SUBCUTANEOUS at 14:56

## 2020-01-01 RX ADMIN — DEXAMETHASONE SODIUM PHOSPHATE 10 MG: 4 INJECTION, SOLUTION INTRA-ARTICULAR; INTRALESIONAL; INTRAMUSCULAR; INTRAVENOUS; SOFT TISSUE at 11:05

## 2020-01-01 RX ADMIN — HEPARIN 300 UNITS: 100 SYRINGE at 14:40

## 2020-01-01 RX ADMIN — HYDROMORPHONE HYDROCHLORIDE 0.5 MG: 1 INJECTION, SOLUTION INTRAMUSCULAR; INTRAVENOUS; SUBCUTANEOUS at 03:51

## 2020-01-01 RX ADMIN — ONDANSETRON 8 MG: 2 INJECTION INTRAMUSCULAR; INTRAVENOUS at 12:32

## 2020-01-01 RX ADMIN — HYDROMORPHONE HYDROCHLORIDE 0.5 MG: 1 INJECTION, SOLUTION INTRAMUSCULAR; INTRAVENOUS; SUBCUTANEOUS at 03:31

## 2020-01-01 RX ADMIN — MEGESTROL ACETATE 20 MG: 40 TABLET ORAL at 08:24

## 2020-01-01 RX ADMIN — IOHEXOL 50 ML: 240 INJECTION, SOLUTION INTRATHECAL; INTRAVASCULAR; INTRAVENOUS; ORAL at 16:42

## 2020-01-01 RX ADMIN — IPRATROPIUM BROMIDE AND ALBUTEROL SULFATE 3 ML: .5; 3 SOLUTION RESPIRATORY (INHALATION) at 16:25

## 2020-01-01 RX ADMIN — Medication 10 ML: at 09:54

## 2020-01-01 RX ADMIN — HYDROMORPHONE HYDROCHLORIDE 0.5 MG: 1 INJECTION, SOLUTION INTRAMUSCULAR; INTRAVENOUS; SUBCUTANEOUS at 11:22

## 2020-01-01 RX ADMIN — PACLITAXEL 115.5 MG: 100 INJECTION, POWDER, LYOPHILIZED, FOR SUSPENSION INTRAVENOUS at 13:29

## 2020-01-01 RX ADMIN — DEXAMETHASONE 2 MG: 4 TABLET ORAL at 08:49

## 2020-01-01 RX ADMIN — IOPAMIDOL 100 ML: 755 INJECTION, SOLUTION INTRAVENOUS at 16:41

## 2020-01-01 RX ADMIN — SERTRALINE HYDROCHLORIDE 50 MG: 50 TABLET ORAL at 09:44

## 2020-01-01 RX ADMIN — DEXAMETHASONE 2 MG: 4 TABLET ORAL at 17:37

## 2020-01-01 RX ADMIN — FENTANYL CITRATE 50 MCG: 50 INJECTION, SOLUTION INTRAMUSCULAR; INTRAVENOUS at 21:13

## 2020-01-01 RX ADMIN — GABAPENTIN 600 MG: 300 CAPSULE ORAL at 09:44

## 2020-01-01 RX ADMIN — MEGESTROL ACETATE 20 MG: 40 TABLET ORAL at 16:40

## 2020-01-01 RX ADMIN — PACLITAXEL 115.5 MG: 100 INJECTION, POWDER, LYOPHILIZED, FOR SUSPENSION INTRAVENOUS at 13:13

## 2020-01-01 RX ADMIN — IPRATROPIUM BROMIDE AND ALBUTEROL SULFATE 3 ML: .5; 3 SOLUTION RESPIRATORY (INHALATION) at 19:51

## 2020-01-01 RX ADMIN — Medication 10 ML: at 16:42

## 2020-01-01 RX ADMIN — ONDANSETRON 8 MG: 2 SOLUTION INTRAMUSCULAR; INTRAVENOUS at 10:51

## 2020-01-01 RX ADMIN — TRAZODONE HYDROCHLORIDE 50 MG: 50 TABLET ORAL at 22:10

## 2020-01-01 RX ADMIN — DEXAMETHASONE SODIUM PHOSPHATE 10 MG: 4 INJECTION, SOLUTION INTRAMUSCULAR; INTRAVENOUS at 12:03

## 2020-01-01 RX ADMIN — FAMOTIDINE 20 MG: 10 INJECTION, SOLUTION INTRAVENOUS at 01:30

## 2020-01-01 RX ADMIN — ROCURONIUM BROMIDE 20 MG: 10 INJECTION INTRAVENOUS at 21:17

## 2020-01-01 RX ADMIN — ONDANSETRON 8 MG: 2 INJECTION INTRAMUSCULAR; INTRAVENOUS at 12:04

## 2020-01-01 RX ADMIN — MEGESTROL ACETATE 20 MG: 40 TABLET ORAL at 09:45

## 2020-01-01 RX ADMIN — GABAPENTIN 600 MG: 300 CAPSULE ORAL at 21:34

## 2020-01-01 RX ADMIN — Medication 10 ML: at 14:54

## 2020-01-01 RX ADMIN — SODIUM CHLORIDE 10 ML: 9 INJECTION INTRAMUSCULAR; INTRAVENOUS; SUBCUTANEOUS at 09:49

## 2020-01-01 RX ADMIN — MEGESTROL ACETATE 20 MG: 40 TABLET ORAL at 17:09

## 2020-01-01 RX ADMIN — LISINOPRIL 5 MG: 5 TABLET ORAL at 08:50

## 2020-01-01 RX ADMIN — BARIUM SULFATE 900 ML: 20 SUSPENSION ORAL at 09:48

## 2020-01-01 RX ADMIN — Medication 80 MCG: at 21:26

## 2020-01-01 RX ADMIN — FENTANYL CITRATE 50 MCG: 50 INJECTION, SOLUTION INTRAMUSCULAR; INTRAVENOUS at 21:22

## 2020-01-01 RX ADMIN — ONDANSETRON HYDROCHLORIDE 4 MG: 2 INJECTION, SOLUTION INTRAMUSCULAR; INTRAVENOUS at 21:31

## 2020-01-01 RX ADMIN — IPRATROPIUM BROMIDE AND ALBUTEROL SULFATE 3 ML: .5; 3 SOLUTION RESPIRATORY (INHALATION) at 19:50

## 2020-01-01 RX ADMIN — SUCCINYLCHOLINE CHLORIDE 140 MG: 20 INJECTION, SOLUTION INTRAMUSCULAR; INTRAVENOUS at 21:13

## 2020-01-01 RX ADMIN — HYDROCODONE BITARTRATE AND ACETAMINOPHEN 1 TABLET: 5; 325 TABLET ORAL at 17:19

## 2020-01-01 RX ADMIN — SODIUM CHLORIDE 200 MG: 9 INJECTION, SOLUTION INTRAVENOUS at 12:27

## 2020-01-01 RX ADMIN — SODIUM CHLORIDE 25 ML/HR: 900 INJECTION, SOLUTION INTRAVENOUS at 12:25

## 2020-01-01 RX ADMIN — Medication 80 MCG: at 21:29

## 2020-01-01 RX ADMIN — Medication 10 ML: at 14:39

## 2020-01-07 NOTE — TELEPHONE ENCOUNTER
[facility] [email or web address] at [facility]  [phone]        01/07/20 1:50 PM Patient called to request refill of Oxycodone IR 10 mg. Last refilled 12/17 for #56 with 0 refills. Patient states he is taking four pills daily. Patient scheduled to see Dr. Kristen Olson on Thursday, 01/09, but is requesting refill of medication in the meantime. Per Dr. Ervin Lewis advised that she will prescribe short-term supply of medication. Patient verbalized understanding and denies any further questions or concerns at this time.

## 2020-01-09 NOTE — PROGRESS NOTES
Rommel Baez is a 60 y.o. male  Chief Complaint   Patient presents with    New Patient     adenocarcinoma of unknown primary, transfer from Dr. Janna Caballero     1. Have you been to the ER, urgent care clinic since your last visit? Hospitalized since your last visit? No    2. Have you seen or consulted any other health care providers outside of the [address] [place] [address] since your last visit? Include any pap smears or colon screening.   No

## 2020-01-09 NOTE — PROGRESS NOTES
[facility]  at [facility], [address] [place] [address], [address] [facility]  [phone]        Hematology / Oncology Established Visit     Reason for Visit:   Yeyo Jimenez is a 60 y.o. male who comes in for f/u of adenocarcinoma of unknown primary. Initially referred by Dr. Tara Olivier.      Hematology Oncology Treatment History:      Diagnosis: Adenocarcinoma of unknown primary. 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 6/4/19 2R LN FNA and core biopsy: Adenocarcinoma. Comment: IHC stains negative for GATA3, AR, ER, p40. Immunohistochemistry shows that the tumor cells express CK7 with focal expression of CDX2. Tumor cells lack expression of CK20, TTF-1 and Napsin A. These findings are not entirely diagnostic and could be seen in either a primary pulmonary malignancy or a primary upper gastrointestinal tract malignancy. Other sites are also not excluded. Clinical and radiographic correlation is recommended.      6/25/19 2R LN, mediastinum: Metastatic adenocarcinoma (see Comment). Comment - The tumor appears poorly differentiated with areas of necrosis. Immunohistochemical staining reveals positive staining for cytokeratin 7 (strong, diffuse), CDX-2 (focal, weak to moderate) and CEA (focal, moderate to strong). The tumor cells are negative for cytokeratin 20, cytokeratin 5/6, p40, p63, TTF-1, napsin-A, PLAP, HCG, AFP and c-KIT (). The findings are not entirely specific with regard to primary site but would be consistent with upper GI/pancreaticobiliary tract.  A pulmonary primary is less likely, but still in the differential.   -PDL1 30%, [employer] identified high tumor mutational burden - which is predictive of higher response to immunotherapy agents.     Prior Treatment: Completed 6 cycles of Carbo-Taxol on 12/02/2019.      Current Treatment: Keytruda 200mg every 21 days, started 12/20/19     History of Present Illness:   Mr. Moise Romero is a 60 y.o. male with COPD, remote h/o colon cancer comes in for evaluation of mediastinal lymphadenopathy. Pt had recently p/w shortness of breath, dyspnea on exertion, and was found on chest CT to have R mediastinal lymphadenopathy, which also were PET avid. Case was discussed at Thoracic Tumor Board with thought that this could be pancreatic, urothelial, or germ cell origin. More tissue is recommended. Pt had EGD 3 yrs ago and was told he had GERD. Patient has h/o colon cancer in 2002. He states a cancerous polyp was found and then he underwent colectomy in 2002. This was done by Dr. Luis Negrete at [facility]. Patient had colonoscopies regularly afterwards, last one was approx 2015. Last EGD was in 2018. As part of search for primary lesion, patient underwent EGD, colonoscopy by Dr. Efren Oconnell, notable for diffusely enlarged gastric folds, but biopsies negative. Pt underwent cytoscopy on 7/17/19 with findings negative for malignancy.      He received 6 cycles of carbo-taxol and experienced many side effects from the chemotherapy including severe sensory neuropathy in feet. Last CT showed progression of disease in the right adrenal glans. He has been receiving Keytruda as second line therapy. He suffers with poor appetite, depression. He is loosing weight.  Pain in the right flank is manageable but present.              Past Medical History:   Diagnosis Date    Abnormal nuclear stress test 12/7/2015    [facility]       colon    Cardiomyopathy ([facility] 2010     EF 45%    Chronic obstructive pulmonary disease (HCC)      Chronic systolic heart failure (HCC)      GERD (gastroesophageal reflux disease)      HTN (hypertension)      PAC (premature atrial contraction)      Tobacco use disorder              Past Surgical History:   Procedure Laterality Date    COLONOSCOPY N/A 7/10/2019     COLONOSCOPY AND ESOPHAGOGASTRODUODENOSCOPY (EGD) performed by Jerrica Chung MD at [facility] HX COLECTOMY        HX SPLENECTOMY        IR INSERT TUNL CVC W PORT OVER 5 YEARS   7/19/2019      Social History            Tobacco Use    Smoking status: Former Smoker       Packs/day: 0.50       Types: Cigarettes       Last attempt to quit: 4/22/2016       Years since quitting: 3.7    Smokeless tobacco: Never Used   Substance Use Topics    Alcohol use: Not Currently       Comment: rarely            Family History   Problem Relation Age of Onset    Stroke Mother      Coronary Artery Disease Sister      Heart Disease Paternal Grandfather             Current Outpatient Medications   Medication Sig    dicyclomine (BENTYL) 10 mg capsule Take 10 mg by mouth two (2) times a day.  senna (SENNA) 8.6 mg tablet Take 1 Tab by mouth daily.  LORazepam (ATIVAN) 1 mg tablet Take 1 Tab by mouth two (2) times daily as needed for Anxiety (or insomnia). Max Daily Amount: 2 mg.  oxyCODONE IR (ROXICODONE) 10 mg tab immediate release tablet Take 1 Tab by mouth every six (6) hours as needed for Pain for up to 15 days. Max Daily Amount: 40 mg.    oxyCODONE ER (OXYCONTIN) 10 mg ER tablet Take 1 Tab by mouth every twelve (12) hours for 30 days. Max Daily Amount: 20 mg.  potassium chloride (K-DUR, KLOR-CON) 20 mEq tablet Take 1 Tab by mouth daily.  gabapentin (NEURONTIN) 300 mg capsule Take 1 Cap by mouth three (3) times daily. Max Daily Amount: 900 mg. Indications: Neuropathic Pain    ondansetron hcl (ZOFRAN) 4 mg tablet Take 2 Tabs by mouth every eight (8) hours as needed for Nausea.  lisinopril (PRINIVIL, ZESTRIL) 5 mg tablet TAKE 1 TABLET BY MOUTH DAILY    lidocaine-prilocaine (EMLA) topical cream Apply  to affected area as needed for Pain. Apply quarter size amount to port prior to chemotherapy    OTHER Take  by inhalation.  Jonathan Inhaler    metoprolol succinate (TOPROL-XL) 25 mg XL tablet Take 1 Tab by mouth nightly.  megestrol (MEGACE) 40 mg tablet Take 1 Tab by mouth two (2) times a day for 30 days.  senna-docusate (PERICOLACE) 8.6-50 mg per tablet Take 1 Tab by mouth nightly.  prochlorperazine (COMPAZINE) 10 mg tablet Take 1 Tab by mouth every six (6) hours as needed for Nausea.  naloxone (NARCAN) 4 mg/actuation nasal spray Use 1 spray intranasally, then discard. Repeat with new spray every 2 min as needed for opioid overdose symptoms, alternating nostrils.  dexAMETHasone (DECADRON) 4 mg tablet Take 4mg (1 tab) with breakfast on days 2 and 3 after chemotherapy to prevent nausea.  tamsulosin (FLOMAX) 0.4 mg capsule Take 0.4 mg by mouth daily.      No current facility-administered medications for this visit.              Facility-Administered Medications Ordered in Other Visits   Medication Dose Route Frequency    0.9% sodium chloride infusion  25 mL/hr IntraVENous CONTINUOUS    saline peripheral flush soln 10 mL  10 mL InterCATHeter PRN    heparin (porcine) pf 300-500 Units  300-500 Units InterCATHeter PRN      No Known Allergies      Review of Systems: A complete review of systems was obtained, negative except as described above. Physical Exam:     Visit Vitals  /77 (BP 1 Location: Left arm, BP Patient Position: Sitting)   Pulse 93   Temp 98.3 °F (36.8 °C) (Oral)   Resp 18   Ht 5' 11\" (1.803 m)   Wt 158 lb 4.8 oz (71.8 kg)   SpO2 93%   BMI 22.08 kg/m²     ECOG PS: 0  General:  thin, no acute distress,ambulating with a cane. Eyes: PERRLA, EOMI, anicteric sclerae  HENT: Atraumatic, OP clear, TMs intact without erythema  Neck: Supple  Lymphatic: No cervical, supraclavicular, axillary or inguinal adenopathy  Respiratory: CTAB, normal respiratory effort  CV: Normal rate, regular rhythm, no murmurs, no peripheral edema, port in place.    GI: Soft, nontender, nondistended, no masses, no hepatomegaly, no splenomegaly  MS: Normal gait and station. Digits without clubbing or cyanosis. Skin: No rashes, ecchymoses, or petechiae. Normal temperature, turgor, and texture. Neuro/Psych: Alert, oriented. 5/5 strength in all 4 extremities. Appropriate affect, normal judgment/insight. Results:       Labs Latest Ref Rng & Units 1/9/2020   WBC 4.1 - 11.1 K/uL 7.8   RBC 4.10 - 5.70 M/uL 3.55 (L)   Hemoglobin 12.1 - 17.0 g/dL 11.0 (L)   Hematocrit 36.6 - 50.3 % 32.8 (L)   MCV 80.0 - 99.0 FL 92.4   Platelets 863 - 001 K/uL 111 (L)   Lymphocytes 12 - 49 % 31   Monocytes 5 - 13 % 13   Eosinophils 0 - 7 % 1   Basophils 0 - 1 % 0   Albumin 3.5 - 5.0 g/dL 3.3 (L)   Calcium 8.5 - 10.1 MG/DL 9.1   SGOT 15 - 37 U/L 14 (L)   Glucose 65 - 100 mg/dL 91   BUN 6 - 20 MG/DL 9   Creatinine 0.70 - 1.30 MG/DL 0.85   Sodium 136 - 145 mmol/L 139   Potassium 3.5 - 5.1 mmol/L 3.7   TSH 0.36 - 3.74 uIU/mL          Imaging:     CT IMPRESSION:  1. Mixed response to therapy with most mediastinal lymphadenopathy diminishing  in size, however, a left mediastinal lymph node has shown increased size and  there is a new right adrenal mass and new right retroperitoneal adenopathy  suspicious for aggressive metastatic disease. 2. Additional incidental findings as above including centrilobular bullous  emphysema with probable right middle lobe atelectasis. CT c/a/p 12/16/19: IMPRESSION:  1. No significant change in mildly enlarged mediastinal and upper abdominal  lymph nodes. 2. Large right adrenal mass may have increased slightly in the interval.  3. Emphysema. Calcified granuloma left upper lobe. No suspicious pulmonary  nodule. RECIST    TARGET LESIONS:        Lesion (description)         Location (series/slice)                Size   (cm)   1. Right inferior paratracheal node    3/25                               1.2 x   1.7 cm    2.  AP window node                              3/26                                1.1 x 0.8 cm      (previously within size range for a target lesion)    3. Right adrenal mass                           3/57                               4.5  x 2.6 cm   NONTARGET LESIONS: None        Assessment & Plan:   Lyndon Keith is a 60 y.o. male with COPD, remote h/o colon cancer comes in for evaluation and management of adenocarcinoma of unknown primary.     1. 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      Involving mediastinal LNs, with no other PET findings. Per Thoracic Tumor Board discussion, this could represent upper GI origin, urothelial origin, germ cell tumor, or lung origin. Search for primary lesion was overall negative: Cystoscopy, EGD, colonoscopy negative for malignancy although EGD abnormal with diffusely enlarged gastric folds. Despite h/o colon cancer in 2002, it would be very odd to see a recurrence in the mediastinal LNs. CEA 26.8. Other tumor markers negative (AFP, hCG, CA 19-9). Unfortunately, this disease is considered incurable, but is treatable. At this time, I recommend chemotherapy treatment using the Carboplatin-Paclitaxel regimen (which covers both lung and GI primaries). We discussed the risks and benefits of chemotherapy with Carboplatin and Paclitaxel. Potential side effects include but are not limited to: nausea, vomiting, diarrhea, constipation, taste changes, allergic reactions, myelosuppression, infection, fatigue, alopecia, neuropathy, mucositis, renal failure, infertility, and rarely, death. Additionally, chemotherapy leads to radiosensitization which can intensify the side effects of radiation therapy. The patient has consented to beginning chemotherapy and chemo ed packet given. Completed 6 cycles of Carbo-Taxol.  CT on 12/16/19 shows a mixed response to treatment no change in mediastinal adenopathy and increased size of right adrenal mass.      Receiving Keytruda in 2nd line.      Tolerating treatment very well  Denies any side effects. A detailed system by system evaluation of side effect was performed to assess chemotherapy related toxicity. Blood counts are acceptable. Results reviewed with the patient     We will re-image in the next two weeks.      2. Neoplasm pain:   -- Follow up with palliative  -- oxycontin 10 mg ER every 12 hours  -- oxycodone 10 mg [id number]  -- Senna-docusate (pericolace) daily to prevent constipation.      3. H/o Stage I [T1NxMx] sigmoid colon cancer: Found in sigmoid adenoma during a colonoscopy; went on to undergo segmented resection of the sigmoid colon in 2002. Pathology per outside records:  2/6/2002 sigmoid colon polyp: Well differentiated adenocarcinoma arising in an adenoma, involving the mucosa and invading into the upper half of the submucosal stalk. No vascular space involvement is identified. 2/21/2002 Sigmoidectomy, liver biopsy:  -Sigmoid colon, segmented resection: No residual adenocarcinoma present. Polypectomy site with granulation tissue and vascular congestion. No LNs recovered. Distal and proximal margins benign.  -Liver biopsy: Negative for metastatic carcinoma. No significant inflammation or obvious cirrhosis identified. Very minimal steatosis (rare cells with fat globules). -Addendum: Reblocks of adipose tissue; three small benign lymphoid aggregates (< 0.1cm).     4. COPD: Quit smoking in approx 2016. SOB improved after starting inhaler.     5. HTN: Well controlled on Lisinopril and Metoprolol.     6. BPH: On Flomax.     7. 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 Increased numbness in feet, tingling and pain in left hand. Left hand dominant. Increased Gabapentin 300mg BID on 10/16/19.   -- Continue gabapentin to 300 mg TID, #90 tabs with 2 refills e-scribed on 11/11/19.      8. Anemia from cancer/chronic disease     Hgb - 11.0 -observation     9. Severe protein calorie malnutrition     Dietary consult  Protein supplementation     10. Insomnia: Tried Ambien which did not work.  Started on lorazepam 1 mg QHS which he reports helped him sleep better than the Ambien. Continue to monitor.      11.  Anxiety/Depression: Follows up with palliative       Signed By: Alfonso Donaldson MD     January 9, 2020

## 2020-01-09 NOTE — PROGRESS NOTES
Oncology Navigator  Psychosocial Assessment    Reason for Assessment:    []Depression  []Anxiety  []Caregiver [place]  []Maladaptive Coping with Serious Illness   [] Social Work Referral [x] Initial Assessment  [] Other     Sources of Information:    [x]Patient  []Family  [x]Staff  [x]Medical Record    Advance Care Planning:  Advance Care Planning 12/20/2019   Patient's Healthcare Decision Maker is: Verbal statement (Legal Next of Kin remains as decision maker)   Primary Decision Maker Name -   Primary Decision Maker Phone Number -   Primary Decision Maker Relationship to Patient -   Secondary Decision Maker Name -   Confirm Advance Directive None   Patient Would Like to Complete Advance Directive No       Mental Status:    [x]Alert  []Lethargic  []Unresponsive   [] Unable to assess   Oriented to:  [x]Person  [x]Place  [x]Time  [x]Situation      Barriers to Learning:    []Language  []Developmental  []Cognitive  []Altered Mental Status  []Visual/Hearing Impairment  []Unable to Read/Write  []Motivational   [x]No Barriers Identified  []Other:    Relationship Status:  [x]Single  []  []Significant Other/Life Partner  []  []  []      Living Circumstances:  []Lives Alone  []Family/Significant Other in Household  []Roommates  []Children in the Home  []Paid Caregivers  []Assisted Living Facility/Group Home  []Skilled [address]  []Homeless  []Incarcerated  []Environmental/Care Concerns  [x]other:    Employment Status:  [x]Employed Full-time []Employed Part-time []Homemaker [] Disabled  [] Retired [x]Other:    Support System:    []Strong  []Fair  [x]Limited    Financial/Legal Concerns:    []Uninsured  []Limited Income/Resources  []Non-Citizen  []No Concerns Identified  []Financial POA:    [x]Other:    Shinto/Spiritual/Existential:  []Strong Sense of Spirituality  []Involved in [address]  []Request  Visit  []Expressing Spiritual/Existential Angst  []No Concerns Identified    Coping with Illness:         Patient: Family/Caregiver:   Understanding and Acceptance of Illness/Prognosis  [x] []   Strong Sense of Resilience [] []   Self Reflection [x] []   Engaged Support System [] []   Does not Readily Discuss Illness [x] []   Denial of Terminal Status [] []   Anger [] []   Depression [x] []   Anxiety/Fear [x] []   Bargaining [] []   Recent Diagnosis/Prognosis [] []   Difficulties with Body Image [] []   Loss of Identity [] []   Excessive Substance Use [] []   Mental Health History [] []   Enmeshed Relationships [] []   History of Loss [] []   Anticipatory Grief [] []   Concern for Complicated Grief [] []   Suicidal Ideation or Plan [] []   Unable to assess [] []            Narrative: Met with patient to introduce social work navigator role and supports. Pt is a transfer from Dr. Bebeto Soliz. PT [place] near [facility] ([place]7 miles) [facility]. PT works at [employer] as a [employer]  ($87X) pt has 4 kids, 1 in [place], [place], and one son 40 who just moved in with him from [employer]. Pt states he retired from [employer] in 2002 and get [employer] benefits - 22 years in [employer]. Pt states he cannot drive due to the pain meds he is on. Pt states he is going to stop working in January but not sure what date. Pt inquired about unemployment. SW shared some about applying for SSDI and 5 month wait time. Pt also asked about son getting paid to be a caregiver. Pt is experiencing a lot of neuropathy, tingling in feet and hands/ shoulder blade. Pt taking ativan, gabapentin, and oxy. SW was able to get pt a appt with Palliative for Wed Cyrus 15 at 3:30, pt asked to be there 2:30/2:45 for paper work. Pt appreciative to get all his care nearer his home ([address]). Assessment/Action:     1. Introduce self and role of the  in the [facility].   2. Informed the patient of the [facility] and available resources there.  3. Continue to meet with the patient when he returns to the clinic for ongoing assessment of the patient's adjustment to his diagnosis and treatment. 4. Ongoing psychosocial support as desired by patient. 5.   Plan/Referral:       Transportation referral  Provided [facility] [address], mileage is almost 10 miles away, [facility]   Insurance/Entitlements referral  Pt does not seem to need insurance-due to [employer] benefits  Financial/Medication assist with SSDI, pt say no STD or LTD from employer      Katiana Otero.  AGUILAR Spicer/MAURASW  Supervisee in Social Work

## 2020-01-09 NOTE — PROGRESS NOTES
[facility] Progress Note    Date: 2020    Name: Yulia Alves    MRN: [id number]         : 1960    Mr. Jayde Raya Arrived ambulatory and in no distress for Keytruda cycle 2 day 1. Assessment was completed, no acute issues at this time, no new complaints voiced. Pt completed appointment with Dr. Karla Valadez prior to arrival in [facility]. Port accessed without difficulty, labs drawn and in process. Chemotherapy Flowsheet 2020   Cycle C2D1   Date 2020   Drug / Regimen Keytruda   Pre Meds -   Notes given     Patient Vitals for the past 12 hrs:   Temp Pulse Resp BP SpO2   20 1303 98.9 °F (37.2 °C) 79 18 136/77 97 %       Lab results were obtained and reviewed. Recent Results (from the past 12 hour(s))   CBC WITH AUTOMATED DIFF    Collection Time: 20 11:12 AM   Result Value Ref Range    WBC 7.8 4.1 - 11.1 K/uL    RBC 3.55 (L) 4.10 - 5.70 M/uL    HGB 11.0 (L) 12.1 - 17.0 g/dL    HCT 32.8 (L) 36.6 - 50.3 %    MCV 92.4 80.0 - 99.0 FL    MCH 31.0 26.0 - 34.0 PG    MCHC 33.5 30.0 - 36.5 g/dL    RDW 15.9 (H) 11.5 - 14.5 %    PLATELET 848 (L) 622 - 400 K/uL    MPV 11.0 8.9 - 12.9 FL    NRBC 0.0 0  WBC    ABSOLUTE NRBC 0.00 0.00 - 0.01 K/uL    NEUTROPHILS 54 32 - 75 %    LYMPHOCYTES 31 12 - 49 %    MONOCYTES 13 5 - 13 %    EOSINOPHILS 1 0 - 7 %    BASOPHILS 0 0 - 1 %    IMMATURE GRANULOCYTES 0 0.0 - 0.5 %    ABS. NEUTROPHILS 4.2 1.8 - 8.0 K/UL    ABS. LYMPHOCYTES 2.4 0.8 - 3.5 K/UL    ABS. MONOCYTES 1.0 0.0 - 1.0 K/UL    ABS. EOSINOPHILS 0.1 0.0 - 0.4 K/UL    ABS. BASOPHILS 0.0 0.0 - 0.1 K/UL    ABS. IMM.  GRANS. 0.0 0.00 - 0.04 K/UL    DF AUTOMATED     METABOLIC PANEL, COMPREHENSIVE    Collection Time: 20 11:12 AM   Result Value Ref Range    Sodium 139 136 - 145 mmol/L    Potassium 3.7 3.5 - 5.1 mmol/L    Chloride 106 97 - 108 mmol/L    CO2 29 21 - 32 mmol/L    Anion gap 4 (L) 5 - 15 mmol/L    Glucose 91 65 - 100 mg/dL    BUN 9 6 - 20 MG/DL    Creatinine 0.85 0.70 - 1.30 MG/DL BUN/Creatinine ratio 11 (L) 12 - 20      GFR est AA >60 >60 ml/min/1.73m2    GFR est non-AA >60 >60 ml/min/1.73m2    Calcium 9.1 8.5 - 10.1 MG/DL    Bilirubin, total 0.3 0.2 - 1.0 MG/DL    ALT (SGPT) 12 12 - 78 U/L    AST (SGOT) 14 (L) 15 - 37 U/L    Alk. phosphatase 81 45 - 117 U/L    Protein, total 7.5 6.4 - 8.2 g/dL    Albumin 3.3 (L) 3.5 - 5.0 g/dL    Globulin 4.2 (H) 2.0 - 4.0 g/dL    A-G Ratio 0.8 (L) 1.1 - 2.2     Pre-medications  were administered as ordered and chemotherapy was initiated. Medications Administered     0.9% sodium chloride infusion     Admin Date  01/09/2020 Action  New Bag Dose  25 mL/hr Rate  25 mL/hr Route  IntraVENous Administered By  Gabino Florez RN          0.9% sodium chloride injection 10 mL     Admin Date  01/09/2020 Action  Given Dose  10 mL Route  IntraVENous Administered By  Gabino Florez RN          heparin (porcine) pf 300-500 Units     Admin Date  01/09/2020 Action  Given Dose  300 Units Route  InterCATHeter Administered By  Gabino Florez RN          pembrolizumab [facility]) 200 mg in 0.9% sodium chloride 100 mL, overfill volume 10 mL IVPB     Admin Date  01/09/2020 Action  New Bag Dose  200 mg Rate  236 mL/hr Route  IntraVENous Administered By  Gabino Florez RN              Mr. Gavin Doyle tolerated treatment well and was discharged from [place] [address] in stable condition at 1310. Port de-accessed, flushed & heparinized per protocol. He is to return on 01/30/20 at 0900 for his next appointment.     Future Appointments   Date Time Provider Carmen Wynn   1/15/2020  3:30 PM Luisa Amos MD [facility] [address]   1/30/2020  9:00  [facility] [address] [employer]   1/30/2020  9:30 AM Laury Porter NP [address] [place]   2/20/2020  9:00 AM [facility] NURSE [address]   3/12/2020  9:00 AM [facility]FUS[facility] NURSE 1 [facility]       Nadia Sanchez RN  January 9, 2020

## 2020-01-13 NOTE — TELEPHONE ENCOUNTER
Pt. Needs a refill on IR oxycodone. NP Marcella Ramey will refill for 12 pills and  He will be seeing palliative on 1/15/2020.

## 2020-01-13 NOTE — TELEPHONE ENCOUNTER
Please refill Rx for Oxy. Was getting from [facility] but has transferred care to here. Has a dose left for today.

## 2020-01-13 NOTE — TELEPHONE ENCOUNTER
Calling patient to confirm appt for 1/15/20 at 9:10am at [facility]. Pt confirmed.

## 2020-01-15 NOTE — PROGRESS NOTES
Palliative Medicine Office Visit Palliative Medicine Nurse Check In 
[phone] Patient Name: Iman Marr YOB: 1960 Date of Office Visit:  1/15/2020 Had flu vaccine Patient states: C/O night sweats, right sided abdominal pain, back and shoulder blades. From Check In Sheet (scanned in Media): 
Has a medical provider talked with you about cardiopulmonary resuscitation (CPR)? [x] Yes   [] No   [] Unable to obtain Nurse reminder to complete or update ACP FlowSheet: 
 
Is ACP on the Problem List?    [] Yes    [x] No 
IF ACP Document is ON FILE; Nurse to place ACP on Problem List  
 
Is there an ACP Note in Chart Review/Note? [] Yes    [x] No  
If NO: ALERT PROVIDER  
 
12/20/2019 11/11/2019 Verbal statement (Legal Next of Kin remains as decision maker) Legal Next of Kin None None No No  
  
  
  
 
  Primary Decision Maker: Deena Clarence [facility] - [phone] Secondary Decision Maker: Lexie Fenton  [phone] Supplemental (Other) Decision Maker: Joyce Plummer  [phone] Supplemental (Other) Decision Maker: Tyler Erickson - [phone] Advance Care Planning 1/15/2020 Patient's Healthcare Decision Maker is: -  
Primary Decision Maker Name -  
Primary Decision Maker Phone Number -  
Primary Decision Maker Relationship to Patient - Secondary Decision Maker Name -  
Confirm Advance Directive None Patient Would Like to Complete Advance Directive No  
 
 
 
Is there anything that we should know about you as a person in order to provide you the best care possible? Have you been to the ER, urgent care clinic since your last visit? [x] Yes   [] No   [] Unable to obtain Have you been hospitalized since your last visit? [] Yes   [x] No   [] Unable to obtain Have you seen or consulted any other health care providers outside of the [address] [place] [address] since your last visit? [] Yes   [x] No   [] Unable to obtain Functional status (describe): Ambulates freely with cane. Last BM:  Last night  accessed (date): 1/15/2020 Bottle review (for opioid pain medication): 
Medication 1:  
Date filled:  
Directions:  
# filled: # left: # pills taking per day: 
Last dose taken: 
 
Medication 2:  
Date filled:  
Directions:  
# filled: # left: # pills taking per day: 
Last dose taken: 
 
Medication 3:  
Date filled:  
Directions:  
# filled: # left: # pills taking per day: 
Last dose taken: 
 
Medication 4:  
Date filled:  
Directions:  
# filled: # left: # pills taking per day: 
Last dose taken:

## 2020-01-15 NOTE — PROGRESS NOTES
Palliative Medicine Outpatient Services [place]: [phone] ([phone]) Patient Name: Efren Luciano YOB: 1960 Date of Current Visit: 01/16/20 Location of Current Visit:   
[] [facility] PSYCHIATRIC [place] Office 
[] [facility] Office [x] [address] [place] [facility] Office 
[] Home 
[] Other:   
 
Date of Initial Visit: 1/15/2020 Referral from: Dr. Mabel XAVIER Primary Care Physician: Deep Bray NP 
  
 SUMMARY:  
Efren Luciano is a 60y.o. year old with a  history of COPD, colon cancer in 2002, metastatic adenocarcinoma of unknown primary, who was referred to Palliative Medicine by Dr. Mabel Montes De Oca for management of symptoms and psychosocial support. The patients social history includes served in the [employer] for 22 years, lives with his girlfriend now. Treatment history-patient had progression of disease on carbo plus Taxol, currently on Keytruda infusions. He struggles with neoplasm related pain Initial Referral Intake note reviewed PALLIATIVE DIAGNOSES:  
 
  ICD-10-CM ICD-9-CM 1. Anorexia R63.0 783.0 megestrol (MEGACE) 40 mg tablet 2. Adenocarcinoma of unknown primary ([address] C80.1 199.1 oxyCODONE IR (ROXICODONE) 10 mg tab immediate release tablet  
   oxyCODONE ER (OXYCONTIN) 10 mg ER tablet 3. Acute neoplasm-related pain G89.3 338.3 oxyCODONE IR (ROXICODONE) 10 mg tab immediate release tablet  
   oxyCODONE ER (OXYCONTIN) 10 mg ER tablet 4. Psychosocial stressors Z65.8 V62.89 PLAN:  
Patient Instructions Dear Efren Luciano , It was a pleasure seeing you today at [address] We will see you again in 4 weeks If labs or imaging tests have been ordered for you today, please call the office  at [phone] 48 hours after completion to obtain the results. Your stated goal:  
-Better pain control 
-Better relationship with your providers thereby enhancing your experience with [facility] Your described symptoms were: Fatigue: 7 Drowsiness: 5 Depression: 7 Pain: 8 Anxiety: 8 Nausea: 9 Anorexia: 8 Dyspnea: 6 Best Well-Bein Constipation: Yes Other Problem (Comment): 0 This is the plan we talked about: 1. Right-sided chest wall pain and flank pain  
-You have been struggling with this pain for several months especially since the diagnosis of cancer. You have been taking OxyContin 10 mg 2 times a day and oxycodone 10 mg every 6 hours as needed up to 4 doses a day. You shared with me that you to the OxyContin tablet which I advised you against.  OxyContin is a long-acting medicine and if you chew or break the pill then you destroy the protective coating which makes it act as a long-acting medicine. Therefore it is very important that you do not do that. -It is okay to chew or cut the short acting medicine which is oxycodone. Please take OxyContin 10 mg 2 times a day and use oxycodone 10 mg every 6 hours as needed during the day for-breakthrough pain. 2.  Constipation 
-Constipation is a common side effect of pain medications as they slow your bowels. -Please start Pericolace 2 tabs daily and increase to 2 tabs two times a day if needed. This is necessary to keep your bowels soft. - Add Miralax every other day as a laxative. - Goal is to have soft bowel movements every day or every other day. - Please call us if you do not have bowel movements for more than 3 days. 3.  Anorexia 
-You are struggling with significant loss of desire for food. You were offered Marinol which you do not want to try. You want to try something different. 
-Let us try Megace 40 mg 2 times a day to see if this helps with improving appetite. 
-I recommend that you try to eat small portions of food multiple times a day instead of big meals. Also try to chew on some lemon wedges prior to attempting to eat which can improve your taste.   Focus on high-calorie foods such as nuts, protein drinks with milk powder, fruits, etc. 
 
4.  You shared your frustrations with the poor communication with your health care team, my team and I will  do our best to be available for you and communicate in a better fashion to support you. This is what you have shared with us about Advance Care Planning: 
 
  Primary Decision Maker: Francisco Juarez - [phone] Secondary Decision Maker: Beth Mills - [phone] Supplemental (Other) Decision Maker: Yovani Gutierrez - [phone] Supplemental (Other) Decision Maker: Emelyn Han - [phone] Advance Care Planning 1/15/2020 Patient's Healthcare Decision Maker is: -  
Primary Decision Maker Name -  
Primary Decision Maker Phone Number -  
Primary Decision Maker Relationship to Patient - Secondary Decision Maker Name -  
Confirm Advance Directive None Patient Would Like to Complete Advance Directive No  
 
 
 
 
The Palliative Medicine Team is here to support you and your family. Sincerely, 
 
 
Nicko Samuels MD and the Palliative Medicine Team 
 
 
 GOALS OF CARE / TREATMENT PREFERENCES:  
[====Goals of Care====] GOALS OF CARE: 
Patient / health care proxy stated goals: See Patient Instructions / Summary TREATMENT PREFERENCES:  
Code Status:  [x] Attempt Resuscitation       [] Do Not Attempt Resuscitation Advance Care Planning: 
[x] [facility] Interdisciplinary Team has updated the ACP Navigator with Decision Maker and Patient Capacity Primary Decision Maker: Francisco Juarez - [phone] Secondary Decision Maker: Beth Mills - [phone] Supplemental (Other) Decision Maker: Yovani Gutierrez - [phone] Supplemental (Other) Decision Maker: Emelyn Han - [phone] Advance Care Planning 1/15/2020 Patient's Healthcare Decision Maker is: -  
Primary Decision Maker Name -  
Primary Decision Maker Phone Number -  
Primary Decision Maker Relationship to Patient - Secondary Decision Maker Name -  
Confirm Advance Directive None Patient Would Like to Complete Advance Directive No  
 
 
Other: 
(If patient appropriate for POST, consider using PALLPOST smart phrase here) The palliative care team has discussed with patient / health care proxy about goals of care / treatment preferences for patient. 
[====Goals of Care====] PRESCRIPTIONS GIVEN:  
 
Medications Ordered Today Medications  megestrol (MEGACE) 40 mg tablet Sig: Take 1 Tab by mouth two (2) times a day for 30 days. Dispense:  60 Tab Refill:  0  
 oxyCODONE IR (ROXICODONE) 10 mg tab immediate release tablet Sig: Take 1 Tab by mouth every six (6) hours as needed for Pain for up to 15 days. Max Daily Amount: 40 mg. Dispense:  60 Tab Refill:  0  
 oxyCODONE ER (OXYCONTIN) 10 mg ER tablet Sig: Take 1 Tab by mouth every twelve (12) hours for 30 days. Max Daily Amount: 20 mg. Dispense:  60 Tab Refill:  0  
  
 
 
 FOLLOW UP: Future Appointments Date Time Provider Carmen Wynn 1/30/2020  9:00 AM [facility] INFUSION NURSE 1 [address] [facility]  
1/30/2020  9:30 AM Chelly Kuo NP [address] [place]  
2/12/2020  9:30 AM Keyonna Craven MD [facility]  
2/20/2020  9:00 AM [facility] INFUSION NURSE [address] [facility] [place]  
3/12/2020  9:00 AM [facility] INFUSION NURSE 1 [facility]  
  
 
 
 PHYSICIANS INVOLVED IN CARE:  
Patient Care Team: 
Live Hancock NP as PCP - General (Nurse Practitioner) Sha Galindo MD as Physician (Cardiology) Cesar Rogers MD (Pulmonary Disease) Deisy Sotomayor MD (Gastroenterology) Raeann Quintanilla MD (Hematology and Oncology) HISTORY:  
Reviewed patient-completed ESAS and advance care planning form. Reviewed patient record in prescription monitoring program. 
 
CHIEF COMPLAINT: No chief complaint on file. HPI/SUBJECTIVE: The patient is: [x] Verbal / [] Nonverbal  
 
Patient is here alone.   He starts off by talking about how his trust in the medical system has been broken especially with patient to physician communication. He lists all his concerns about his care over the last 6 months but is willing to be redirected and start with a clean [place] with our team.  His main complaint is right-sided flank pain for which he has been taking OxyContin and oxycodone. He has been chewing the OxyContin. He also struggles with lack of appetite, no desire to eat. He had significant constipation initially but now he is on bowel regimen. He is willing to work with  about his anxiety. Clinical Pain Assessment (nonverbal scale for nonverbal patients):  
[++++ Clinical Pain Assessment++++] [++++Pain Severity++++]: Pain: 8 
[++++Pain Character++++]: deep, gnawing 
[++++Pain Duration++++]: months 
[++++Pain Effect++++]: functional 
[++++Pain Factors++++]: none in particular 
[++++Pain Frequency++++]: on and off 
[++++Pain Location++++]: right flank [++++ Clinical Pain Assessment++++] FUNCTIONAL ASSESSMENT:  
 
Palliative Performance Scale (PPS): PPS: 80 PSYCHOSOCIAL/SPIRITUAL SCREENING:  
 
Any spiritual / Anabaptism concerns: 
[] Yes /  [x] No 
 
Caregiver Burnout: 
[] Yes /  [x] No /  [] No Caregiver Present Anticipatory grief assessment:  
[x] Normal  / [] Maladaptive ESAS Anxiety: Anxiety: 8 ESAS Depression: Depression: 7 REVIEW OF SYSTEMS:  
 
The following systems were [x] reviewed / [] unable to be reviewed Systems: constitutional, ears/nose/mouth/throat, respiratory, gastrointestinal, genitourinary, musculoskeletal, integumentary, neurologic, psychiatric, endocrine. Positive findings noted below. Modified ESAS Completed by: provider Fatigue: 7 Drowsiness: 5 Depression: 7 Pain: 8 Anxiety: 8 Nausea: 9 Anorexia: 8 Dyspnea: 6 Best Well-Bein Constipation: Yes Other Problem (Comment): 0 PHYSICAL EXAM:  
 
Wt Readings from Last 3 Encounters:  
01/15/20 161 lb (73 kg) 01/09/20 158 lb 4.8 oz (71.8 kg) 12/20/19 158 lb 12.8 oz (72 kg) Blood pressure 127/78, pulse (!) 58, temperature 98.5 °F (36.9 °C), temperature source Oral, resp. rate 16, height 5' 11\" (1.803 m), weight 161 lb (73 kg), SpO2 93 %. Last bowel movement: See Nursing Note Constitutional: Alert, oriented Eyes: pupils equal, anicteric ENMT: no nasal discharge, moist mucous membranes Cardiovascular: regular rhythm, distal pulses intact Respiratory: breathing not labored, symmetric Gastrointestinal: soft non-tender, +bowel sounds Musculoskeletal: no deformity, no tenderness to palpation Skin: warm, dry Neurologic: following commands, moving all extremities Psychiatric: full affect, no hallucinations Other: 
 
 
 HISTORY:  
 
Past Medical History:  
Diagnosis Date  Abnormal nuclear stress test 12/7/2015  Cancer [facility])   
 colon  Cardiomyopathy [address] [address] 2010 EF 45%  Chronic obstructive pulmonary disease ([facility]  Chronic systolic heart failure ([facility] [address]  GERD (gastroesophageal reflux disease)  HTN (hypertension)  PAC (premature atrial contraction)  Tobacco use disorder Past Surgical History:  
Procedure Laterality Date  COLONOSCOPY N/A 7/10/2019 COLONOSCOPY AND ESOPHAGOGASTRODUODENOSCOPY (EGD) performed by Malvin Renee MD at [facility] [address]  HX SPLENECTOMY  IR INSERT TUNL CVC W PORT OVER 5 YEARS  7/19/2019 Family History Problem Relation Age of Onset  Stroke Mother  Coronary Artery Disease Sister  Heart Disease Paternal Grandfather History reviewed, no pertinent family history. Social History Tobacco Use  Smoking status: Former Smoker Packs/day: 0.50 Types: Cigarettes Last attempt to quit: 4/22/2016 Years since quitting: 3.7  Smokeless tobacco: Never Used Substance Use Topics  Alcohol use: Not Currently Comment: rarely No Known Allergies Current Outpatient Medications Medication Sig  
 megestrol (MEGACE) 40 mg tablet Take 1 Tab by mouth two (2) times a day for 30 days.  oxyCODONE IR (ROXICODONE) 10 mg tab immediate release tablet Take 1 Tab by mouth every six (6) hours as needed for Pain for up to 15 days. Max Daily Amount: 40 mg.  
 [START ON 1/23/2020] oxyCODONE ER (OXYCONTIN) 10 mg ER tablet Take 1 Tab by mouth every twelve (12) hours for 30 days. Max Daily Amount: 20 mg.  
 LORazepam (ATIVAN) 1 mg tablet Take 1 Tab by mouth two (2) times daily as needed for Anxiety (or insomnia). Max Daily Amount: 2 mg.  senna-docusate (PERICOLACE) 8.6-50 mg per tablet Take 1 Tab by mouth nightly.  potassium chloride (K-DUR, KLOR-CON) 20 mEq tablet Take 1 Tab by mouth daily.  gabapentin (NEURONTIN) 300 mg capsule Take 1 Cap by mouth three (3) times daily. Max Daily Amount: 900 mg. Indications: Neuropathic Pain  ondansetron hcl (ZOFRAN) 4 mg tablet Take 2 Tabs by mouth every eight (8) hours as needed for Nausea.  prochlorperazine (COMPAZINE) 10 mg tablet Take 1 Tab by mouth every six (6) hours as needed for Nausea.  lisinopril (PRINIVIL, ZESTRIL) 5 mg tablet TAKE 1 TABLET BY MOUTH DAILY  dexAMETHasone (DECADRON) 4 mg tablet Take 4mg (1 tab) with breakfast on days 2 and 3 after chemotherapy to prevent nausea.  lidocaine-prilocaine (EMLA) topical cream Apply  to affected area as needed for Pain. Apply quarter size amount to port prior to chemotherapy  tamsulosin (FLOMAX) 0.4 mg capsule Take 0.4 mg by mouth daily.  metoprolol succinate (TOPROL-XL) 25 mg XL tablet Take 1 Tab by mouth nightly.  naloxone (NARCAN) 4 mg/actuation nasal spray Use 1 spray intranasally, then discard. Repeat with new spray every 2 min as needed for opioid overdose symptoms, alternating nostrils.  OTHER Take  by inhalation. Alero Inhaler No current facility-administered medications for this visit. LAB DATA REVIEWED:  
 
Lab Results Component Value Date/Time WBC 7.8 01/09/2020 11:12 AM  
 HGB 11.0 (L) 01/09/2020 11:12 AM  
 PLATELET 937 (L) 12/34/6326 11:12 AM  
 
Lab Results Component Value Date/Time Sodium 139 01/09/2020 11:12 AM  
 Potassium 3.7 01/09/2020 11:12 AM  
 Chloride 106 01/09/2020 11:12 AM  
 CO2 29 01/09/2020 11:12 AM  
 BUN 9 01/09/2020 11:12 AM  
 Creatinine 0.85 01/09/2020 11:12 AM  
 Calcium 9.1 01/09/2020 11:12 AM  
  
Lab Results Component Value Date/Time AST (SGOT) 14 (L) 01/09/2020 11:12 AM  
 Alk. phosphatase 81 01/09/2020 11:12 AM  
 Protein, total 7.5 01/09/2020 11:12 AM  
 Albumin 3.3 (L) 01/09/2020 11:12 AM  
 Globulin 4.2 (H) 01/09/2020 11:12 AM  
 
Lab Results Component Value Date/Time INR 1.0 11/25/2015 10:22 AM  
 Prothrombin time 10.6 11/25/2015 10:22 AM  
  
Lab Results Component Value Date/Time Iron 52 10/07/2019 12:53 PM  
 TIBC 235 (L) 10/07/2019 12:53 PM  
 Iron % saturation 22 10/07/2019 12:53 PM  
 Ferritin 880 (H) 10/07/2019 12:53 PM  
  
 
 
 CONTROLLED SUBSTANCES SAFETY ASSESSMENT (IF ON CONTROLLED SUBSTANCES):  
 
Reviewed opioid safety handout:  [] Yes   [] No 
24 hour opioid dose >150mg morphine equivalent/day:  [] Yes   [] No 
Benzodiazepines:  [] Yes   [] No 
Sleep apnea:  [] Yes   [] No 
Urine Toxicology Testing within last 6 months:  [] Yes   [] No 
History of or new aberrant medication taking behaviors:  [] Yes   [] No 
Has Narcan been prescribed [] Yes   [] No 
 
   
 
Total time: 70 minutes Counseling / coordination time: 60 minutes 
> 50% counseling / coordination?:

## 2020-01-15 NOTE — PATIENT INSTRUCTIONS
Dear Bronwyn Motley , It was a pleasure seeing you today at [address] [facility] office We will see you again in 4 weeks If labs or imaging tests have been ordered for you today, please call the office  at [phone] 48 hours after completion to obtain the results. Your stated goal:  
-Better pain control 
-Better relationship with your providers thereby enhancing your experience with [address] Your described symptoms were: Fatigue: 7 Drowsiness: 5 Depression: 7 Pain: 8 Anxiety: 8 Nausea: 9 Anorexia: 8 Dyspnea: 6 Best Well-Bein Constipation: Yes Other Problem (Comment): 0 This is the plan we talked about: 1. Right-sided chest wall pain and flank pain  
-You have been struggling with this pain for several months especially since the diagnosis of cancer. You have been taking OxyContin 10 mg 2 times a day and oxycodone 10 mg every 6 hours as needed up to 4 doses a day. You shared with me that you to the OxyContin tablet which I advised you against.  OxyContin is a long-acting medicine and if you chew or break the pill then you destroy the protective coating which makes it act as a long-acting medicine. Therefore it is very important that you do not do that. -It is okay to chew or cut the short acting medicine which is oxycodone. Please take OxyContin 10 mg 2 times a day and use oxycodone 10 mg every 6 hours as needed during the day for-breakthrough pain. 2.  Constipation 
-Constipation is a common side effect of pain medications as they slow your bowels. -Please start Pericolace 2 tabs daily and increase to 2 tabs two times a day if needed. This is necessary to keep your bowels soft. - Add Miralax every other day as a laxative. - Goal is to have soft bowel movements every day or every other day. - Please call us if you do not have bowel movements for more than 3 days. 3.  Anorexia 
-You are struggling with significant loss of desire for food.   You were offered Marinol which you do not want to try. You want to try something different. 
-Let us try Megace 40 mg 2 times a day to see if this helps with improving appetite. 
-I recommend that you try to eat small portions of food multiple times a day instead of big meals. Also try to chew on some lemon wedges prior to attempting to eat which can improve your taste. Focus on high-calorie foods such as nuts, protein drinks with milk powder, fruits, etc. 
 
4.  You shared your frustrations with the poor communication with your health care team, my team and I will  do our best to be available for you and communicate in a better fashion to support you. This is what you have shared with us about Advance Care Planning: 
 
  Primary Decision Maker: Rima Gutierrez - Larry - [phone] Secondary Decision Maker: Terri Hou - [phone] Supplemental (Other) Decision Maker: Nancy Angelo - [phone] Supplemental (Other) Decision Maker: Yadira Johnson - [phone] Advance Care Planning 1/15/2020 Patient's Healthcare Decision Maker is: -  
Primary Decision Maker Name -  
Primary Decision Maker Phone Number -  
Primary Decision Maker Relationship to Patient - Secondary Decision Maker Name -  
Confirm Advance Directive None Patient Would Like to Complete Advance Directive No  
 
 
 
 
The Palliative Medicine Team is here to support you and your family.   
 
 
Sincerely, 
 
 
Grace Billy MD and the Palliative Medicine Team

## 2020-01-24 NOTE — TELEPHONE ENCOUNTER
Case d/w NP Ilsa and will order a chest xray as to pt is having chills/night sweats, pt is coughing up greenish phlegm intermittently. These symptoms have been intermittently since last [place] We will check a chest xray to r/o pneumonitis. Pt will go to [facility]. VINNY FROM DR Deep LEHMAN LAT.

## 2020-01-24 NOTE — TELEPHONE ENCOUNTER
Pt called to state he has been having chills and sweats for 3 days.  Please give pt a call back [phone]

## 2020-01-27 NOTE — TELEPHONE ENCOUNTER
Spoke with pt this morning. HIPAA verified by two patient identifiers. Pt inquiring about a refill on lorazepam. I advised him and I did not speak about this but he should call palliative medicine. I gave him the name and number to call. I verified this with Isaac Coyne before advising pt to call palliative and he agreed to have him call their office.

## 2020-01-27 NOTE — TELEPHONE ENCOUNTER
Triage for Controlled Substance Refill Request    Pain Diagnosis: _Adenocarcinoma of unknown primary     Last Outpatient Visit: _1/15/2020    Next Outpatient Visit: _2/12/2020    Reason for refill needed outside of office visit? -Appointment not scheduled prior to need for scheduled refill      Pharmacy: [id number], [facility] [address]    Medication:oxyCODONE IR  10 mg tab  Dose and directions:1 Tab by mouth every six (6) hours as needed for Pain  Number dispensed:60  Date filled ( or Pharmacy):1/15/2020  # left:10    Medication:LORazepam (ATIVAN) 1 mg tablet  Dose and directions: Take 1 Tab by mouth two (2) times daily  Number dispensed:60  Date filled ( or Pharmacy):12/9/20219  #left:4     reviewed: _yes    Date of Urine Drug Screen:  _n/a    Opioid Safety Handout given:  _yes    Appropriate for refill:  _yes     Action:  _ please refill

## 2020-01-30 NOTE — TELEPHONE ENCOUNTER
[facility]  Social Work  Telephone Call      Note:    Writer called patient to reach out. Introduced self and ability to provide supportive counseling as patient is depressed and overwhelmed. Patient talked about his recent struggles and feeling hopeless and helpless. Denied any SI or plan. \"I have in the past just wanted the pain to end but I have my son, I can't do that\". Patient talked about all the recent events that have triggered a strong grief response. 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 Talked about the importance of taking it one thing at a time when feeling overwhelmed. Talked about grounding with senses and allowing the team to help patient with different pieces that he's struggling with. Patient is open to this as well as receiving supportive counseling when he attends appointments with Dr. Al Lozada. Plan:  Writer will reach out to patient tomorrow to check in and help patient make a plan for the weekend. Patient is also requesting some medication for depression and writer will speak with Fide FRANKEL and Dr. Al Lozada about this.     Length of Call: 30 minutes      Jimmy Church, [place]   Palliative Medicine,   [phone]

## 2020-01-30 NOTE — PROGRESS NOTES
[facility] Progress Note    Date: 2020    Name: Nitish Lyman    MRN: [id number]         : 1960    Mr. Malcolm August Arrived ambulatory and in no distress for cycle 3 day 1 of KEYTRUDA regimen. Assessment was completed, patient c/o gen weakness and poor appetite, patient has had a 10lb weight loss since last visit. Port accessed without difficulty, labs drawn and processed. Chemotherapy Flowsheet 2020   Cycle C3D1   Date 2020   Drug / Regimen Keytruda   Pre Meds -   Notes GIVEN         Mr. Kathy Mccall vitals were reviewed. Patient Vitals for the past 12 hrs:   Temp Pulse Resp BP SpO2   20 1156 -- 94 -- 114/82 --   20 0942 97.9 °F (36.6 °C) 98 18 115/88 100 %         Lab results were obtained and reviewed. Recent Results (from the past 12 hour(s))   CBC WITH AUTOMATED DIFF    Collection Time: 20  9:50 AM   Result Value Ref Range    WBC 6.1 4.1 - 11.1 K/uL    RBC 3.81 (L) 4.10 - 5.70 M/uL    HGB 11.6 (L) 12.1 - 17.0 g/dL    HCT 34.2 (L) 36.6 - 50.3 %    MCV 89.8 80.0 - 99.0 FL    MCH 30.4 26.0 - 34.0 PG    MCHC 33.9 30.0 - 36.5 g/dL    RDW 15.3 (H) 11.5 - 14.5 %    PLATELET 138 322 - 503 K/uL    MPV 11.0 8.9 - 12.9 FL    NRBC 0.0 0  WBC    ABSOLUTE NRBC 0.00 0.00 - 0.01 K/uL    NEUTROPHILS 41 32 - 75 %    LYMPHOCYTES 45 12 - 49 %    MONOCYTES 13 5 - 13 %    EOSINOPHILS 1 0 - 7 %    BASOPHILS 0 0 - 1 %    IMMATURE GRANULOCYTES 0 0.0 - 0.5 %    ABS. NEUTROPHILS 2.5 1.8 - 8.0 K/UL    ABS. LYMPHOCYTES 2.8 0.8 - 3.5 K/UL    ABS. MONOCYTES 0.8 0.0 - 1.0 K/UL    ABS. EOSINOPHILS 0.1 0.0 - 0.4 K/UL    ABS. BASOPHILS 0.0 0.0 - 0.1 K/UL    ABS. IMM.  GRANS. 0.0 0.00 - 0.04 K/UL    DF AUTOMATED     METABOLIC PANEL, COMPREHENSIVE    Collection Time: 20  9:50 AM   Result Value Ref Range    Sodium 142 136 - 145 mmol/L    Potassium 3.7 3.5 - 5.1 mmol/L    Chloride 106 97 - 108 mmol/L    CO2 26 21 - 32 mmol/L    Anion gap 10 5 - 15 mmol/L    Glucose 107 (H) 65 - 100 mg/dL    BUN 12 6 - 20 MG/DL    Creatinine 0.86 0.70 - 1.30 MG/DL    BUN/Creatinine ratio 14 12 - 20      GFR est AA >60 >60 ml/min/1.73m2    GFR est non-AA >60 >60 ml/min/1.73m2    Calcium 9.1 8.5 - 10.1 MG/DL    Bilirubin, total 0.3 0.2 - 1.0 MG/DL    ALT (SGPT) 12 12 - 78 U/L    AST (SGOT) 13 (L) 15 - 37 U/L    Alk. phosphatase 78 45 - 117 U/L    Protein, total 7.9 6.4 - 8.2 g/dL    Albumin 3.5 3.5 - 5.0 g/dL    Globulin 4.4 (H) 2.0 - 4.0 g/dL    A-G Ratio 0.8 (L) 1.1 - 2.2     TSH 3RD GENERATION    Collection Time: 01/30/20  9:50 AM   Result Value Ref Range    TSH 0.78 0.36 - 3.74 uIU/mL       Pre-medications  were administered as ordered and chemotherapy was initiated. Medications Administered     0.9% sodium chloride infusion     Admin Date  01/30/2020 Action  New Bag Dose  25 mL/hr Rate  25 mL/hr Route  IntraVENous Administered By  Regla Reyna RN          heparin (porcine) pf 300-500 Units     Admin Date  01/30/2020 Action  Given Dose  500 Units Route  InterCATHeter Administered By  Krystle Arias RN          pembrolizumab [facility] MED [facility]) 200 mg in 0.9% sodium chloride 100 mL, overfill volume 10 mL IVPB     Admin Date  01/30/2020 Action  New Bag Dose  200 mg Rate  236 mL/hr Route  IntraVENous Administered By  Regla Reyna RN          saline peripheral flush soln 10 mL     Admin Date  01/30/2020 Action  Given Dose  10 mL Route  InterCATHeter Administered By  Krystle Arias RN                    Mr. Tania Acevedo tolerated treatment well, port flushed and de accessed, patient was discharged from [place] [address] in stable condition at 1200.      Future Appointments   Date Time Provider Carmen Wynn   2/12/2020  9:30 AM John Hand MD [facility] [place] [address]   2/20/2020  9:00 AM 0 [facility] 1 [address] [place] [address]   2/20/2020  9:15 AM Hiram Clements NP [address]   3/12/2020  9:00 AM [facility] [place] [facility] NURSE 1 [address] [facility]         [place] RN  January 30, 2020

## 2020-01-30 NOTE — PROGRESS NOTES
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

## 2020-01-30 NOTE — TELEPHONE ENCOUNTER
LPN spoke with patient with gave states feelings of depression since Saturday or Sunday and realizes he needs help with this as his feels his prognosis may not be in his power to handle , patient currently at [facility] getting infusion and reports Ricarda Martinez informed him to call our office.

## 2020-01-30 NOTE — TELEPHONE ENCOUNTER
Patient calling stating he needs some help. States he has anxiety and depression, however since Jesus's death he feels like he is slipping in a hole he cannot climb out of. Advised I could have SW return his call very soon, however he did not want to hold so call transferred to nurse to speak to patient.

## 2020-01-30 NOTE — PROGRESS NOTES
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 Caleb 145 number 3 today. Pt has established care with palliative care, he is on   Oxycodone IR, and oxycontin ER, and lorazepam now. Pt complains of moderate amount amount of fatigue, eating small amounts of food. Constant Coughing but chest xray was negative. No ER or urgent care visits since we last saw pt. Pt is not taking megace but this nurse educated him to take it due to he complaints of no appetite. Pt takes breakthrough pain meds every 4 hours for pain of right shoulder and right side. 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

## 2020-01-30 NOTE — PROGRESS NOTES
[address] [facility]  [address], [address] [place] [address] Rik Luna, [address] [facility]  [phone]      Hematology / Oncology Established Visit    Reason for Visit:   Annamarie Mcburney is a 60 y.o. male who comes in for f/u of adenocarcinoma of unknown primary. Initially referred by Dr. Tanner Dorantes. Hematology Oncology Treatment History:     Diagnosis: Adenocarcinoma of unknown primary. 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 6/4/19 2R LN FNA and core biopsy: Adenocarcinoma. Comment: IHC stains negative for GATA3, AR, ER, p40. Immunohistochemistry shows that the tumor cells express CK7 with focal expression of CDX2. Tumor cells lack expression of CK20, TTF-1 and Napsin A. These findings are not entirely diagnostic and could be seen in either a primary pulmonary malignancy or a primary upper gastrointestinal tract malignancy. Other sites are also not excluded. Clinical and radiographic correlation is recommended. 6/25/19 2R LN, mediastinum: Metastatic adenocarcinoma (see Comment). Comment - The tumor appears poorly differentiated with areas of necrosis. Immunohistochemical staining reveals positive staining for cytokeratin 7 (strong, diffuse), CDX-2 (focal, weak to moderate) and CEA (focal, moderate to strong). The tumor cells are negative for cytokeratin 20, cytokeratin 5/6, p40, p63, TTF-1, napsin-A, PLAP, HCG, AFP and c-KIT (). The findings are not entirely specific with regard to primary site but would be consistent with upper GI/pancreaticobiliary tract.  A pulmonary primary is less likely, but still in the differential.   -PDL1 30%, [employer] [facility] identified high tumor mutational burden - which is predictive of higher response to immunotherapy agents. Prior Treatment: Completed 6 cycles of Carbo-Taxol on 12/02/2019. Current Treatment: Keytruda 200mg every 21 days, started 12/20/19    History of Present Illness:   Mr. Coco Pugh is a 60 y.o. male with COPD, remote h/o colon cancer comes in for evaluation of mediastinal lymphadenopathy. Pt had recently p/w shortness of breath, dyspnea on exertion, and was found on chest CT to have R mediastinal lymphadenopathy, which also were PET avid. Case was discussed at Thoracic Tumor Board with thought that this could be pancreatic, urothelial, or germ cell origin. More tissue is recommended. Pt had EGD 3 yrs ago and was told he had GERD. Patient has h/o colon cancer in 2002. He states a cancerous polyp was found and then he underwent colectomy in 2002. This was done by Dr. Jodie Rose at [facility]. Patient had colonoscopies regularly afterwards, last one was approx 2015. Last EGD was in 2018. As part of search for primary lesion, patient underwent EGD, colonoscopy by Dr. Betsey Gaspar, notable for diffusely enlarged gastric folds, but biopsies negative. Pt underwent cytoscopy on 7/17/19 with findings negative for malignancy. He received 6 cycles of carbo-taxol and experienced many side effects from the chemotherapy including severe sensory neuropathy in feet. Last CT showed progression of disease in the right adrenal glans. He has been receiving Keytruda as second line therapy. He suffers with poor appetite, depression. He is loosing weight. Pain in the right flank is manageable but present.        Past Medical History:   Diagnosis Date    Abnormal nuclear stress test 12/7/2015    Cancer [facility]     colon    Cardiomyopathy ([facility] [facility] 2010    EF 45%    Chronic obstructive pulmonary disease (HCC)     Chronic systolic heart failure (HCC)     GERD (gastroesophageal reflux disease)     HTN (hypertension)     PAC (premature atrial contraction)     Tobacco use disorder       Past Surgical History:   Procedure Laterality Date    COLONOSCOPY N/A 7/10/2019    COLONOSCOPY AND ESOPHAGOGASTRODUODENOSCOPY (EGD) performed by Mandie Ring MD at [address] HX COLECTOMY      HX SPLENECTOMY      IR INSERT TUNL CVC W PORT OVER 5 YEARS  7/19/2019      Social History     Tobacco Use    Smoking status: Former Smoker     Packs/day: 0.50     Types: Cigarettes     Last attempt to quit: 4/22/2016     Years since quitting: 3.7    Smokeless tobacco: Never Used   Substance Use Topics    Alcohol use: Not Currently     Comment: rarely      Family History   Problem Relation Age of Onset    Stroke Mother     Coronary Artery Disease Sister     Heart Disease Paternal Grandfather      Current Outpatient Medications   Medication Sig    dicyclomine (BENTYL) 10 mg capsule Take 10 mg by mouth two (2) times a day.  senna (SENNA) 8.6 mg tablet Take 1 Tab by mouth daily.  LORazepam (ATIVAN) 1 mg tablet Take 1 Tab by mouth two (2) times daily as needed for Anxiety (or insomnia). Max Daily Amount: 2 mg.  oxyCODONE IR (ROXICODONE) 10 mg tab immediate release tablet Take 1 Tab by mouth every six (6) hours as needed for Pain for up to 15 days. Max Daily Amount: 40 mg.    oxyCODONE ER (OXYCONTIN) 10 mg ER tablet Take 1 Tab by mouth every twelve (12) hours for 30 days. Max Daily Amount: 20 mg.  potassium chloride (K-DUR, KLOR-CON) 20 mEq tablet Take 1 Tab by mouth daily.  gabapentin (NEURONTIN) 300 mg capsule Take 1 Cap by mouth three (3) times daily. Max Daily Amount: 900 mg. Indications: Neuropathic Pain    ondansetron hcl (ZOFRAN) 4 mg tablet Take 2 Tabs by mouth every eight (8) hours as needed for Nausea.  lisinopril (PRINIVIL, ZESTRIL) 5 mg tablet TAKE 1 TABLET BY MOUTH DAILY    lidocaine-prilocaine (EMLA) topical cream Apply  to affected area as needed for Pain. Apply quarter size amount to port prior to chemotherapy    OTHER Take  by inhalation.  Alero Inhaler    metoprolol succinate (TOPROL-XL) 25 mg XL tablet Take 1 Tab by mouth nightly.  megestrol (MEGACE) 40 mg tablet Take 1 Tab by mouth two (2) times a day for 30 days.  senna-docusate (PERICOLACE) 8.6-50 mg per tablet Take 1 Tab by mouth nightly.  prochlorperazine (COMPAZINE) 10 mg tablet Take 1 Tab by mouth every six (6) hours as needed for Nausea.  naloxone (NARCAN) 4 mg/actuation nasal spray Use 1 spray intranasally, then discard. Repeat with new spray every 2 min as needed for opioid overdose symptoms, alternating nostrils.  dexAMETHasone (DECADRON) 4 mg tablet Take 4mg (1 tab) with breakfast on days 2 and 3 after chemotherapy to prevent nausea.  tamsulosin (FLOMAX) 0.4 mg capsule Take 0.4 mg by mouth daily. No current facility-administered medications for this visit. Facility-Administered Medications Ordered in Other Visits   Medication Dose Route Frequency    0.9% sodium chloride infusion  25 mL/hr IntraVENous CONTINUOUS    saline peripheral flush soln 10 mL  10 mL InterCATHeter PRN    heparin (porcine) pf 300-500 Units  300-500 Units InterCATHeter PRN      No Known Allergies     Review of Systems: A complete review of systems was obtained, negative except as described above. Physical Exam:     Visit Vitals  /88   Pulse 98   Temp 97.9 °F (36.6 °C) (Oral)   Resp 18   Ht 5' 11\" (1.803 m)   Wt 152 lb 5.4 oz (69.1 kg)   SpO2 100% Comment: RA   BMI 21.25 kg/m²     ECOG PS: 1  General:  thin, no acute distress,ambulating with a cane. Eyes: PERRLA, EOMI, anicteric sclerae  HENT: Atraumatic, OP clear, TMs intact without erythema  Neck: Supple  Lymphatic: No cervical, supraclavicular, axillary or inguinal adenopathy  Respiratory: CTAB, normal respiratory effort  CV: Normal rate, regular rhythm, no murmurs, no peripheral edema, port in place. GI: Soft, nontender, nondistended, no masses, no hepatomegaly, no splenomegaly  MS: Normal gait and station. Digits without clubbing or cyanosis. Skin: No rashes, ecchymoses, or petechiae.  Normal temperature, turgor, and texture. Neuro/Psych: Alert, oriented. 5/5 strength in all 4 extremities. Appropriate affect, normal judgment/insight. Results:     Lab Results   Component Value Date/Time    WBC 6.1 01/30/2020 09:50 AM    HGB 11.6 (L) 01/30/2020 09:50 AM    HCT 34.2 (L) 01/30/2020 09:50 AM    PLATELET 729 65/69/7218 09:50 AM    MCV 89.8 01/30/2020 09:50 AM    ABS. NEUTROPHILS 2.5 01/30/2020 09:50 AM     Lab Results   Component Value Date/Time    Sodium 142 01/30/2020 09:50 AM    Potassium 3.7 01/30/2020 09:50 AM    Chloride 106 01/30/2020 09:50 AM    CO2 26 01/30/2020 09:50 AM    Glucose 107 (H) 01/30/2020 09:50 AM    BUN 12 01/30/2020 09:50 AM    Creatinine 0.86 01/30/2020 09:50 AM    GFR est AA >60 01/30/2020 09:50 AM    GFR est non-AA >60 01/30/2020 09:50 AM    Calcium 9.1 01/30/2020 09:50 AM     Lab Results   Component Value Date/Time    Bilirubin, total 0.3 01/30/2020 09:50 AM    ALT (SGPT) 12 01/30/2020 09:50 AM    AST (SGOT) 13 (L) 01/30/2020 09:50 AM    Alk. phosphatase 78 01/30/2020 09:50 AM    Protein, total 7.9 01/30/2020 09:50 AM    Albumin 3.5 01/30/2020 09:50 AM    Globulin 4.4 (H) 01/30/2020 09:50 AM     Lab Results   Component Value Date/Time    Iron 52 10/07/2019 12:53 PM    TIBC 235 (L) 10/07/2019 12:53 PM    Iron % saturation 22 10/07/2019 12:53 PM    Ferritin 880 (H) 10/07/2019 12:53 PM       No results found for: B12LT, FOL, RBCF  Lab Results   Component Value Date/Time    TSH 0.78 01/30/2020 09:50 AM     No results found for: HAMAT, HAAB, HABT, HAAT, HBSAG, HBSB, HBSAT, HBABN, HBCM, HBCAB, HBCAT, XBCABS, [facility], [address], XHEPCS, [id number], [address], [facility], HBCLT, [address], [id number], [id number], [address] [place] [address], [id number], HBCMLT, [id number], HCGAT      Imaging:     Chest CT 5/10/19:  FINDINGS:  THYROID: No nodule. MEDIASTINUM: There are enlarged lymph nodes in the right paratracheal region  with 3 enlarged lymph nodes in this area.  The largest is inferiorly in the right  paratracheal region measuring 2.5 x 1.7 cm. There are also enlarged lymph nodes  in the medial and lateral AP window with the largest lymph node measuring 18 mm. These lymph nodes are worrisome for neoplastic process. There is a normal size  subcarinal lymph node. Candance Huger REBECCA: No mass or lymphadenopathy. THORACIC AORTA: No aneurysm. MAIN PULMONARY ARTERY: Normal in caliber. TRACHEA/BRONCHI: Patent. ESOPHAGUS: No wall thickening or dilatation. HEART: Normal in size. PLEURA: No effusion or pneumothorax. LUNGS: There are moderate changes of centrilobular emphysema. There are bullous  lesions at each apex left greater than right. There is a calcified granuloma in the left upper lobe.   There is volume loss in the medial segment right middle lobe that extends to a  oval-shaped opacity measures 1.8 x 1.1 cm this may all be atelectasis. Pulmonary  nodule within the atelectasis is not excluded. No abnormality corresponds with the rounded opacity seen on the chest  radiograph. Possibly that was a nipple shadow. .  Mild scarring is seen medially in the lingula. INCIDENTALLY IMAGED UPPER ABDOMEN: The spleen has been removed. No adrenal  masses. BONES: No destructive bone lesion. IMPRESSION:  1. There are are multiple enlarged lymph nodes in the right paratracheal region  as well as adenopathy and AP window region. These enlarged lymph nodes are  worrisome for neoplastic process. 2. There is volume loss in right middle lobe with a nodular area of opacity that  could be atelectasis but a superimposed nodule within the areas of atelectasis  is not excluded. Further evaluation is suggested. Tissue diagnosis is suggested. PET CT scan may yield more information. . 23X     5/28/19 PET:  FINDINGS:  HEAD/NECK: No apparent foci of abnormal hypermetabolism. Cerebral evaluation is  limited by normal intense activity. CHEST: A hypermetabolic right paratracheal lymph node SUV is 8.5. There are  hypermetabolic lymph nodes anterior to the main bronchi bilaterally. There is  centrilobular emphysema. There is volume loss in the right middle lobe but a  central obstructing mass is not identified on this PET scan. ABDOMEN/PELVIS: No foci of abnormal hypermetabolism. The prostate gland is  enlarged. SKELETON: No foci of abnormal hypermetabolism in the axial and visualized  appendicular skeleton. IMPRESSION: Hypermetabolic mediastinal lymph nodes concerning for neoplastic process. Chest/abd/pelvis CT 7/30/19:  FINDINGS:   THYROID: No nodule. MEDIASTINUM: Mediastinal lymphadenopathy has loosely decreased with upper  paratracheal node measuring 1.3 x 1.9 cm, previously 1.4 x 1.8 cm (2, 24), right  paratracheal node at the level of the aortic arch measuring 1.4 x 2.1 cm,  previously 1.7 x 2.6 cm of (2, 27) series, precarinal node on the right  measuring 1.7 x 2.2 cm, previously 1.7 x 2.5 cm (2, 31), and left pericarinal  noted measuring 1.2 x 1.8 cm, previously 1.8 x 2.1 cm of (2, 30). However, there  is an enlarged left preaortic node just lateral to left mainstem bronchus  measuring 1.3 x 2.1 cm which previously measured 8 x 8 mm (2, 33). REBECCA: No mass or lymphadenopathy. THORACIC AORTA: No dissection or aneurysm. MAIN PULMONARY ARTERY: Normal in caliber. TRACHEA/BRONCHI: Patent. ESOPHAGUS: No wall thickening or dilatation. HEART: Normal in size. PLEURA: No effusion or pneumothorax. LUNGS: Centrilobular bullous emphysematous change. Right middle lobe ovoid  entity most likely reflective of atelectasis adjacent to the right heart margin  appears unchanged. Left upper lobe granuloma. No acute infiltrate, nodule, or  mass. LIVER: No mass or biliary dilatation. GALLBLADDER: Unremarkable. SPLEEN: Surgically absent. PANCREAS: No mass or ductal dilatation. ADRENALS: Right adrenal mass not seen previously measures 2.0 x 3.0 cm (2, 62). Left adrenal appears unremarkable. KIDNEYS: No mass, calculus, or hydronephrosis. STOMACH: Unremarkable.   SMALL BOWEL: No dilatation or wall thickening. COLON: No dilatation or wall thickening. APPENDIX: Unremarkable. PERITONEUM: No ascites or pneumoperitoneum. RETROPERITONEUM: Right retroperitoneal adenopathy posterior to the inferior vena  cava at the level of the renal hilum measures 1.3 x 1.9 cm, not previously seen  (2, 69). REPRODUCTIVE ORGANS: The prostate and seminal vesicles appear unremarkable. URINARY BLADDER: No mass or calculus. BONES: No destructive bone lesion. ADDITIONAL COMMENTS: Right Port-A-Cath in place with catheter traversing to the  atriocaval junction region. IMPRESSION:  1. Mixed response to therapy with most mediastinal lymphadenopathy diminishing  in size, however, a left mediastinal lymph node has shown increased size and  there is a new right adrenal mass and new right retroperitoneal adenopathy  suspicious for aggressive metastatic disease. 2. Additional incidental findings as above including centrilobular bullous  emphysema with probable right middle lobe atelectasis. CT c/a/p 12/16/19: IMPRESSION:  1. No significant change in mildly enlarged mediastinal and upper abdominal  lymph nodes. 2. Large right adrenal mass may have increased slightly in the interval.  3. Emphysema. Calcified granuloma left upper lobe. No suspicious pulmonary  nodule. RECIST    TARGET LESIONS:        Lesion (description)         Location (series/slice)                Size   (cm)   1. Right inferior paratracheal node    3/25                               1.2 x   1.7 cm    2. AP window node                              3/26                                1.1 x 0.8 cm      (previously within size range for a target lesion)    3.  Right adrenal mass                           3/57                               4.5  x 2.6 cm   NONTARGET LESIONS: None    Procedures:     EGD 7/10/19:   Esophagus:normal  Stomach: diffuse enlargement gastric folds with nodular erythema and erosion throughout stomach  Duodenum/jejunum: normal    Colonoscopy 7/10/19: Diverticulosis    CT c/a/p 9/3/19: IMPRESSION:  1. Improvement in adenopathy in the chest. No acute findings or parenchymal abnormalities in the chest.  2. Interval increase in right adrenal mass. 3. Minimal decrease in size of right retroperitoneal node. CT c/a/p 12/16/19 (completion of carbo-taxol): THYROID: Unremarkable. MEDIASTINUM/REBECCA: Mildly enlarged precarinal lymph node 17 x 12 mm, unchanged. Nonenlarged AP window, hilar and subcarinal lymph nodes without significant  change. Right IJ Port-A-Cath with tip in the distal superior vena cava. HEART/PERICARDIUM: Unremarkable. LUNGS/PLEURA: Extensive emphysema. No consolidation, edema, or effusion. Calcified granuloma left upper lobe. No suspicious pulmonary nodule. Mild  scarring in the right middle lobe and inferior lingula. BONES: No destructive bone lesion. ADDITIONAL COMMENTS: N/A     LIVER: No mass or biliary dilatation. GALLBLADDER: Unremarkable. SPLEEN: Surgically absent. PANCREAS: No mass or ductal dilatation. ADRENALS: Normal left adrenal gland. Masslike enlargement right adrenal gland,  increased slightly in the interval previously up to 4.0 x 2.5 cm, currently up  to 4.5 x 2.6 cm. KIDNEYS: No mass, calculus, or hydronephrosis. GI TRACT: No bowel obstruction. Postsurgical anastomosis sigmoid colon. Moderate  amount of stool  PERITONEUM: No free air or free fluid. APPENDIX: Unremarkable. RETROPERITONEUM: No aortic aneurysm. LYMPH NODES: Slightly hyperdense right retrocaval lymph node at the level of the  adrenal gland measures 15 x 10 mm, unchanged. No new or enlarging abdominal  lymph nodes. ADDITIONAL COMMENTS: N/A.     URINARY BLADDER: No mass or calculus. LYMPH NODES: None enlarged. REPRODUCTIVE ORGANS: Unremarkable. FREE FLUID: None. BONES: No destructive bone lesion. Small sclerotic foci right femur probable  bone island  IMPRESSION:    1.  No significant change in mildly enlarged mediastinal and upper abdominal  lymph nodes. 2. Large right adrenal mass may have increased slightly in the interval.  3. Emphysema. Calcified granuloma left upper lobe. No suspicious pulmonary  nodule. RECIST    TARGET LESIONS:        Lesion (description)         Location (series/slice)                Size   (cm)   1. Right inferior paratracheal node    3/25                               1.2 x   1.7 cm    2. AP window node                              3/26                                1.1 x 0.8 cm      (previously within size range for a target lesion)    3. Right adrenal mass                           3/57                               4.5  x 2.6 cm     Assessment & Plan:   Karrie Griffin is a 60 y.o. male with COPD, remote h/o colon cancer comes in for evaluation and management of adenocarcinoma of unknown primary. 1. 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 Involving mediastinal LNs, with no other PET findings. Per Thoracic Tumor Board discussion, this could represent upper GI origin, urothelial origin, germ cell tumor, or lung origin. Search for primary lesion was overall negative: Cystoscopy, EGD, colonoscopy negative for malignancy although EGD abnormal with diffusely enlarged gastric folds. Despite h/o colon cancer in 2002, it would be very odd to see a recurrence in the mediastinal LNs. CEA 26.8. Other tumor markers negative (AFP, hCG, CA 19-9). Unfortunately, this disease is considered incurable, but is treatable. At this time, I recommend chemotherapy treatment using the Carboplatin-Paclitaxel regimen (which covers both lung and GI primaries). We discussed the risks and benefits of chemotherapy with Carboplatin and Paclitaxel.   Potential side effects include but are not limited to: nausea, vomiting, diarrhea, constipation, taste changes, allergic reactions, myelosuppression, infection, fatigue, alopecia, neuropathy, mucositis, renal failure, infertility, and rarely, death. Additionally, chemotherapy leads to radiosensitization which can intensify the side effects of radiation therapy. The patient has consented to beginning chemotherapy and chemo ed packet given. Completed 6 cycles of Carbo-Taxol. CT on 12/16/19 shows a mixed response to treatment no change in mediastinal adenopathy and increased size of right adrenal mass. Receiving Keytruda in 2nd line. Tolerating treatment very well  Denies any side effects. A detailed system by system evaluation of side effect was performed to assess chemotherapy related toxicity. Blood counts are acceptable. Results reviewed with the patient    We will re-image in the next two weeks. 2. Neoplasm pain:   -- Follow up with palliative  -- oxycontin 10 mg ER every 12 hours  -- oxycodone 10 mg [id number]  -- Senna-docusate (pericolace) daily to prevent constipation. 3. H/o Stage I [T1NxMx] sigmoid colon cancer: Found in sigmoid adenoma during a colonoscopy; went on to undergo segmented resection of the sigmoid colon in 2002. Pathology per outside records:  2/6/2002 sigmoid colon polyp: Well differentiated adenocarcinoma arising in an adenoma, involving the mucosa and invading into the upper half of the submucosal stalk. No vascular space involvement is identified. 2/21/2002 Sigmoidectomy, liver biopsy:  -Sigmoid colon, segmented resection: No residual adenocarcinoma present. Polypectomy site with granulation tissue and vascular congestion. No LNs recovered. Distal and proximal margins benign.  -Liver biopsy: Negative for metastatic carcinoma. No significant inflammation or obvious cirrhosis identified. Very minimal steatosis (rare cells with fat globules). -Addendum: Reblocks of adipose tissue; three small benign lymphoid aggregates (< 0.1cm). 4. COPD: Quit smoking in approx 2016. SOB improved after starting inhaler.     5. HTN: Well controlled on Lisinopril and Metoprolol. 6. BPH: On Flomax. 7. 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 Increased numbness in feet, tingling and pain in left hand. Left hand dominant. Increased Gabapentin 300mg BID on 10/16/19.   -- Continue gabapentin to 300 mg TID, #90 tabs with 2 refills e-scribed on 11/11/19.     8. Anemia from cancer/chronic disease    Hgb - 11.6 -observation    9. Severe protein calorie malnutrition    Dietary consult  Protein supplementation    10. Insomnia: Tried Ambien which did not work. Started on lorazepam 1 mg QHS which he reports helped him sleep better than the Ambien. Continue to monitor. 11.  Anxiety/Depression: Follows up with palliative      I saw the patient in conjunction with DENIS Anna      Signed by: Enrique Miranda MD                     January 30, 2020

## 2020-01-31 NOTE — TELEPHONE ENCOUNTER
[facility]  Social Work  Telephone Call      Note:    Called patient to follow up regarding yesterday's phone call. Informed patient that Dr. Gwendolynn Koyanagi is sending in a prescription for Zoloft 25 mg. Patient reported he's appreciative as the depression has really \"come out of nowhere\". Checked in with patient regarding safety. Patient denied feeling suicidal. Reported that his son will be with him all weekend. Patient was informed to take Zoloft once daily for 3 weeks and that at his next appt with Dr. Gwendolynn Koyanagi they will discuss the dose and whether it needs to be increased. Patient expressed appreciation at writer's call and reported that he would still like to meet writer at the next appt. Writer reported she would call patient next week to check in.         Length of Call: 15 minutes      Keyla Perry [place]   Palliative Medicine,   [phone]

## 2020-02-11 NOTE — TELEPHONE ENCOUNTER
Called patient to confirm appt for 2/12/20, at 9:30am at [facility]. Pt confirmed.
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
## 2020-02-12 NOTE — PROGRESS NOTES
[email or web address] [facility] Palliative Medicine Social Work [phone] Narrative Met with patient prior to patient's appointment with Dr. Mirna Fabian. Patient reported that the depressive episode he experienced really confused and scared him. \"The thoughts just came out of nowhere and wouldn't go away\". Explored this. Patient reported that he feels \"better\" and is not currently feeling helpless, hopeless and not experiencing SI. \"The medicine is helping\". 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 Talked with patient about trauma and its effects. Talked about grounding and how to ground oneself during a flashback. Patient expressed understanding. Worked on assessing current depressive sxs. Patient reported that he continues to have trouble staying asleep. Patient reported; \"the Ativan helps me fall asleep but then I just wake up and toss and turn\". Encouraged patient to talk to Dr. Mirna Fabian about this. Assessment Patient was alert and oriented x4. Patient's mood was euthymic and affect congruent. Patient's insight and judgment were good. Patient's thought process and speech were logical and clear. Patient reported he feels well supported by his many friends as well as son. Denied any current SI or plan. \"the medicine is helping with [negative] thoughts, it's like I can shake them off now\". Plan Will follow up with patient via phone on a regular basis. Patient is aware that supportive counseling is available to him if he decides to participate. Will continue to follow up with patient when he attends clinic appointments with Dr. Mirna Fabian. Marilin Christian, [facility] Palliative Medicine,  [phone]

## 2020-02-12 NOTE — PATIENT INSTRUCTIONS
Dear Fidencio Quispe , It was a pleasure seeing you today at [address] [facility] office We will see you again in 4 weeks If labs or imaging tests have been ordered for you today, please call the office  at [phone] 48 hours after completion to obtain the results. Your stated goal:  
-Better pain control 
-Better relationship with your providers thereby enhancing your experience with [facility] Your described symptoms were: Fatigue: 7 Drowsiness: 8 Depression: 10 Pain: 8 Anxiety: 9 Nausea: 8 Anorexia: 10 Dyspnea: 6 Best Well-Bein Constipation: No  
Other Problem (Comment): 0 This is the plan we talked about: 1. Right-sided chest wall pain and flank pain   
 
-You have been struggling with this pain for several months especially since the diagnosis of cancer. You have been taking OxyContin 10 mg 2 times a day and oxycodone 10 mg every 6 hours as needed up to 4 doses a day. - You have been hesitant to take Oxycontin along with Oxycodone and so only taking oxycontin prn instead of regular basis. We talked about the importance of taking it on a regular basis. - Stop Oxycontin, too expensive, start Morphine extended release 15 mg two times a day 2. Constipation 
-Constipation is a common side effect of pain medications as they slow your bowels. -Please continue senna 2 tabs two times a day. This is necessary to keep your bowels soft. - Add Miralax every other day as a laxative. - Goal is to have soft bowel movements every day or every other day. - Please call us if you do not have bowel movements for more than 3 days. 3. Insomnia - You are struggling with severe lack of sleep due to stress and anxiety - You take Ativan 1 mg at bedtime and this helps you fall asleep but you sleep only for about 2 hours. - Add Trazodone 50 mg at bedtime and let us see if this helps. 4.  Anorexia/ acid reflux 
-You are struggling with significant loss of desire for food.   You were offered Marinol which you do not want to try. You want to try something different. 
-Let us try Megace 40 mg 2 times a day to see if this helps with improving appetite. 
-I recommend that you try to eat small portions of food multiple times a day instead of big meals. Also try to chew on some lemon wedges prior to attempting to eat which can improve your taste. Focus on high-calorie foods such as nuts, protein drinks with milk powder, fruits, etc. 
 
5. Anxiety/ depression 
- Continue Zoloft 25 mg at bedtime 
-You are willing to meet with our  once a month and travel in between This is what you have shared with us about Advance Care Planning: 
 
  Primary Decision Maker: Francisco Rosalia Christen Larry - [phone] Secondary Decision Maker: Beth Mills - [phone] Supplemental (Other) Decision Maker: Yovani Gutierrez - [phone] Supplemental (Other) Decision Maker: Emelyn Han - [phone] Advance Care Planning 1/15/2020 Patient's Healthcare Decision Maker is: -  
Primary Decision Maker Name -  
Primary Decision Maker Phone Number -  
Primary Decision Maker Relationship to Patient - Secondary Decision Maker Name -  
Confirm Advance Directive None Patient Would Like to Complete Advance Directive No  
 
 
 
 
The Palliative Medicine Team is here to support you and your family.   
 
 
Sincerely, 
 
 
Nicko Samuels MD and the Palliative Medicine Team

## 2020-02-12 NOTE — PROGRESS NOTES
Palliative Medicine Office Visit Palliative Medicine Nurse Check In 
[phone] Patient Name: Daniela Rendon YOB: 1960 Date of Office Visit: 2/12/2020 Patient states: \"  \" 
 
From Check In Sheet (scanned in Media): 
Has a medical provider talked with you about cardiopulmonary resuscitation (CPR)? [x] Yes   [] No   [] Unable to obtain Nurse reminder to complete or update ACP FlowSheet: 
 
Is ACP on the Problem List?    [] Yes    [x] No 
IF ACP Document is ON FILE; Nurse to place ACP on Problem List  
 
Is there an ACP Note in Chart Review/Note? [] Yes    [x] No  
If NO: ALERT PROVIDER Advance Care Planning 1/15/2020 Patient's Healthcare Decision Maker is: -  
Primary Decision Maker Name -  
Primary Decision Maker Phone Number -  
Primary Decision Maker Relationship to Patient - Secondary Decision Maker Name -  
Confirm Advance Directive None Patient Would Like to Complete Advance Directive No  
 
 
Is there anything that we should know about you as a person in order to provide you the best care possible? Have you been to the ER, urgent care clinic since your last visit? [] Yes   [x] No   [] Unable to obtain Have you been hospitalized since your last visit? [] Yes   [x] No   [] Unable to obtain Have you seen or consulted any other health care providers outside of the [address] [place] [address] since your last visit? [] Yes   [x] No   [] Unable to obtain Functional status (describe):  
 
 
 
Last BM:2/11/2020  accessed (date): 2/12/2020 Bottle review (for opioid pain medication): 
Medication 1:  
Date filled:  
Directions:  
# filled: # left: # pills taking per day: 
Last dose taken: 
 
Medication 2:  
Date filled:  
Directions:  
# filled: # left: # pills taking per day: 
Last dose taken: 
 
Medication 3:  
Date filled:  
Directions:  
# filled: # left: # pills taking per day: 
Last dose taken: 
 
Medication 4:  
Date filled: Directions:  
# filled: # left: # pills taking per day: 
Last dose taken:

## 2020-02-17 NOTE — TELEPHONE ENCOUNTER
Palliative Medicine  Nursing Note  [phone]  Fax [phone]      Telephone Call  Patient Name: Iman Marr  YOB: 1960    2/17/2020        Primary Decision Maker: Deena Lima - Son - [phone]    Secondary Decision Maker: Lexie Fenton - [phone]    Supplemental (Other) Decision Maker: Alexandra Tarango - Child - [phone]    Supplemental (Other) Decision Maker: Cal Cr Child - [phone]   Advance Care Planning 1/15/2020   Patient's Healthcare Decision Maker is: -   Primary Decision Maker Name -   Primary Decision Maker Phone Number -   Primary Decision Maker Relationship to Patient -   Secondary Decision Maker Name -   Confirm Advance Directive None   Patient Would Like to Complete Advance Directive No       Spoke with patient this morning and he reported that he picked up the new pain/sleep medication (MS Contin 15 mg BID, and Trazodone 50 mg nightly). Patient reported that he is taking pain medication as prescribed, and is having good control - he reported pain this morning is 3-4/10 on pain scale in his shoulders, hands and feet. He is also taking Trazadone as prescribed and sleeping more than 6 hours per night (last night he went to bed 10:30 pm and got up at 6:30 am this morning). He did report a phone call in the middle of the night, but was able to go right back to sleep.     Information was shared with Dr. Rudy Hatfield RN  Palliative Medicine

## 2020-02-20 NOTE — PROGRESS NOTES
Chemotherapy education packet provided to the patient and reviewed. Consent was also obtained. All questions and concerns were addressed. Time spent with patient approximately 15 minutes.

## 2020-02-20 NOTE — PROGRESS NOTES
[facility]  Social Work Navigator Encounter     Patient Name:  Jeannette March    Medical History:     Advance Directives:    Narrative: Progression. Pt son lives with him, moved her from [facility]. Pt sees Palliative. Pt to change to gemcitabine and abraxane. No needs voiced.        Barriers to Care:     Assessment/Action:    Plan/Referral:   Transportation referral  Other referral

## 2020-02-20 NOTE — PROGRESS NOTES
Power Rios is a 60 y.o. male  Here for:  Chief Complaint   Patient presents with    Lung Cancer     gallbladder/ met. 1. Have you been to the ER, urgent care clinic since your last visit? Hospitalized since your last visit? No    2. Have you seen or consulted any other health care providers outside of the [address] [place] [address] since your last visit? Include any pap smears or colon screening.  No    C4d1 of Keytruda

## 2020-02-24 NOTE — PROGRESS NOTES
[facility]  [address], [address] [facility], [facility] Rik Luna, [address]  [phone]      Hematology / Oncology Established Visit    Reason for Visit:   Bard Amos is a 60 y.o. male who comes in for f/u of adenocarcinoma of unknown primary. Initially referred by Dr. Tino Gabriel. Hematology Oncology Treatment History:     Diagnosis: Adenocarcinoma of unknown primary. 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 6/4/19 2R LN FNA and core biopsy: Adenocarcinoma. Comment: IHC stains negative for GATA3, AR, ER, p40. Immunohistochemistry shows that the tumor cells express CK7 with focal expression of CDX2. Tumor cells lack expression of CK20, TTF-1 and Napsin A. These findings are not entirely diagnostic and could be seen in either a primary pulmonary malignancy or a primary upper gastrointestinal tract malignancy. Other sites are also not excluded. Clinical and radiographic correlation is recommended. 6/25/19 2R LN, mediastinum: Metastatic adenocarcinoma (see Comment). Comment - The tumor appears poorly differentiated with areas of necrosis. Immunohistochemical staining reveals positive staining for cytokeratin 7 (strong, diffuse), CDX-2 (focal, weak to moderate) and CEA (focal, moderate to strong). The tumor cells are negative for cytokeratin 20, cytokeratin 5/6, p40, p63, TTF-1, napsin-A, PLAP, HCG, AFP and c-KIT (). The findings are not entirely specific with regard to primary site but would be consistent with upper GI/pancreaticobiliary tract.  A pulmonary primary is less likely, but still in the differential.   -PDL1 30%, Foundation One identified high tumor mutational burden - which is predictive of higher response to immunotherapy agents. Prior Treatment: Completed 6 cycles of Carbo-Taxol on 12/02/2019. Current Treatment: Keytruda 200mg every 21 days, started 12/20/19, stopped 02/2020    History of Present Illness:   Mr. Jacob Sanchez is a 60 y.o. male with COPD, remote h/o colon cancer comes in for evaluation of mediastinal lymphadenopathy. Pt had recently p/w shortness of breath, dyspnea on exertion, and was found on chest CT to have R mediastinal lymphadenopathy, which also were PET avid. Case was discussed at Thoracic Tumor Board with thought that this could be pancreatic, urothelial, or germ cell origin. More tissue is recommended. Pt had EGD 3 yrs ago and was told he had GERD. Patient has h/o colon cancer in 2002. He states a cancerous polyp was found and then he underwent colectomy in 2002. This was done by Dr. Titi Moreau at [facility]. Patient had colonoscopies regularly afterwards, last one was approx 2015. Last EGD was in 2018. As part of search for primary lesion, patient underwent EGD, colonoscopy by Dr. Saji Louis, notable for diffusely enlarged gastric folds, but biopsies negative. Pt underwent cytoscopy on 7/17/19 with findings negative for malignancy. He received 6 cycles of carbo-taxol and experienced many side effects from the chemotherapy including severe sensory neuropathy in feet. Last CT showed progression of disease in the right adrenal glans. He has been receiving Keytruda as second line therapy. He suffers with poor appetite, depression. He is loosing weight. Pain in the right flank is manageable but present.        Past Medical History:   Diagnosis Date    Abnormal nuclear stress test 12/7/2015    [facility] [address]     colon    Cardiomyopathy ([facility] 2010    EF 45%    Chronic obstructive pulmonary disease (HCC)     Chronic systolic heart failure (HCC)     GERD (gastroesophageal reflux disease)     HTN (hypertension)     PAC (premature atrial contraction)     Tobacco use disorder       Past Surgical History:   Procedure Laterality Date    COLONOSCOPY N/A 7/10/2019    COLONOSCOPY AND ESOPHAGOGASTRODUODENOSCOPY (EGD) performed by Flash Irene MD at [facility] HX COLECTOMY      HX SPLENECTOMY      IR INSERT TUNL CVC W PORT OVER 5 YEARS  7/19/2019      Social History     Tobacco Use    Smoking status: Former Smoker     Packs/day: 0.50     Types: Cigarettes     Last attempt to quit: 4/22/2016     Years since quitting: 3.8    Smokeless tobacco: Never Used   Substance Use Topics    Alcohol use: Not Currently     Comment: rarely      Family History   Problem Relation Age of Onset    Stroke Mother     Coronary Artery Disease Sister     Heart Disease Paternal Grandfather      Current Outpatient Medications   Medication Sig    morphine CR (MS CONTIN) 15 mg CR tablet Take 1 Tab by mouth every twelve (12) hours for 30 days. Max Daily Amount: 30 mg.    oxyCODONE IR (ROXICODONE) 10 mg tab immediate release tablet Take 1 Tab by mouth every six (6) hours as needed for Pain for up to 30 days. Max Daily Amount: 40 mg.    naloxone (NARCAN) 4 mg/actuation nasal spray Use 1 spray intranasally, then discard. Repeat with new spray every 2 min as needed for opioid overdose symptoms, alternating nostrils. Indications: decrease in rate & depth of breathing due to opioid drug    gabapentin (NEURONTIN) 300 mg capsule Take 1 Cap by mouth three (3) times daily. Max Daily Amount: 900 mg.  LORazepam (ATIVAN) 1 mg tablet Take 1 Tab by mouth nightly for 30 days. Max Daily Amount: 1 mg.  traZODone (DESYREL) 50 mg tablet Take 1 Tab by mouth nightly.  sertraline (ZOLOFT) 25 mg tablet Take 1 Tab by mouth daily.  dicyclomine (BENTYL) 10 mg capsule Take 10 mg by mouth two (2) times a day.  senna (SENNA) 8.6 mg tablet Take 1 Tab by mouth daily.  LORazepam (ATIVAN) 1 mg tablet Take 1 Tab by mouth two (2) times daily as needed for Anxiety (or insomnia). Max Daily Amount: 2 mg.     senna-docusate (Jackie Calk) 8.6-50 mg per tablet Take 1 Tab by mouth nightly.  potassium chloride (K-DUR, KLOR-CON) 20 mEq tablet Take 1 Tab by mouth daily.  gabapentin (NEURONTIN) 300 mg capsule Take 1 Cap by mouth three (3) times daily. Max Daily Amount: 900 mg. Indications: Neuropathic Pain    ondansetron hcl (ZOFRAN) 4 mg tablet Take 2 Tabs by mouth every eight (8) hours as needed for Nausea.  prochlorperazine (COMPAZINE) 10 mg tablet Take 1 Tab by mouth every six (6) hours as needed for Nausea.  lisinopril (PRINIVIL, ZESTRIL) 5 mg tablet TAKE 1 TABLET BY MOUTH DAILY    naloxone (NARCAN) 4 mg/actuation nasal spray Use 1 spray intranasally, then discard. Repeat with new spray every 2 min as needed for opioid overdose symptoms, alternating nostrils.  dexAMETHasone (DECADRON) 4 mg tablet Take 4mg (1 tab) with breakfast on days 2 and 3 after chemotherapy to prevent nausea.  lidocaine-prilocaine (EMLA) topical cream Apply  to affected area as needed for Pain. Apply quarter size amount to port prior to chemotherapy    tamsulosin (FLOMAX) 0.4 mg capsule Take 0.4 mg by mouth daily.  OTHER Take  by inhalation. Alero Inhaler    metoprolol succinate (TOPROL-XL) 25 mg XL tablet Take 1 Tab by mouth nightly. No current facility-administered medications for this visit. No Known Allergies     Review of Systems: A complete review of systems was obtained, negative except as described above. Physical Exam:     Visit Vitals  BP 99/64 (BP 1 Location: Right arm, BP Patient Position: Sitting)   Pulse 73   Temp 97.4 °F (36.3 °C) (Oral)   Ht 5' 11\" (1.803 m)   Wt 151 lb (68.5 kg)   SpO2 94%   BMI 21.06 kg/m²     ECOG PS: 1  General:  thin, no acute distress,ambulating with a cane.    Eyes: PERRLA, EOMI, anicteric sclerae  HENT: Atraumatic, OP clear, TMs intact without erythema  Neck: Supple  Lymphatic: No cervical, supraclavicular, axillary or inguinal adenopathy  Respiratory: CTAB, normal respiratory effort  CV: Normal rate, regular rhythm, no murmurs, no peripheral edema, port in place. GI: Soft, nontender, nondistended, no masses, no hepatomegaly, no splenomegaly  MS: Normal gait and station. Digits without clubbing or cyanosis. Skin: No rashes, ecchymoses, or petechiae. Normal temperature, turgor, and texture. Neuro/Psych: Alert, oriented. 5/5 strength in all 4 extremities. Appropriate affect, normal judgment/insight. Results:     Lab Results   Component Value Date/Time    WBC 6.1 01/30/2020 09:50 AM    HGB 11.6 (L) 01/30/2020 09:50 AM    HCT 34.2 (L) 01/30/2020 09:50 AM    PLATELET 660 83/83/9965 09:50 AM    MCV 89.8 01/30/2020 09:50 AM    ABS. NEUTROPHILS 2.5 01/30/2020 09:50 AM     Lab Results   Component Value Date/Time    Sodium 142 01/30/2020 09:50 AM    Potassium 3.7 01/30/2020 09:50 AM    Chloride 106 01/30/2020 09:50 AM    CO2 26 01/30/2020 09:50 AM    Glucose 107 (H) 01/30/2020 09:50 AM    BUN 12 01/30/2020 09:50 AM    Creatinine 0.86 01/30/2020 09:50 AM    GFR est AA >60 01/30/2020 09:50 AM    GFR est non-AA >60 01/30/2020 09:50 AM    Calcium 9.1 01/30/2020 09:50 AM     Lab Results   Component Value Date/Time    Bilirubin, total 0.3 01/30/2020 09:50 AM    ALT (SGPT) 12 01/30/2020 09:50 AM    AST (SGOT) 13 (L) 01/30/2020 09:50 AM    Alk.  phosphatase 78 01/30/2020 09:50 AM    Protein, total 7.9 01/30/2020 09:50 AM    Albumin 3.5 01/30/2020 09:50 AM    Globulin 4.4 (H) 01/30/2020 09:50 AM     Lab Results   Component Value Date/Time    Iron 52 10/07/2019 12:53 PM    TIBC 235 (L) 10/07/2019 12:53 PM    Iron % saturation 22 10/07/2019 12:53 PM    Ferritin 880 (H) 10/07/2019 12:53 PM       No results found for: B12LT, FOL, RBCF  Lab Results   Component Value Date/Time    TSH 0.78 01/30/2020 09:50 AM     No results found for: HAMAT, HAAB, HABT, HAAT, HBSAG, HBSB, HBSAT, HBABN, HBCM, HBCAB, HBCAT, Yadira Len, [facility], HBEAG, XHEPCS, [id number], [address], [facility], [facility], [address], [id number], [id number], [facility], [id number], [facility], [id number], HCGAT      Imaging:     CT Results (most recent):  Results from Hospital Encounter encounter on 02/13/20   CT ABD PELV W CONT    Narrative INDICATION: Gallbladder cancer, secondary neoplasm of unspecified site,  metastatic adenocarcinoma carcinoma of unknown primary, history of colon cancer      COMPARISON STUDY: 12/16/2019  TECHNIQUE:  Following the uneventful intravenous administration of 100 cc  Isovue-370,  axial images were obtained through the chest, abdomen, and pelvis. Oral contrast was  administered. Coronal and sagittal reformatted images were  generated. CT dose reduction was achieved through use of a standardized protocol  tailored for this examination and automatic exposure control for dose  modulation. When the injection was completed, it was noted that there was  extravasation of a small amount of IV contrast material in the antecubital  fossa. There is no discoloration of the skin. Distal sensation and pulses were  intact. The patient was provided instructions for contrast extravasation. CT CHEST:  THYROID: Unremarkable. MEDIASTINUM/REBECCA: Right paratracheal node currently 2.6 x 2.2 cm (3-25) slight  increase in size of AP window nodes, without significant enlargement. The target  AP window lymph node is currently 1.2 x 0.8 cm from C3-26), previously 1.1 x 0.8  cm. A nontarget note that is now clearly seen is currently 1.6 x 0.8 cm (3-26),  previously not measurable. Unchanged small subcarinal and hilar nodes. Candance Huger HEART/PERICARDIUM: Normal size. Tip of infusion catheter in right atrium just  inferior to the atriocaval junction. .  VESSELS: No aneurysm or dissection. LUNGS: Severe centrilobular emphysema. Minimal lingular and right middle lobe  scarring. No nodules or masses. No acute airspace disease. Left upper lobe  calcified granuloma again noted. Candance Huger PLEURA: No effusion. CHEST WALL: No significant abnormality. CT ABDOMEN AND PELVIS:  LIVER: No mass or biliary dilatation.   GALLBLADDER: Unremarkable. SPLEEN: Surgically absent. PANCREAS: No mass or ductal dilatation. ADRENALS: Right adrenal mass currently 6.4 x 3.5 cm (3-58), previously 0.5 x 2.6  cm. Normal left adrenal gland. KIDNEYS: No mass, calculus, or hydronephrosis. STOMACH: Unremarkable. SMALL BOWEL: No dilatation or wall thickening. COLON: No dilatation or wall thickening. Surgical anastomosis in the sigmoid  colon. Fecal retention in the colon. APPENDIX: Nondistended. PERITONEUM: No ascites or pneumoperitoneum. RETROPERITONEUM: No aortic aneurysm. Unchanged 1.7 cm right common iliac artery  aneurysm. Atherosclerotic aorta. No para-aortic adenopathy. Right retrocaval  node at the level of the right adrenal gland is currently 1.7 x 1.3 cm (3-66),  previously 1.5 x 1.0 cm. REPRODUCTIVE ORGANS: Unchanged mild prostatic enlargement. URINARY BLADDER: Nondistended. No significant abnormality seen. PELVIC LYMPH NODES: None enlarged. BONES: No significant abnormalities. .  ADDITIONAL COMMENTS:  N/A      Impression IMPRESSION:    1. Increase in size of right paratracheal node and slight increase in size of AP  window nodes since 12/16/2019.  2. Severe emphysema. 3. Interval increase in size of right adrenal mass. 4. Slight increase in size of retrocaval node. RECIST  TARGET LESIONS:     LESION (description)         LOCATION (series/slice)                Size   (cm)      1. Right inferior paratracheal node    3/25                      2.6 x 2.2 cm. 2. AP window node                           3/26                      1.2 x 0.8  cm. (Previously within size range for a target lesion)     3. Right adrenal mass                       3/58                      6.4 x 3.5  cm            NONTARGET LESIONS: None         I personally reviewed the images. Increase in the size of the mediastinal nodes and right adrenal gland.         Assessment & Plan:   Kamille Willis is a 60 y.o. male with COPD, remote h/o colon cancer comes in for evaluation and management of adenocarcinoma of unknown primary. 1. 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 Involving mediastinal LNs, with no other PET findings. Per Thoracic Tumor Board discussion, this could represent upper GI origin, urothelial origin, germ cell tumor, or lung origin. Search for primary lesion was overall negative: Cystoscopy, EGD, colonoscopy negative for malignancy although EGD abnormal with diffusely enlarged gastric folds. Despite h/o colon cancer in 2002, it would be very odd to see a recurrence in the mediastinal LNs. CEA 26.8. Other tumor markers negative (AFP, hCG, CA 19-9). Unfortunately, this disease is considered incurable, but is treatable. At this time, I recommend chemotherapy treatment using the Carboplatin-Paclitaxel regimen (which covers both lung and GI primaries). We discussed the risks and benefits of chemotherapy with Carboplatin and Paclitaxel. Potential side effects include but are not limited to: nausea, vomiting, diarrhea, constipation, taste changes, allergic reactions, myelosuppression, infection, fatigue, alopecia, neuropathy, mucositis, renal failure, infertility, and rarely, death. Additionally, chemotherapy leads to radiosensitization which can intensify the side effects of radiation therapy. The patient has consented to beginning chemotherapy and chemo ed packet given. Completed 6 cycles of Carbo-Taxol. CT on 12/16/19 shows a mixed response to treatment no change in mediastinal adenopathy and increased size of right adrenal mass. Receiving Keytruda in 2nd line. CT shows disease progression in the mediastinal nodes and adrenal mass. Based on the gene expression profile revealing the likely primary to be bile duct, I recommended switching systemic therapy to Gemcitabine/Abraxane,     Gordon/Abraxane has been studied and found to be be efficacious for bile duct cancer.    I explained to the patient pros and cons,.side effects and benefits of the treatment and he is agreeable to it. 2. Neoplasm pain:   -- Follow up with palliative  -- oxycontin 10 mg ER every 12 hours  -- oxycodone 10 mg c6cvefs  -- Senna-docusate (pericolace) daily to prevent constipation. 3. H/o Stage I [T1NxMx] sigmoid colon cancer: Found in sigmoid adenoma during a colonoscopy; went on to undergo segmented resection of the sigmoid colon in 2002. Pathology per outside records:  2/6/2002 sigmoid colon polyp: Well differentiated adenocarcinoma arising in an adenoma, involving the mucosa and invading into the upper half of the submucosal stalk. No vascular space involvement is identified. 2/21/2002 Sigmoidectomy, liver biopsy:  -Sigmoid colon, segmented resection: No residual adenocarcinoma present. Polypectomy site with granulation tissue and vascular congestion. No LNs recovered. Distal and proximal margins benign.  -Liver biopsy: Negative for metastatic carcinoma. No significant inflammation or obvious cirrhosis identified. Very minimal steatosis (rare cells with fat globules). -Addendum: Reblocks of adipose tissue; three small benign lymphoid aggregates (< 0.1cm). 4. COPD: Quit smoking in approx 2016. SOB improved after starting inhaler. 5. HTN: Well controlled on Lisinopril and Metoprolol. 6. BPH: On Flomax. 7. 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 Increased numbness in feet, tingling and pain in left hand. Left hand dominant. Increased Gabapentin 300mg BID on 10/16/19.   -- Continue gabapentin to 300 mg TID, #90 tabs with 2 refills e-scribed on 11/11/19.     8. Anemia from cancer/chronic disease    Hgb - 11.6 -observation    9. Severe protein calorie malnutrition    Dietary consult  Protein supplementation    10. Insomnia: Tried Ambien which did not work. Started on lorazepam 1 mg QHS which he reports helped him sleep better than the Ambien. Continue to monitor. 11.  Anxiety/Depression: Follows up with palliative      Signed by: Amanda Johnson MD                     February 23, 2020

## 2020-03-02 NOTE — TELEPHONE ENCOUNTER
Patient calling stating that he needs a refill on his Zoloft called into [facility]. Advised nurse would call with any questions.

## 2020-03-05 NOTE — PROGRESS NOTES
[facility]  [address], [address] [facility] Rik Luna, [address]  [phone]      Hematology / Oncology Established Visit    Reason for Visit:   Martha Strange is a 60 y.o. male who comes in for f/u of adenocarcinoma of unknown primary. Initially referred by Dr. Janine Armstrong. Hematology Oncology Treatment History:     Diagnosis: Adenocarcinoma of unknown primary. 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 6/4/19 2R LN FNA and core biopsy: Adenocarcinoma. Comment: IHC stains negative for GATA3, AR, ER, p40. Immunohistochemistry shows that the tumor cells express CK7 with focal expression of CDX2. Tumor cells lack expression of CK20, TTF-1 and Napsin A. These findings are not entirely diagnostic and could be seen in either a primary pulmonary malignancy or a primary upper gastrointestinal tract malignancy. Other sites are also not excluded. Clinical and radiographic correlation is recommended. 6/25/19 2R LN, mediastinum: Metastatic adenocarcinoma (see Comment). Comment - The tumor appears poorly differentiated with areas of necrosis. Immunohistochemical staining reveals positive staining for cytokeratin 7 (strong, diffuse), CDX-2 (focal, weak to moderate) and CEA (focal, moderate to strong). The tumor cells are negative for cytokeratin 20, cytokeratin 5/6, p40, p63, TTF-1, napsin-A, PLAP, HCG, AFP and c-KIT (). The findings are not entirely specific with regard to primary site but would be consistent with upper GI/pancreaticobiliary tract.  A pulmonary primary is less likely, but still in the differential.   -PDL1 30%, [employer] [facility] identified high tumor mutational burden - which is predictive of higher response to immunotherapy agents. Prior Treatment:    Completed 6 cycles of Carbo-Taxol on 12/02/2019   Keytruda 200mg every 21 days, started 12/20/19, stopped 02/2020 - s/p 3 cycles    Current Treatment:    Gemcitabine/Abraxane - Cycle 1 Day 1      History of Present Illness:   Mr. Tiffanie Barth is a 60 y.o. male with COPD, remote h/o colon cancer comes in for evaluation of mediastinal lymphadenopathy. Pt had recently p/w shortness of breath, dyspnea on exertion, and was found on chest CT to have R mediastinal lymphadenopathy, which also were PET avid. Case was discussed at Thoracic Tumor Board with thought that this could be pancreatic, urothelial, or germ cell origin. More tissue is recommended. Pt had EGD 3 yrs ago and was told he had GERD. Patient has h/o colon cancer in 2002. He states a cancerous polyp was found and then he underwent colectomy in 2002. This was done by Dr. Herrera Lindsay at [facility]. Patient had colonoscopies regularly afterwards, last one was approx 2015. Last EGD was in 2018. As part of search for primary lesion, patient underwent EGD, colonoscopy by Dr. Liudmila Nicholson, notable for diffusely enlarged gastric folds, but biopsies negative. Pt underwent cytoscopy on 7/17/19 with findings negative for malignancy. He received 6 cycles of carbo-taxol and experienced many side effects from the chemotherapy including severe sensory neuropathy in feet. Last CT showed progression of disease in the right adrenal glans. He received Keytruda as second line therapy. Recent scans show disease progression. He is here today to start palliative chemotherapy with gem/abraxane.       Past Medical History:   Diagnosis Date    Abnormal nuclear stress test 12/7/2015    Cancer [facility]     colon    Cardiomyopathy ([facility] 2010    EF 45%    Chronic obstructive pulmonary disease (HCC)     Chronic systolic heart failure (HCC)     GERD (gastroesophageal reflux disease)     HTN (hypertension)     PAC (premature atrial contraction)     Tobacco use disorder       Past Surgical History:   Procedure Laterality Date    COLONOSCOPY N/A 7/10/2019    COLONOSCOPY AND ESOPHAGOGASTRODUODENOSCOPY (EGD) performed by Rasta Mcpherson MD at [facility] HX COLECTOMY      HX SPLENECTOMY      IR INSERT TUNL CVC W PORT OVER 5 YEARS  7/19/2019      Social History     Tobacco Use    Smoking status: Former Smoker     Packs/day: 0.50     Types: Cigarettes     Last attempt to quit: 4/22/2016     Years since quitting: 3.8    Smokeless tobacco: Never Used   Substance Use Topics    Alcohol use: Not Currently     Comment: rarely      Family History   Problem Relation Age of Onset    Stroke Mother     Coronary Artery Disease Sister     Heart Disease Paternal Grandfather      Current Outpatient Medications   Medication Sig    sertraline (ZOLOFT) 25 mg tablet TAKE 1 TABLET BY MOUTH DAILY    morphine CR (MS CONTIN) 15 mg CR tablet Take 1 Tab by mouth every twelve (12) hours for 30 days. Max Daily Amount: 30 mg.    oxyCODONE IR (ROXICODONE) 10 mg tab immediate release tablet Take 1 Tab by mouth every six (6) hours as needed for Pain for up to 30 days. Max Daily Amount: 40 mg.    naloxone (NARCAN) 4 mg/actuation nasal spray Use 1 spray intranasally, then discard. Repeat with new spray every 2 min as needed for opioid overdose symptoms, alternating nostrils. Indications: decrease in rate & depth of breathing due to opioid drug    gabapentin (NEURONTIN) 300 mg capsule Take 1 Cap by mouth three (3) times daily. Max Daily Amount: 900 mg.  LORazepam (ATIVAN) 1 mg tablet Take 1 Tab by mouth nightly for 30 days. Max Daily Amount: 1 mg.  traZODone (DESYREL) 50 mg tablet Take 1 Tab by mouth nightly.  dicyclomine (BENTYL) 10 mg capsule Take 10 mg by mouth two (2) times a day.  senna (SENNA) 8.6 mg tablet Take 1 Tab by mouth daily.  LORazepam (ATIVAN) 1 mg tablet Take 1 Tab by mouth two (2) times daily as needed for Anxiety (or insomnia).  Max Daily Amount: 2 mg.    senna-docusate (PERICOLACE) 8.6-50 mg per tablet Take 1 Tab by mouth nightly.  potassium chloride (K-DUR, KLOR-CON) 20 mEq tablet Take 1 Tab by mouth daily.  gabapentin (NEURONTIN) 300 mg capsule Take 1 Cap by mouth three (3) times daily. Max Daily Amount: 900 mg. Indications: Neuropathic Pain    ondansetron hcl (ZOFRAN) 4 mg tablet Take 2 Tabs by mouth every eight (8) hours as needed for Nausea.  prochlorperazine (COMPAZINE) 10 mg tablet Take 1 Tab by mouth every six (6) hours as needed for Nausea.  lisinopril (PRINIVIL, ZESTRIL) 5 mg tablet TAKE 1 TABLET BY MOUTH DAILY    naloxone (NARCAN) 4 mg/actuation nasal spray Use 1 spray intranasally, then discard. Repeat with new spray every 2 min as needed for opioid overdose symptoms, alternating nostrils.  dexAMETHasone (DECADRON) 4 mg tablet Take 4mg (1 tab) with breakfast on days 2 and 3 after chemotherapy to prevent nausea.  lidocaine-prilocaine (EMLA) topical cream Apply  to affected area as needed for Pain. Apply quarter size amount to port prior to chemotherapy    metoprolol succinate (TOPROL-XL) 25 mg XL tablet Take 1 Tab by mouth nightly.  tamsulosin (FLOMAX) 0.4 mg capsule Take 0.4 mg by mouth daily.  OTHER Take  by inhalation. Alero Inhaler     No current facility-administered medications for this visit. Facility-Administered Medications Ordered in Other Visits   Medication Dose Route Frequency    saline peripheral flush soln 10 mL  10 mL InterCATHeter PRN    heparin (porcine) pf 300-500 Units  300-500 Units InterCATHeter PRN    0.9% sodium chloride infusion  25 mL/hr IntraVENous CONTINUOUS      No Known Allergies     Review of Systems: A complete review of systems was obtained, negative except as described above.     Physical Exam:     Visit Vitals  /87   Pulse 83   Temp 98 °F (36.7 °C) (Oral)   Resp 18   Ht 5' 11\" (1.803 m)   Wt 150 lb (68 kg)   SpO2 99%   BMI 20.92 kg/m²          ECOG PS: 1  General:  thin, no acute distress,ambulating with a cane. Eyes: PERRLA, EOMI, anicteric sclerae  HENT: Atraumatic, OP clear, TMs intact without erythema  Neck: Supple  Lymphatic: No cervical, supraclavicular, axillary or inguinal adenopathy  Respiratory: CTAB, normal respiratory effort  CV: Normal rate, regular rhythm, no murmurs, no peripheral edema, port in place. GI: Soft, nontender, nondistended, no masses, no hepatomegaly, no splenomegaly  MS: Normal gait and station. Digits without clubbing or cyanosis. Skin: No rashes, ecchymoses, or petechiae. Normal temperature, turgor, and texture. Neuro/Psych: Alert, oriented. 5/5 strength in all 4 extremities. Appropriate affect, normal judgment/insight. Results:     Lab Results   Component Value Date/Time    WBC 7.6 03/05/2020 10:34 AM    HGB 11.1 (L) 03/05/2020 10:34 AM    HCT 33.2 (L) 03/05/2020 10:34 AM    PLATELET 880 25/46/1581 10:34 AM    MCV 86.5 03/05/2020 10:34 AM    ABS. NEUTROPHILS 4.8 03/05/2020 10:34 AM     Lab Results   Component Value Date/Time    Sodium 139 03/05/2020 10:34 AM    Potassium 3.9 03/05/2020 10:34 AM    Chloride 104 03/05/2020 10:34 AM    CO2 24 03/05/2020 10:34 AM    Glucose 185 (H) 03/05/2020 10:34 AM    BUN 11 03/05/2020 10:34 AM    Creatinine 0.97 03/05/2020 10:34 AM    GFR est AA >60 03/05/2020 10:34 AM    GFR est non-AA >60 03/05/2020 10:34 AM    Calcium 9.2 03/05/2020 10:34 AM     Lab Results   Component Value Date/Time    Bilirubin, total 0.3 03/05/2020 10:34 AM    ALT (SGPT) 20 03/05/2020 10:34 AM    AST (SGOT) 12 (L) 03/05/2020 10:34 AM    Alk.  phosphatase 79 03/05/2020 10:34 AM    Protein, total 7.4 03/05/2020 10:34 AM    Albumin 3.2 (L) 03/05/2020 10:34 AM    Globulin 4.2 (H) 03/05/2020 10:34 AM     Lab Results   Component Value Date/Time    Iron 52 10/07/2019 12:53 PM    TIBC 235 (L) 10/07/2019 12:53 PM    Iron % saturation 22 10/07/2019 12:53 PM    Ferritin 880 (H) 10/07/2019 12:53 PM       No results found for: B12LT, FOL, RBCF  Lab Results   Component Value Date/Time    TSH 0.78 01/30/2020 09:50 AM     No results found for: HAMAT, HAAB, HABT, HAAT, HBSAG, HBSB, HBSAT, HBABN, HBCM, HBCAB, HBCAT, Gearlean Reasons, HBEAB, HBEAG, XHEPCS, [id number], [facility], [facility], HBCLT, HBEBLT, [id number], [id number], [facility], [id number], HBCMLT, [id number], HCGAT      Imaging:     CT Results (most recent):  Results from Hospital Encounter encounter on 02/13/20   CT ABD PELV W CONT    Narrative INDICATION: Gallbladder cancer, secondary neoplasm of unspecified site,  metastatic adenocarcinoma carcinoma of unknown primary, history of colon cancer      COMPARISON STUDY: 12/16/2019  TECHNIQUE:  Following the uneventful intravenous administration of 100 cc  Isovue-370,  axial images were obtained through the chest, abdomen, and pelvis. Oral contrast was  administered. Coronal and sagittal reformatted images were  generated. CT dose reduction was achieved through use of a standardized protocol  tailored for this examination and automatic exposure control for dose  modulation. When the injection was completed, it was noted that there was  extravasation of a small amount of IV contrast material in the antecubital  fossa. There is no discoloration of the skin. Distal sensation and pulses were  intact. The patient was provided instructions for contrast extravasation. CT CHEST:  THYROID: Unremarkable. MEDIASTINUM/REBECCA: Right paratracheal node currently 2.6 x 2.2 cm (3-25) slight  increase in size of AP window nodes, without significant enlargement. The target  AP window lymph node is currently 1.2 x 0.8 cm from C3-26), previously 1.1 x 0.8  cm. A nontarget note that is now clearly seen is currently 1.6 x 0.8 cm (3-26),  previously not measurable. Unchanged small subcarinal and hilar nodes. Mabeline Sink HEART/PERICARDIUM: Normal size. Tip of infusion catheter in right atrium just  inferior to the atriocaval junction. .  VESSELS: No aneurysm or dissection. LUNGS: Severe centrilobular emphysema. Minimal lingular and right middle lobe  scarring. No nodules or masses. No acute airspace disease. Left upper lobe  calcified granuloma again noted. Rosales Fauzia PLEURA: No effusion. CHEST WALL: No significant abnormality. CT ABDOMEN AND PELVIS:  LIVER: No mass or biliary dilatation. GALLBLADDER: Unremarkable. SPLEEN: Surgically absent. PANCREAS: No mass or ductal dilatation. ADRENALS: Right adrenal mass currently 6.4 x 3.5 cm (3-58), previously 0.5 x 2.6  cm. Normal left adrenal gland. KIDNEYS: No mass, calculus, or hydronephrosis. STOMACH: Unremarkable. SMALL BOWEL: No dilatation or wall thickening. COLON: No dilatation or wall thickening. Surgical anastomosis in the sigmoid  colon. Fecal retention in the colon. APPENDIX: Nondistended. PERITONEUM: No ascites or pneumoperitoneum. RETROPERITONEUM: No aortic aneurysm. Unchanged 1.7 cm right common iliac artery  aneurysm. Atherosclerotic aorta. No para-aortic adenopathy. Right retrocaval  node at the level of the right adrenal gland is currently 1.7 x 1.3 cm (3-66),  previously 1.5 x 1.0 cm. REPRODUCTIVE ORGANS: Unchanged mild prostatic enlargement. URINARY BLADDER: Nondistended. No significant abnormality seen. PELVIC LYMPH NODES: None enlarged. BONES: No significant abnormalities. .  ADDITIONAL COMMENTS:  N/A      Impression IMPRESSION:    1. Increase in size of right paratracheal node and slight increase in size of AP  window nodes since 12/16/2019.  2. Severe emphysema. 3. Interval increase in size of right adrenal mass. 4. Slight increase in size of retrocaval node. RECIST  TARGET LESIONS:     LESION (description)         LOCATION (series/slice)                Size   (cm)      1. Right inferior paratracheal node    3/25                      2.6 x 2.2 cm. 2. AP window node                           3/26                      1.2 x 0.8  cm. (Previously within size range for a target lesion)     3.  Right adrenal mass 3/58                      6.4 x 3.5  cm            NONTARGET LESIONS: None         I personally reviewed the images. Increase in the size of the mediastinal nodes and right adrenal gland. Assessment & Plan:   Sarita Richard is a 60 y.o. male with COPD, remote h/o colon cancer comes in for evaluation and management of adenocarcinoma of unknown primary. 1. 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 Involving mediastinal LNs, with no other PET findings. Per Thoracic Tumor Board discussion, this could represent upper GI origin, urothelial origin, germ cell tumor, or lung origin. Search for primary lesion was overall negative: Cystoscopy, EGD, colonoscopy negative for malignancy although EGD abnormal with diffusely enlarged gastric folds. Despite h/o colon cancer in 2002, it would be very odd to see a recurrence in the mediastinal LNs. CEA 26.8. Other tumor markers negative (AFP, hCG, CA 19-9). Unfortunately, this disease is considered incurable, but is treatable. At this time, I recommend chemotherapy treatment using the Carboplatin-Paclitaxel regimen (which covers both lung and GI primaries). We discussed the risks and benefits of chemotherapy with Carboplatin and Paclitaxel. Potential side effects include but are not limited to: nausea, vomiting, diarrhea, constipation, taste changes, allergic reactions, myelosuppression, infection, fatigue, alopecia, neuropathy, mucositis, renal failure, infertility, and rarely, death. Additionally, chemotherapy leads to radiosensitization which can intensify the side effects of radiation therapy. The patient has consented to beginning chemotherapy and chemo ed packet given. Completed 6 cycles of Carbo-Taxol. CT on 12/16/19 shows a mixed response to treatment no change in mediastinal adenopathy and increased size of right adrenal mass. Receiving Keytruda in 2nd line.    CT shows disease progression in the mediastinal nodes and adrenal mass. Based on the gene expression profile revealing the likely primary to be bile duct, I recommended switching systemic therapy to Gemcitabine/Abraxane     Starting palliative chemotherapy   Gemcitabine/Abraxane - Cycle 1 Day 1    Reviewed the expected side effects of chemotherapy and ways to manage them. Blood counts acceptable. Results reviewed with patient. Symptom management form reviewed with patient. 2. Neoplasm pain:   -- Follow up with palliative  -- oxycontin 10 mg ER every 12 hours  -- oxycodone 10 mg [id number]  -- Senna-docusate (pericolace) daily to prevent constipation. 3. H/o Stage I [T1NxMx] sigmoid colon cancer: Found in sigmoid adenoma during a colonoscopy; went on to undergo segmented resection of the sigmoid colon in 2002. Pathology per outside records:  2/6/2002 sigmoid colon polyp: Well differentiated adenocarcinoma arising in an adenoma, involving the mucosa and invading into the upper half of the submucosal stalk. No vascular space involvement is identified. 2/21/2002 Sigmoidectomy, liver biopsy:  -Sigmoid colon, segmented resection: No residual adenocarcinoma present. Polypectomy site with granulation tissue and vascular congestion. No LNs recovered. Distal and proximal margins benign.  -Liver biopsy: Negative for metastatic carcinoma. No significant inflammation or obvious cirrhosis identified. Very minimal steatosis (rare cells with fat globules). -Addendum: Reblocks of adipose tissue; three small benign lymphoid aggregates (< 0.1cm). 4. COPD: Quit smoking in approx 2016. SOB improved after starting inhaler. 5. HTN: Well controlled on Lisinopril and Metoprolol. 6. BPH: On Flomax. 7. 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 Increased numbness in feet, tingling and pain in left hand. Left hand dominant. Increased Gabapentin 300mg BID on 10/16/19.   -- Continue gabapentin to 300 mg TID, #90 tabs with 2 refills e-scribed on 11/11/19. 8. Anemia from cancer/chronic disease    observation    9. Severe protein calorie malnutrition    Dietary consult  Protein supplementation    10. Insomnia: Tried Ambien which did not work. Started on lorazepam 1 mg QHS which he reports helped him sleep better than the Ambien. Continue to monitor. 11.  Anxiety/Depression: Follows up with palliative      Signed by: Sandra Copeland MD                     March 5, 2020

## 2020-03-05 NOTE — PROGRESS NOTES
Martha Strange is a 60 y.o. AA  male here for:  Chief Complaint   Patient presents with    Cancer     gallbladder/met    Chemotherapy   Cycle #1 [place]/Abrax    1. Have you been to the ER, urgent care clinic since your last visit? Hospitalized since your last visit? No    2. Have you seen or consulted any other health care providers outside of the [address] [place] [address] since your last visit? Include any pap smears or colon screening. No    *Pt see palliative med.

## 2020-03-05 NOTE — TELEPHONE ENCOUNTER
PER VINNY from Dr. Allie Solorio lidocaine-prilocaine (EMLA) cream apply to affected area as needed for pain dispense 30 refill 1

## 2020-03-05 NOTE — PROGRESS NOTES
[facility] Progress Note    Date: 2020    Name: Laverne Fitzgerald    MRN: [id number]         : 1960    Mr. Komal Kimball Arrived ambulatory and in no distress for cycle 1 day 1 of Abraxane/Gemzar regimen. Assessment was completed, no acute issues at this time, no new complaints voiced. Port accessed without difficulty, labs drawn and processed. Problem: Chemotherapy Treatment  Goal: *Chemotherapy regimen followed  Outcome: Progressing Towards Goal    Abraxane/Gemzar, C1D1 complete. Risk/signs/symptoms and possible reactions discussed with patient by MD and nursing. Patient voiced understanding. Goal: *Hemodynamically stable  Outcome: Progressing Towards Goal  Goal: *Tolerating diet  Outcome: Progressing Towards Goal     Problem: Knowledge Deficit  Goal: *Verbalizes understanding of procedures and medications  Outcome: Progressing Towards Goal    Chemotherapy Flowsheet 3/5/2020   Cycle C1D1   Date 3/5/2020   Drug / Regimen Abraxane/Gemzar   Pre Meds Given   Notes Given         Mr. Hines's vitals were reviewed. Patient Vitals for the past 12 hrs:   Temp Pulse Resp BP SpO2   20 1413 -- (!) 48 -- (!) 112/97 --   20 1023 98 °F (36.7 °C) 83 18 139/87 99 %         Lab results were obtained and reviewed. Recent Results (from the past 12 hour(s))   CBC WITH AUTOMATED DIFF    Collection Time: 20 10:34 AM   Result Value Ref Range    WBC 7.6 4.1 - 11.1 K/uL    RBC 3.84 (L) 4.10 - 5.70 M/uL    HGB 11.1 (L) 12.1 - 17.0 g/dL    HCT 33.2 (L) 36.6 - 50.3 %    MCV 86.5 80.0 - 99.0 FL    MCH 28.9 26.0 - 34.0 PG    MCHC 33.4 30.0 - 36.5 g/dL    RDW 15.9 (H) 11.5 - 14.5 %    PLATELET 797 637 - 596 K/uL    MPV 10.5 8.9 - 12.9 FL    NRBC 0.0 0  WBC    ABSOLUTE NRBC 0.00 0.00 - 0.01 K/uL    NEUTROPHILS 63 32 - 75 %    LYMPHOCYTES 26 12 - 49 %    MONOCYTES 10 5 - 13 %    EOSINOPHILS 1 0 - 7 %    BASOPHILS 0 0 - 1 %    IMMATURE GRANULOCYTES 0 0.0 - 0.5 %    ABS. NEUTROPHILS 4.8 1.8 - 8.0 K/UL    ABS. LYMPHOCYTES 2.0 0.8 - 3.5 K/UL    ABS. MONOCYTES 0.7 0.0 - 1.0 K/UL    ABS. EOSINOPHILS 0.0 0.0 - 0.4 K/UL    ABS. BASOPHILS 0.0 0.0 - 0.1 K/UL    ABS. IMM. GRANS. 0.0 0.00 - 0.04 K/UL    DF AUTOMATED     METABOLIC PANEL, COMPREHENSIVE    Collection Time: 03/05/20 10:34 AM   Result Value Ref Range    Sodium 139 136 - 145 mmol/L    Potassium 3.9 3.5 - 5.1 mmol/L    Chloride 104 97 - 108 mmol/L    CO2 24 21 - 32 mmol/L    Anion gap 11 5 - 15 mmol/L    Glucose 185 (H) 65 - 100 mg/dL    BUN 11 6 - 20 MG/DL    Creatinine 0.97 0.70 - 1.30 MG/DL    BUN/Creatinine ratio 11 (L) 12 - 20      GFR est AA >60 >60 ml/min/1.73m2    GFR est non-AA >60 >60 ml/min/1.73m2    Calcium 9.2 8.5 - 10.1 MG/DL    Bilirubin, total 0.3 0.2 - 1.0 MG/DL    ALT (SGPT) 20 12 - 78 U/L    AST (SGOT) 12 (L) 15 - 37 U/L    Alk. phosphatase 79 45 - 117 U/L    Protein, total 7.4 6.4 - 8.2 g/dL    Albumin 3.2 (L) 3.5 - 5.0 g/dL    Globulin 4.2 (H) 2.0 - 4.0 g/dL    A-G Ratio 0.8 (L) 1.1 - 2.2         Pre-medications  were administered as ordered and chemotherapy was initiated.   Medications Administered     0.9% sodium chloride infusion     Admin Date  03/05/2020 Action  New Bag Dose  25 mL/hr Rate  25 mL/hr Route  IntraVENous Administered By  Mario Araujo RN          dexamethasone (DECADRON) 4 mg/mL injection 10 mg     Admin Date  03/05/2020 Action  Given Dose  10 mg Route  IntraVENous Administered By  Mario Araujo RN          gemcitabine (GEMZAR) 1,850 mg in 0.9% sodium chloride 250 mL, overfill volume 25 mL chemo infusion     Admin Date  03/05/2020 Action  New Bag Dose  1850 mg Rate  647.3 mL/hr Route  IntraVENous Administered By  Martin Breslow, RN          ondansetron [facility]) injection 8 mg     Admin Date  03/05/2020 Action  Given Dose  8 mg Route  IntraVENous Administered By  Mario Araujo RN          PACLitaxel-protein bound (ABRAXANE) 231.5 mg in 0.9% sodium chloride 46.3 mL chemo infusion     Admin Date  03/05/2020 Action  New Bag Dose  231.5 mg Rate  92.6 mL/hr Route  IntraVENous Administered By  Alen Bennett, MIGUELITO                Mr. Jude Bragg tolerated treatment well, port flushed and de accessed, patient was discharged from [place] [address] in stable condition at [phone].      Future Appointments   Date Time Provider Carmen Wynn   3/11/2020  9:30 AM Austin Martinez MD [facility] 10.   3/12/2020 11:00 AM 0 [facility] 1 [address] COM   3/19/2020 11:00 AM [facility] [place] INFUSION NURSE 2 Ricky REDDY [address] [employer]   4/2/2020 11:00 AM [facility] [place] [facility] INFUSION NURSE 2 [address] [employer]   4/9/2020 11:00 AM [facility] INFUSION NURSE 1 [address] [employer]   4/16/2020 11:00 AM [facility] INFUSION NURSE [address] [place] [address]         Ata Christianson RN  March 5, 2020

## 2020-03-09 NOTE — TELEPHONE ENCOUNTER
Called patient to advise/confirm upcoming appt with Dr. Britany Wolfe on  3/11/20      at 9:30am at [facility]. No answer and not able to leave voicemail. Mailbox full. Also advised to please bring in your Drivers License and Insurance Card with you to appointment as well as any medication in the original container with you to appt.

## 2020-03-11 NOTE — PATIENT INSTRUCTIONS
Dear Ciara Lyman ,    It was a pleasure seeing you today at [address] office    We will see you again in 2 weeks    If labs or imaging tests have been ordered for you today, please call the office  at [phone] 48 hours after completion to obtain the results. Your stated goal:   -Better pain control  -Better relationship with your providers thereby enhancing your experience with [address] described symptoms were: Fatigue: 8 Drowsiness: 8   Depression: 8 Pain: 8   Anxiety: 8 Nausea: 8   Anorexia: 8 Dyspnea: 7   Best Well-Bein Constipation: Yes   Other Problem (Comment): 0       This is the plan we talked about:    1. Right-sided chest wall pain and flank pain      -Continue MS contin 15 mg two times a day and oxycodone 10 mg every 6 hours as needed    2. Severe chemotherapy induced neuropathy  - Increase Gabapentin 600 mg three times a day  - Come back in 2 weeks so we can re assess and add Amytrptyline as needed    3. Constipation  -Constipation is a common side effect of pain medications as they slow your bowels. -Continue senna 2 tabs two times a day. This is necessary to keep your bowels soft. - Add Miralax every other day as a laxative. - Goal is to have soft bowel movements every day or every other day. - Please call us if you do not have bowel movements for more than 3 days. 4. Insomnia  - You are struggling with severe lack of sleep due to stress and anxiety  - You take Ativan 1 mg at bedtime and this helps you fall asleep but you sleep only for about 2 hours.  -We added Trazodone and this is helping you. 5.  Anorexia/ acid reflux  -You are struggling with significant loss of desire for food. You were offered Marinol which you do not want to try. You want to try something different.  -Let us try Megace 40 mg 2 times a day to see if this helps with improving appetite.  -I recommend that you try to eat small portions of food multiple times a day instead of big meals.   Also try to chew on some lemon wedges prior to attempting to eat which can improve your taste. Focus on high-calorie foods such as nuts, protein drinks with milk powder, fruits, etc.    6. Anxiety/ depression  - Continue Zoloft 25 mg at bedtime  -You are willing to meet with our  once a month and travel in between      This is what you have shared with us about Advance Care Planning:      Primary Decision Maker: Herberth Carroll - Son - [phone]    Secondary Decision Maker: Alva Winchester - [phone]    Supplemental (Other) Decision Maker: Shayna Yap - Child - [phone]    Supplemental (Other) Decision Maker: Samantha Park Child - [phone]  Advance Care Planning 3/5/2020   Patient's Healthcare Decision Maker is: Verbal statement (Legal Next of Kin remains as decision maker)   Primary Decision Maker Name -   Primary Decision Maker Phone Number -   Primary Decision Maker Relationship to Patient -   Secondary Decision Maker Name -   Confirm Advance Directive None   Patient Would Like to Complete Advance Directive No           The Palliative Medicine Team is here to support you and your family.        Sincerely,      Lucas Medrano MD and the Palliative Medicine Team

## 2020-03-11 NOTE — TELEPHONE ENCOUNTER
Pt called and stated that he was suppose to get Xanax during his visit today and he does not see on his AVS where that was sent to the pharmacy. He also said that 2 of his medications were not called into the pharmacy either. . He would like a callback regarding this.

## 2020-03-11 NOTE — PROGRESS NOTES
Palliative Medicine Outpatient Services  [address]: [phone]    Patient Name: Rommel Baez  YOB: 1960    Date of Current Visit: 03/11/20  Location of Current Visit:    [] [facility] Office  [] [facility] Office  [x] [facility]  [] Home  [] Other:      Date of Initial Visit: 1/15/2020  Referral from: Dr. Roberto XAVIER  Primary Care Physician: Facundo Arango NP      SUMMARY:   Rommel Baez is a 60y.o. year old with a  history of COPD, colon cancer in 2002, metastatic adenocarcinoma of unknown primary, who was referred to Palliative Medicine by Dr. Roberto Rao for management of symptoms and psychosocial support. The patients social history includes served in the [employer] for 22 years, lives with his girlfriend now. Treatment history-patient had progression of disease on carbo plus Taxol, currently on Keytruda infusions. He struggles with neoplasm related pain       PALLIATIVE DIAGNOSES:       ICD-10-CM ICD-9-CM    1. Depression, unspecified depression type F32.9 311    2. Chemotherapy-induced peripheral neuropathy (HCC) G62.0 357.7     T45. 1X5A E933.1    3. Adenocarcinoma of unknown primary ([facility] [address] C80.1 199.1 gabapentin (NEURONTIN) 300 mg capsule      morphine CR (MS CONTIN) 15 mg CR tablet      oxyCODONE IR (ROXICODONE) 10 mg tab immediate release tablet   4. Acute neoplasm-related pain G89.3 338.3 gabapentin (NEURONTIN) 300 mg capsule      morphine CR (MS CONTIN) 15 mg CR tablet      oxyCODONE IR (ROXICODONE) 10 mg tab immediate release tablet   5. Psychophysiological insomnia F51.04 307.42    6. Psychosocial stressors Z65.8 V62.89    7. Anxiety F41.9 300.00 LORazepam (ATIVAN) 1 mg tablet          PLAN:   Patient Instructions     Dear Rommel Baez ,    It was a pleasure seeing you today at [facility] [address] [facility] office    We will see you again in 2 weeks    If labs or imaging tests have been ordered for you today, please call the office  at [phone] 48 hours after completion to obtain the results.       Your stated goal:   -Better pain control  -Better relationship with your providers thereby enhancing your experience with [address] described symptoms were: Fatigue: 8 Drowsiness: 8   Depression: 8 Pain: 8   Anxiety: 8 Nausea: 8   Anorexia: 8 Dyspnea: 7   Best Well-Bein Constipation: Yes   Other Problem (Comment): 0       This is the plan we talked about:    1. Right-sided chest wall pain and flank pain      -Continue MS contin 15 mg two times a day and oxycodone 10 mg every 6 hours as needed    2. Severe chemotherapy induced neuropathy  - Increase Gabapentin 600 mg three times a day  - Come back in 2 weeks so we can re assess and add Amytrptyline as needed    3. Constipation  -Constipation is a common side effect of pain medications as they slow your bowels. -Continue senna 2 tabs two times a day. This is necessary to keep your bowels soft. - Add Miralax every other day as a laxative. - Goal is to have soft bowel movements every day or every other day. - Please call us if you do not have bowel movements for more than 3 days. 4. Insomnia  - You are struggling with severe lack of sleep due to stress and anxiety  - You take Ativan 1 mg at bedtime and this helps you fall asleep but you sleep only for about 2 hours.  -We added Trazodone and this is helping you. 5.  Anorexia/ acid reflux  -You are struggling with significant loss of desire for food. You were offered Marinol which you do not want to try. You want to try something different.  -Let us try Megace 40 mg 2 times a day to see if this helps with improving appetite.  -I recommend that you try to eat small portions of food multiple times a day instead of big meals. Also try to chew on some lemon wedges prior to attempting to eat which can improve your taste.   Focus on high-calorie foods such as nuts, protein drinks with milk powder, fruits, etc.    6. Anxiety/ depression  - Continue Zoloft 25 mg at bedtime  -You are willing to meet with our  once a month and travel in between      This is what you have shared with us about Advance Care Planning:      Primary Decision Maker: Mango Juarez - [phone]    Secondary Decision Maker: Milo Zepeda - [phone]    Supplemental (Other) Decision Maker: Selina Roland - [phone]    Supplemental (Other) Decision Maker: Adelita Coon Child - [phone]  Advance Care Planning 3/5/2020   Patient's Healthcare Decision Maker is: Verbal statement (Legal Next of Kin remains as decision maker)   Primary Decision Maker Name -   Primary Decision Maker Phone Number -   Primary Decision Maker Relationship to Patient -   Secondary Decision Maker Name -   Confirm Advance Directive None   Patient Would Like to Complete Advance Directive No           The Palliative Medicine Team is here to support you and your family. Sincerely,      Daksha Pate MD and the Palliative Medicine Team       GOALS OF CARE / TREATMENT PREFERENCES:   [====Goals of Care====]  GOALS OF CARE:  Patient / health care proxy stated goals: See Patient Instructions / Summary    TREATMENT PREFERENCES:   Code Status:  [x] Attempt Resuscitation       [] Do Not Attempt Resuscitation    Advance Care Planning:  [x] The [facility] Interdisciplinary Team has updated the ACP Navigator with Decision Maker and Patient Capacity      Primary Decision Maker: Mango Juarez - [phone]    Secondary Decision Maker:  Milo Zepeda - [phone]    Supplemental (Other) Decision Maker: Selina Roland - [phone]    Supplemental (Other) Decision Maker: Adelita Roland - [phone]  Advance Care Planning 3/5/2020   Patient's Healthcare Decision Maker is: Verbal statement (Legal Next of Kin remains as decision maker)   Primary Decision Maker Name -   Primary Decision Maker Phone Number -   Primary Decision Maker Relationship to Patient -   Secondary Decision Maker Name - Confirm Advance Directive None   Patient Would Like to Complete Advance Directive No       Other:  (If patient appropriate for POST, consider using PALLPOST smart phrase here)    The palliative care team has discussed with patient / health care proxy about goals of care / treatment preferences for patient.  [====Goals of Care====]     PRESCRIPTIONS GIVEN:     Medications Ordered Today   Medications    gabapentin (NEURONTIN) 300 mg capsule     Sig: Take 2 Caps by mouth three (3) times daily. Max Daily Amount: 1,800 mg. Dispense:  180 Cap     Refill:  3    morphine CR (MS CONTIN) 15 mg CR tablet     Sig: Take 1 Tab by mouth every twelve (12) hours for 30 days. Max Daily Amount: 30 mg. Dispense:  60 Tab     Refill:  0    LORazepam (ATIVAN) 1 mg tablet     Sig: Take 1 Tab by mouth nightly for 30 days. Max Daily Amount: 1 mg. Dispense:  30 Tab     Refill:  3    oxyCODONE IR (ROXICODONE) 10 mg tab immediate release tablet     Sig: Take 1 Tab by mouth every six (6) hours as needed for Pain for up to 30 days. Max Daily Amount: 40 mg. Dispense:  120 Tab     Refill:  0    metoprolol succinate (TOPROL-XL) 25 mg XL tablet     Sig: Take 1 Tab by mouth nightly.      Dispense:  30 Tab     Refill:  5           FOLLOW UP:     Future Appointments   Date Time Provider Carmen Wynn   3/12/2020 11:00  [facility] [address]   3/19/2020 11:00  [address] INFUSION NURSE [address]   3/25/2020  9:30 AM Shaun Vieyra MD [facility] ELIAZAR SCHED   4/2/2020 11:00 AM Nick Lockett MD [address] [place]   4/2/2020 11:00  [address] INFUSION NURSE 2 [address] [place]   4/9/2020 11:00  [address] INFUSION NURSE 1 Ricky Dhillon Yozio   4/16/2020 11:00  [address] INFUSION NURSE 2 Ricky Dhillon [employer]. NXT-ID           PHYSICIANS INVOLVED IN CARE:   Patient Care Team:  Armando Levine NP as PCP - General (Nurse Practitioner)  Patricia Hernandez MD as Physician (Cardiology)  Yan Mclean MD (Pulmonary Disease)  Sheree Burr MD (Gastroenterology)  Seamus Alston MD (Hematology and Oncology)       HISTORY:   Reviewed patient-completed ESAS and advance care planning form. Reviewed patient record in prescription monitoring program.    CHIEF COMPLAINT: No chief complaint on file. HPI/SUBJECTIVE:    The patient is: [x] Verbal / [] Nonverbal     Patient here along with his son. He is complaining of worsening bilateral leg neuropathy to the point that he has not been very able to wear his shoes. He was able to wear his sneakers today but still feels very unstable on his feet and severe numbness. The chest wall pain and flank pain has improved and he has been able to tolerate MS Contin and oxycodone. There are days he takes only 3 oxycodone but there are days he needs more. Rosales Cage ------------  Initial visit    Patient is here alone. He starts off by talking about how his trust in the medical system has been broken especially with patient to physician communication. He lists all his concerns about his care over the last 6 months but is willing to be redirected and start with a clean [place] with our team.  His main complaint is right-sided flank pain for which he has been taking OxyContin and oxycodone. He has been chewing the OxyContin. He also struggles with lack of appetite, no desire to eat. He had significant constipation initially but now he is on bowel regimen. He is willing to work with  about his anxiety.       Clinical Pain Assessment (nonverbal scale for nonverbal patients):   [++++ Clinical Pain Assessment++++]  [++++Pain Severity++++]: Pain: 8  [++++Pain Character++++]: deep, gnawing  [++++Pain Duration++++]: months  [++++Pain Effect++++]: functional  [++++Pain Factors++++]: none in particular  [++++Pain Frequency++++]: on and off  [++++Pain Location++++]: right flank  [++++ Clinical Pain Assessment++++]       FUNCTIONAL ASSESSMENT:     Palliative Performance Scale (PPS):  PPS: 70       PSYCHOSOCIAL/SPIRITUAL SCREENING:     Any spiritual / Uatsdin concerns:  [] Yes /  [x] No    Caregiver Burnout:  [] Yes /  [x] No /  [] No Caregiver Present      Anticipatory grief assessment:   [x] Normal  / [] Maladaptive       ESAS Anxiety: Anxiety: 8    ESAS Depression: Depression: 8       REVIEW OF SYSTEMS:     The following systems were [x] reviewed / [] unable to be reviewed  Systems: constitutional, ears/nose/mouth/throat, respiratory, gastrointestinal, genitourinary, musculoskeletal, integumentary, neurologic, psychiatric, endocrine. Positive findings noted below. Modified ESAS Completed by: provider   Fatigue: 8 Drowsiness: 8   Depression: 8 Pain: 8   Anxiety: 8 Nausea: 8   Anorexia: 8 Dyspnea: 7   Best Well-Bein Constipation: Yes   Other Problem (Comment): 0          PHYSICAL EXAM:     Wt Readings from Last 3 Encounters:   20 152 lb 9.6 oz (69.2 kg)   20 150 lb 14.4 oz (68.4 kg)   20 150 lb (68 kg)     Blood pressure 110/79, pulse 84, temperature 98.2 °F (36.8 °C), temperature source Oral, resp. rate 18, height 5' 11\" (1.803 m), weight 152 lb 9.6 oz (69.2 kg), SpO2 96 %.   Last bowel movement: See Nursing Note    Constitutional: Alert, oriented  Eyes: pupils equal, anicteric  ENMT: no nasal discharge, moist mucous membranes  Cardiovascular: regular rhythm, distal pulses intact  Respiratory: breathing not labored, symmetric  Gastrointestinal: soft non-tender, +bowel sounds  Musculoskeletal: no deformity, no tenderness to palpation  Skin: warm, dry  Neurologic: following commands, moving all extremities  Psychiatric: full affect, no hallucinations  Other:       HISTORY:     Past Medical History:   Diagnosis Date    Abnormal nuclear stress test 2015    Cancer ([facility] [address]     colon    Cardiomyopathy ([facility] [address] 2010    EF 45%    Chronic obstructive pulmonary disease (HCC)     Chronic systolic heart failure (HCC)     GERD (gastroesophageal reflux disease)     HTN (hypertension)     PAC (premature atrial contraction)     Tobacco use disorder       Past Surgical History:   Procedure Laterality Date    COLONOSCOPY N/A 7/10/2019    COLONOSCOPY AND ESOPHAGOGASTRODUODENOSCOPY (EGD) performed by Mignon Cruz MD at [facility] HX COLECTOMY      HX SPLENECTOMY      IR INSERT TUNL CVC W PORT OVER 5 YEARS  7/19/2019      Family History   Problem Relation Age of Onset    Stroke Mother     Coronary Artery Disease Sister     Heart Disease Paternal Grandfather       History reviewed, no pertinent family history. Social History     Tobacco Use    Smoking status: Former Smoker     Packs/day: 0.50     Types: Cigarettes     Last attempt to quit: 4/22/2016     Years since quitting: 3.8    Smokeless tobacco: Never Used   Substance Use Topics    Alcohol use: Not Currently     Comment: rarely     No Known Allergies   Current Outpatient Medications   Medication Sig    gabapentin (NEURONTIN) 300 mg capsule Take 2 Caps by mouth three (3) times daily. Max Daily Amount: 1,800 mg.  morphine CR (MS CONTIN) 15 mg CR tablet Take 1 Tab by mouth every twelve (12) hours for 30 days. Max Daily Amount: 30 mg.  LORazepam (ATIVAN) 1 mg tablet Take 1 Tab by mouth nightly for 30 days. Max Daily Amount: 1 mg.  oxyCODONE IR (ROXICODONE) 10 mg tab immediate release tablet Take 1 Tab by mouth every six (6) hours as needed for Pain for up to 30 days. Max Daily Amount: 40 mg.    metoprolol succinate (TOPROL-XL) 25 mg XL tablet Take 1 Tab by mouth nightly.  megestroL (MEGACE) 40 mg tablet TAKE 1 TABLET BY MOUTH TWICE DAILY    lidocaine-prilocaine (EMLA) topical cream APPLY QUARTER SIZE AMOUNT TO PORT PRIOR TO CHEMOTHERAPY    sertraline (ZOLOFT) 25 mg tablet TAKE 1 TABLET BY MOUTH DAILY    naloxone (NARCAN) 4 mg/actuation nasal spray Use 1 spray intranasally, then discard. Repeat with new spray every 2 min as needed for opioid overdose symptoms, alternating nostrils.   Indications: decrease in rate & depth of breathing due to opioid drug    traZODone (DESYREL) 50 mg tablet Take 1 Tab by mouth nightly.  senna (SENNA) 8.6 mg tablet Take 1 Tab by mouth daily.  senna-docusate (PERICOLACE) 8.6-50 mg per tablet Take 1 Tab by mouth nightly.  potassium chloride (K-DUR, KLOR-CON) 20 mEq tablet Take 1 Tab by mouth daily.  gabapentin (NEURONTIN) 300 mg capsule Take 1 Cap by mouth three (3) times daily. Max Daily Amount: 900 mg. Indications: Neuropathic Pain    ondansetron hcl (ZOFRAN) 4 mg tablet Take 2 Tabs by mouth every eight (8) hours as needed for Nausea.  prochlorperazine (COMPAZINE) 10 mg tablet Take 1 Tab by mouth every six (6) hours as needed for Nausea.  lisinopril (PRINIVIL, ZESTRIL) 5 mg tablet TAKE 1 TABLET BY MOUTH DAILY    naloxone (NARCAN) 4 mg/actuation nasal spray Use 1 spray intranasally, then discard. Repeat with new spray every 2 min as needed for opioid overdose symptoms, alternating nostrils.  tamsulosin (FLOMAX) 0.4 mg capsule Take 0.4 mg by mouth daily.  OTHER Take  by inhalation. Alero Inhaler    dicyclomine (BENTYL) 10 mg capsule Take 10 mg by mouth two (2) times a day.  LORazepam (ATIVAN) 1 mg tablet Take 1 Tab by mouth two (2) times daily as needed for Anxiety (or insomnia). Max Daily Amount: 2 mg. No current facility-administered medications for this visit. LAB DATA REVIEWED:     Lab Results   Component Value Date/Time    WBC 7.6 03/05/2020 10:34 AM    HGB 11.1 (L) 03/05/2020 10:34 AM    PLATELET 332 70/51/0745 10:34 AM     Lab Results   Component Value Date/Time    Sodium 139 03/05/2020 10:34 AM    Potassium 3.9 03/05/2020 10:34 AM    Chloride 104 03/05/2020 10:34 AM    CO2 24 03/05/2020 10:34 AM    BUN 11 03/05/2020 10:34 AM    Creatinine 0.97 03/05/2020 10:34 AM    Calcium 9.2 03/05/2020 10:34 AM      Lab Results   Component Value Date/Time    AST (SGOT) 12 (L) 03/05/2020 10:34 AM    Alk.  phosphatase 79 03/05/2020 10:34 AM Protein, total 7.4 03/05/2020 10:34 AM    Albumin 3.2 (L) 03/05/2020 10:34 AM    Globulin 4.2 (H) 03/05/2020 10:34 AM     Lab Results   Component Value Date/Time    INR 1.0 11/25/2015 10:22 AM    Prothrombin time 10.6 11/25/2015 10:22 AM      Lab Results   Component Value Date/Time    Iron 52 10/07/2019 12:53 PM    TIBC 235 (L) 10/07/2019 12:53 PM    Iron % saturation 22 10/07/2019 12:53 PM    Ferritin 880 (H) 10/07/2019 12:53 PM           CONTROLLED SUBSTANCES SAFETY ASSESSMENT (IF ON CONTROLLED SUBSTANCES):     Reviewed opioid safety handout:  [] Yes   [] No  24 hour opioid dose >150mg morphine equivalent/day:  [] Yes   [] No  Benzodiazepines:  [] Yes   [] No  Sleep apnea:  [] Yes   [] No  Urine Toxicology Testing within last 6 months:  [] Yes   [] No  History of or new aberrant medication taking behaviors:  [] Yes   [] No  Has Narcan been prescribed [] Yes   [] No          Total time: 70 minutes  Counseling / coordination time: 60 minutes  > 50% counseling / coordination?:

## 2020-03-11 NOTE — TELEPHONE ENCOUNTER
Returned call to patient who informs that he was to get another medication to help with neuropathy in feet, advised per Dr Benjy Ba our office increased Gabapentin this week and will evaluate in two weeks if amitriptyline ill need to be added , patient verbalized understanding .

## 2020-03-11 NOTE — PROGRESS NOTES
Palliative Medicine Office Visit  Palliative Medicine Nurse Check In  [phone]    Patient Name: Sully Griffiths  YOB: 1960      Date of Office Visit: 3/11/2020    Patient states: \"  \"    From Check In Sheet (scanned in Media):  Has a medical provider talked with you about cardiopulmonary resuscitation (CPR)? [x] Yes   [] No   [] Unable to obtain    Nurse reminder to complete or update ACP FlowSheet:    Is ACP on the Problem List?    [] Yes    [x] No  IF ACP Document is ON FILE; Nurse to place ACP on Problem List     Is there an ACP Note in Chart Review/Note? [] Yes    [x] No   If NO: [address] Planning 3/5/2020   Patient's Healthcare Decision Maker is: Verbal statement (Legal Next of Kin remains as decision maker)   Primary Decision Maker Name -   Primary Decision Maker Phone Number -   Primary Decision Maker Relationship to Patient -   Secondary Decision Maker Name -   Confirm Advance Directive None   Patient Would Like to Complete Advance Directive No       Is there anything that we should know about you as a person in order to provide you the best care possible? Have you been to the ER, urgent care clinic since your last visit? [] Yes   [x] No   [] Unable to obtain    Have you been hospitalized since your last visit? [] Yes   [x] No   [] Unable to obtain    Have you seen or consulted any other health care providers outside of the [address] [place] [address] since your last visit?    [] Yes   [x] No   [] Unable to obtain    Functional status (describe):         Last BM: 3/10/2020     accessed (date): 3/11/2020    Bottle review (for opioid pain medication):  Medication 1:   Date filled:   Directions:   # filled:   # left:   # pills taking per day:  Last dose taken:    Medication 2:   Date filled:   Directions:   # filled:   # left:   # pills taking per day:  Last dose taken:    Medication 3:   Date filled:   Directions:   # filled:   # left:   # pills taking per day:  Last dose taken:    Medication 4:   Date filled:   Directions:   # filled:   # left:   # pills taking per day:  Last dose taken:

## 2020-03-12 NOTE — PROGRESS NOTES
[facility] Progress Note    Date: 2020    Name: Babak Collier    MRN: [id number]         : 1960    Mr. Gavin Doyle Arrived ambulatory and in no distress for cycle 1 day 8 of Abraxane/Gemzar regimen. Assessment was completed, patient reports feeling good today, voiced a couple days of nausea and weakness about two days after day 1 treatment. Port accessed without difficulty, labs drawn and processed. Problem: Chemotherapy Treatment  Goal: *Chemotherapy regimen followed  Outcome: Progressing Towards Goal  Goal: *Hemodynamically stable  Outcome: Progressing Towards Goal  Goal: *Tolerating diet  Outcome: Progressing Towards Goal     Problem: Knowledge Deficit  Goal: *Verbalizes understanding of procedures and medications  Outcome: Progressing Towards Goal       Chemotherapy Flowsheet 3/12/2020   Cycle C1D8   Date 3/12/2020   Drug / Regimen Abraxane/Gemxar   Dosage `   Pre Meds Given   Notes Given         Mr. Hines's vitals were reviewed. Patient Vitals for the past 12 hrs:   Temp Pulse Resp BP SpO2   20 1440 -- 69 -- 108/73 --   20 1118 98.5 °F (36.9 °C) 70 18 106/83 100 %         Lab results were obtained and reviewed. Recent Results (from the past 12 hour(s))   CBC WITH AUTOMATED DIFF    Collection Time: 20 11:26 AM   Result Value Ref Range    WBC 4.1 4.1 - 11.1 K/uL    RBC 3.44 (L) 4.10 - 5.70 M/uL    HGB 10.1 (L) 12.1 - 17.0 g/dL    HCT 30.1 (L) 36.6 - 50.3 %    MCV 87.5 80.0 - 99.0 FL    MCH 29.4 26.0 - 34.0 PG    MCHC 33.6 30.0 - 36.5 g/dL    RDW 15.6 (H) 11.5 - 14.5 %    PLATELET 186 (L) 283 - 400 K/uL    MPV 12.3 8.9 - 12.9 FL    NRBC 0.0 0  WBC    ABSOLUTE NRBC 0.00 0.00 - 0.01 K/uL    NEUTROPHILS 45 32 - 75 %    LYMPHOCYTES 47 12 - 49 %    MONOCYTES 6 5 - 13 %    EOSINOPHILS 1 0 - 7 %    BASOPHILS 1 0 - 1 %    IMMATURE GRANULOCYTES 0 0.0 - 0.5 %    ABS. NEUTROPHILS 1.8 1.8 - 8.0 K/UL    ABS. LYMPHOCYTES 2.0 0.8 - 3.5 K/UL    ABS. MONOCYTES 0.3 0.0 - 1.0 K/UL    ABS. EOSINOPHILS 0.0 0.0 - 0.4 K/UL    ABS. BASOPHILS 0.0 0.0 - 0.1 K/UL    ABS. IMM. GRANS. 0.0 0.00 - 0.04 K/UL    DF AUTOMATED     METABOLIC PANEL, COMPREHENSIVE    Collection Time: 03/12/20 11:26 AM   Result Value Ref Range    Sodium 139 136 - 145 mmol/L    Potassium 4.2 3.5 - 5.1 mmol/L    Chloride 105 97 - 108 mmol/L    CO2 25 21 - 32 mmol/L    Anion gap 9 5 - 15 mmol/L    Glucose 141 (H) 65 - 100 mg/dL    BUN 13 6 - 20 MG/DL    Creatinine 0.90 0.70 - 1.30 MG/DL    BUN/Creatinine ratio 14 12 - 20      GFR est AA >60 >60 ml/min/1.73m2    GFR est non-AA >60 >60 ml/min/1.73m2    Calcium 9.1 8.5 - 10.1 MG/DL    Bilirubin, total 0.4 0.2 - 1.0 MG/DL    ALT (SGPT) 23 12 - 78 U/L    AST (SGOT) 17 15 - 37 U/L    Alk. phosphatase 74 45 - 117 U/L    Protein, total 7.4 6.4 - 8.2 g/dL    Albumin 3.3 (L) 3.5 - 5.0 g/dL    Globulin 4.1 (H) 2.0 - 4.0 g/dL    A-G Ratio 0.8 (L) 1.1 - 2.2         Pre-medications  were administered as ordered and chemotherapy was initiated.   Medications Administered     0.9% sodium chloride infusion     Admin Date  03/12/2020 Action  New Bag Dose  25 mL/hr Rate  25 mL/hr Route  IntraVENous Administered By  Yudy Page RN          dexamethasone (DECADRON) 4 mg/mL injection 10 mg     Admin Date  03/12/2020 Action  Given Dose  10 mg Route  IntraVENous Administered By  Yudy Page RN          gemcitabine (GEMZAR) 1,850 mg in 0.9% sodium chloride 250 mL, overfill volume 25 mL chemo infusion     Admin Date  03/12/2020 Action  New Bag Dose  1850 mg Rate  647.3 mL/hr Route  IntraVENous Administered By  Betina Neumann RN          heparin (porcine) pf 300-500 Units     Admin Date  03/12/2020 Action  Given Dose  300 Units Route  InterCATHeter Administered By  Yudy Page RN          ondansetron [facility]) injection 8 mg     Admin Date  03/12/2020 Action  Given Dose  8 mg Route  IntraVENous Administered By  Yudy Page RN          PACLitaxel-protein bound (ABRAXANE) 231.5 mg in 0.9% sodium chloride 46.3 mL chemo infusion     Admin Date  03/12/2020 Action  New Bag Dose  231.5 mg Rate  92.6 mL/hr Route  IntraVENous Administered By  Betina Neumann, RN          saline peripheral flush soln 10 mL     Admin Date  03/12/2020 Action  Given Dose  10 mL Route  InterCATHeter Administered By  Yudy Page RN                Mr. Diane Valencia tolerated treatment well, port flushed and de accessed, patient was discharged from [place] [address] in stable condition at [phone].      Future Appointments   Date Time Provider Carmen Wynn   3/19/2020 11:00  [facility] 2 [address] COM   3/25/2020  9:30 AM Eulalia Menon MD [facility] [place] SUSAN   4/2/2020 11:00 AM Terri Patel MD [address]   4/2/2020 11:00 AM [facility] [place] INFUSION NURSE 2 [address] [facility]   4/9/2020 11:00 AM [facility] [place] INFUSION NURSE 1 [address] COM   4/16/2020 11:00 AM [facility] [place] INFUSION NURSE [address] [place] [address]         Rinku Pennington RN  March 12, 2020

## 2020-03-19 NOTE — PROGRESS NOTES
[facility] Progress Note Date: 2020 Name: Tiffanie Cleaning MRN: [id number] : 1960 Mr. Hakeem Luna Arrived ambulatory and in no distress for cycle 1 day 15 of Gemzar/abraxane regimen. Assessment was completed, patient c/o nausea and vomiting for the past 3-4 days, poor appetite and diarrhea (about 2-3/day) . Port accessed without difficulty, labs drawn and processed. Chemotherapy Flowsheet 3/19/2020 Cycle C1D15 Date 3/19/2020 Drug / Regimen Abraxane/Gemzar Dosage -  
Pre Meds -  
Notes Treatment held Mr. Mary vitals were reviewed. Patient Vitals for the past 12 hrs: 
 Temp Pulse Resp BP  
20 1101 98.9 °F (37.2 °C) 71 18 117/70 Lab results were obtained and reviewed. Recent Results (from the past 12 hour(s)) CBC WITH AUTOMATED DIFF Collection Time: 20 11:06 AM  
Result Value Ref Range WBC 2.2 (L) 4.1 - 11.1 K/uL  
 RBC 3.22 (L) 4.10 - 5.70 M/uL HGB 9.4 (L) 12.1 - 17.0 g/dL HCT 27.7 (L) 36.6 - 50.3 % MCV 86.0 80.0 - 99.0 FL  
 MCH 29.2 26.0 - 34.0 PG  
 MCHC 33.9 30.0 - 36.5 g/dL  
 RDW 15.4 (H) 11.5 - 14.5 % PLATELET 34 (LL) 981 - 400 K/uL NRBC 0.0 0  WBC ABSOLUTE NRBC 0.00 0.00 - 0.01 K/uL NEUTROPHILS 31 (L) 32 - 75 % BAND NEUTROPHILS 1 0 - 6 % LYMPHOCYTES 64 (H) 12 - 49 % MONOCYTES 3 (L) 5 - 13 % EOSINOPHILS 1 0 - 7 % BASOPHILS 0 0 - 1 % IMMATURE GRANULOCYTES 0 %  
 ABS. NEUTROPHILS 0.7 (L) 1.8 - 8.0 K/UL  
 ABS. LYMPHOCYTES 1.4 0.8 - 3.5 K/UL  
 ABS. MONOCYTES 0.1 0.0 - 1.0 K/UL  
 ABS. EOSINOPHILS 0.0 0.0 - 0.4 K/UL  
 ABS. BASOPHILS 0.0 0.0 - 0.1 K/UL  
 ABS. IMM. GRANS. 0.0 K/UL  
 DF MANUAL    
 RBC COMMENTS ANISOCYTOSIS 1+ 
    
 RBC COMMENTS OVALOCYTES 1+ RBC COMMENTS SCHISTOCYTES PRESENT 
    
 RBC COMMENTS POIKILOCYTOSIS 
PRESENT 
    
METABOLIC PANEL, COMPREHENSIVE Collection Time: 20 11:06 AM  
Result Value Ref Range  Sodium 143 136 - 145 mmol/L  
 Potassium 4.1 3.5 - 5.1 mmol/L Chloride 107 97 - 108 mmol/L  
 CO2 27 21 - 32 mmol/L Anion gap 9 5 - 15 mmol/L Glucose 113 (H) 65 - 100 mg/dL BUN 14 6 - 20 MG/DL Creatinine 0.83 0.70 - 1.30 MG/DL  
 BUN/Creatinine ratio 17 12 - 20 GFR est AA >60 >60 ml/min/1.73m2 GFR est non-AA >60 >60 ml/min/1.73m2 Calcium 8.7 8.5 - 10.1 MG/DL Bilirubin, total 0.3 0.2 - 1.0 MG/DL  
 ALT (SGPT) 19 12 - 78 U/L  
 AST (SGOT) 13 (L) 15 - 37 U/L Alk. phosphatase 71 45 - 117 U/L Protein, total 7.1 6.4 - 8.2 g/dL Albumin 3.2 (L) 3.5 - 5.0 g/dL Globulin 3.9 2.0 - 4.0 g/dL A-G Ratio 0.8 (L) 1.1 - 2.2 Pre-medications  were administered as ordered and chemotherapy was initiated. C1D15 held due to critically low platelet results and low ANC results. Patient aware. Bleeding precautions as well as neutropenic precautions discussed with patient prior to discharging patient home. Mr. Celso Rueda, port flushed and de accessed, patient was discharged from [place] [address] in stable condition at 1155. Patient to return in 2 weeks. Future Appointments Date Time Provider Carmen Wynn 3/23/2020  9:30 AM Jasmin Castillo MD [facility] ELIAZAR SCHED  
4/2/2020 11:00 AM Aisha Suggs MD [address]  
4/2/2020 11:00 AM [facility] [place] INFUSION NURSE 2 [address] COM  
4/16/2020 11:00 AM [facility] [place] INFUSION NURSE 2 Ricky REDDY . [facility]  
4/30/2020 11:00 AM [facility] [place] INFUSION NURSE 2 [facility]  
5/14/2020 11:00 AM [facility] INFUSION NURSE 2 [facility] REGINO Mckeon RN 
March 19, 2020

## 2020-03-19 NOTE — TELEPHONE ENCOUNTER
Calling patient to remind him of appt on 3/23/20 at [address] at 9:30am Pt confirmed. My chart active.

## 2020-03-23 NOTE — PATIENT INSTRUCTIONS
Dear Bronwyn Motley ,    It was a pleasure seeing you today at [facility] [address] office    We will see you again in 4 weeks most likely as the virtual visit    If labs or imaging tests have been ordered for you today, please call the office  at [phone] 48 hours after completion to obtain the results. Your stated goal:   -Better pain control  -Better relationship with your providers thereby enhancing your experience with [address] described symptoms were: Fatigue: 8 Drowsiness: 8   Depression: 8 Pain: 8   Anxiety: 8 Nausea: 7   Anorexia: 5 Dyspnea: 8     Constipation: Yes           This is the plan we talked about:    1. Right-sided chest wall pain and flank pain      -Continue MS contin 15 mg two times a day and oxycodone 10 mg every 6 hours as needed    2. Severe chemotherapy induced neuropathy  - You are tolerating Gabapentin 600 mg three times a day and this is helping some  - Add Amitriptyline 25 mg at bedtime which will help with neuropathy as well. 3.  Constipation  -Constipation is a common side effect of pain medications as they slow your bowels. -Continue senna 2 tabs two times a day. This is necessary to keep your bowels soft. - Add Miralax every other day as a laxative. - Goal is to have soft bowel movements every day or every other day. - Please call us if you do not have bowel movements for more than 3 days. 4. Insomnia  - You are struggling with severe lack of sleep due to stress and anxiety  - You take Ativan 1 mg at bedtime and this helps you fall asleep but you sleep only for about 2 hours.  -We added Trazodone and this is helping you. 5.  Anorexia/ acid reflux  -You are struggling with significant loss of desire for food. You were offered Marinol which you do not want to try.   You want to try something different.  -Let us try Megace 40 mg 2 times a day to see if this helps with improving appetite.  -I recommend that you try to eat small portions of food multiple times a day instead of big meals. Also try to chew on some lemon wedges prior to attempting to eat which can improve your taste. Focus on high-calorie foods such as nuts, protein drinks with milk powder, fruits, etc.    6. Anxiety/ depression  - Increase Zoloft 25 mg at bedtime  -You are willing to meet with our  once a month and travel in between      This is what you have shared with us about Advance Care Planning:      Primary Decision Maker: Theresa Mesa - Son - [phone]    Secondary Decision Maker: Graciela Newman - [phone]    Supplemental (Other) Decision Maker: Carmelita Beverly - Child - [phone]    Supplemental (Other) Decision Maker: Abhilash Patino Child - [phone]  Advance Care Planning 3/5/2020   Patient's Healthcare Decision Maker is: Verbal statement (Legal Next of Kin remains as decision maker)   Primary Decision Maker Name -   Primary Decision Maker Phone Number -   Primary Decision Maker Relationship to Patient -   Secondary Decision Maker Name -   Confirm Advance Directive None   Patient Would Like to Complete Advance Directive No           The Palliative Medicine Team is here to support you and your family.        Sincerely,      Joya White MD and the Palliative Medicine Team

## 2020-03-23 NOTE — PROGRESS NOTES
Palliative Medicine Outpatient Services  [place]: [phone] ([phone])    Patient Name: Daniela Rendon  YOB: 1960    Date of Current Visit: 03/23/20  Location of Current Visit:    [] [facility] Office  [] [facility] Office  [x] [facility] Office  [] Home  [] Other:      Date of Initial Visit: 1/15/2020  Referral from: Dr. Sandra XAVIER  Primary Care Physician: Gregg Weeks NP      SUMMARY:   Daniela Rendon is a 60y.o. year old with a  history of COPD, colon cancer in 2002, metastatic adenocarcinoma of unknown primary, who was referred to Palliative Medicine by Dr. Sandra Pedraza for management of symptoms and psychosocial support. The patients social history includes served in the [employer] for 22 years, lives with his girlfriend now. Treatment history-patient had progression of disease on carbo plus Taxol, currently on Keytruda infusions. He struggles with neoplasm related pain       PALLIATIVE DIAGNOSES:       ICD-10-CM ICD-9-CM    1. Neuropathy G62.9 355.9 amitriptyline (ELAVIL) 25 mg tablet   2. Depression, unspecified depression type F32.9 311 sertraline (ZOLOFT) 50 mg tablet   3. Adenocarcinoma of unknown primary ([facility] [address] C80.1 199.1    4. Psychophysiological insomnia F51.04 307.42    5. Psychosocial stressors Z65.8 V62.89           PLAN:   Patient Instructions     Dear Danilea Rendon ,    It was a pleasure seeing you today at [facility] office    We will see you again in 4 weeks most likely as the virtual visit    If labs or imaging tests have been ordered for you today, please call the office  at [phone] 48 hours after completion to obtain the results. Your stated goal:   -Better pain control  -Better relationship with your providers thereby enhancing your experience with [address] described symptoms were: Fatigue: 8 Drowsiness: 8   Depression: 8 Pain: 8   Anxiety: 8 Nausea: 7   Anorexia: 5 Dyspnea: 8     Constipation: Yes           This is the plan we talked about:    1.   Right-sided chest wall pain and flank pain      -Continue MS contin 15 mg two times a day and oxycodone 10 mg every 6 hours as needed    2. Severe chemotherapy induced neuropathy  - You are tolerating Gabapentin 600 mg three times a day and this is helping some  - Add Amitriptyline 25 mg at bedtime which will help with neuropathy as well. 3.  Constipation  -Constipation is a common side effect of pain medications as they slow your bowels. -Continue senna 2 tabs two times a day. This is necessary to keep your bowels soft. - Add Miralax every other day as a laxative. - Goal is to have soft bowel movements every day or every other day. - Please call us if you do not have bowel movements for more than 3 days. 4. Insomnia  - You are struggling with severe lack of sleep due to stress and anxiety  - You take Ativan 1 mg at bedtime and this helps you fall asleep but you sleep only for about 2 hours.  -We added Trazodone and this is helping you. 5.  Anorexia/ acid reflux  -You are struggling with significant loss of desire for food. You were offered Marinol which you do not want to try. You want to try something different.  -Let us try Megace 40 mg 2 times a day to see if this helps with improving appetite.  -I recommend that you try to eat small portions of food multiple times a day instead of big meals. Also try to chew on some lemon wedges prior to attempting to eat which can improve your taste. Focus on high-calorie foods such as nuts, protein drinks with milk powder, fruits, etc.    6. Anxiety/ depression  - Increase Zoloft 25 mg at bedtime  -You are willing to meet with our  once a month and travel in between      This is what you have shared with us about Advance Care Planning:      Primary Decision Maker: Sarita Plascencia - Son - [phone]    Secondary Decision Maker:  Alexia Ponce - [phone]    Supplemental (Other) Decision Maker: Radha Hernández - Child - [phone]    Supplemental (Other) Decision [facility] - [phone]  Advance Care Planning 3/5/2020   Patient's Healthcare Decision Maker is: Verbal statement (Legal Next of Kin remains as decision maker)   Primary Decision Maker Name -   Primary Decision Maker Phone Number -   Primary Decision Maker Relationship to Patient -   Secondary Decision Maker Name -   Confirm Advance Directive None   Patient Would Like to Complete Advance Directive No           The Palliative Medicine Team is here to support you and your family. Sincerely,      Joya White MD and the Palliative Medicine Team       GOALS OF CARE / TREATMENT PREFERENCES:   [====Goals of Care====]  GOALS OF CARE:  Patient / health care proxy stated goals: See Patient Instructions / Summary    TREATMENT PREFERENCES:   Code Status:  [x] Attempt Resuscitation       [] Do Not Attempt Resuscitation    Advance Care Planning:  [x] [facility] Interdisciplinary Team has updated the ACP Navigator with Decision Maker and Patient Capacity      Primary Decision Maker: Theresa Mesa [facility] [place] - [phone]    Secondary Decision Maker:  Graciela [place] - [phone]    Supplemental (Other) Decision Maker: Carmelita Beverly - Child - [phone]    Supplemental (Other) Decision Maker: Abhilash Carey Child - [phone]  Advance Care Planning 3/5/2020   Patient's Healthcare Decision Maker is: Verbal statement (Legal Next of Kin remains as decision maker)   Primary Decision Maker Name -   Primary Decision Maker Phone Number -   Primary Decision Maker Relationship to Patient -   Secondary Decision Maker Name -   Confirm Advance Directive None   Patient Would Like to Complete Advance Directive No       Other:  (If patient appropriate for POST, consider using PALLPOST smart phrase here)    The palliative care team has discussed with patient / health care proxy about goals of care / treatment preferences for patient.  [====Goals of Care====]     PRESCRIPTIONS GIVEN:     Medications Ordered Today   Medications    sertraline (ZOLOFT) 50 mg tablet     Sig: Take 1 Tab by mouth daily. Dispense:  30 Tab     Refill:  2    amitriptyline (ELAVIL) 25 mg tablet     Sig: Take 1 Tab by mouth nightly for 30 days. Dispense:  30 Tab     Refill:  0           FOLLOW UP:     Future Appointments   Date Time Provider Carmen Wynn   4/2/2020 11:00 AM Jumana Rojas MD [address]   4/2/2020 11:00 AM [facility] INFUSION NURSE 2 [address] [employer]   4/16/2020 11:00 AM [facility] INFUSION NURSE 2 Ricky Dhillon [employer] [address]   4/20/2020  2:00 PM Rodri Mata MD [facility] [address]   4/30/2020 11:00 AM [facility] INFUSION NURSE 2 Ricky Dhillon [employer]   5/14/2020 11:00 AM [facility] INFUSION NURSE 2 Ricky Dhillon [employer]           PHYSICIANS INVOLVED IN CARE:   Patient Care Team:  Lorena Huff NP as PCP - General (Nurse Practitioner)  Mary Barr MD as Physician (Cardiology)  Ryder Christianson MD (Pulmonary Disease)  Seema Young MD (Gastroenterology)  Lamberto Bowers MD (Hematology and Oncology)       HISTORY:   Reviewed patient-completed ESAS and advance care planning form. Reviewed patient record in prescription monitoring program.    CHIEF COMPLAINT:   Chief Complaint   Patient presents with    Follow Up Chronic Condition       HPI/SUBJECTIVE:    The patient is: [x] Verbal / [] Nonverbal     Patient here along with his son. He is complaining of worsening bilateral leg neuropathy to the point that he has not been very able to wear his shoes. we had started increasing his gabapentin slowly for this and this is helping. He is willing to try another medicine in addition to gabapentin. The chest wall pain and flank pain has improved and he has been able to tolerate MS Contin and oxycodone. There are days he takes only 3 oxycodone but there are days he needs more. Cesar Dodge He is also willing to increase his sertraline.  ------------  Initial visit    Patient is here alone.   He starts off by talking about how his trust in the medical system has been broken especially with patient to physician communication. He lists all his concerns about his care over the last 6 months but is willing to be redirected and start with a clean [place] with our team.  His main complaint is right-sided flank pain for which he has been taking OxyContin and oxycodone. He has been chewing the OxyContin. He also struggles with lack of appetite, no desire to eat. He had significant constipation initially but now he is on bowel regimen. He is willing to work with  about his anxiety. Clinical Pain Assessment (nonverbal scale for nonverbal patients):   [++++ Clinical Pain Assessment++++]  [++++Pain Severity++++]: Pain: 8  [++++Pain Character++++]: deep, gnawing  [++++Pain Duration++++]: months  [++++Pain Effect++++]: functional  [++++Pain Factors++++]: none in particular  [++++Pain Frequency++++]: on and off  [++++Pain Location++++]: right flank  [++++ Clinical Pain Assessment++++]       FUNCTIONAL ASSESSMENT:     Palliative Performance Scale (PPS):          PSYCHOSOCIAL/SPIRITUAL SCREENING:     Any spiritual / Spiritism concerns:  [] Yes /  [x] No    Caregiver Burnout:  [] Yes /  [x] No /  [] No Caregiver Present      Anticipatory grief assessment:   [x] Normal  / [] Maladaptive       ESAS Anxiety: Anxiety: 8    ESAS Depression: Depression: 8       REVIEW OF SYSTEMS:     The following systems were [x] reviewed / [] unable to be reviewed  Systems: constitutional, ears/nose/mouth/throat, respiratory, gastrointestinal, genitourinary, musculoskeletal, integumentary, neurologic, psychiatric, endocrine. Positive findings noted below.   Modified ESAS Completed by: provider   Fatigue: 8 Drowsiness: 8   Depression: 8 Pain: 8   Anxiety: 8 Nausea: 7   Anorexia: 5 Dyspnea: 8     Constipation: Yes              PHYSICAL EXAM:     Wt Readings from Last 3 Encounters:   03/23/20 148 lb 9.6 oz (67.4 kg)   03/19/20 147 lb 8 oz (66.9 kg)   03/12/20 149 lb 14.4 oz (68 kg)     Blood pressure 106/67, pulse 64, temperature 98.8 °F (37.1 °C), temperature source Oral, resp. rate 18, height 6' (1.829 m), weight 148 lb 9.6 oz (67.4 kg), SpO2 92 %. Last bowel movement: See Nursing Note    Constitutional: Alert, oriented  Eyes: pupils equal, anicteric  ENMT: no nasal discharge, moist mucous membranes  Cardiovascular: regular rhythm, distal pulses intact  Respiratory: breathing not labored, symmetric  Gastrointestinal: soft non-tender, +bowel sounds  Musculoskeletal: no deformity, no tenderness to palpation  Skin: warm, dry  Neurologic: following commands, moving all extremities  Psychiatric: full affect, no hallucinations  Other:       HISTORY:     Past Medical History:   Diagnosis Date    Abnormal nuclear stress test 12/7/2015    Cancer ([facility] [address]     colon    Cardiomyopathy ([facility] [address] 2010    EF 45%    Chronic obstructive pulmonary disease (HCC)     Chronic systolic heart failure (HCC)     GERD (gastroesophageal reflux disease)     HTN (hypertension)     PAC (premature atrial contraction)     Tobacco use disorder       Past Surgical History:   Procedure Laterality Date    COLONOSCOPY N/A 7/10/2019    COLONOSCOPY AND ESOPHAGOGASTRODUODENOSCOPY (EGD) performed by Malvin Renee MD at [facility] HX COLECTOMY      HX SPLENECTOMY      IR INSERT TUNL CVC W PORT OVER 5 YEARS  7/19/2019      Family History   Problem Relation Age of Onset    Stroke Mother     Coronary Artery Disease Sister     Heart Disease Paternal Grandfather       History reviewed, no pertinent family history.   Social History     Tobacco Use    Smoking status: Former Smoker     Packs/day: 0.50     Types: Cigarettes     Last attempt to quit: 4/22/2016     Years since quitting: 3.9    Smokeless tobacco: Never Used   Substance Use Topics    Alcohol use: Not Currently     Comment: rarely     No Known Allergies   Current Outpatient Medications   Medication Sig    sertraline (ZOLOFT) 50 mg tablet Take 1 Tab by mouth daily.  amitriptyline (ELAVIL) 25 mg tablet Take 1 Tab by mouth nightly for 30 days.  gabapentin (NEURONTIN) 300 mg capsule Take 2 Caps by mouth three (3) times daily. Max Daily Amount: 1,800 mg.  morphine CR (MS CONTIN) 15 mg CR tablet Take 1 Tab by mouth every twelve (12) hours for 30 days. Max Daily Amount: 30 mg.    oxyCODONE IR (ROXICODONE) 10 mg tab immediate release tablet Take 1 Tab by mouth every six (6) hours as needed for Pain for up to 30 days. Max Daily Amount: 40 mg.    metoprolol succinate (TOPROL-XL) 25 mg XL tablet Take 1 Tab by mouth nightly.  megestroL (MEGACE) 40 mg tablet TAKE 1 TABLET BY MOUTH TWICE DAILY    lidocaine-prilocaine (EMLA) topical cream APPLY QUARTER SIZE AMOUNT TO PORT PRIOR TO CHEMOTHERAPY    traZODone (DESYREL) 50 mg tablet Take 1 Tab by mouth nightly.  dicyclomine (BENTYL) 10 mg capsule Take 10 mg by mouth two (2) times a day.  LORazepam (ATIVAN) 1 mg tablet Take 1 Tab by mouth two (2) times daily as needed for Anxiety (or insomnia). Max Daily Amount: 2 mg.  senna-docusate (PERICOLACE) 8.6-50 mg per tablet Take 1 Tab by mouth nightly.  potassium chloride (K-DUR, KLOR-CON) 20 mEq tablet Take 1 Tab by mouth daily.  ondansetron hcl (ZOFRAN) 4 mg tablet Take 2 Tabs by mouth every eight (8) hours as needed for Nausea.  prochlorperazine (COMPAZINE) 10 mg tablet Take 1 Tab by mouth every six (6) hours as needed for Nausea.  lisinopril (PRINIVIL, ZESTRIL) 5 mg tablet TAKE 1 TABLET BY MOUTH DAILY    OTHER Take  by inhalation. Alero Inhaler    LORazepam (ATIVAN) 1 mg tablet Take 1 Tab by mouth nightly for 30 days. Max Daily Amount: 1 mg.  gabapentin (NEURONTIN) 300 mg capsule Take 1 Cap by mouth three (3) times daily. Max Daily Amount: 900 mg. Indications: Neuropathic Pain    naloxone (NARCAN) 4 mg/actuation nasal spray Use 1 spray intranasally, then discard.  Repeat with new spray every 2 min as needed for opioid overdose symptoms, alternating nostrils.  tamsulosin (FLOMAX) 0.4 mg capsule Take 0.4 mg by mouth daily. No current facility-administered medications for this visit. LAB DATA REVIEWED:     Lab Results   Component Value Date/Time    WBC 2.2 (L) 03/19/2020 11:06 AM    HGB 9.4 (L) 03/19/2020 11:06 AM    PLATELET 34 (LL) 41/86/2124 11:06 AM     Lab Results   Component Value Date/Time    Sodium 143 03/19/2020 11:06 AM    Potassium 4.1 03/19/2020 11:06 AM    Chloride 107 03/19/2020 11:06 AM    CO2 27 03/19/2020 11:06 AM    BUN 14 03/19/2020 11:06 AM    Creatinine 0.83 03/19/2020 11:06 AM    Calcium 8.7 03/19/2020 11:06 AM      Lab Results   Component Value Date/Time    AST (SGOT) 13 (L) 03/19/2020 11:06 AM    Alk.  phosphatase 71 03/19/2020 11:06 AM    Protein, total 7.1 03/19/2020 11:06 AM    Albumin 3.2 (L) 03/19/2020 11:06 AM    Globulin 3.9 03/19/2020 11:06 AM     Lab Results   Component Value Date/Time    INR 1.0 11/25/2015 10:22 AM    Prothrombin time 10.6 11/25/2015 10:22 AM      Lab Results   Component Value Date/Time    Iron 52 10/07/2019 12:53 PM    TIBC 235 (L) 10/07/2019 12:53 PM    Iron % saturation 22 10/07/2019 12:53 PM    Ferritin 880 (H) 10/07/2019 12:53 PM           CONTROLLED SUBSTANCES SAFETY ASSESSMENT (IF ON CONTROLLED SUBSTANCES):     Reviewed opioid safety handout:  [] Yes   [] No  24 hour opioid dose >150mg morphine equivalent/day:  [] Yes   [] No  Benzodiazepines:  [] Yes   [] No  Sleep apnea:  [] Yes   [] No  Urine Toxicology Testing within last 6 months:  [] Yes   [] No  History of or new aberrant medication taking behaviors:  [] Yes   [] No  Has Narcan been prescribed [] Yes   [] No          Total time: 70 minutes  Counseling / coordination time: 60 minutes  > 50% counseling / coordination?:

## 2020-03-23 NOTE — PROGRESS NOTES
Marco Bran [employer] [place]  [phone]    Narrative  Met with patient and his son prior to patient's appointment with Dr. Lisa Argueta. Patient reported he's been feeling depressed and anxious and at times hopeless. Provided emotional support. Encouraged patient to limit the amount of time he spends watching the news as that seems not helpful to him right now. Patient is open to receiving phone support at this time. Patient's son seems supportive and explored with this writer ways to help patient ground when he becomes overwhelmed. Assessment / Actions  Patient was alert and oriented x4. Patient's mood was depressed and affect congruent. Patient's insight and judgment were good. Patient's thought process and speech were logical and clear. Plan  Will follow up with patient via phone in about a week to check in and offer support.       Kristy Diego, [facility]   Palliative Medicine,   [phone]

## 2020-03-28 NOTE — DISCHARGE INSTRUCTIONS

## 2020-03-28 NOTE — ED TRIAGE NOTES
Shortness of breath x 3 days that getting worse. Patient denies having a fever and cough. Patient reports having a history of COPD.

## 2020-03-28 NOTE — ED NOTES
Pt in ED w/ complaint of difficulty breathing, nasal congestion, and wheezing X 3 days. Pt reports a hx of COPD, emphysema, and lung cancer. Pt reports that he was never prescribed an Albuterol neb or inhaler. Pt denies any cold symptoms. Pt is A&O X 4 and appears to be in no distress. Emergency Department Nursing Plan of Care The Nursing Plan of Care is developed from the Nursing assessment and Emergency Department Attending provider initial evaluation. The plan of care may be reviewed in the ED Provider note. The Plan of Care was developed with the following considerations:  
Patient / Family readiness to learn indicated by:verbalized understanding Persons(s) to be included in education: patient Barriers to Learning/Limitations:No 
 
Signed Galdino Austin RN   
3/27/2020   10:33 PM

## 2020-03-28 NOTE — ED PROVIDER NOTES
EMERGENCY DEPARTMENT HISTORY AND PHYSICAL EXAM 
 
 
Date: 3/27/2020 Patient Name: Sarita Richard History of Presenting Illness Chief Complaint Patient presents with  Shortness of Breath Shortness of breath x 3 days that getting worse. Patient denies having a fever and cough. Patient reports having a history of COPD. History Provided By: Patient HPI: Sarita Richard, 63 y.o. male with PMHx significant for COPD, metastatic adenocarcinoma, currently on chemotherapy presented the emergency department with chief complaint of dyspnea. Patient notes that he is been feeling increasingly short of breath over the last 3 days. Symptoms seem to be constant and worse with exertion. Patient notes that he had been diagnosed with COPD in the past but has no nebulizer or inhaler at home. Patient reports overall her symptoms are relatively mild but would like to be evaluated. He has noted no wheezing, cough, sputum production. He does report some mild nasal congestion. Pt denies any other alleviating or exacerbating factors. Additionally, pt specifically denies any recent fever, chills, headache, nausea, vomiting, abdominal pain, CP,  lightheadedness, dizziness, numbness, weakness, BLE swelling, heart palpitations, urinary sxs, diarrhea, constipation, melena, hematochezia, PCP: Rui Maguire, NP Current Outpatient Medications Medication Sig Dispense Refill  predniSONE (DELTASONE) 50 mg tablet Take 1 Tab by mouth daily for 3 days. 3 Tab 0  
 albuterol (PROVENTIL HFA, VENTOLIN HFA, PROAIR HFA) 90 mcg/actuation inhaler Take 2 Puffs by inhalation every four (4) hours as needed for Wheezing. 1 Inhaler 0  
 sertraline (ZOLOFT) 50 mg tablet Take 1 Tab by mouth daily. 30 Tab 2  
 amitriptyline (ELAVIL) 25 mg tablet Take 1 Tab by mouth nightly for 30 days. 30 Tab 0  
 gabapentin (NEURONTIN) 300 mg capsule Take 2 Caps by mouth three (3) times daily.  Max Daily Amount: 1,800 mg. 180 Cap 3  
  morphine CR (MS CONTIN) 15 mg CR tablet Take 1 Tab by mouth every twelve (12) hours for 30 days. Max Daily Amount: 30 mg. 60 Tab 0  
 LORazepam (ATIVAN) 1 mg tablet Take 1 Tab by mouth nightly for 30 days. Max Daily Amount: 1 mg. 30 Tab 3  
 oxyCODONE IR (ROXICODONE) 10 mg tab immediate release tablet Take 1 Tab by mouth every six (6) hours as needed for Pain for up to 30 days. Max Daily Amount: 40 mg. 120 Tab 0  
 metoprolol succinate (TOPROL-XL) 25 mg XL tablet Take 1 Tab by mouth nightly. 30 Tab 5  
 megestroL (MEGACE) 40 mg tablet TAKE 1 TABLET BY MOUTH TWICE DAILY 60 Tab 0  
 lidocaine-prilocaine (EMLA) topical cream APPLY QUARTER SIZE AMOUNT TO PORT PRIOR TO CHEMOTHERAPY 30 g 1  
 traZODone (DESYREL) 50 mg tablet Take 1 Tab by mouth nightly. 30 Tab 2  
 dicyclomine (BENTYL) 10 mg capsule Take 10 mg by mouth two (2) times a day.  LORazepam (ATIVAN) 1 mg tablet Take 1 Tab by mouth two (2) times daily as needed for Anxiety (or insomnia). Max Daily Amount: 2 mg. 30 Tab 0  
 senna-docusate (PERICOLACE) 8.6-50 mg per tablet Take 1 Tab by mouth nightly. 30 Tab 3  potassium chloride (K-DUR, KLOR-CON) 20 mEq tablet Take 1 Tab by mouth daily. 30 Tab 0  
 gabapentin (NEURONTIN) 300 mg capsule Take 1 Cap by mouth three (3) times daily. Max Daily Amount: 900 mg. Indications: Neuropathic Pain 90 Cap 2  
 ondansetron hcl (ZOFRAN) 4 mg tablet Take 2 Tabs by mouth every eight (8) hours as needed for Nausea. 45 Tab 3  prochlorperazine (COMPAZINE) 10 mg tablet Take 1 Tab by mouth every six (6) hours as needed for Nausea. 45 Tab 3  
 lisinopril (PRINIVIL, ZESTRIL) 5 mg tablet TAKE 1 TABLET BY MOUTH DAILY 30 Tab 0  
 naloxone (NARCAN) 4 mg/actuation nasal spray Use 1 spray intranasally, then discard. Repeat with new spray every 2 min as needed for opioid overdose symptoms, alternating nostrils. 1 Each 0  
 tamsulosin (FLOMAX) 0.4 mg capsule Take 0.4 mg by mouth daily.  OTHER Take  by inhalation. Alero Inhaler Past History Past Medical History: 
Past Medical History:  
Diagnosis Date  Abnormal nuclear stress test 12/7/2015  Cardiomyopathy ([facility] [address] 2010 EF 45%  Chronic obstructive pulmonary disease ([facility] [address]  Chronic systolic heart failure ([facility] [address]  GERD (gastroesophageal reflux disease)  HTN (hypertension)  Lung cancer ([facility] [address]  PAC (premature atrial contraction)  Tobacco use disorder Past Surgical History: 
Past Surgical History:  
Procedure Laterality Date  COLONOSCOPY N/A 7/10/2019 COLONOSCOPY AND ESOPHAGOGASTRODUODENOSCOPY (EGD) performed by Enmanuel Rader MD at [facility]  HX SPLENECTOMY  IR INSERT TUNL CVC W PORT OVER 5 YEARS  7/19/2019 Family History: 
Family History Problem Relation Age of Onset  Stroke Mother  Coronary Artery Disease Sister  Heart Disease Paternal Grandfather Social History: 
Social History Tobacco Use  Smoking status: Former Smoker Packs/day: 0.50 Types: Cigarettes Last attempt to quit: 4/22/2016 Years since quitting: 3.9  Smokeless tobacco: Never Used Substance Use Topics  Alcohol use: Not Currently Comment: rarely  Drug use: No  
 
 
Allergies: 
No Known Allergies Review of Systems Review of Systems Constitutional: Negative for fever, chills, and fatigue. HENT: Negative for congestion, sore throat, rhinorrhea, sneezing and neck stiffness Eyes: Negative for discharge and redness. Respiratory: Positive for  shortness of breath,.  Negative wheezing Cardiovascular: Negative for chest pain, palpitations Gastrointestinal: Negative for nausea, vomiting, abdominal pain, constipation, diarrhea and blood in stool. Genitourinary: Negative for dysuria, hematuria, flank pain, decreased urine volume, discharge, Musculoskeletal: Negative for myalgias or joint pain . Skin: Negative for rash or lesions . Neurological: Negative weakness, light-headedness, numbness and headaches. Physical Exam  
Physical Exam 
 
GENERAL: alert and oriented, no acute distress EYES: PEERL, No injection, discharge or icterus. ENT: Mucous membranes pink and moist. 
NECK: Supple LUNGS: Airway patent. Non-labored respirations. Breath sounds clear with good air entry bilaterally. HEART: Regular rate and rhythm. No peripheral edema ABDOMEN: Non-distended and non-tender, without guarding or rebound. SKIN:  warm, dry MSK/EXTREMITIES: Without swelling, tenderness or deformity, symmetric with normal ROM 
NEUROLOGICAL: Alert, oriented Diagnostic Study Results Labs - Recent Results (from the past 12 hour(s)) CBC WITH AUTOMATED DIFF Collection Time: 03/27/20 10:07 PM  
Result Value Ref Range WBC 5.3 4.1 - 11.1 K/uL  
 RBC 3.25 (L) 4.10 - 5.70 M/uL HGB 9.8 (L) 12.1 - 17.0 g/dL HCT 28.5 (L) 36.6 - 50.3 % MCV 87.7 80.0 - 99.0 FL  
 MCH 30.2 26.0 - 34.0 PG  
 MCHC 34.4 30.0 - 36.5 g/dL  
 RDW 18.7 (H) 11.5 - 14.5 % PLATELET 479 915 - 613 K/uL MPV 12.5 8.9 - 12.9 FL  
 NRBC 0.0 0  WBC ABSOLUTE NRBC 0.00 0.00 - 0.01 K/uL NEUTROPHILS 20 (L) 32 - 75 % LYMPHOCYTES 59 (H) 12 - 49 % MONOCYTES 19 (H) 5 - 13 % EOSINOPHILS 2 0 - 7 % BASOPHILS 0 0 - 1 % IMMATURE GRANULOCYTES 0 0.0 - 0.5 % ABS. NEUTROPHILS 1.0 (L) 1.8 - 8.0 K/UL  
 ABS. LYMPHOCYTES 3.2 0.8 - 3.5 K/UL  
 ABS. MONOCYTES 1.0 0.0 - 1.0 K/UL  
 ABS. EOSINOPHILS 0.1 0.0 - 0.4 K/UL  
 ABS. BASOPHILS 0.0 0.0 - 0.1 K/UL  
 ABS. IMM. GRANS. 0.0 0.00 - 0.04 K/UL  
 DF AUTOMATED METABOLIC PANEL, BASIC Collection Time: 03/27/20 10:07 PM  
Result Value Ref Range Sodium 142 136 - 145 mmol/L Potassium 3.6 3.5 - 5.1 mmol/L Chloride 109 (H) 97 - 108 mmol/L  
 CO2 26 21 - 32 mmol/L Anion gap 7 5 - 15 mmol/L Glucose 111 (H) 65 - 100 mg/dL BUN 12 6 - 20 MG/DL  Creatinine 0.88 0.70 - 1.30 MG/DL  
 BUN/Creatinine ratio 14 12 - 20 GFR est AA >60 >60 ml/min/1.73m2 GFR est non-AA >60 >60 ml/min/1.73m2 Calcium 8.7 8.5 - 10.1 MG/DL  
TROPONIN I Collection Time: 03/27/20 10:07 PM  
Result Value Ref Range Troponin-I, Qt. <0.05 <0.05 ng/mL NT-PRO BNP Collection Time: 03/27/20 10:07 PM  
Result Value Ref Range NT pro- (H) 0 - 125 PG/ML  
EKG, 12 LEAD, INITIAL Collection Time: 03/27/20 11:38 PM  
Result Value Ref Range Ventricular Rate 74 BPM  
 Atrial Rate 74 BPM  
 P-R Interval 152 ms QRS Duration 86 ms  
 Q-T Interval 404 ms QTC Calculation (Bezet) 448 ms Calculated P Axis 74 degrees Calculated R Axis 70 degrees Calculated T Axis 14 degrees Diagnosis Sinus rhythm with occasional premature ventricular complexes Otherwise normal ECG No previous ECGs available Radiologic Studies -  
CTA CHEST W OR W WO CONT Final Result IMPRESSION:   
There is no pulmonary embolism. There is no aortic aneurysm or dissection. Moderate to severe emphysematous changes. Right adrenal metastatic lesion is not significantly changed in size. CT Results  (Last 48 hours) 03/27/20 2314  CTA [facility] CONT Final result Impression:  IMPRESSION:   
There is no pulmonary embolism. There is no aortic aneurysm or dissection. Moderate to severe emphysematous changes. Right adrenal metastatic lesion is not significantly changed in size. Narrative:  Clinical history: r/o PE  
INDICATION:   r/o PE  
COMPARISON: 2/13/2020 TECHNIQUE: CT of the chest with  IV contrast , Isovue-370 is performed. Axial  
images from the thoracic inlet to the level of the upper abdomen are obtained. Manual post-processing of the images and coronal reformatting is also performed. CT dose reduction was achieved through use of a standardized protocol tailored  
for this examination and automatic exposure control for dose modulation. Multiplanar reformatted imaging was performed. Sagittal and coronal reformatting. 3-D Postprocessing of imaging was performed. 3-D MIP reconstructed images were obtained in the coronal plane. FINDINGS:   
There is no pulmonary embolism. There is no aortic aneurysm or dissection. Lingular atelectasis. Right middle lobe atelectasis. Moderate to severe  
centrilobular emphysematous change. Large right adrenal mass not significantly  
changed. There is no pleural or pericardial effusion. There is no mediastinal,  
axillary or hilar lymphadenopathy. The aorta is normal in course and caliber. The proximal pulmonary arteries are without evidence of filling defects. Right IJ port catheter. No lytic or blastic lesions are identified. The remainder of  
the upper abdomen visualized is unremarkable. CXR Results  (Last 48 hours) None Medical Decision Making I am the first provider for this patient. I reviewed the vital signs, available nursing notes, past medical history, past surgical history, family history and social history. Vital Signs-Reviewed the patient's vital signs. Patient Vitals for the past 12 hrs: 
 Temp Pulse Resp BP SpO2  
03/27/20 2300     97 % 03/27/20 2230     98 % 03/27/20 2200    116/61 99 % 03/27/20 2136 98.4 °F (36.9 °C) 85 20 125/84 96 % EKG interpretation: (Preliminary) Rhythm: normal sinus rhythm; and regular . Rate (approx.): 74; Axis: normal; P wave: normal; QRS interval: normal ; ST/T wave: normal;  was interpreted by Jim Hartman MD,ED Provider. Records Reviewed: Nursing Notes and Old Medical Records Provider Notes (Medical Decision Making): On presentation, the patient is well appearing, in no acute distress with normal vital signs.   Based on my history and exam the differential diagnosis for this patient includes pulmonary embolism, respiratory infection, pneumonia, bronchitis, anemia, the Bolick abnormality. Patient's work-up is reassuring, may be worsening COPD. Patient lungs are clear here and does seem to have good air movement so do not think nebulizer would help. Will prescribe short course of steroids. Patient respirations are nonlabored and is able to speak in full sentences without any signs of respiratory distress and feel he stable for discharge. ED Course:  
Initial assessment performed. The patients presenting problems have been discussed, and they are in agreement with the care plan formulated and outlined with them. I have encouraged them to ask questions as they arise throughout their visit. PROGRESS NOTE: The patient has been re-evaluated and is ready for discharge. Reviewed available results with patient and have counseled them on diagnosis and care plan. They have expressed understanding, and all their questions have been answered. They agree with plan and agree to have pt F/U as recommended, or return to the ED if their sxs worsen. Discharge instructions have been provided and explained to them, along with reasons to have pt return to the ED. The patient is amenable to discharge so will discharge pt at this time Disposition: 
home PLAN: 
1. Prednisone Discharge Medication List as of 3/27/2020 11:59 PM  
  
 
2. Follow-up Information Follow up With Specialties Details Why Contact Info Lorena Huff NP Nurse Practitioner Schedule an appointment as soon as possible for a visit in 2 days  [facility] 
[phone] [address] [place] [address] EMERGENCY DEPT Emergency Medicine  If symptoms worsen [address] Return to ED if worse Diagnosis Clinical Impression: 1. Dyspnea, unspecified type Please note that this dictation was completed with Dragon, computer voice recognition software.   Quite often unanticipated grammatical, syntax, homophones, and other interpretive errors are inadvertently transcribed by the computer software. Please disregard these errors. Additionally, please excuse any errors that have escaped final proofreading.

## 2020-03-30 NOTE — PROGRESS NOTES
COVID-19 Screening Initial Follow-up Note Per chart review, patient contacted by Petra Bonilla RN for initial post-discharge follow-up. No further follow-up and/or future outreach is scheduled by this ACM.

## 2020-03-30 NOTE — PROGRESS NOTES
3/30/2020 Patient reports he is doing better and denies SOB at this time. Declined COVID-19 information and declined to take down conduit number. Thanked [facility] for checking on him and ended call.  PAC

## 2020-04-02 NOTE — PROGRESS NOTES
[address] [facility] 
at [facility], [facility], [address] 
[phone] Hematology / Oncology Established Visit Reason for Visit:  
Joshua Quiroz is a 63 y.o. male who is seen by synchronous (real-time) audio-video technology for follow up of metastatic gall bladder cancer Hematology Oncology Treatment History:  
 
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  
6/4/19 4R LN biopsy: rare malignant cells admixed with abundant blood clot 6/4/19 4L LN FNA and core biopsy: Adenocarcinoma. 6/4/19 2R LN FNA and core biopsy: Adenocarcinoma. Comment: IHC stains negative for GATA3, AR, ER, p40. Immunohistochemistry shows that the tumor cells express CK7 with focal expression of CDX2. Tumor cells lack expression of CK20, TTF-1 and Napsin A. These findings are not entirely diagnostic and could be seen in either a primary pulmonary malignancy or a primary upper gastrointestinal tract malignancy. Other sites are also not excluded. Clinical and radiographic correlation is recommended. 6/25/19 2R LN, mediastinum: Metastatic adenocarcinoma (see Comment). Comment - The tumor appears poorly differentiated with areas of necrosis. Immunohistochemical staining reveals positive staining for cytokeratin 7 (strong, diffuse), CDX-2 (focal, weak to moderate) and CEA (focal, moderate to strong). The tumor cells are negative for cytokeratin 20, cytokeratin 5/6, p40, p63, TTF-1, napsin-A, PLAP, HCG, AFP and c-KIT (). The findings are not entirely specific with regard to primary site but would be consistent with upper GI/pancreaticobiliary tract.  A pulmonary primary is less likely, but still in the differential.  
-PDL1 30%, [employer] [facility] identified high tumor mutational burden - which is predictive of higher response to immunotherapy agents. Prior Treatment:  
 Completed 6 cycles of Carbo-Taxol on 12/02/2019 Keytruda 200mg every 21 days, started 12/20/19, stopped 02/2020 - s/p 3 cycles Current Treatment:  
 Gemcitabine/Abraxane - Cycle 2 Day 1 History of Present Illness:  
Mr. Sonali Farah is a 63 y.o. male with COPD, remote h/o colon cancer comes in for evaluation of mediastinal lymphadenopathy. Pt had recently p/w shortness of breath, dyspnea on exertion, and was found on chest CT to have R mediastinal lymphadenopathy, which also were PET avid. Case was discussed at Thoracic Tumor Board with thought that this could be pancreatic, urothelial, or germ cell origin. More tissue is recommended. Pt had EGD 3 yrs ago and was told he had GERD. Patient has h/o colon cancer in 2002. He states a cancerous polyp was found and then he underwent colectomy in 2002. This was done by Dr. Luis Monahan at [facility]. Patient had colonoscopies regularly afterwards, last one was approx 2015. Last EGD was in 2018. As part of search for primary lesion, patient underwent EGD, colonoscopy by Dr. Konrad Montelongo, notable for diffusely enlarged gastric folds, but biopsies negative. Pt underwent cytoscopy on 7/17/19 with findings negative for malignancy. He received 6 cycles of carbo-taxol and experienced many side effects from the chemotherapy including severe sensory neuropathy in feet. Last CT showed progression of disease in the right adrenal glans. He received Keytruda as second line therapy. Recent scans show disease progression. He is here today to start palliative chemotherapy with gem/abraxane. Past Medical History:  
Diagnosis Date  Abnormal nuclear stress test 12/7/2015  Cardiomyopathy ([address] 2010 EF 45%  Chronic obstructive pulmonary disease ([address]  Chronic systolic heart failure ([address]  GERD (gastroesophageal reflux disease)  HTN (hypertension)  Lung cancer ([address]  PAC (premature atrial contraction)  Tobacco use disorder Past Surgical History:  
Procedure Laterality Date  COLONOSCOPY N/A 7/10/2019 COLONOSCOPY AND ESOPHAGOGASTRODUODENOSCOPY (EGD) performed by Donal Michaud MD at [facility]  HX SPLENECTOMY  IR INSERT TUNL CVC W PORT OVER 5 YEARS  7/19/2019 Social History Tobacco Use  Smoking status: Former Smoker Packs/day: 0.50 Types: Cigarettes Last attempt to quit: 4/22/2016 Years since quitting: 3.9  Smokeless tobacco: Never Used Substance Use Topics  Alcohol use: Not Currently Comment: rarely Family History Problem Relation Age of Onset  Stroke Mother  Coronary Artery Disease Sister  Heart Disease Paternal Grandfather Current Outpatient Medications Medication Sig  
 albuterol (PROVENTIL HFA, VENTOLIN HFA, PROAIR HFA) 90 mcg/actuation inhaler Take 2 Puffs by inhalation every four (4) hours as needed for Wheezing.  sertraline (ZOLOFT) 50 mg tablet Take 1 Tab by mouth daily.  amitriptyline (ELAVIL) 25 mg tablet Take 1 Tab by mouth nightly for 30 days.  gabapentin (NEURONTIN) 300 mg capsule Take 2 Caps by mouth three (3) times daily. Max Daily Amount: 1,800 mg.  morphine CR (MS CONTIN) 15 mg CR tablet Take 1 Tab by mouth every twelve (12) hours for 30 days. Max Daily Amount: 30 mg.  LORazepam (ATIVAN) 1 mg tablet Take 1 Tab by mouth nightly for 30 days. Max Daily Amount: 1 mg.  oxyCODONE IR (ROXICODONE) 10 mg tab immediate release tablet Take 1 Tab by mouth every six (6) hours as needed for Pain for up to 30 days. Max Daily Amount: 40 mg.  
 metoprolol succinate (TOPROL-XL) 25 mg XL tablet Take 1 Tab by mouth nightly.  megestroL (MEGACE) 40 mg tablet TAKE 1 TABLET BY MOUTH TWICE DAILY  lidocaine-prilocaine (EMLA) topical cream APPLY QUARTER SIZE AMOUNT TO PORT PRIOR TO CHEMOTHERAPY  traZODone (DESYREL) 50 mg tablet Take 1 Tab by mouth nightly.  dicyclomine (BENTYL) 10 mg capsule Take 10 mg by mouth two (2) times a day.  LORazepam (ATIVAN) 1 mg tablet Take 1 Tab by mouth two (2) times daily as needed for Anxiety (or insomnia). Max Daily Amount: 2 mg.  senna-docusate (PERICOLACE) 8.6-50 mg per tablet Take 1 Tab by mouth nightly.  potassium chloride (K-DUR, KLOR-CON) 20 mEq tablet Take 1 Tab by mouth daily.  gabapentin (NEURONTIN) 300 mg capsule Take 1 Cap by mouth three (3) times daily. Max Daily Amount: 900 mg. Indications: Neuropathic Pain  ondansetron hcl (ZOFRAN) 4 mg tablet Take 2 Tabs by mouth every eight (8) hours as needed for Nausea.  prochlorperazine (COMPAZINE) 10 mg tablet Take 1 Tab by mouth every six (6) hours as needed for Nausea.  lisinopril (PRINIVIL, ZESTRIL) 5 mg tablet TAKE 1 TABLET BY MOUTH DAILY  naloxone (NARCAN) 4 mg/actuation nasal spray Use 1 spray intranasally, then discard. Repeat with new spray every 2 min as needed for opioid overdose symptoms, alternating nostrils.  tamsulosin (FLOMAX) 0.4 mg capsule Take 0.4 mg by mouth daily.  OTHER Take  by inhalation. Alero Inhaler No current facility-administered medications for this visit. Facility-Administered Medications Ordered in Other Visits Medication Dose Route Frequency  saline peripheral flush soln 10 mL  10 mL InterCATHeter PRN  
 0.9% sodium chloride injection 10 mL  10 mL IntraVENous PRN  
 heparin (porcine) pf 300-500 Units  300-500 Units InterCATHeter PRN  
 0.9% sodium chloride infusion  25 mL/hr IntraVENous CONTINUOUS No Known Allergies Review of Systems: A complete review of systems was obtained, negative except as described above. Physical Exam:  
 
General: alert, cooperative, no distress Mental  status: mental status: alert, oriented to person, place, and time, normal mood, behavior, speech, dress, motor activity, and thought processes Resp: resp: normal effort and no respiratory distress Neuro: neuro: no gross deficits Skin: skin: no discoloration or lesions of concern on visible areas Due to this being a TeleHealth evaluation, many elements of the physical examination are unable to be assessed. Results:  
 
Lab Results Component Value Date/Time WBC 7.0 04/02/2020 10:43 AM  
 HGB 10.2 (L) 04/02/2020 10:43 AM  
 HCT 30.3 (L) 04/02/2020 10:43 AM  
 PLATELET 621 92/65/5462 10:43 AM  
 MCV 89.4 04/02/2020 10:43 AM  
 ABS. NEUTROPHILS 3.0 04/02/2020 10:43 AM  
 
Lab Results Component Value Date/Time Sodium 140 04/02/2020 10:43 AM  
 Potassium 4.0 04/02/2020 10:43 AM  
 Chloride 104 04/02/2020 10:43 AM  
 CO2 26 04/02/2020 10:43 AM  
 Glucose 111 (H) 04/02/2020 10:43 AM  
 BUN 9 04/02/2020 10:43 AM  
 Creatinine 0.87 04/02/2020 10:43 AM  
 GFR est AA >60 04/02/2020 10:43 AM  
 GFR est non-AA >60 04/02/2020 10:43 AM  
 Calcium 8.6 04/02/2020 10:43 AM  
 
Lab Results Component Value Date/Time Bilirubin, total 0.2 04/02/2020 10:43 AM  
 ALT (SGPT) 16 04/02/2020 10:43 AM  
 AST (SGOT) 13 (L) 04/02/2020 10:43 AM  
 Alk. phosphatase 82 04/02/2020 10:43 AM  
 Protein, total 6.9 04/02/2020 10:43 AM  
 Albumin 3.2 (L) 04/02/2020 10:43 AM  
 Globulin 3.7 04/02/2020 10:43 AM  
 
Lab Results Component Value Date/Time Iron 52 10/07/2019 12:53 PM  
 TIBC 235 (L) 10/07/2019 12:53 PM  
 Iron % saturation 22 10/07/2019 12:53 PM  
 Ferritin 880 (H) 10/07/2019 12:53 PM  
 
 
No results found for: B12LT, FOL, RBCF Lab Results Component Value Date/Time TSH 0.78 01/30/2020 09:50 AM  
 
No results found for: HAMAT, HAAB, HABT, HAAT, HBSAG, HBSB, HBSAT, HBABN, HBCM, HBCAB, HBCAT, XBCABS, HBEAB, HBEAG, XHEPCS, [id number], [address], [facility], [facility], [address] [facility], [id number], [id number], [address] [facility], [id number], [facility], [id number], HCGAT Imaging: CT Results (most recent): 
Results from [facility] [place] on 03/27/20 CTA CHEST W OR W WO CONT Narrative Clinical history: r/o PE 
INDICATION:   r/o PE 
COMPARISON: 2/13/2020 TECHNIQUE: CT of the chest with  IV contrast , Isovue-370 is performed. Axial 
images from the thoracic inlet to the level of the upper abdomen are obtained. Manual post-processing of the images and coronal reformatting is also performed. CT dose reduction was achieved through use of a standardized protocol tailored 
for this examination and automatic exposure control for dose modulation. Multiplanar reformatted imaging was performed. Sagittal and coronal reformatting. 3-D Postprocessing of imaging was performed. 3-D MIP reconstructed images were obtained in the coronal plane. FINDINGS:  
There is no pulmonary embolism. There is no aortic aneurysm or dissection. Lingular atelectasis. Right middle lobe atelectasis. Moderate to severe 
centrilobular emphysematous change. Large right adrenal mass not significantly 
changed. There is no pleural or pericardial effusion. There is no mediastinal, 
axillary or hilar lymphadenopathy. The aorta is normal in course and caliber. The proximal pulmonary arteries are without evidence of filling defects. Right IJ port catheter. No lytic or blastic lesions are identified. The remainder of 
the upper abdomen visualized is unremarkable. Impression IMPRESSION:  
There is no pulmonary embolism. There is no aortic aneurysm or dissection. Moderate to severe emphysematous changes. Right adrenal metastatic lesion is not significantly changed in size. I personally reviewed the images. Increase in the size of the mediastinal nodes and right adrenal gland. Assessment & Plan:  
Deni De Leon is a 63 y.o. male with COPD, remote h/o colon cancer comes in for evaluation and management of adenocarcinoma of unknown primary. 1. 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 Involving mediastinal LNs, with no other PET findings. Per Thoracic Tumor Board discussion, this could represent upper GI origin, urothelial origin, germ cell tumor, or lung origin. Search for primary lesion was overall negative: Cystoscopy, EGD, colonoscopy negative for malignancy although EGD abnormal with diffusely enlarged gastric folds. Despite h/o colon cancer in 2002, it would be very odd to see a recurrence in the mediastinal LNs. CEA 26.8. Other tumor markers negative (AFP, hCG, CA 19-9). Unfortunately, this disease is considered incurable, but is treatable. At this time, I recommend chemotherapy treatment using the Carboplatin-Paclitaxel regimen (which covers both lung and GI primaries). We discussed the risks and benefits of chemotherapy with Carboplatin and Paclitaxel. Potential side effects include but are not limited to: nausea, vomiting, diarrhea, constipation, taste changes, allergic reactions, myelosuppression, infection, fatigue, alopecia, neuropathy, mucositis, renal failure, infertility, and rarely, death. Additionally, chemotherapy leads to radiosensitization which can intensify the side effects of radiation therapy. The patient has consented to beginning chemotherapy and chemo ed packet given. Completed 6 cycles of Carbo-Taxol. CT on 12/16/19 shows a mixed response to treatment no change in mediastinal adenopathy and increased size of right adrenal mass. Receiving Keytruda in 2nd line. CT shows disease progression in the mediastinal nodes and adrenal mass. Based on the gene expression profile revealing the likely primary to be bile duct, I recommended switching systemic therapy to Gemcitabine/Abraxane Starting palliative chemotherapy Gemcitabine/Abraxane - Cycle 2 Day 1 Tolerating treatment very well Denies any side effects. A detailed system by system evaluation of side effect was performed to assess chemotherapy related toxicity. Blood counts are acceptable. Results reviewed with the patient Symptom management form reviewed with patient. 2. Neoplasm pain: better 
-- Follow up with palliative 
-- oxycontin 10 mg ER every 12 hours -- oxycodone 10 mg [id number] 
-- Senna-docusate (pericolace) daily to prevent constipation. 3. H/o Stage I [T1NxMx] sigmoid colon cancer: Found in sigmoid adenoma during a colonoscopy; went on to undergo segmented resection of the sigmoid colon in 2002. Pathology per outside records: 
2/6/2002 sigmoid colon polyp: Well differentiated adenocarcinoma arising in an adenoma, involving the mucosa and invading into the upper half of the submucosal stalk. No vascular space involvement is identified. 2/21/2002 Sigmoidectomy, liver biopsy: 
-Sigmoid colon, segmented resection: No residual adenocarcinoma present. Polypectomy site with granulation tissue and vascular congestion. No LNs recovered. Distal and proximal margins benign. 
-Liver biopsy: Negative for metastatic carcinoma. No significant inflammation or obvious cirrhosis identified. Very minimal steatosis (rare cells with fat globules). -Addendum: Reblocks of adipose tissue; three small benign lymphoid aggregates (< 0.1cm). 4. COPD: Quit smoking in approx 2016. SOB improved after starting inhaler. 5. HTN: Well controlled on Lisinopril and Metoprolol. 6. BPH: On Flomax. 7. 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 Increased numbness in feet, tingling and pain in left hand. Left hand dominant. Increased Gabapentin 300mg BID on 10/16/19.  
-- Continue gabapentin to 300 mg TID, #90 tabs with 2 refills e-scribed on 11/11/19.  
 
8. Anemia from cancer/chronic disease 
 
observation 9. Severe protein calorie malnutrition - better Gaining weight Dietary consult Protein supplementation 10. Insomnia: Tried Ambien which did not work. Started on lorazepam 1 mg QHS which he reports helped him sleep better than the Ambien. Continue to monitor. 11. Anxiety/Depression: Follows up with palliative Signed by: Shan Gauthier MD 
                   April 2, 2020 He and/or his healthcare decision maker is aware that this patient-initiated Telehealth encounter is a billable service, with coverage as determined by his insurance carrier. He is aware that he may receive a bill and has provided verbal consent to proceed: Yes Pursuant to the emergency declaration under the [address] [place] [address], [facility] waiver authority and the [employer] Act, this Virtual  Visit was conducted, with patient's consent, to reduce the patient's risk of exposure to COVID-19 and provide continuity of care for an established patient. Services were provided through a video synchronous discussion virtually to substitute for in-person clinic visit. This visit was completed virtually using [employer].

## 2020-04-02 NOTE — PROGRESS NOTES
[employer]  Social Work Navigator Encounter     Patient Name:  Олег Rowe    Medical History: dx metastatic lung cancer    Advance Directives:  s  Narrative: SW provided pt info on SSDI, phone # to call. Pt states he was last paid in January. Pt girlfriend from [place] here for a visit- he is unsure how long she will stay.     Barriers to Care:     Assessment/Action:    Plan/Referral:     Other referral  Supportive listening

## 2020-04-02 NOTE — PROGRESS NOTES
[facility] Progress Note    Date: 2020    Name: New Trevino    MRN: [id number]         : 1960    Mr. Celso Rueda Arrived ambulatory and in no distress for Abraxane/Gemzar cycle 2 day 1. Assessment was completed, no acute issues at this time, no new complaints voiced. Port accessed without difficulty, labs drawn and in process. Chemotherapy Flowsheet 2020   Cycle C2D1   Date 2020   Drug / Regimen Abraxane/Gemzar   Dosage -   Pre Meds given   Notes given     Patient Vitals for the past 12 hrs:   Temp Pulse Resp BP SpO2   20 1409 -- 84 -- 122/83 --   20 1045 98.6 °F (37 °C) 88 18 97/51 98 %       Lab results were obtained and reviewed. Recent Results (from the past 12 hour(s))   CBC WITH AUTOMATED DIFF    Collection Time: 20 10:43 AM   Result Value Ref Range    WBC 7.0 4.1 - 11.1 K/uL    RBC 3.39 (L) 4.10 - 5.70 M/uL    HGB 10.2 (L) 12.1 - 17.0 g/dL    HCT 30.3 (L) 36.6 - 50.3 %    MCV 89.4 80.0 - 99.0 FL    MCH 30.1 26.0 - 34.0 PG    MCHC 33.7 30.0 - 36.5 g/dL    RDW 19.7 (H) 11.5 - 14.5 %    PLATELET 381 058 - 803 K/uL    MPV 10.4 8.9 - 12.9 FL    NRBC 0.3 (H) 0  WBC    ABSOLUTE NRBC 0.02 (H) 0.00 - 0.01 K/uL    NEUTROPHILS 44 32 - 75 %    LYMPHOCYTES 36 12 - 49 %    MONOCYTES 19 (H) 5 - 13 %    EOSINOPHILS 1 0 - 7 %    BASOPHILS 0 0 - 1 %    IMMATURE GRANULOCYTES 0 0.0 - 0.5 %    ABS. NEUTROPHILS 3.0 1.8 - 8.0 K/UL    ABS. LYMPHOCYTES 2.5 0.8 - 3.5 K/UL    ABS. MONOCYTES 1.3 (H) 0.0 - 1.0 K/UL    ABS. EOSINOPHILS 0.1 0.0 - 0.4 K/UL    ABS. BASOPHILS 0.0 0.0 - 0.1 K/UL    ABS. IMM.  GRANS. 0.0 0.00 - 0.04 K/UL    DF AUTOMATED     METABOLIC PANEL, COMPREHENSIVE    Collection Time: 20 10:43 AM   Result Value Ref Range    Sodium 140 136 - 145 mmol/L    Potassium 4.0 3.5 - 5.1 mmol/L    Chloride 104 97 - 108 mmol/L    CO2 26 21 - 32 mmol/L    Anion gap 10 5 - 15 mmol/L    Glucose 111 (H) 65 - 100 mg/dL    BUN 9 6 - 20 MG/DL    Creatinine 0.87 0.70 - 1.30 MG/DL BUN/Creatinine ratio 10 (L) 12 - 20      GFR est AA >60 >60 ml/min/1.73m2    GFR est non-AA >60 >60 ml/min/1.73m2    Calcium 8.6 8.5 - 10.1 MG/DL    Bilirubin, total 0.2 0.2 - 1.0 MG/DL    ALT (SGPT) 16 12 - 78 U/L    AST (SGOT) 13 (L) 15 - 37 U/L    Alk. phosphatase 82 45 - 117 U/L    Protein, total 6.9 6.4 - 8.2 g/dL    Albumin 3.2 (L) 3.5 - 5.0 g/dL    Globulin 3.7 2.0 - 4.0 g/dL    A-G Ratio 0.9 (L) 1.1 - 2.2       Pre-medications  were administered as ordered and chemotherapy was initiated.      Medications Administered     0.9% sodium chloride infusion     Admin Date  04/02/2020 Action  New Bag Dose  25 mL/hr Rate  25 mL/hr Route  IntraVENous Administered By  Maritza Cardenas RN          dexamethasone (DECADRON) 4 mg/mL injection 10 mg     Admin Date  04/02/2020 Action  Given Dose  10 mg Route  IntraVENous Administered By  Maritza Cardenas RN          gemcitabine (GEMZAR) 925 mg in 0.9% sodium chloride 250 mL, overfill volume 25 mL chemo infusion     Admin Date  04/02/2020 Action  New Bag Dose  925 mg Rate  598.7 mL/hr Route  IntraVENous Administered By  Maritza Cardenas RN          heparin (porcine) pf 300-500 Units     Admin Date  04/02/2020 Action  Given Dose  500 Units Route  InterCATHeter Administered By  Fide Civil RIOS          ondansetron [facility]) injection 8 mg     Admin Date  04/02/2020 Action  Given Dose  8 mg Route  IntraVENous Administered By  Maritza Cardenas RN          PACLitaxel-protein bound (ABRAXANE) 115.5 mg in 0.9% sodium chloride 23.1 mL chemo infusion     Admin Date  04/02/2020 Action  New Bag Dose  115.5 mg Rate  46.2 mL/hr Route  IntraVENous Administered By  Maritza Cardenas RN          saline peripheral flush soln 10 mL     Admin Date  04/02/2020 Action  Given Dose  10 mL Route  InterCATHeter Administered By  Yaritza Meyers              Mr. Jayde Raya tolerated treatment well and was discharged from [place] [address] in stable condition at [address] [facility] [facility] de-accessed, flushed & heparinized per protocol. He is to return on 04/16/16 at 1100 for his next appointment.     Future Appointments   Date Time Provider Carmen Wynn   4/16/2020 11:00  [facility] [address] [facility]   4/20/2020  2:00 PM Mirian Mayfield MD [facility] [address]   4/30/2020 11:00 AM Feliciano Jain MD [address] [place] [address] Ji   4/30/2020 11:00 AM [facility] INFUSION NURSE 2 [address] [facility]   5/14/2020 11:00 AM [facility] INFUSION NURSE 2 [address], RN  April 2, 2020

## 2020-04-02 NOTE — PROGRESS NOTES
Sonia Billy is a 63 y.o. male here for follow up for: Chief Complaint Patient presents with  Cancer  
  gallbladder/met  Chemotherapy Cycle 2 Day 1 [place]/Abrax Pt was in ED 3/27 for SOB - says he is much better now. Was prescribed medication but never filled it. Pt sees palliative med.

## 2020-04-03 NOTE — TELEPHONE ENCOUNTER
Palliative Medicine  Nursing Note  [phone]  Fax [phone]      Telephone Call  Patient Name: Jose Hsu  YOB: 1960    4/3/2020        Primary Decision Maker: Dalton Hameed - Son - [phone]    Secondary Decision Maker: Ariel Vazquez - [phone]    Supplemental (Other) Decision Maker: Kimberly Akbar - Child - [phone]    Supplemental (Other) Decision Maker: Lynne Licea Child - [phone]   Advance Care Planning 4/2/2020   Patient's Healthcare Decision Maker is: Verbal statement (Legal Next of Kin remains as decision maker)   Primary Decision Maker Name -   Primary Decision Maker Phone Number -   Primary Decision Maker Relationship to Patient -   Secondary Decision Maker Name -   Confirm Advance Directive None   Patient Would Like to Complete Advance Directive No       Spoke with patient today to follow up on his recent ER visit - patient stated that he is taking Prednisone as prescribed and feeling a lot better - he denies any additional episode of shortness of breath. Advised patient if symptoms returns/worsens or any other concerns to give our office a call as we can try to prevent an ER visit. Patient verbalized understanding.       Information was shared with Dr. Antoine Cardona, RN  Palliative Medicine

## 2020-04-15 NOTE — PROGRESS NOTES
Nurse Clarification of the Prior to Admission Medication Regimen The patient was interviewed regarding clarification of the prior to admission medication regimen. The individual(s) listed above were questioned regarding the patient's use of any other inhalers, topical products, over the counter medications, herbal medications, vitamin products or ophthalmic/nasal/otic medication use. Information Obtained From: patient and pharmacy [facility] [phone] Recommendations/Findings: The following amendments were made to the patient's active medication list on file at [facility]:  
 
1) Additions: ? Morphine 15mg ER q12 x 30 days ? Oxycodone IR 10 mg q6 hr prn pain 
? anoro inhalor 1 puff qd 2) Removals:  
? Gabapentin 300 mg 1 tablet tid duplicate 3) Changes: ? (Old regimen: gabapentin 300 mg 1 tablet tid /New regimen: 2 tablet  Tid ) PTA medication list was corrected to the following:  
 
Prior to Admission Medications Prescriptions Last Dose Informant Taking? LORazepam (ATIVAN) 1 mg tablet   No  
Sig: Take 1 Tab by mouth two (2) times daily as needed for Anxiety (or insomnia). Max Daily Amount: 2 mg. albuterol (PROVENTIL HFA, VENTOLIN HFA, PROAIR HFA) 90 mcg/actuation inhaler   No  
Sig: Take 2 Puffs by inhalation every four (4) hours as needed for Wheezing. amitriptyline (ELAVIL) 25 mg tablet   No  
Sig: Take 1 Tab by mouth nightly for 30 days. dicyclomine (BENTYL) 10 mg capsule   No  
Sig: Take 10 mg by mouth two (2) times a day.  
gabapentin (NEURONTIN) 300 mg capsule   No  
Sig: Take 2 Caps by mouth three (3) times daily. Max Daily Amount: 1,800 mg.  
lidocaine-prilocaine (EMLA) topical cream   No  
Sig: APPLY QUARTER SIZE AMOUNT TO PORT PRIOR TO CHEMOTHERAPY  
lisinopril (PRINIVIL, ZESTRIL) 5 mg tablet   No  
Sig: TAKE 1 TABLET BY MOUTH DAILY  
megestroL (MEGACE) 40 mg tablet   No  
Sig: TAKE 1 TABLET BY MOUTH TWICE DAILY metoprolol succinate (TOPROL-XL) 25 mg XL tablet   No  
Sig: Take 1 Tab by mouth nightly. morphine (KRISTIN) 20 mg ER Capsule   Yes Sig: Take 15 mg by mouth every twenty-four (24) hours. 15 mg every day for 30 days  
naloxone (NARCAN) 4 mg/actuation nasal spray   No  
Sig: Use 1 spray intranasally, then discard. Repeat with new spray every 2 min as needed for opioid overdose symptoms, alternating nostrils. ondansetron hcl (ZOFRAN) 4 mg tablet   No  
Sig: Take 2 Tabs by mouth every eight (8) hours as needed for Nausea. oxyCODONE IR (ROXICODONE) 10 mg tab immediate release tablet   Yes Sig: Take 10 mg by mouth every six (6) hours as needed for Pain.  
potassium chloride (K-DUR, KLOR-CON) 20 mEq tablet   No  
Sig: Take 1 Tab by mouth daily. prochlorperazine (COMPAZINE) 10 mg tablet   No  
Sig: Take 1 Tab by mouth every six (6) hours as needed for Nausea. senna-docusate (PERICOLACE) 8.6-50 mg per tablet   No  
Sig: Take 1 Tab by mouth nightly. sertraline (ZOLOFT) 50 mg tablet   No  
Sig: Take 1 Tab by mouth daily. tamsulosin (FLOMAX) 0.4 mg capsule   No  
Sig: Take 0.4 mg by mouth daily. traZODone (DESYREL) 50 mg tablet   No  
Sig: Take 1 Tab by mouth nightly. umeclidinium brm/vilanterol tr (ANORO ELLIPTA IN)   Yes Sig: Take 1 Puff by inhalation daily. Facility-Administered Medications: None Thank you, Martina Salazar

## 2020-04-15 NOTE — DISCHARGE INSTRUCTIONS
Patient Education        Chest Pain: Care Instructions  Your Care Instructions    There are many things that can cause chest pain. Some are not serious and will get better on their own in a few days. But some kinds of chest pain need more testing and treatment. Your doctor may have recommended a follow-up visit in the next 8 to 12 hours. If you are not getting better, you may need more tests or treatment. Even though your doctor has released you, you still need to watch for any problems. The doctor carefully checked you, but sometimes problems can develop later. If you have new symptoms or if your symptoms do not get better, get medical care right away. If you have worse or different chest pain or pressure that lasts more than 5 minutes or you passed out (lost consciousness), call 911 or seek other emergency help right away. A medical visit is only one step in your treatment. Even if you feel better, you still need to do what your doctor recommends, such as going to all suggested follow-up appointments and taking medicines exactly as directed. This will help you recover and help prevent future problems. How can you care for yourself at home? · Rest until you feel better. · Take your medicine exactly as prescribed. Call your doctor if you think you are having a problem with your medicine. · Do not drive after taking a prescription pain medicine. When should you call for help? Call 911 if:    · You passed out (lost consciousness).     · You have severe difficulty breathing.     · You have symptoms of a heart attack. These may include:  ? Chest pain or pressure, or a strange feeling in your chest.  ? Sweating. ? Shortness of breath. ? Nausea or vomiting. ? Pain, pressure, or a strange feeling in your back, neck, jaw, or upper belly or in one or both shoulders or arms. ? Lightheadedness or sudden weakness. ? A fast or irregular heartbeat.   After you call  911, the  may tell you to chew 1 adult-strength or 2 to 4 low-dose aspirin. Wait for an ambulance. Do not try to drive yourself.    Call your doctor today if:    · You have any trouble breathing.     · Your chest pain gets worse.     · You are dizzy or lightheaded, or you feel like you may faint.     · You are not getting better as expected.     · You are having new or different chest pain. Where can you learn more? Go to http://judi-juliana.info/  Enter A120 in the search box to learn more about \"Chest Pain: Care Instructions. \"  Current as of: June 26, 2019Content Version: 12.4  © 5872-5148 [employer]. Care instructions adapted under license by [employer] (which disclaims liability or warranty for this information). If you have questions about a medical condition or this instruction, always ask your healthcare professional. [place] [address] any warranty or liability for your use of this information. Patient Education        Musculoskeletal Chest Pain: Care Instructions  Your Care Instructions    Chest pain is not always a sign that something is wrong with your heart or that you have another serious problem. The doctor thinks your chest pain is caused by strained muscles or ligaments, inflamed chest cartilage, or another problem in your chest, rather than by your heart. You may need more tests to find the cause of your chest pain. Follow-up care is a key part of your treatment and safety. Be sure to make and go to all appointments, and call your doctor if you are having problems. It's also a good idea to know your test results and keep a list of the medicines you take. How can you care for yourself at home? · Take pain medicines exactly as directed. ? If the doctor gave you a prescription medicine for pain, take it as prescribed. ? If you are not taking a prescription pain medicine, ask your doctor if you can take an over-the-counter medicine.   · Rest and protect the sore area. · Stop, change, or take a break from any activity that may be causing your pain or soreness. · Put ice or a cold pack on the sore area for 10 to 20 minutes at a time. Try to do this every 1 to 2 hours for the next 3 days (when you are awake) or until the swelling goes down. Put a thin cloth between the ice and your skin. · After 2 or 3 days, apply a heating pad set on low or a warm cloth to the area that hurts. Some doctors suggest that you go back and forth between hot and cold. · Do not wrap or tape your ribs for support. This may cause you to take smaller breaths, which could increase your risk of lung problems. · Mentholated creams such as Bengay or Icy Hot may soothe sore muscles. Follow the instructions on the package. · Follow your doctor's instructions for exercising. · Gentle stretching and massage may help you get better faster. Stretch slowly to the point just before pain begins, and hold the stretch for at least 15 to 30 seconds. Do this 3 or 4 times a day. Stretch just after you have applied heat. · As your pain gets better, slowly return to your normal activities. Any increased pain may be a sign that you need to rest a while longer. When should you call for help? Call 911 anytime you think you may need emergency care. For example, call if:    · You have chest pain or pressure. This may occur with:  ? Sweating. ? Shortness of breath. ? Nausea or vomiting. ? Pain that spreads from the chest to the neck, jaw, or one or both shoulders or arms. ? Dizziness or lightheadedness. ? A fast or uneven pulse. After calling  911, chew 1 adult-strength aspirin. Wait for an ambulance.  Do not try to drive yourself.     · You have sudden chest pain and shortness of breath, or you cough up blood.    Call your doctor now or seek immediate medical care if:    · You have any trouble breathing.     · Your chest pain gets worse.     · Your chest pain occurs consistently with exercise and is relieved by rest.    Watch closely for changes in your health, and be sure to contact your doctor if:    · Your chest pain does not get better after 1 week. Where can you learn more? Go to http://judi-juliana.info/  Enter V293 in the search box to learn more about \"Musculoskeletal Chest Pain: Care Instructions. \"  Current as of: June 26, 2019Content Version: 12.4  © 3727-9322 [employer]. Care instructions adapted under license by [employer] (which disclaims liability or warranty for this information). If you have questions about a medical condition or this instruction, always ask your healthcare professional. [address] any warranty or liability for your use of this information.        CONTINUE YOUR CURRENT PRESCRIPTIONS AS THEY ARE PRESCRIBED    CALL DR. David Ellison TO DISCUSS YOUR CURRENT PAIN MANAGEMENT     TAKE 10 DEEP BREATHS EVERY HOUR WHILE AWAKE

## 2020-04-16 NOTE — PROGRESS NOTES
COVID-19 Screening Initial Follow-up Note Patient contacted regarding COVID-19  risk. Care Transition Nurse contacted the patient by telephone to perform post discharge assessment. Verified name and  with patient as identifiers. Provided introduction to self, and explanation of the CTN  role, and reason for call due to risk factors for infection and/or exposure to COVID-19. Patient declined, hung up after introduction. - [address]

## 2020-04-16 NOTE — TELEPHONE ENCOUNTER
Palliative Medicine  Nursing Note  [phone]  Fax [phone]      Telephone Call  Patient Name: Javier Armijo  YOB: 1960    4/16/2020      Primary Decision Maker: Vishal Bo - Son - [phone]    Secondary Decision Maker: Phoebe Rivera - [phone]    Supplemental (Other) Decision Maker: Kayden Rodas - Child - [phone]    Supplemental (Other) Decision Maker: Carlos Velázquez Child - [phone]   Advance Care Planning 4/16/2020   Patient's Healthcare Decision Maker is: Verbal statement (Legal Next of Kin remains as decision maker)   Primary Decision Maker Name -   Primary Decision Maker Phone Number -   Primary Decision Maker Relationship to Patient -   Secondary Decision Maker Name -   Confirm Advance Directive None   Patient Would Like to Complete Advance Directive No       Spoke with patient today and he reported that he is feeling a little better - no additional chest pain noted. He reported that is ankle is improving, and pain is much better. Virtual visit schedule with Dr. Shannan Cerrato for 4/21/2020 - advised if anything comes up between now and his next schedule visit to please call our office before going to the ER.   Patient verbalized understanding    Information was shared with Dr. Primitivo Severe, RN  Palliative Medicine

## 2020-04-16 NOTE — ED PROVIDER NOTES
EMERGENCY DEPARTMENT HISTORY AND PHYSICAL EXAM 
 
 
Date: 4/15/2020 Patient Name: Kareem Malagon History of Presenting Illness Chief Complaint Patient presents with  Chest Pain  
  started this AM and spreads across chest  
 Ankle Pain L ankle pain after hitting it on something 2 days ago History Provided By: Patient HPI: Kareem Malagon, 63 y.o. male presents to the ED with cc of CP/ injury to foot. . Pt with Cancer hx on Chemo currently. Present by POV with cc: chest pain. Pt states over the coupld of days he has had increasing substernal CP. Pt states pain sharp in nature rate 7/10, no alleviating factors. Pain worsened by taking a deep breath. He has had dry cough at baseline for him. There has been no fever or chills. Pt states SOA due to pain. He denies abd pain, n/v/d. There has been no recent pain or swelling of the lower extremities. He is not on any anti-coagulation meds at this time. Pt also states he has a hx of Neuropathy and at home this morning his left foot was hist against a piece of furniture resulting in mild swelling. There is no pain in foot. There are no other complaints, changes, or physical findings at this time. PCP: Snehal Tolbert NP No current facility-administered medications on file prior to encounter. Current Outpatient Medications on File Prior to Encounter Medication Sig Dispense Refill  morphine (KRISTIN) 20 mg ER Capsule Take 15 mg by mouth every twelve (12) hours. 15 mg every day for 30 days  oxyCODONE IR (ROXICODONE) 10 mg tab immediate release tablet Take 10 mg by mouth every six (6) hours as needed for Pain.  umeclidinium brm/vilanterol tr (ANORO ELLIPTA IN) Take 1 Puff by inhalation daily.  albuterol (PROVENTIL HFA, VENTOLIN HFA, PROAIR HFA) 90 mcg/actuation inhaler Take 2 Puffs by inhalation every four (4) hours as needed for Wheezing.  1 Inhaler 0  
  sertraline (ZOLOFT) 50 mg tablet Take 1 Tab by mouth daily. 30 Tab 2  
 amitriptyline (ELAVIL) 25 mg tablet Take 1 Tab by mouth nightly for 30 days. 30 Tab 0  
 gabapentin (NEURONTIN) 300 mg capsule Take 2 Caps by mouth three (3) times daily. Max Daily Amount: 1,800 mg. 180 Cap 3  
 metoprolol succinate (TOPROL-XL) 25 mg XL tablet Take 1 Tab by mouth nightly. 30 Tab 5  
 megestroL (MEGACE) 40 mg tablet TAKE 1 TABLET BY MOUTH TWICE DAILY 60 Tab 0  
 lidocaine-prilocaine (EMLA) topical cream APPLY QUARTER SIZE AMOUNT TO PORT PRIOR TO CHEMOTHERAPY 30 g 1  
 traZODone (DESYREL) 50 mg tablet Take 1 Tab by mouth nightly. 30 Tab 2  
 dicyclomine (BENTYL) 10 mg capsule Take 10 mg by mouth two (2) times a day.  LORazepam (ATIVAN) 1 mg tablet Take 1 Tab by mouth two (2) times daily as needed for Anxiety (or insomnia). Max Daily Amount: 2 mg. 30 Tab 0  
 senna-docusate (PERICOLACE) 8.6-50 mg per tablet Take 1 Tab by mouth nightly. 30 Tab 3  potassium chloride (K-DUR, KLOR-CON) 20 mEq tablet Take 1 Tab by mouth daily. 30 Tab 0  [DISCONTINUED] gabapentin (NEURONTIN) 300 mg capsule Take 1 Cap by mouth three (3) times daily. Max Daily Amount: 900 mg. Indications: Neuropathic Pain 90 Cap 2  
 ondansetron hcl (ZOFRAN) 4 mg tablet Take 2 Tabs by mouth every eight (8) hours as needed for Nausea. 45 Tab 3  prochlorperazine (COMPAZINE) 10 mg tablet Take 1 Tab by mouth every six (6) hours as needed for Nausea. 45 Tab 3  
 lisinopril (PRINIVIL, ZESTRIL) 5 mg tablet TAKE 1 TABLET BY MOUTH DAILY 30 Tab 0  
 naloxone (NARCAN) 4 mg/actuation nasal spray Use 1 spray intranasally, then discard. Repeat with new spray every 2 min as needed for opioid overdose symptoms, alternating nostrils. 1 Each 0  
 tamsulosin (FLOMAX) 0.4 mg capsule Take 0.4 mg by mouth daily.  [DISCONTINUED] OTHER Take  by inhalation. Alero Inhaler Past History Past Medical History: 
Past Medical History:  
Diagnosis Date  Abnormal nuclear stress test 12/7/2015  Cardiomyopathy ([facility] 2010 EF 45%  [facility]  Chronic systolic heart failure ([facility]  GERD (gastroesophageal reflux disease)  HTN (hypertension)  [facility]  PAC (premature atrial contraction)  Tobacco use disorder Past Surgical History: 
Past Surgical History:  
Procedure Laterality Date  COLONOSCOPY N/A 7/10/2019 COLONOSCOPY AND ESOPHAGOGASTRODUODENOSCOPY (EGD) performed by Louie Florez MD at [facility]  HX SPLENECTOMY  IR INSERT TUNL CVC W PORT OVER 5 YEARS  7/19/2019 Family History: 
Family History Problem Relation Age of Onset  Stroke Mother  Coronary Artery Disease Sister  Heart Disease Paternal Grandfather Social History: 
Social History Tobacco Use  Smoking status: Former Smoker Packs/day: 0.50 Types: Cigarettes Last attempt to quit: 4/22/2016 Years since quitting: 3.9  Smokeless tobacco: Never Used Substance Use Topics  Alcohol use: Not Currently Comment: rarely  Drug use: No  
 
 
Allergies: 
No Known Allergies Review of Systems Review of Systems Constitutional: Negative. Negative for appetite change, chills, fatigue and fever. HENT: Negative. Negative for congestion, rhinorrhea, sinus pressure and sore throat. Eyes: Negative. Respiratory: Positive for shortness of breath. Negative for cough, choking, chest tightness and wheezing. Cardiovascular: Positive for chest pain. Negative for palpitations and leg swelling. Gastrointestinal: Negative for abdominal pain, constipation, diarrhea, nausea and vomiting. Endocrine: Negative. Genitourinary: Negative. Negative for difficulty urinating, dysuria, flank pain and urgency. Musculoskeletal:  
     Left foot injury Skin: Negative. Neurological: Negative.   Negative for dizziness, speech difficulty, weakness, light-headedness, numbness and headaches. Psychiatric/Behavioral: Negative. All other systems reviewed and are negative. Physical Exam  
Physical Exam 
Vitals signs and nursing note reviewed. Constitutional:   
   General: He is not in acute distress. Appearance: He is well-developed. He is not diaphoretic. Comments: Thin male, appears fatigued HENT:  
   Head: Normocephalic and atraumatic. Mouth/Throat:  
   Pharynx: No oropharyngeal exudate. Eyes:  
   Conjunctiva/sclera: Conjunctivae normal.  
   Pupils: Pupils are equal, round, and reactive to light. Neck: Musculoskeletal: Normal range of motion and neck supple. Vascular: No JVD. Trachea: No tracheal deviation. Cardiovascular:  
   Rate and Rhythm: Normal rate and regular rhythm. Heart sounds: Normal heart sounds. No murmur. Pulmonary:  
   Effort: Pulmonary effort is normal. No respiratory distress. Breath sounds: Normal breath sounds. No stridor. No wheezing or rales. Chest:  
   Chest wall: No tenderness. Abdominal:  
   General: There is no distension. Palpations: Abdomen is soft. Tenderness: There is no abdominal tenderness. There is no guarding or rebound. Musculoskeletal: Normal range of motion. General: No tenderness. Comments: Small amount of swelling to top of foot, PMS intact, sensory- deficit in fine touch, pt with Neuropathy Skin: 
   General: Skin is warm and dry. Capillary Refill: Capillary refill takes less than 2 seconds. Neurological:  
   Mental Status: He is alert and oriented to person, place, and time. Cranial Nerves: No cranial nerve deficit. Comments: No gross motor or sensory deficits Psychiatric:     
   Behavior: Behavior normal.  
 
 
 
Diagnostic Study Results Labs - Recent Results (from the past 24 hour(s)) EKG, 12 LEAD, INITIAL Collection Time: 04/15/20  8:05 AM  
Result Value Ref Range Ventricular Rate 72 BPM  
 Atrial Rate 72 BPM  
 P-R Interval 146 ms  
 QRS Duration 100 ms Q-T Interval 402 ms QTC Calculation (Bezet) 440 ms Calculated P Axis 76 degrees Calculated R Axis 48 degrees Calculated T Axis 69 degrees Diagnosis Sinus rhythm with occasional premature ventricular complexes Poor R-wave Progression (consider lead placement or loss of anterior forces) Confirmed by Gely Andrews MD, [facility] ([id number]) on 4/15/2020 9:90:60 AM 
  
METABOLIC PANEL, COMPREHENSIVE Collection Time: 04/15/20  8:23 AM  
Result Value Ref Range Sodium 140 136 - 145 mmol/L Potassium 3.8 3.5 - 5.1 mmol/L Chloride 107 97 - 108 mmol/L  
 CO2 29 21 - 32 mmol/L Anion gap 4 (L) 5 - 15 mmol/L Glucose 112 (H) 65 - 100 mg/dL BUN 10 6 - 20 MG/DL Creatinine 0.94 0.70 - 1.30 MG/DL  
 BUN/Creatinine ratio 11 (L) 12 - 20 GFR est AA >60 >60 ml/min/1.73m2 GFR est non-AA >60 >60 ml/min/1.73m2 Calcium 9.4 8.5 - 10.1 MG/DL Bilirubin, total 0.4 0.2 - 1.0 MG/DL  
 ALT (SGPT) 19 12 - 78 U/L  
 AST (SGOT) 14 (L) 15 - 37 U/L Alk. phosphatase 84 45 - 117 U/L Protein, total 7.3 6.4 - 8.2 g/dL Albumin 3.4 (L) 3.5 - 5.0 g/dL Globulin 3.9 2.0 - 4.0 g/dL A-G Ratio 0.9 (L) 1.1 - 2.2 CK W/ REFLX CKMB Collection Time: 04/15/20  8:23 AM  
Result Value Ref Range CK 63 39 - 308 U/L  
TROPONIN I Collection Time: 04/15/20  8:23 AM  
Result Value Ref Range Troponin-I, Qt. <0.05 <0.05 ng/mL CBC WITH AUTOMATED DIFF Collection Time: 04/15/20  9:32 AM  
Result Value Ref Range WBC 6.7 4.1 - 11.1 K/uL  
 RBC 3.45 (L) 4.10 - 5.70 M/uL  
 HGB 10.3 (L) 12.1 - 17.0 g/dL HCT 31.1 (L) 36.6 - 50.3 % MCV 90.1 80.0 - 99.0 FL  
 MCH 29.9 26.0 - 34.0 PG  
 MCHC 33.1 30.0 - 36.5 g/dL RDW 21.7 (H) 11.5 - 14.5 % PLATELET 557 254 - 167 K/uL MPV 12.6 8.9 - 12.9 FL  
 NRBC 0.3 (H) 0  WBC ABSOLUTE NRBC 0.02 (H) 0.00 - 0.01 K/uL NEUTROPHILS 53 32 - 75 % LYMPHOCYTES 30 12 - 49 % MONOCYTES 17 (H) 5 - 13 % EOSINOPHILS 0 0 - 7 % BASOPHILS 0 0 - 1 % IMMATURE GRANULOCYTES 0 0.0 - 0.5 % ABS. NEUTROPHILS 3.6 1.8 - 8.0 K/UL  
 ABS. LYMPHOCYTES 2.0 0.8 - 3.5 K/UL  
 ABS. MONOCYTES 1.1 (H) 0.0 - 1.0 K/UL  
 ABS. EOSINOPHILS 0.0 0.0 - 0.4 K/UL  
 ABS. BASOPHILS 0.0 0.0 - 0.1 K/UL  
 ABS. IMM. GRANS. 0.0 0.00 - 0.04 K/UL  
 DF AUTOMATED    
 RBC COMMENTS ANISOCYTOSIS 2+ 
    
 RBC COMMENTS ACANTHOCYTES 1+ Radiologic Studies -  
CTA CHEST W OR W WO CONT Final Result IMPRESSION:  
1. No acute abnormality. Specifically, no evidence for pulmonary embolus. 2. COPD. 3. Mediastinal and right hilar adenopathy. Stable. 4. Right adrenal metastasis appears stable. XR FOOT LT MIN 3 V Final Result IMPRESSION: No acute fracture or dislocation. XR CHEST PORT Final Result IMPRESSION: No evidence of active lung disease. CT Results  (Last 48 hours) 04/15/20 0954  CTA [facility] CONT Final result Impression:  IMPRESSION:  
1. No acute abnormality. Specifically, no evidence for pulmonary embolus. 2. COPD. 3. Mediastinal and right hilar adenopathy. Stable. 4. Right adrenal metastasis appears stable. Narrative: Indication: Chest pain, shortness of breath. COMPARISON: 3/27/2020. Contrast-enhanced CT angiogram of the chest performed using 100 cc Isovue-370. Three-dimensional postprocessing performed. CT dose reduction was achieved through use of a standardized protocol tailored  
for this examination and are automatic exposure control for dose modulation dose  
reduction. FINDINGS:  
   
The pulmonary arteries are patent. There is no evidence for pulmonary embolus as  
was questioned. There is mild right hilar adenopathy and mild subcarinal adenopathy.   
   
Lungs demonstrate emphysematous change but there is no focal consolidation,  
 mass, or nodule. There is mild atelectatic change in the lingula. No pleural effusion. Right adrenal metastasis is again noted. CXR Results  (Last 48 hours) 04/15/20 0855  XR CHEST PORT Final result Impression:  IMPRESSION: No evidence of active lung disease. Narrative:  Portable chest 2 views dated 4/15/2020 Comparison chest dated 1/24/2020 History is chest pain 2 portable AP upright views of the chest were obtained. It is difficult to  
accurately assess the size of the cardiac silhouette. The Mediport catheter and  
reservoir on the right are again noted. There is hyperaeration of the lungs. No  
areas of lobar consolidation are identified. Medical Decision Making I am the first provider for this patient. I reviewed the vital signs, available nursing notes, past medical history, past surgical history, family history and social history. Vital Signs-Reviewed the patient's vital signs. Patient Vitals for the past 12 hrs: 
 BP  
04/15/20 1156 (!) 132/94 EKG interpretation: (Preliminary) Sinus, rate 72, normal axis/pr/qrs, no acute ST changes, PVCs. Interpreted by Michele Villalba DO 
 
 
Records Reviewed: Nursing Notes, Old Medical Records, Previous electrocardiograms, Previous Radiology Studies and Previous Laboratory Studies Provider Notes (Medical Decision Making): DDx- PE, Chest wall pain, ACS, pneumonia, METS to bone, foot fx/contusion ED Course:  
Initial assessment performed. The patients presenting problems have been discussed, and they are in agreement with the care plan formulated and outlined with them. I have encouraged them to ask questions as they arise throughout their visit. Pt had some improvement in pain with medications in the ED. Pt has pain meds at home Oxy/MS, will dc home with continued home regimen. Follow up with Palliative. ED work-up unremarkable, CT no PE or pneumonia. Disposition: 
DC home with his brother. DISCHARGE PLAN: 
1. Discharge Medication List as of 4/15/2020 11:44 AM  
  
 
2. Follow-up Information Follow up With Specialties Details Why Contact Info Estela Rodriguez NP Nurse Practitioner   [address] [facility] 
[phone] Ceclia Seip, MD Palliative Medicine   [address] [facility] [address] [place] [address] 
[phone] Briana Morgan MD Hematology and Oncology, Internal Medicine, Hematology, Oncology   [address] 
[phone] 3. Return to ED if worse Diagnosis Clinical Impression: 1. Chest pain, unspecified type 2. Non-small cell lung cancer, unspecified laterality ([facility] Attestations: 
 
Mirtha Solomon, DO Please note that this dictation was completed with [employer], the computer voice recognition software. Quite often unanticipated grammatical, syntax, homophones, and other interpretive errors are inadvertently transcribed by the computer software. Please disregard these errors. Please excuse any errors that have escaped final proofreading. Thank you.

## 2020-04-16 NOTE — PROGRESS NOTES
[facility] Progress Note    Date: 2020    Name: Avril Chaidez    MRN: [id number]         : 1960    Mr. Hines Arrived ambulatory and in no distress for cycle 2 day 5 of Abraxane/Gemzar regimen. Assessment was completed, patient in ER one day prior for c/o chest pains, ER work up negative. Verbal OK to treat received from Juan F Newman NP. Port accessed without difficulty, labs drawn on 4/15/20 used for today's treatment. Problem: Chemotherapy Treatment  Goal: *Chemotherapy regimen followed  Outcome: Progressing Towards Goal  Goal: *Hemodynamically stable  Outcome: Progressing Towards Goal  Goal: *Tolerating diet  Outcome: Progressing Towards Goal     Problem: Knowledge Deficit  Goal: *Verbalizes understanding of procedures and medications  Outcome: Progressing Towards Goal    Chemotherapy Flowsheet 2020   Cycle C2D15   Date 2020   Drug / Regimen -   Dosage -   Pre Meds given   Notes given         Mr. Hines's vitals were reviewed. Patient Vitals for the past 12 hrs:   Temp Pulse Resp BP SpO2   20 1413 -- 85 -- 111/71 --   20 1113 98.1 °F (36.7 °C) 79 18 97/46 97 %         Pre-medications  were administered as ordered and chemotherapy was initiated.   Medications Administered     0.9% sodium chloride infusion     Admin Date  2020 Action  New Bag Dose  25 mL/hr Rate  25 mL/hr Route  IntraVENous Administered By  Danette Cisneros RN          0.9% sodium chloride injection 10 mL     Admin Date  2020 Action  Given Dose  10 mL Route  IntraVENous Administered By  Gail Trujillo RN          dexamethasone (DECADRON) 4 mg/mL injection 10 mg     Admin Date  2020 Action  Given Dose  10 mg Route  IntraVENous Administered By  Danette Cisneros RN          gemcitabine (GEMZAR) 925 mg in 0.9% sodium chloride 250 mL, overfill volume 25 mL chemo infusion     Admin Date  2020 Action  New Bag Dose  925 mg Rate  598.7 mL/hr Route  IntraVENous Administered By  Parris Trevino Andrea SEQUEIRA RN          heparin (porcine) pf 300-500 Units     Admin Date  04/16/2020 Action  Given Dose  500 Units Route  InterCATHeter Administered By  Daniel Perez RN          ondansetron [facility]) injection 8 mg     Admin Date  04/16/2020 Action  Given Dose  8 mg Route  IntraVENous Administered By  Agatha Virk RN          PACLitaxel-protein bound (ABRAXANE) 115.5 mg in 0.9% sodium chloride 23.1 mL chemo infusion     Admin Date  04/16/2020 Action  New Bag Dose  115.5 mg Rate  46.2 mL/hr Route  IntraVENous Administered By  Agatha Virk, MIGUELITO          saline peripheral flush soln 10 mL     Admin Date  04/16/2020 Action  Given Dose  10 mL Route  InterCATHeter Administered By  Agatha Virk, MIGUELITO                Mr. Teodoro Espinosa tolerated treatment well, port flushed and de accessed, patient was discharged from [place] [address] in stable condition at 1420.      Future Appointments   Date Time Provider Carmen Wynn   4/21/2020 11:30 AM Kiara Duenas MD [facility] [place]   4/30/2020 11:00 AM Kayla Wiseman MD [address] [place]   4/30/2020 11:00 AM [facility] INFUSION NURSE 2 [address] [facility]   5/14/2020 11:00 AM [facility] INFUSION NURSE 2 [address] [facility]   5/28/2020  9:00 AM [facility] INFUSION NURSE 2 [address] REDDY [address]   6/11/2020 11:00 AM [facility] INFUSION NURSE [address] [place] [address]         Hans Rm RN  April 16, 2020

## 2020-04-20 NOTE — TELEPHONE ENCOUNTER
Triage for Controlled Substance Refill Request    Pain Diagnosis: _Adenocarcinoma of unknown primary    Last Outpatient Visit: _3/23/2020    Next Outpatient Visit: _4/20/2020    Reason for refill needed outside of office visit? -Appointment not scheduled prior to need for scheduled refill      Pharmacy: [facility] 13 CR (MS CONTIN) 15 mg CR   Dose and directions: Take 1 Tab by mouth every twelve (12) hours  Number dispensed:60  Date filled ( or Pharmacy):3/11/2020  # left:0    Medication:oxyCODONE IR (ROXICODONE) 10 mg tab  Dose and directions:1 tab by mouth every 4 hours   Number dispensed:120  Date filled ( or Pharmacy):3/11/2020  #left:0     reviewed: _yes     Date of Urine Drug Screen:  _n/a     Opioid Safety Handout given:  _yes     Appropriate for refill:  _yes     Action:  _ please refill

## 2020-04-20 NOTE — TELEPHONE ENCOUNTER
[address] [facility]  Palliative Social Work  Telephone Call      Time in: 12:30  Time out: 12:40    Note:  LCSW reached out to patient to follow up and provide psychosocial support. [place] pt's oxycodone ran out yesterday and he feels terrible today. Pt said he tried calling in, but it didn't work out. He would like a call re: the oxycodone and his pain. Pt did not feel like talking today. SW asked about calling him on another day to follow up and pt was agreeable. Plan:  Follow up by phone in the next week.     Juana Lnadon, CCM, MSSW, LCSW  Palliative   [facility] Palliative Medicine  [phone]

## 2020-04-20 NOTE — TELEPHONE ENCOUNTER
Calling patient/family to confirm virtual visit with Dr. Deep Zhang on 4/21/2020 AT 11:30AM with nurse calling him at 10:30am to get set up in virtual visit. Pt confirmed. Advised nurse would call them about 60 minutes before appt to help get pre visit done and set up in virtual waiting room.

## 2020-04-21 NOTE — PROGRESS NOTES
Palliative Medicine Office Visit Palliative Medicine Nurse Check In 
[phone] Patient Name: José Poe YOB: 1960 Date of Office Visit: 4/21/2020 Patient states: \" I don't like the morphine   \" From Check In Sheet (scanned in Media): 
Has a medical provider talked with you about cardiopulmonary resuscitation (CPR)? [x] Yes   [] No   [] Unable to obtain Nurse reminder to complete or update ACP FlowSheet: 
 
Is ACP on the Problem List?    [] Yes    [x] No 
IF ACP Document is ON FILE; Nurse to place ACP on Problem List  
 
Is there an ACP Note in Chart Review/Note? [] Yes    [x] No  
If NO: ALERT PROVIDER Advance Care Planning 4/16/2020 Patient's Healthcare Decision Maker is: Verbal statement (Legal Next of Kin remains as decision maker) Primary Decision Maker Name -  
Primary Decision Maker Phone Number -  
Primary Decision Maker Relationship to Patient - Secondary Decision Maker Name -  
Confirm Advance Directive None Patient Would Like to Complete Advance Directive No  
 
 
Is there anything that we should know about you as a person in order to provide you the best care possible? Have you been to the ER, urgent care clinic since your last visit? [] Yes   [x] No   [] Unable to obtain Have you been hospitalized since your last visit? [] Yes   [x] No   [] Unable to obtain Have you seen or consulted any other health care providers outside of the [address] [place] [address] since your last visit? [] Yes   [x] No   [] Unable to obtain Functional status (describe):  
 
 
 
Last BM: 4/20/2020  accessed (date): 4/21/2020 Bottle review (for opioid pain medication): 
Medication 1: Oxycodone IR 10 mg Date filled: 4/20/2020 Directions: 1 tab by mouth every 6 hours. # filled: 120 # left: 116 # pills taking per day: 4 Last dose taken:4/21/2020 am

## 2020-04-21 NOTE — PROGRESS NOTES
Palliative Medicine Outpatient Services [place]: [phone] ([phone]) Patient Name: Kenny Juarez YOB: 1960 Date of Current Visit: 04/21/20 Location of Current Visit:   
[] [facility] Office 
[] [facility] Office [] [id number] [place] [facility] Office [x] Home-virtual visit 
[] Other:   
 
Date of Initial Visit: 1/15/2020 Referral from: Dr. Areli XAVIER Primary Care Physician: Luis Daniel Hill NP 
  
 SUMMARY:  
Kenny Juarez is a 63y.o. year old with a  history of COPD, colon cancer in 2002, metastatic adenocarcinoma of unknown primary, who was referred to Palliative Medicine by Dr. Areli Portillo for management of symptoms and psychosocial support. The patients social history includes served in the [employer] for 22 years, lives with his girlfriend now. Treatment history-patient had progression of disease on carbo plus Taxol, currently on Keytruda infusions. He struggles with neoplasm related pain PALLIATIVE DIAGNOSES:  
 
  ICD-10-CM ICD-9-CM 1. Acute neoplasm-related pain G89.3 338.3 2. Neuropathy G62.9 355.9 3. Psychophysiological insomnia F51.04 307.42   
4. Adenocarcinoma of unknown primary ([address] C80.1 199.1 5. Depression, unspecified depression type F32.9 311 PLAN:  
Patient Instructions Dear Kenny Juarez , It was a pleasure seeing you today in your home virtually. We will see you again in 4 weeks If labs or imaging tests have been ordered for you today, please call the office  at [phone] 48 hours after completion to obtain the results. Your stated goal:  
-Better pain control 
-Better relationship with your providers thereby enhancing your experience with [employer] This is the plan we talked about: 1. Right-sided chest wall pain and flank pain - You stopped the MS contin on your own 1 month ago because it made you jittery.  
- You are questioning the medicines you are on because when you do not take the oxycodone, you feel very anxious. We talked about potential withdrawal when you do not take the medicine as prescribed. - You want to continue Oxycodone 10 mg every 6 hours as needed, no more than 4 tabs per day. - You do not want to use MS contin anymore. 2. Severe chemotherapy induced neuropathy - You are tolerating Gabapentin 600 mg three times a day and this is helping some - We added Amitriptyline 25 mg at bedtime and this is helping as well. 3.  Constipation 
-Constipation is a common side effect of pain medications as they slow your bowels. -Continue senna 2 tabs two times a day. This is necessary to keep your bowels soft. - Add Miralax every other day as a laxative. - Goal is to have soft bowel movements every day or every other day. - Please call us if you do not have bowel movements for more than 3 days. 4. Insomnia - You are struggling with severe lack of sleep due to stress and anxiety - You take Ativan 1 mg at bedtime and this helps you fall asleep but you sleep only for about 2 hours. 
-We added Trazodone and this is helping you. 5.  Anorexia/ acid reflux 
-You are struggling with significant loss of desire for food. You were offered Marinol which you do not want to try. You want to try something different. 
-We started you on Megace 40 mg 2 times a day but you did not try it for more than a day. Please try it at least for a week to see how you feel. 
-I recommend that you try to eat small portions of food multiple times a day instead of big meals. Also try to chew on some lemon wedges prior to attempting to eat which can improve your taste. Focus on high-calorie foods such as nuts, protein drinks with milk powder, fruits, etc. 
- You were seen in ER for reflux related pain on 4/15. 6. Anxiety/ depression 
- Continue Zoloft 25 mg at bedtime 
-You are willing to meet with our  once a month and travel in between This is what you have shared with us about Advance Care Planning: 
 
  Primary Decision Maker: Adriane Juarez - [phone] Secondary Decision Maker: Swati Miranda - [phone] Supplemental (Other) Decision Maker: Kj Gunderson - [phone] Supplemental (Other) Decision Maker: Berrios Rosita - [phone] Advance Care Planning 4/16/2020 Patient's Healthcare Decision Maker is: Verbal statement (Legal Next of Kin remains as decision maker) Primary Decision Maker Name -  
Primary Decision Maker Phone Number -  
Primary Decision Maker Relationship to Patient - Secondary Decision Maker Name -  
Confirm Advance Directive None Patient Would Like to Complete Advance Directive No  
 
 
 
 
The Palliative Medicine Team is here to support you and your family. Sincerely, 
 
 
Caroline Sparks MD and the Palliative Medicine Team 
 
 
 GOALS OF CARE / TREATMENT PREFERENCES:  
[====Goals of Care====] GOALS OF CARE: 
Patient / health care proxy stated goals: See Patient Instructions / Summary TREATMENT PREFERENCES:  
Code Status:  [x] Attempt Resuscitation       [] Do Not Attempt Resuscitation Advance Care Planning: 
[x] The [facility] Interdisciplinary Team has updated the ACP Navigator with Decision Maker and Patient Capacity Primary Decision Maker: Adriane Juarez - [phone] Secondary Decision Maker: Swati Miranda - [phone] Supplemental (Other) Decision Maker: Kj Gunderson - [phone] Supplemental (Other) Decision Maker: Yash Becker - [phone] Advance Care Planning 4/16/2020 Patient's Healthcare Decision Maker is: Verbal statement (Legal Next of Kin remains as decision maker) Primary Decision Maker Name -  
Primary Decision Maker Phone Number -  
Primary Decision Maker Relationship to Patient - Secondary Decision Maker Name -  
Confirm Advance Directive None Patient Would Like to Complete Advance Directive No  
 
 
Other: 
(If patient appropriate for POST, consider using PALLPOST smart phrase here) The palliative care team has discussed with patient / health care proxy about goals of care / treatment preferences for patient. 
[====Goals of Care====] PRESCRIPTIONS GIVEN:  
 
No orders of the defined types were placed in this encounter. FOLLOW UP: Future Appointments Date Time Provider Carmen Davisisti 4/21/2020 11:30 AM Caroline Sparks MD [address] [place]  
4/30/2020 11:00 AM Jonna Ormond, MD [address]  
4/30/2020 11:00 AM [facility] [place] INFUSION NURSE 2 [address] [place]  
5/14/2020 11:00 AM [facility] [place] INFUSION NURSE 2 [employer] [place]  
5/28/2020  9:00 AM [facility] [place] INFUSION NURSE 2 Ricky Dhill[address] [employer] [place]  
6/11/2020 11:00 AM [facility] [place] INFUSION NURSE 2 [employer] [place]  
  
 
 
 PHYSICIANS INVOLVED IN CARE:  
Patient Care Team: 
Mallory Sumner NP as PCP - General (Nurse Practitioner) Abigail Cross MD as Physician (Cardiology) Donal Michaud MD (Gastroenterology) Alverto Metcalf MD (Hematology and Oncology) Mary Serrano RN as Care Transitions Nurse HISTORY:  
Reviewed patient-completed ESAS and advance care planning form. Reviewed patient record in prescription monitoring program. 
 
CHIEF COMPLAINT:  
No chief complaint on file. HPI/SUBJECTIVE: The patient is: [x] Verbal / [] Nonverbal  
 
Patient seen virtually at his home. He tells me that over the last month he tried to experiment with his opioids. He stopped MS Contin because he thought it was making him too sleepy. He has been taking oxycodone 10 mg every 6 hours and he tried to decrease that to see how he felt when he got really jittery and anxious, ended up in the emergency room with sort of panic attack. He now realizes that he needs to take the medicine on a regular basis. He has not tried Megace for appetite, he only tried it 1 day and stopped. He is willing to retry it. No constipation. 
------------ Initial visit Patient is here alone. He starts off by talking about how his trust in the medical system has been broken especially with patient to physician communication. He lists all his concerns about his care over the last 6 months but is willing to be redirected and start with a clean [place] with our team.  His main complaint is right-sided flank pain for which he has been taking OxyContin and oxycodone. He has been chewing the OxyContin. He also struggles with lack of appetite, no desire to eat. He had significant constipation initially but now he is on bowel regimen. He is willing to work with  about his anxiety. Clinical Pain Assessment (nonverbal scale for nonverbal patients):  
[++++ Clinical Pain Assessment++++] [++++Pain Severity++++]: Pain: 8 
[++++Pain Character++++]: deep, gnawing 
[++++Pain Duration++++]: months 
[++++Pain Effect++++]: functional 
[++++Pain Factors++++]: none in particular 
[++++Pain Frequency++++]: on and off 
[++++Pain Location++++]: right flank [++++ Clinical Pain Assessment++++] FUNCTIONAL ASSESSMENT:  
 
Palliative Performance Scale (PPS): PPS: 70 PSYCHOSOCIAL/SPIRITUAL SCREENING:  
 
Any spiritual / Islam concerns: 
[] Yes /  [x] No 
 
Caregiver Burnout: 
[] Yes /  [x] No /  [] No Caregiver Present Anticipatory grief assessment:  
[x] Normal  / [] Maladaptive ESAS Anxiety: Anxiety: 7 ESAS Depression: Depression: 4 REVIEW OF SYSTEMS:  
 
The following systems were [x] reviewed / [] unable to be reviewed Systems: constitutional, ears/nose/mouth/throat, respiratory, gastrointestinal, genitourinary, musculoskeletal, integumentary, neurologic, psychiatric, endocrine. Positive findings noted below. Modified ESAS Completed by: provider Fatigue: 7 Drowsiness: 7 Depression: 4 Pain: 8 Anxiety: 7 Nausea: 7 Anorexia: 8 Dyspnea: 8 [facility]in Constipation: Yes Other Problem (Comment): 0 PHYSICAL EXAM:  
 
Wt Readings from Last 3 Encounters:  
20 149 lb 12.8 oz (67.9 kg) 04/15/20 152 lb (68.9 kg) 20 152 lb (68.9 kg) There were no vitals taken for this visit. Last bowel movement: See Nursing Note Constitutional   
[x] Appears well-developed and well-nourished in no apparent distress   
[] Abnormal: 
Mental status [x] Alert and awake [x] Oriented to person/place/time 
[x] Able to follow commands 
[] Abnormal:  
Eyes 
[x] EOM normal  
[x] Sclera normal  
[x] No visible ocular discharge 
[] Abnormal:  
HENT [x] Normocephalic, atraumatic [x] Mouth/Throat: Moist mucous membranes  
[x] External Ears normal 
[] Abnormal: 
Neck [x] No visualized mass 
[] Abnormal: 
Pulmonary/Chest  
[x] Respiratory effort normal 
[x] No visualized signs of difficulty breathing or respiratory distress 
[] Abnormal: Musculoskeletal 
[x] Normal gait with no signs of ataxia [x] Normal range of motion of neck [] Abnormal: 
Neurological:  
[x] No facial asymmetry (Cranial nerve 7 motor function) [x] No gaze palsy 
[] Abnormal:  
Skin 
[x] No significant exanthematous lesions or discoloration noted on facial skin 
[] Abnormal: Psychiatric [x] Normal affect [x] No hallucinations [] Abnormal: 
 
Other pertinent observable physical exam findings: 
 
Due to this being a TeleHealth evaluation, many elements of the physical examination are unable to be assessed. HISTORY:  
 
Past Medical History:  
Diagnosis Date  Abnormal nuclear stress test 2015  Cardiomyopathy ([address] 2010 EF 45%  Chronic obstructive pulmonary disease ([address]  Chronic systolic heart failure ([address]  GERD (gastroesophageal reflux disease)  HTN (hypertension)  Lung cancer ([address]  PAC (premature atrial contraction)  Tobacco use disorder Past Surgical History: Procedure Laterality Date  COLONOSCOPY N/A 7/10/2019 COLONOSCOPY AND ESOPHAGOGASTRODUODENOSCOPY (EGD) performed by Marcie Marquez MD at [facility]  HX SPLENECTOMY  IR INSERT TUNL CVC W PORT OVER 5 YEARS  2019 Family History Problem Relation Age of Onset  Stroke Mother  Coronary Artery Disease Sister  Heart Disease Paternal Grandfather History reviewed, no pertinent family history. Social History Tobacco Use  Smoking status: Former Smoker Packs/day: 0.50 Types: Cigarettes Last attempt to quit: 2016 Years since quittin.0  Smokeless tobacco: Never Used Substance Use Topics  Alcohol use: Not Currently Comment: rarely No Known Allergies Current Outpatient Medications Medication Sig  morphine CR (MS CONTIN) 15 mg CR tablet Take 1 Tab by mouth every twelve (12) hours for 30 days. Max Daily Amount: 30 mg.  
 oxyCODONE IR (ROXICODONE) 10 mg tab immediate release tablet Take 1 Tab by mouth every six (6) hours as needed for Pain for up to 30 days. Max Daily Amount: 40 mg.  
 morphine (KRISTIN) 20 mg ER Capsule Take 15 mg by mouth every twelve (12) hours. 15 mg every day for 30 days  umeclidinium brm/vilanterol tr (ANORO ELLIPTA IN) Take 1 Puff by inhalation daily.  albuterol (PROVENTIL HFA, VENTOLIN HFA, PROAIR HFA) 90 mcg/actuation inhaler Take 2 Puffs by inhalation every four (4) hours as needed for Wheezing.  sertraline (ZOLOFT) 50 mg tablet Take 1 Tab by mouth daily.  amitriptyline (ELAVIL) 25 mg tablet Take 1 Tab by mouth nightly for 30 days.  gabapentin (NEURONTIN) 300 mg capsule Take 2 Caps by mouth three (3) times daily. Max Daily Amount: 1,800 mg.  
 metoprolol succinate (TOPROL-XL) 25 mg XL tablet Take 1 Tab by mouth nightly.  megestroL (MEGACE) 40 mg tablet TAKE 1 TABLET BY MOUTH TWICE DAILY  lidocaine-prilocaine (EMLA) topical cream APPLY QUARTER SIZE AMOUNT TO PORT PRIOR TO CHEMOTHERAPY  traZODone (DESYREL) 50 mg tablet Take 1 Tab by mouth nightly.  dicyclomine (BENTYL) 10 mg capsule Take 10 mg by mouth two (2) times a day.  LORazepam (ATIVAN) 1 mg tablet Take 1 Tab by mouth two (2) times daily as needed for Anxiety (or insomnia). Max Daily Amount: 2 mg.  senna-docusate (PERICOLACE) 8.6-50 mg per tablet Take 1 Tab by mouth nightly.  potassium chloride (K-DUR, KLOR-CON) 20 mEq tablet Take 1 Tab by mouth daily.  ondansetron hcl (ZOFRAN) 4 mg tablet Take 2 Tabs by mouth every eight (8) hours as needed for Nausea.  prochlorperazine (COMPAZINE) 10 mg tablet Take 1 Tab by mouth every six (6) hours as needed for Nausea.  lisinopril (PRINIVIL, ZESTRIL) 5 mg tablet TAKE 1 TABLET BY MOUTH DAILY  naloxone (NARCAN) 4 mg/actuation nasal spray Use 1 spray intranasally, then discard. Repeat with new spray every 2 min as needed for opioid overdose symptoms, alternating nostrils.  tamsulosin (FLOMAX) 0.4 mg capsule Take 0.4 mg by mouth daily. No current facility-administered medications for this visit. LAB DATA REVIEWED:  
 
Lab Results Component Value Date/Time WBC 6.7 04/15/2020 09:32 AM  
 HGB 10.3 (L) 04/15/2020 09:32 AM  
 PLATELET 747 70/01/1076 09:32 AM  
 
Lab Results Component Value Date/Time Sodium 140 04/15/2020 08:23 AM  
 Potassium 3.8 04/15/2020 08:23 AM  
 Chloride 107 04/15/2020 08:23 AM  
 CO2 29 04/15/2020 08:23 AM  
 BUN 10 04/15/2020 08:23 AM  
 Creatinine 0.94 04/15/2020 08:23 AM  
 Calcium 9.4 04/15/2020 08:23 AM  
  
Lab Results Component Value Date/Time AST (SGOT) 14 (L) 04/15/2020 08:23 AM  
 Alk. phosphatase 84 04/15/2020 08:23 AM  
 Protein, total 7.3 04/15/2020 08:23 AM  
 Albumin 3.4 (L) 04/15/2020 08:23 AM  
 Globulin 3.9 04/15/2020 08:23 AM  
 
Lab Results Component Value Date/Time INR 1.0 11/25/2015 10:22 AM  
 Prothrombin time 10.6 11/25/2015 10:22 AM  
  
Lab Results Component Value Date/Time Iron 52 10/07/2019 12:53 PM  
 TIBC 235 (L) 10/07/2019 12:53 PM  
 Iron % saturation 22 10/07/2019 12:53 PM  
 Ferritin 880 (H) 10/07/2019 12:53 PM  
  
 
 
 CONTROLLED SUBSTANCES SAFETY ASSESSMENT (IF ON CONTROLLED SUBSTANCES):  
 
Reviewed opioid safety handout:  [] Yes   [] No 
24 hour opioid dose >150mg morphine equivalent/day:  [] Yes   [] No 
Benzodiazepines:  [] Yes   [] No 
Sleep apnea:  [] Yes   [] No 
Urine Toxicology Testing within last 6 months:  [] Yes   [] No 
History of or new aberrant medication taking behaviors:  [] Yes   [] No 
Has Narcan been prescribed [] Yes   [] No 
 
   
 
Total time: 70 minutes Counseling / coordination time: 60 minutes 
> 50% counseling / coordination?:  
 
Consent: 
He and/or health care decision maker is aware that that he may receive a bill for this telehealth service, depending on his insurance coverage, and has provided verbal consent to proceed: Yes CPT Codes 01429-50709 for Established Patients may apply to this Telehealth Visit Pursuant to the emergency declaration under the [facility], 1135 waiver authority and the [employer] Act, this Virtual  Visit was conducted, with patient's consent, to reduce the patient's risk of exposure to COVID-19 and provide continuity of care for an established patient. Services were provided through a video synchronous discussion virtually to substitute for in-person clinic visit.

## 2020-04-21 NOTE — PATIENT INSTRUCTIONS
Dear Arsalan Andrade , It was a pleasure seeing you today in your home virtually. We will see you again in 4 weeks If labs or imaging tests have been ordered for you today, please call the office  at [phone] 48 hours after completion to obtain the results. Your stated goal:  
-Better pain control 
-Better relationship with your providers thereby enhancing your experience with [employer] This is the plan we talked about: 1. Right-sided chest wall pain and flank pain - You stopped the MS contin on your own 1 month ago because it made you jittery. - You are questioning the medicines you are on because when you do not take the oxycodone, you feel very anxious. We talked about potential withdrawal when you do not take the medicine as prescribed. - You want to continue Oxycodone 10 mg every 6 hours as needed, no more than 4 tabs per day. - You do not want to use MS contin anymore. 2. Severe chemotherapy induced neuropathy - You are tolerating Gabapentin 600 mg three times a day and this is helping some - We added Amitriptyline 25 mg at bedtime and this is helping as well. 3.  Constipation 
-Constipation is a common side effect of pain medications as they slow your bowels. -Continue senna 2 tabs two times a day. This is necessary to keep your bowels soft. - Add Miralax every other day as a laxative. - Goal is to have soft bowel movements every day or every other day. - Please call us if you do not have bowel movements for more than 3 days. 4. Insomnia - You are struggling with severe lack of sleep due to stress and anxiety - You take Ativan 1 mg at bedtime and this helps you fall asleep but you sleep only for about 2 hours. 
-We added Trazodone and this is helping you. 5.  Anorexia/ acid reflux 
-You are struggling with significant loss of desire for food. You were offered Marinol which you do not want to try. You want to try something different. -We started you on Megace 40 mg 2 times a day but you did not try it for more than a day. Please try it at least for a week to see how you feel. 
-I recommend that you try to eat small portions of food multiple times a day instead of big meals. Also try to chew on some lemon wedges prior to attempting to eat which can improve your taste. Focus on high-calorie foods such as nuts, protein drinks with milk powder, fruits, etc. 
- You were seen in ER for reflux related pain on 4/15. 6. Anxiety/ depression 
- Continue Zoloft 25 mg at bedtime 
-You are willing to meet with our  once a month and travel in between This is what you have shared with us about Advance Care Planning: 
 
  Primary Decision Maker: Barbara Elizabeth - [facility] - [phone] Secondary Decision Maker: Erich [facility] - [phone] Supplemental (Other) Decision Maker: Keron Zavala - [phone] Supplemental (Other) Decision Maker: Venu Issa - [phone] Advance Care Planning 4/16/2020 Patient's Healthcare Decision Maker is: Verbal statement (Legal Next of Kin remains as decision maker) Primary Decision Maker Name -  
Primary Decision Maker Phone Number -  
Primary Decision Maker Relationship to Patient - Secondary Decision Maker Name -  
Confirm Advance Directive None Patient Would Like to Complete Advance Directive No  
 
 
 
 
The Palliative Medicine Team is here to support you and your family.   
 
 
Sincerely, 
 
 
Luna Hernández MD and the Palliative Medicine Team

## 2020-04-30 NOTE — PROGRESS NOTES
Deni De Leon is a 63 y.o. AA male here for follow up for:  Chief Complaint   Patient presents with    Cancer     gallbladder/met    Chemotherapy   Cycle 3 Day 1    1. Have you been to the ER, urgent care clinic since your last visit? Hospitalized since your last visit? Yes - ED 4/15 chest and ankle pain    2. Have you seen or consulted any other health care providers outside of the [address] [place] [address] since your last visit? Include any pap smears or colon screening. No    *Pt see palliative med.

## 2020-04-30 NOTE — PROGRESS NOTES
[employer]  Social Work Navigator Encounter     Patient Name:  Joshua Quiroz   Medical History:     Advance Directives:    Narrative: SW spoke to pt about if he has called Social security about SSDI and/or if he has gotten his will done. Pt states he is having a hard time focusing with meds/oxy. SW will call him tomorrow when he is home about LINC and SSDI.       Barriers to Care:     Assessment/Action:    Plan/Referral:     Insurance/Entitlements referral  Legal referral

## 2020-04-30 NOTE — PROGRESS NOTES
[facility] Progress Note    Date: 2020    Name: Nancy Whaley    MRN: [id number]         : 1960    Mr. Rosio Rodriguez arrived ambulatory at 1030 and in no distress for C3D1 Gemzar+Abraxane. Assessment was completed, no acute issues at this time, no new complaints voiced. Venous Access Device [place], [place] [id number] 19 Upper chest (subclavicular area, right (Active)   Central Line Being Utilized Yes 2020 10:30 AM   Criteria for Appropriate Use Irritant/vesicant medication 2020 10:30 AM   Site Assessment Clean, dry, & intact 2020 10:30 AM   Date of Last Dressing Change 20 10:30 AM   Dressing Status New;Clean, dry, & intact 2020 10:30 AM   Dressing Type Transparent 2020 10:30 AM   Action Taken Blood drawn 2020 10:30 AM   Date Accessed (Medial Site) 20 10:30 AM   Access Time (Medial Site) 1045 2020 10:30 AM   Access Needle Size (Site #1) 20 G 2020 10:30 AM   Access Needle Length (Medial Site) 0.75 inches 2020 10:30 AM   Positive Blood Return (Medial Site) Yes 2020 10:30 AM   Action Taken (Medial Site) Blood drawn;Flushed 2020 10:30 AM   Alcohol Cap Used Yes 2020 10:30 AM      + blood return noted, labs drawn and sent. Proceeded to appt with Dr. Mr. Bullock Lose vitals were reviewed. Patient Vitals for the past 12 hrs:   Temp Pulse Resp BP SpO2   20 1033 97.4 °F (36.3 °C) 100 18 91/67 98 %     Lab results were obtained and reviewed.   Recent Results (from the past 12 hour(s))   CBC WITH AUTOMATED DIFF    Collection Time: 20 10:47 AM   Result Value Ref Range    WBC 7.8 4.1 - 11.1 K/uL    RBC 3.81 (L) 4.10 - 5.70 M/uL    HGB 11.4 (L) 12.1 - 17.0 g/dL    HCT 34.2 (L) 36.6 - 50.3 %    MCV 89.8 80.0 - 99.0 FL    MCH 29.9 26.0 - 34.0 PG    MCHC 33.3 30.0 - 36.5 g/dL    RDW 21.0 (H) 11.5 - 14.5 %    PLATELET 029 021 - 617 K/uL    MPV 11.7 8.9 - 12.9 FL    NRBC 0.0 0  WBC    ABSOLUTE NRBC 0.00 0.00 - 0.01 K/uL    NEUTROPHILS 62 32 - 75 %    LYMPHOCYTES 27 12 - 49 %    MONOCYTES 10 5 - 13 %    EOSINOPHILS 1 0 - 7 %    BASOPHILS 0 0 - 1 %    IMMATURE GRANULOCYTES 0 0.0 - 0.5 %    ABS. NEUTROPHILS 4.8 1.8 - 8.0 K/UL    ABS. LYMPHOCYTES 2.1 0.8 - 3.5 K/UL    ABS. MONOCYTES 0.8 0.0 - 1.0 K/UL    ABS. EOSINOPHILS 0.1 0.0 - 0.4 K/UL    ABS. BASOPHILS 0.0 0.0 - 0.1 K/UL    ABS. IMM. GRANS. 0.0 0.00 - 0.04 K/UL    DF SMEAR SCANNED      RBC COMMENTS ANISOCYTOSIS  2+        RBC COMMENTS SOBEIDA CELLS  PRESENT       METABOLIC PANEL, COMPREHENSIVE    Collection Time: 04/30/20 10:47 AM   Result Value Ref Range    Sodium 139 136 - 145 mmol/L    Potassium 3.8 3.5 - 5.1 mmol/L    Chloride 106 97 - 108 mmol/L    CO2 24 21 - 32 mmol/L    Anion gap 9 5 - 15 mmol/L    Glucose 168 (H) 65 - 100 mg/dL    BUN 15 6 - 20 MG/DL    Creatinine 1.03 0.70 - 1.30 MG/DL    BUN/Creatinine ratio 15 12 - 20      GFR est AA >60 >60 ml/min/1.73m2    GFR est non-AA >60 >60 ml/min/1.73m2    Calcium 9.1 8.5 - 10.1 MG/DL    Bilirubin, total 0.3 0.2 - 1.0 MG/DL    ALT (SGPT) 14 12 - 78 U/L    AST (SGOT) 14 (L) 15 - 37 U/L    Alk. phosphatase 79 45 - 117 U/L    Protein, total 7.6 6.4 - 8.2 g/dL    Albumin 3.4 (L) 3.5 - 5.0 g/dL    Globulin 4.2 (H) 2.0 - 4.0 g/dL    A-G Ratio 0.8 (L) 1.1 - 2.2       Patient's labs within parameters for treatment. Pre-medications  were administered as ordered and chemotherapy was initiated.      Medication:  Medications Administered     0.9% sodium chloride infusion     Admin Date  04/30/2020 Action  New Bag Dose  25 mL/hr Rate  25 mL/hr Route  IntraVENous Administered By  Ladonna Cronin A          0.9% sodium chloride injection 10 mL     Admin Date  04/30/2020 Action  Given Dose  10 mL Route  IntraVENous Administered By  Ladonna RIOS          dexamethasone (DECADRON) 4 mg/mL injection 10 mg     Admin Date  04/30/2020 Action  Given Dose  10 mg Route  IntraVENous Administered By  Ladonna echevarria (GEMZAR) 925 mg in 0.9% sodium chloride 250 mL, overfill volume 25 mL chemo infusion     Admin Date  04/30/2020 Action  New Bag Dose  925 mg Rate  598.7 mL/hr Route  IntraVENous Administered By  Londell Degree A          heparin (porcine) pf 300-500 Units     Admin Date  04/30/2020 Action  Given Dose  500 Units Route  InterCATHeter Administered By  [employer] Degree A          ondansetron [facility]) injection 8 mg     Admin Date  04/30/2020 Action  Given Dose  8 mg Route  IntraVENous Administered By  [employer] Degree A          PACLitaxel-protein bound (ABRAXANE) 115.5 mg in 0.9% sodium chloride 23.1 mL chemo infusion     Admin Date  04/30/2020 Action  New Bag Dose  115.5 mg Rate  46.2 mL/hr Route  IntraVENous Administered By  Londell Degree A          saline peripheral flush soln 10 mL     Admin Date  04/30/2020 Action  Given Dose  10 mL Route  InterCATHeter Administered By  Alethea Mustache Date  04/30/2020 Action  Given Dose  10 mL Route  InterCATHeter Administered By  Londell Degree A              Patient port flushed; heparinized; and de-accessed per protocol. Mr. Charmaine Kendall tolerated treatment well and was discharged from [place] [address] in stable condition at 1430. He is aware of when to return for his next appointment.     Future Appointments   Date Time Provider Carmen Wynn   4/30/2020 11:00 AM Hector Baca MD [address]   4/30/2020 11:00 AM [facility] INFUSION NURSE 2 [address] [facility]   5/14/2020 11:00 AM [facility] INFUSION NURSE 2 [address] [facility]   5/19/2020 10:30 AM Mila Randhawa MD [address]   5/28/2020  9:00 AM [facility] INFUSION NURSE 2 [address] COM   6/11/2020 11:00 AM [facility] INFUSION NURSE 2 [address] [facility]       Amanda Lay  April 30, 2020

## 2020-05-07 NOTE — TELEPHONE ENCOUNTER
Returned call to patient who also had his cousin from [place] on the line patient requesting refills on Zoloft , senna , gabapentin ,       Also requested amitriptyline patient no longer taking medication has been changed to Zoloft , patient has refills at pharmacy and can  today. [facility] also sent today for refill.      C/o ear drainage and often draining down throat, rx per Dr Loraine Sandra for Amoxicillin 500 mg BIDx 5 days #10 to local pharmacy     Oxycodone on hand and patient has enough medication until refill     Returned call to patients son Mr Oneil Wilkins to inform [phone]

## 2020-05-08 NOTE — TELEPHONE ENCOUNTER
Patient is calling to speak to nurse states he needs to go to hospital. Advised nurses  Would call him back to discuss.

## 2020-05-08 NOTE — TELEPHONE ENCOUNTER
Returned call to patient and he stated he has his nephew, Vaughn Medrano on the line as well. Per patient he is having very uncontrollable pain in his left side - pain is stabbing, radiating from his abdomen to rib cage, and rated pain 9/10 on pain scale. Patient reported taken oxycodone and Gabapentin with about 30 minutes of pain relief. Per patient pain started after he took Amoxicillin. Patient denies nausea, vomiting and did have a bowel movement this morning. Patient inquired if he can go to the hospital - advised per MD burroughs to go to the hospital.  Patient inquired if he will have to stay hours in the ER - advised that I am unable to say how long of a wait he will have in the ER.   Advised that he can also call [facility] to see if they can come out to evaluate and treat as needed - he may still have a wait, but at home and not in the ER - patient verbalized understanding and will call [place]

## 2020-05-08 NOTE — TELEPHONE ENCOUNTER
Returned call to patient and he stated that he is having some pain in his left side. He is taking antibiotics as prescribed as well as pain medication. Patient stated that he is calling this morning to clarify instruction given to him v/s instruction given to his son. He stated that nurse advise that he call out office with any questions or concerns before going to the ER - I confirmed that is the process. He stated that someone else told his son to go directly to the ER (son in the background saying, \"no - they said call the office before going to the ER\"). Advised that they both received the same information to call our office with any question or concerns, so we can triage and treat before going to the ER.   Patient and son verbalized understanding \"although they are still arguing in the background\"

## 2020-05-08 NOTE — TELEPHONE ENCOUNTER
Patient is calling sounding very confused. States he was told to call office. Then he states he needs to speak to nurse [place]. Advised that I would have nurse call him back.

## 2020-05-14 NOTE — TELEPHONE ENCOUNTER
Patient calling stating he needs to speak to MD.  Rafi Aldrich would have MD call him back. Called On call provider and sent her information and she will call patient. Advised Dr. Marisol Zhang saw patient today.

## 2020-05-14 NOTE — TELEPHONE ENCOUNTER
Returned call to Ms Karthik Gonzalez her concern was would she be admitted to attend patients OPIC appointment today as PM would be visiting him at that time  Caregiver reports patients cognition is not well and she wants to ensure she receives information to aid him in the home with his care, advised she would have to inform [facility] staff of this to determine admission , She in not on patients release of informations

## 2020-05-14 NOTE — TELEPHONE ENCOUNTER
Returned call to Ms Wiliam Acevedo to f/u on medications in the home and go over visit today ,     Morphine 15 mg on hand in the home , per Dr. Sully Sweeney patient to restart RX tonight , take medication trazadone and Morphine  at least two hours apart and take Morphine at bedtime , No Rx for Trazadone in the home rx sent to patients local pharmacy,  Ms Wiliam Acevedo also advised to keep a behaviors and pain log , Ms Wiliam Acevedo verbalized understanding ,patient on speaker phone and verbalized understanding as well     Orders for hospital bed placed

## 2020-05-14 NOTE — PROGRESS NOTES
[facility] Progress Note    Date: May 14, 2020    Name: Corin Garcia    MRN: [id number]         : 1960    Mr. Hines Arrived ambulatory and in no distress for cycle 3 day 15 of Abraxane/Gemzar regimen. Assessment was completed, patient c/o severe upper back pain rated 9/10, patient also reports memory loss. Patient has been calling palliative care with his complaints. Call received from palliative med today to say the doctor will be coming to the infusion center to see the patient today. Order received to add a urine tox screen to labs today . Port accessed without difficulty, labs drawn and processed. Problem: Chemotherapy Treatment  Goal: *Chemotherapy regimen followed  Outcome: Progressing Towards Goal  Goal: *Hemodynamically stable  Outcome: Progressing Towards Goal  Goal: *Tolerating diet  Outcome: Progressing Towards Goal     Problem: Knowledge Deficit  Goal: *Verbalizes understanding of procedures and medications  Outcome: Progressing Towards Goal     Problem: Patient Education:  Go to Education Activity  Goal: Patient/Family Education  Outcome: Progressing Towards Goal    Chemotherapy Flowsheet 2020   Cycle C3D15   Date 2020   Drug / Regimen Abraxane/Gemzar   Dosage -   Pre Meds given   Notes given         Mr. Hines's vitals were reviewed. Patient Vitals for the past 12 hrs:   Temp Pulse Resp BP SpO2   20 1440 -- 93 -- 114/59 --   20 1030 97.4 °F (36.3 °C) 68 18 135/83 97 %         Lab results were obtained and reviewed.   Recent Results (from the past 12 hour(s))   CBC WITH AUTOMATED DIFF    Collection Time: 20 10:50 AM   Result Value Ref Range    WBC 7.4 4.1 - 11.1 K/uL    RBC 3.49 (L) 4.10 - 5.70 M/uL    HGB 10.4 (L) 12.1 - 17.0 g/dL    HCT 30.9 (L) 36.6 - 50.3 %    MCV 88.5 80.0 - 99.0 FL    MCH 29.8 26.0 - 34.0 PG    MCHC 33.7 30.0 - 36.5 g/dL    RDW 19.2 (H) 11.5 - 14.5 %    PLATELET 420 859 - 766 K/uL    MPV 11.6 8.9 - 12.9 FL    NRBC 0.0 0  WBC ABSOLUTE NRBC 0.00 0.00 - 0.01 K/uL    NEUTROPHILS 57 32 - 75 %    LYMPHOCYTES 25 12 - 49 %    MONOCYTES 17 (H) 5 - 13 %    EOSINOPHILS 1 0 - 7 %    BASOPHILS 0 0 - 1 %    IMMATURE GRANULOCYTES 0 0.0 - 0.5 %    ABS. NEUTROPHILS 4.3 1.8 - 8.0 K/UL    ABS. LYMPHOCYTES 1.8 0.8 - 3.5 K/UL    ABS. MONOCYTES 1.2 (H) 0.0 - 1.0 K/UL    ABS. EOSINOPHILS 0.0 0.0 - 0.4 K/UL    ABS. BASOPHILS 0.0 0.0 - 0.1 K/UL    ABS. IMM. GRANS. 0.0 0.00 - 0.04 K/UL    DF AUTOMATED     METABOLIC PANEL, COMPREHENSIVE    Collection Time: 05/14/20 10:50 AM   Result Value Ref Range    Sodium 138 136 - 145 mmol/L    Potassium 3.9 3.5 - 5.1 mmol/L    Chloride 104 97 - 108 mmol/L    CO2 27 21 - 32 mmol/L    Anion gap 7 5 - 15 mmol/L    Glucose 96 65 - 100 mg/dL    BUN 11 6 - 20 MG/DL    Creatinine 0.88 0.70 - 1.30 MG/DL    BUN/Creatinine ratio 13 12 - 20      GFR est AA >60 >60 ml/min/1.73m2    GFR est non-AA >60 >60 ml/min/1.73m2    Calcium 9.2 8.5 - 10.1 MG/DL    Bilirubin, total 0.3 0.2 - 1.0 MG/DL    ALT (SGPT) 13 12 - 78 U/L    AST (SGOT) 14 (L) 15 - 37 U/L    Alk. phosphatase 83 45 - 117 U/L    Protein, total 7.7 6.4 - 8.2 g/dL    Albumin 3.4 (L) 3.5 - 5.0 g/dL    Globulin 4.3 (H) 2.0 - 4.0 g/dL    A-G Ratio 0.8 (L) 1.1 - 2.2     DRUG SCREEN, URINE    Collection Time: 05/14/20 11:18 AM   Result Value Ref Range    AMPHETAMINES Negative NEG      BARBITURATES Negative NEG      BENZODIAZEPINES Negative NEG      COCAINE Negative NEG      METHADONE Negative NEG      OPIATES Positive (A) NEG      PCP(PHENCYCLIDINE) Negative NEG      THC (TH-CANNABINOL) Negative NEG      Drug screen comment (NOTE)        Pre-medications  were administered as ordered and chemotherapy was initiated.   Medications Administered     0.9% sodium chloride infusion     Admin Date  05/14/2020 Action  New Bag Dose  25 mL/hr Rate  25 mL/hr Route  IntraVENous Administered By  Wilmar Asif RN          dexamethasone (DECADRON) 4 mg/mL injection 10 mg     Admin Date  05/14/2020 Action  Given Dose  10 mg Route  IntraVENous Administered By  Chon Ward RN          gemcitabine (GEMZAR) 925 mg in 0.9% sodium chloride 250 mL, overfill volume 25 mL chemo infusion     Admin Date  05/14/2020 Action  New Bag Dose  925 mg Rate  598.7 mL/hr Route  IntraVENous Administered By  Chon Ward RN          heparin (porcine) pf 300-500 Units     Admin Date  05/14/2020 Action  Given Dose  300 Units Route  InterCATHeter Administered By  Chon Ward RN          ondansetron [facility]) injection 8 mg     Admin Date  05/14/2020 Action  Given Dose  8 mg Route  IntraVENous Administered By  Chon Ward RN          PACLitaxel-protein bound (ABRAXANE) 115.5 mg in 0.9% sodium chloride 23.1 mL chemo infusion     Admin Date  05/14/2020 Action  New Bag Dose  115.5 mg Rate  46.2 mL/hr Route  IntraVENous Administered By  Chon Ward RN          saline peripheral flush soln 10 mL     Admin Date  05/14/2020 Action  Given Dose  10 mL Route  InterCATHeter Administered By  Chon Ward RN                Mr. Zaina Montero tolerated treatment well, port flushed and de accessed, patient was discharged from [place] [address] in stable condition at 12.      Future Appointments   Date Time Provider Carmen Wynn   5/19/2020 10:30 AM John Rhoades MD [address] [facility]   5/28/2020  9:00 AM [facility] INFUSION NURSE 2 [address] COM   5/28/2020  9:15 AM Vu Corley NP [address]   6/11/2020 11:00 AM [facility] INFUSION NURSE 2 [address] [place] [address]         Delvin Barlow RN  May 14, 2020

## 2020-05-14 NOTE — TELEPHONE ENCOUNTER
Ms. Rogers Guest calling to speak to MD.  States they are now home. Advised would have MD or nurse call her back.

## 2020-05-14 NOTE — TELEPHONE ENCOUNTER
Returned call to Mrs Higinio Franz advised patient is in the right place for increased pain and discomfort, Dr Cheema  is on her way to [facility], advised her to keep him there until she arrives, Ms Higinio Franz reports patient did not bring his pain medication with him today for treatment .  She informs she will keep him there until Dr John Duran arrives

## 2020-05-14 NOTE — TELEPHONE ENCOUNTER
Ms. Betsey Gabriel is calling to speak to nurse or MD.  Has questions and needs to talk as soon as possible.

## 2020-05-14 NOTE — PATIENT INSTRUCTIONS
Dear Le Emanuel ,    It was a pleasure seeing you today in at [facility]. We will see you again next week. If labs or imaging tests have been ordered for you today, please call the office  at [phone] 48 hours after completion to obtain the results. Your stated goal:   -Better pain control  -Better relationship with your providers thereby enhancing your experience with [address]      This is the plan we talked about:    1. Right-sided chest wall pain and flank pain    - You stopped the MS contin on your own 1 month ago because it made you jittery. - You are questioning the medicines you are on because when you do not take the oxycodone, you feel very anxious. We talked about potential withdrawal when you do not take the medicine as prescribed. - You want to continue Oxycodone 10 mg every 6 hours as needed, no more than 4 tabs per day. -You do not think the oxycodone alone is helping your pain and now you are a little bit open to trying long-acting pain medication.  -Since you have MS Contin at home, please take 1 tablet tonight and see how you feel.  -I am open to trying a different long-acting medication for you. 2. Severe chemotherapy induced neuropathy  - You are tolerating Gabapentin 600 mg three times a day and this is helping some  - We added Amitriptyline 25 mg at bedtime and this is helping as well. 3.  Constipation  -Constipation is a common side effect of pain medications as they slow your bowels. -Continue senna 2 tabs two times a day. This is necessary to keep your bowels soft. - Add Miralax every other day as a laxative. - Goal is to have soft bowel movements every day or every other day. - Please call us if you do not have bowel movements for more than 3 days.     4. Insomnia  - You are struggling with severe lack of sleep due to stress and anxiety  - You take Ativan 1 mg at bedtime and this helps you fall asleep but you sleep only for about 2 hours.  -We added trazodone which he never tried. Please pick it up from the pharmacy and start trying that. 5.  Confusion and short-term memory loss. -For the past 2 weeks, you have been struggling with effusion that comes from not remembering things well. You seem to have short-term memory problems. We talked about this in detail today and it is unclear what is causing this. You recently had a urine infection and urinary tract infection for which you have been treated with antibiotics.  -We did full blood work and urine tox screen today which did not reveal anything suspicious.  -I have discussed with your girlfriend today who is going to keep a detailed log of your periods of confusion and its association with pain medications starting today.  -I have also discussed this with Lizett Anderson, NP with Dr. Marcia Rob office. This continues, we may have to image your brain. 6. Anxiety/ depression  - Continue Zoloft 25 mg at bedtime  -You are willing to meet with our  once a month and travel in between    7. DME recommendation   Today we place an order for [facility] bed , you require changes in positioning not feasible with ordinary hospital bed, pillows , wedges or cushions, you also require the head of bed to elevated at 30 degrees      This is what you have shared with us about Advance Care Planning:      Primary Decision Maker: Luis Soto - Son - [phone]    Secondary Decision Maker:  Yonny Joesphcorby - [phone]    Supplemental (Other) Decision Maker: Rose  - Child - [phone]    Supplemental (Other) Decision Maker: Hans Gabriela Child - [phone]  Advance Care Planning 5/14/2020   Patient's Healthcare Decision Maker is: Verbal statement (Legal Next of Kin remains as decision maker)   Primary Decision Maker Name -   Primary Decision Maker Phone Number -   Primary Decision Maker Relationship to Patient -   Secondary Decision Maker Name -   Confirm Advance Directive None   Patient Would Like to Complete Advance Directive No           The Palliative Medicine Team is here to support you and your family.        Sincerely,      Portillo Donahue MD and the Palliative Medicine Team

## 2020-05-15 NOTE — TELEPHONE ENCOUNTER
Ms. Irasema Hong calling stating she needs to speak to MD , states it is Urgent but would not tell me. Only that it pertains to the meeting yesterday at [address] [place] [address] would have nurse or MD call her back.

## 2020-05-15 NOTE — TELEPHONE ENCOUNTER
Per Dr Lawson Police OxyContin 10 mg BID x 10 days, and decrease Oxycodone 10 mg , 5mg ( half tab ) returned call to Ms Daniel Meek and patient to inform who verbalized understanding, patient reports he had this once before and the cost was high informed patient to contact office if cost is high to insurance authorization

## 2020-05-15 NOTE — TELEPHONE ENCOUNTER
Returned call to Ms Juanis uDbon who reports they took medication early last night because she reports they had a rough day  , oxycodone makes him jittery ,and morphine caused him chest pain , a feeling of acid reflux all night reports he did not go to bed last night at all and currently still wide awake, Trazodone was not picked from pharmacy last night , in talking with patient this morning he is more clear in communication and speech , wants to know what to do for pain advised on a return call after speaking with physician

## 2020-05-15 NOTE — TELEPHONE ENCOUNTER
Ms. Juanis Ling calling stating she needs to speak to MD , states it is Urgent but would not tell me. Only that it pertains to the meeting yesterday at [address] [place] [address] would have nurse or MD call her back.

## 2020-05-15 NOTE — TELEPHONE ENCOUNTER
Patient called yesterday around 5:44 pm at Palliative care office , spoke to Jatinder Mayen, patient wants to talk to MD urgently . I called the patient, he said he has some concerns , denied any urgent concern ,  did not tell me his concerns , and he will call Dr Jenna Jeong office tomorrow to talk to her. I had emailed Dr Sandrita Alcantara regarding above.

## 2020-05-18 NOTE — PROGRESS NOTES
Palliative Medicine Outpatient Services  [place]: [phone] ([phone])    Patient Name: Radhames Kaplan  YOB: 1960    Date of Current Visit: 05/14/20  Location of Current Visit:    [] [facility] Office  [] [facility] Office  [] [facility] Office  [] Home-virtual visit  [x] Other: [address]    Date of Initial Visit: 1/15/2020  Referral from: Dr. Blake XAVIER  Primary Care Physician: Estela Rodriguez NP      SUMMARY:   Radhames Kaplan is a 63y.o. year old with a  history of COPD, colon cancer in 2002, metastatic adenocarcinoma of unknown primary, who was referred to Palliative Medicine by Dr. Blake Duvall for management of symptoms and psychosocial support. The patients social history includes served in the [employer] for 22 years, lives with his girlfriend now. Treatment history-patient had progression of disease on carbo plus Taxol, currently on Keytruda infusions. He struggles with neoplasm related pain    Patient seen for an urgent visit today at [address] [place] [address] due to intractable pain and new onset confusion. PALLIATIVE DIAGNOSES:       ICD-10-CM ICD-9-CM    1. Intractable pain R52 780.96    2. Delirium due to general medical condition F05 293.0    3. Anxiety about health F41.8 300.09    4. Adenocarcinoma of unknown primary [facility]) C80.1 199.1           PLAN:   Patient Instructions     Dear Radhames Kaplan ,    It was a pleasure seeing you today in at [address] [place] [address]. We will see you again next week. If labs or imaging tests have been ordered for you today, please call the office  at [phone] 48 hours after completion to obtain the results. Your stated goal:   -Better pain control  -Better relationship with your providers thereby enhancing your experience with [address] [place] [address]      This is the plan we talked about:    1. Right-sided chest wall pain and flank pain    - You stopped the MS contin on your own 1 month ago because it made you jittery.   - You are questioning the medicines you are on because when you do not take the oxycodone, you feel very anxious. We talked about potential withdrawal when you do not take the medicine as prescribed. - You want to continue Oxycodone 10 mg every 6 hours as needed, no more than 4 tabs per day. -You do not think the oxycodone alone is helping your pain and now you are a little bit open to trying long-acting pain medication.  -Since you have MS Contin at home, please take 1 tablet tonight and see how you feel.  -I am open to trying a different long-acting medication for you. 2. Severe chemotherapy induced neuropathy  - You are tolerating Gabapentin 600 mg three times a day and this is helping some  - We added Amitriptyline 25 mg at bedtime and this is helping as well. 3.  Constipation  -Constipation is a common side effect of pain medications as they slow your bowels. -Continue senna 2 tabs two times a day. This is necessary to keep your bowels soft. - Add Miralax every other day as a laxative. - Goal is to have soft bowel movements every day or every other day. - Please call us if you do not have bowel movements for more than 3 days. 4. Insomnia  - You are struggling with severe lack of sleep due to stress and anxiety  - You take Ativan 1 mg at bedtime and this helps you fall asleep but you sleep only for about 2 hours.  -We added trazodone which he never tried. Please pick it up from the pharmacy and start trying that. 5.  Confusion and short-term memory loss. -For the past 2 weeks, you have been struggling with effusion that comes from not remembering things well. You seem to have short-term memory problems. We talked about this in detail today and it is unclear what is causing this.   You recently had a urine infection and urinary tract infection for which you have been treated with antibiotics.  -We did full blood work and urine tox screen today which did not reveal anything suspicious.  -I have discussed with your girlfriend today who is going to keep a detailed log of your periods of confusion and its association with pain medications starting today.  -I have also discussed this with Анна Winters NP with Dr. Joshua Chilel office. This continues, we may have to image your brain. 6. Anxiety/ depression  - Continue Zoloft 25 mg at bedtime  -You are willing to meet with our  once a month and travel in between      This is what you have shared with us about Advance Care Planning:      Primary Decision Maker: Williams Schaffer - Son - [phone]    Secondary Decision Maker: Joyce Abreu - [phone]    Supplemental (Other) Decision Maker: Concho Mercedes - Child - [phone]    Supplemental (Other) Decision Maker: Kaylyn Kenia Child - [phone]  Advance Care Planning 5/14/2020   Patient's Healthcare Decision Maker is: Verbal statement (Legal Next of Kin remains as decision maker)   Primary Decision Maker Name -   Primary Decision Maker Phone Number -   Primary Decision Maker Relationship to Patient -   Secondary Decision Maker Name -   Confirm Advance Directive None   Patient Would Like to Complete Advance Directive No           The Palliative Medicine Team is here to support you and your family. Sincerely,      Mariano Morrissey MD and the Palliative Medicine Team       GOALS OF CARE / TREATMENT PREFERENCES:   [====Goals of Care====]  GOALS OF CARE:  Patient / health care proxy stated goals: See Patient Instructions / Summary    TREATMENT PREFERENCES:   Code Status:  [x] Attempt Resuscitation       [] Do Not Attempt Resuscitation    Advance Care Planning:  [x] [facility] Interdisciplinary Team has updated the ACP Navigator with Decision Maker and Patient Capacity      Primary Decision Maker: Williams Schaffer - Son - [phone]    Secondary Decision Maker:  Ally Reyes - Daughter - [phone]    Supplemental (Other) Decision Maker: Gaurav Medina - Child - [phone]  Advance Care Planning 5/14/2020   Patient's Healthcare Decision Maker is: Verbal statement (Legal Next of Kin remains as decision maker)   Primary Decision Maker Name -   Primary Decision Maker Phone Number -   Primary Decision Maker Relationship to Patient -   Secondary Decision Maker Name -   Confirm Advance Directive None   Patient Would Like to Complete Advance Directive No       Other:  (If patient appropriate for POST, consider using PALLPOST smart phrase here)    The palliative care team has discussed with patient / health care proxy about goals of care / treatment preferences for patient.  [====Goals of Care====]     PRESCRIPTIONS GIVEN:     No orders of the defined types were placed in this encounter. FOLLOW UP:     Future Appointments   Date Time Provider Carmen Tianna   5/19/2020 10:30 AM Liz Schwartz MD [facility] [place]   5/28/2020  9:00 AM [facility] INFUSION NURSE 2 [facility]   5/28/2020  9:15 AM Lazaro Adan NP ONC ELIAZAR SUSAN   6/11/2020 11:00 AM [facility] [employer] [facility]           PHYSICIANS INVOLVED IN CARE:   Patient Care Team:  Laura Allen NP as PCP - General (Nurse Practitioner)  Rg Nguyen MD as Physician (Cardiology)  Janet Alvarez MD (Gastroenterology)  Michael Crump MD (Hematology and Oncology)       HISTORY:   Reviewed patient-completed ESAS and advance care planning form. Reviewed patient record in prescription monitoring program.    CHIEF COMPLAINT:   No chief complaint on file. HPI/SUBJECTIVE:    The patient is: [x] Verbal / [] Nonverbal     Patient seen in infusion center along with his girlfriend Lorrie Patel is very worried about back pain. He has been taking oxycodone 10 mg every 6 hours but it only lasts about 5 hours. His girlfriend started noticing that he repeats the same questions over and over again and does not remember things that happened earlier in the day. This is causing him confusion and severe anxiety.   In the last 2 weeks, he struggled with a urine infection and urinary tract infection for which we had him on antibiotics and he is much better now. He thinks the confusion started before the antibiotics and terms if it is related to the opioids. But he has been on opioids for several months prior to this. No new medications, no illicit drug use.    ------------  Initial visit    Patient is here alone. He starts off by talking about how his trust in the medical system has been broken especially with patient to physician communication. He lists all his concerns about his care over the last 6 months but is willing to be redirected and start with a clean [place] with our team.  His main complaint is right-sided flank pain for which he has been taking OxyContin and oxycodone. He has been chewing the OxyContin. He also struggles with lack of appetite, no desire to eat. He had significant constipation initially but now he is on bowel regimen. He is willing to work with  about his anxiety.       Clinical Pain Assessment (nonverbal scale for nonverbal patients):   [++++ Clinical Pain Assessment++++]  [++++Pain Severity++++]: Pain: 4  [++++Pain Character++++]: deep, gnawing  [++++Pain Duration++++]: months  [++++Pain Effect++++]: functional  [++++Pain Factors++++]: none in particular  [++++Pain Frequency++++]: on and off  [++++Pain Location++++]: right flank  [++++ Clinical Pain Assessment++++]       FUNCTIONAL ASSESSMENT:     Palliative Performance Scale (PPS):  PPS: 70       PSYCHOSOCIAL/SPIRITUAL SCREENING:     Any spiritual / Yazidi concerns:  [] Yes /  [x] No    Caregiver Burnout:  [] Yes /  [x] No /  [] No Caregiver Present      Anticipatory grief assessment:   [x] Normal  / [] Maladaptive       ESAS Anxiety: Anxiety: 4    ESAS Depression: Depression: 3       REVIEW OF SYSTEMS:     The following systems were [x] reviewed / [] unable to be reviewed  Systems: constitutional, ears/nose/mouth/throat, respiratory, gastrointestinal, genitourinary, musculoskeletal, integumentary, neurologic, psychiatric, endocrine. Positive findings noted below. Modified ESAS Completed by: provider   Fatigue: 4 Drowsiness: 0   Depression: 3 Pain: 4   Anxiety: 4 Nausea: 2   Anorexia: 2 Dyspnea: 0     Constipation: No              PHYSICAL EXAM:     Wt Readings from Last 3 Encounters:   05/14/20 145 lb 3.2 oz (65.9 kg)   04/30/20 149 lb 3.2 oz (67.7 kg)   04/30/20 149 lb (67.6 kg)     There were no vitals taken for this visit. Last bowel movement: See Nursing Note    General-alert, oriented x4  Constitutional, appears anxious but well-nourished  Cardiovascular-regular heart sounds  Respiratory system-regular breath sounds  Abdomen-soft, bowel sounds present  Musculoskeletal-length of generalized tenderness but no localized tenderness on palpation along the spine and hip. Bilateral extremities without edema  Neurological-able to move all extremities, no focal neurological deficit           HISTORY:     Past Medical History:   Diagnosis Date    Abnormal nuclear stress test 12/7/2015    Cardiomyopathy ([facility] 2010    EF 45%    Chronic obstructive pulmonary disease (HCC)     Chronic systolic heart failure (HCC)     GERD (gastroesophageal reflux disease)     HTN (hypertension)     Lung cancer (HCC)     PAC (premature atrial contraction)     Tobacco use disorder       Past Surgical History:   Procedure Laterality Date    COLONOSCOPY N/A 7/10/2019    COLONOSCOPY AND ESOPHAGOGASTRODUODENOSCOPY (EGD) performed by Sania Barrientos MD at [facility] HX COLECTOMY      HX SPLENECTOMY      IR INSERT TUNL CVC W PORT OVER 5 YEARS  7/19/2019      Family History   Problem Relation Age of Onset    Stroke Mother     Coronary Artery Disease Sister     Heart Disease Paternal Grandfather       History reviewed, no pertinent family history.   Social History     Tobacco Use    Smoking status: Former Smoker     Packs/day: 0.50     Types: Cigarettes     Last attempt to quit: 2016     Years since quittin.0    Smokeless tobacco: Never Used   Substance Use Topics    Alcohol use: Not Currently     Comment: rarely     No Known Allergies   Current Outpatient Medications   Medication Sig    oxyCODONE ER (OxyCONTIN) 10 mg ER tablet Take 1 Tab by mouth every twelve (12) hours for 10 days. Max Daily Amount: 20 mg.    traZODone (DESYREL) 50 mg tablet Take 1 Tab by mouth nightly.  senna-docusate (PERICOLACE) 8.6-50 mg per tablet Take 1 Tab by mouth two (2) times a day.  oxyCODONE IR (ROXICODONE) 10 mg tab immediate release tablet Take 1 Tab by mouth every six (6) hours as needed for Pain for up to 30 days. Max Daily Amount: 40 mg.    umeclidinium brm/vilanterol tr (ANORO ELLIPTA IN) Take 1 Puff by inhalation daily.  albuterol (PROVENTIL HFA, VENTOLIN HFA, PROAIR HFA) 90 mcg/actuation inhaler Take 2 Puffs by inhalation every four (4) hours as needed for Wheezing.  sertraline (ZOLOFT) 50 mg tablet Take 1 Tab by mouth daily.  gabapentin (NEURONTIN) 300 mg capsule Take 2 Caps by mouth three (3) times daily. Max Daily Amount: 1,800 mg.    metoprolol succinate (TOPROL-XL) 25 mg XL tablet Take 1 Tab by mouth nightly.  megestroL (MEGACE) 40 mg tablet TAKE 1 TABLET BY MOUTH TWICE DAILY    lidocaine-prilocaine (EMLA) topical cream APPLY QUARTER SIZE AMOUNT TO PORT PRIOR TO CHEMOTHERAPY    dicyclomine (BENTYL) 10 mg capsule Take 10 mg by mouth two (2) times a day.  LORazepam (ATIVAN) 1 mg tablet Take 1 Tab by mouth two (2) times daily as needed for Anxiety (or insomnia). Max Daily Amount: 2 mg.  potassium chloride (K-DUR, KLOR-CON) 20 mEq tablet Take 1 Tab by mouth daily.  ondansetron hcl (ZOFRAN) 4 mg tablet Take 2 Tabs by mouth every eight (8) hours as needed for Nausea.  prochlorperazine (COMPAZINE) 10 mg tablet Take 1 Tab by mouth every six (6) hours as needed for Nausea.     lisinopril (PRINIVIL, ZESTRIL) 5 mg tablet TAKE 1 TABLET BY MOUTH DAILY    tamsulosin (FLOMAX) 0.4 mg capsule Take 0.4 mg by mouth daily. No current facility-administered medications for this visit. LAB DATA REVIEWED:     Lab Results   Component Value Date/Time    WBC 7.4 05/14/2020 10:50 AM    HGB 10.4 (L) 05/14/2020 10:50 AM    PLATELET 987 23/32/4783 10:50 AM     Lab Results   Component Value Date/Time    Sodium 138 05/14/2020 10:50 AM    Potassium 3.9 05/14/2020 10:50 AM    Chloride 104 05/14/2020 10:50 AM    CO2 27 05/14/2020 10:50 AM    BUN 11 05/14/2020 10:50 AM    Creatinine 0.88 05/14/2020 10:50 AM    Calcium 9.2 05/14/2020 10:50 AM      Lab Results   Component Value Date/Time    AST (SGOT) 14 (L) 05/14/2020 10:50 AM    Alk. phosphatase 83 05/14/2020 10:50 AM    Protein, total 7.7 05/14/2020 10:50 AM    Albumin 3.4 (L) 05/14/2020 10:50 AM    Globulin 4.3 (H) 05/14/2020 10:50 AM     Lab Results   Component Value Date/Time    INR 1.0 11/25/2015 10:22 AM    Prothrombin time 10.6 11/25/2015 10:22 AM      Lab Results   Component Value Date/Time    Iron 52 10/07/2019 12:53 PM    TIBC 235 (L) 10/07/2019 12:53 PM    Iron % saturation 22 10/07/2019 12:53 PM    Ferritin 880 (H) 10/07/2019 12:53 PM      Reviewed today's labs and most recent imaging.        CONTROLLED SUBSTANCES SAFETY ASSESSMENT (IF ON CONTROLLED SUBSTANCES):     Reviewed opioid safety handout:  [x] Yes   [] No  24 hour opioid dose >150mg morphine equivalent/day:  [] Yes   [] No  Benzodiazepines:  [] Yes   [] No  Sleep apnea:  [] Yes   [] No  Urine Toxicology Testing within last 6 months:  [] Yes   [] No  History of or new aberrant medication taking behaviors:  [] Yes   [] No  Has Narcan been prescribed [] Yes   [] No          Total time: 70 minutes  Counseling / coordination time: 60 minutes  > 50% counseling / coordination?:

## 2020-05-19 NOTE — TELEPHONE ENCOUNTER
Triage for Controlled Substance Refill Request    Pain Diagnosis: _Adenocarcinoma of unknown primary    Last Outpatient Visit: _5/19/2020    Next Outpatient Visit: _6/16/20202    Reason for refill needed outside of office visit?   -Appointment not scheduled prior to need for scheduled refill      Pharmacy: [id number] [place] [address] [address]      Medication:oxyCODONE ER (OxyCONTIN) 10 mg ER tablet  Dose and directions: 1 tab by mouth every 12 hours   Number dispensed:10  Date filled ( or Pharmacy):5/15/2020  #left:     reviewed: _yes     Date of Urine Drug Screen:  _5/14/2020    Opioid Safety Handout given:  _yes     Appropriate for refill:  _yes     Action:  _please refill

## 2020-05-19 NOTE — PATIENT INSTRUCTIONS
Dear Carri Flood , It was a pleasure seeing you today virtually as a follow-up to the urgent visit we had last week. We will see you again 4 weeks If labs or imaging tests have been ordered for you today, please call the office  at [phone] 48 hours after completion to obtain the results. Your stated goal:  
-Better pain control 
-Better relationship with your providers thereby enhancing your experience with [facility] This is the plan we talked about: 1. Right-sided chest wall pain and flank pain   
-Urine oxycodone 10 mg every 6 hours as needed. You take between 3 and 4 doses per day. We started you on MS Contin but you did not tolerate this. We started OxyContin 2 times a day which is been very helpful for you. You are noticing some decrease in your back pain. 2. Severe chemotherapy induced neuropathy - You are tolerating Gabapentin 600 mg three times a day and this is helping some - We added Amitriptyline 25 mg at bedtime and this is helping as well. 3.  Constipation 
-Constipation is a common side effect of pain medications as they slow your bowels. -Continue senna 2 tabs two times a day. This is necessary to keep your bowels soft. - Add Miralax every other day as a laxative. - Goal is to have soft bowel movements every day or every other day. - Please call us if you do not have bowel movements for more than 3 days. 4. Insomnia - You are struggling with severe lack of sleep due to stress and anxiety 
-You finally picked up the trazodone from the pharmacy and have been taking it the last few days and this is helping. 5.  Chest pain and heartburn 
-He had excruciating midsternal pain yesterday that lasted for more than 4 hours, you were worried that this was heart related. Your heart rate was a little high during this time but no shortness of breath.   We talked at length about the symptoms today and agreed that this was likely from heartburn. He recall having similar symptoms years ago when you had heartburn. You are willing to try Protonix 40 mg daily to see if this helps. 6.  Short-term memory loss 
-You are much more clear today and we discussed your confusion and short-term memory loss in detail today. You tell me that this is baseline for you. You have always been someone who has very poor memory and you blamed this on your time served in the [employer]. You do admit that you get frustrated with very simple things and tend to obsess over things you cannot control. For example, we spent about 40 minutes today talking about your phone call to our on-call service last night and the response you received. You were not pleased with how the call center handled your phone call last night and you were even more disappointed that the on-call provider did not know everything about you and had to ask many questions. We talked about how on-call providers do have access to your medical record but they do not know you personally and therefore to allow them to ask you the questions that they need to ask in order to provide best care for you. You were hoping to reach me directly every time you call. We reviewed that you can call us anytime during business hours and have direct access to nurses who know you and reserve calling us after business hours only for emergencies. 7. Anxiety/ depression 
- Continue Zoloft 25 mg at bedtime 
-You are willing to meet with our  once a month and travel in between 8. DME recommendation Today we place an order for [facility] bed , you require changes in positioning not feasible with ordinary hospital bed, pillows , wedges or cushions, you also require the head of bed to elevated at 30 degrees This is what you have shared with us about Advance Care Planning: 
 
  Primary Decision Maker: Leann Padilla - Son - [phone] Secondary Decision Maker: Rosamaria White - Daughter - [phone] Supplemental (Other) Decision Maker: Ivonne Moreira - [phone] Advance Care Planning 5/19/2020 Patient's Healthcare Decision Maker is: Verbal statement (Legal Next of Kin remains as decision maker) Primary Decision Maker Name -  
Primary Decision Maker Phone Number -  
Primary Decision Maker Relationship to Patient - Secondary Decision Maker Name -  
Confirm Advance Directive None Patient Would Like to Complete Advance Directive Yes The Palliative Medicine Team is here to support you and your family.   
 
 
Sincerely, 
 
 
Tucker De Santiago MD and the Palliative Medicine Team

## 2020-05-19 NOTE — PROGRESS NOTES
Palliative Medicine Office Visit Palliative Medicine Nurse Check In 
[phone] Patient Name: Sonia Billy YOB: 1960 Date of Office Visit: 5/19/2020 Patient states: \"  \" 
 
From Check In Sheet (scanned in Media): 
Has a medical provider talked with you about cardiopulmonary resuscitation (CPR)? [x] Yes   [] No   [] Unable to obtain Nurse reminder to complete or update ACP FlowSheet: 
 
Is ACP on the Problem List?    [] Yes    [x] No 
IF ACP Document is ON FILE; Nurse to place ACP on Problem List  
 
Is there an ACP Note in Chart Review/Note? [] Yes    [x] No  
If NO: ALERT PROVIDER Primary Decision Maker: Susi Rosales - Son - [phone] Secondary Decision Maker: Darrel Fothergill - Daughter - [phone] Supplemental (Other) Decision Maker: Jaden Kong - [phone] Advance Care Planning 5/19/2020 Patient's Healthcare Decision Maker is: Verbal statement (Legal Next of Kin remains as decision maker) Primary Decision Maker Name -  
Primary Decision Maker Phone Number -  
Primary Decision Maker Relationship to Patient - Secondary Decision Maker Name -  
Confirm Advance Directive None Patient Would Like to Complete Advance Directive Yes Is there anything that we should know about you as a person in order to provide you the best care possible? Have you been to the ER, urgent care clinic since your last visit? [] Yes   [x] No   [] Unable to obtain Have you been hospitalized since your last visit? [] Yes   [x] No   [] Unable to obtain Have you seen or consulted any other health care providers outside of the [address] [place] [address] since your last visit? [] Yes   [x] No   [] Unable to obtain Functional status (describe):  
 
 
 
Last BM:5/18/2020  accessed (date): 5/19/2020 Bottle review (for opioid pain medication): 
Medication 1:  
Date filled:  
Directions:  
# filled: # left: # pills taking per day: 
Last dose taken: 
 
Medication 2:  
Date filled:  
Directions:  
# filled: # left: # pills taking per day: 
Last dose taken: 
 
Medication 3:  
Date filled:  
Directions:  
# filled: # left: # pills taking per day: 
Last dose taken: 
 
Medication 4:  
Date filled:  
Directions:  
# filled: # left: # pills taking per day: 
Last dose taken:

## 2020-05-26 NOTE — PROGRESS NOTES
[facility]  [address], [facility], [address]  [phone]      Hematology / Oncology Established Visit      Reason for Visit:   Joshua Quiroz is a 63 y.o. male who is seen by synchronous (real-time) audio-video technology for follow up of metastatic gall bladder cancer    Hematology Oncology Treatment History:     Diagnosis: Adenocarcinoma of unknown primary. 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 6/4/19 2R LN FNA and core biopsy: Adenocarcinoma. Comment: IHC stains negative for GATA3, AR, ER, p40. Immunohistochemistry shows that the tumor cells express CK7 with focal expression of CDX2. Tumor cells lack expression of CK20, TTF-1 and Napsin A. These findings are not entirely diagnostic and could be seen in either a primary pulmonary malignancy or a primary upper gastrointestinal tract malignancy. Other sites are also not excluded. Clinical and radiographic correlation is recommended. 6/25/19 2R LN, mediastinum: Metastatic adenocarcinoma (see Comment). Comment - The tumor appears poorly differentiated with areas of necrosis. Immunohistochemical staining reveals positive staining for cytokeratin 7 (strong, diffuse), CDX-2 (focal, weak to moderate) and CEA (focal, moderate to strong). The tumor cells are negative for cytokeratin 20, cytokeratin 5/6, p40, p63, TTF-1, napsin-A, PLAP, HCG, AFP and c-KIT (). The findings are not entirely specific with regard to primary site but would be consistent with upper GI/pancreaticobiliary tract.  A pulmonary primary is less likely, but still in the differential.   -PDL1 30%, [employer] [facility] identified high tumor mutational burden - which is predictive of higher response to immunotherapy agents. Prior Treatment:    Completed 6 cycles of Carbo-Taxol on 12/02/2019   Keytruda 200mg every 21 days, started 12/20/19, stopped 02/2020 - s/p 3 cycles    Current Treatment:    Gemcitabine/Abraxane - Cycle 3 Day 1      History of Present Illness:   Mr. Rosa Maria Denson is a 63 y.o. male with COPD, remote h/o colon cancer comes in for evaluation of mediastinal lymphadenopathy. Pt had recently p/w shortness of breath, dyspnea on exertion, and was found on chest CT to have R mediastinal lymphadenopathy, which also were PET avid. Case was discussed at Thoracic Tumor Board with thought that this could be pancreatic, urothelial, or germ cell origin. More tissue is recommended. Pt had EGD 3 yrs ago and was told he had GERD. Patient has h/o colon cancer in 2002. He states a cancerous polyp was found and then he underwent colectomy in 2002. This was done by Dr. Tyesha Gerard at [facility]. Patient had colonoscopies regularly afterwards, last one was approx 2015. Last EGD was in 2018. As part of search for primary lesion, patient underwent EGD, colonoscopy by Dr. Felipe Garcia, notable for diffusely enlarged gastric folds, but biopsies negative. Pt underwent cytoscopy on 7/17/19 with findings negative for malignancy. He received 6 cycles of carbo-taxol and experienced many side effects from the chemotherapy including severe sensory neuropathy in feet. Last CT showed progression of disease in the right adrenal glans. He received Keytruda as second line therapy. Recent scans show disease progression. He is receiving palliative chemotherapy with gem/abraxane. He denies any new symptoms.        Past Medical History:   Diagnosis Date    Abnormal nuclear stress test 12/7/2015    Cardiomyopathy ([facility] [address] 2010    EF 45%    Chronic obstructive pulmonary disease (HCC)     Chronic systolic heart failure (HCC)     GERD (gastroesophageal reflux disease)     HTN (hypertension)     Lung cancer (HCC)     PAC (premature atrial contraction)     Tobacco use disorder       Past Surgical History:   Procedure Laterality Date    COLONOSCOPY N/A 7/10/2019    COLONOSCOPY AND ESOPHAGOGASTRODUODENOSCOPY (EGD) performed by Dutch Armstrong MD at [facility] 112 HX COLECTOMY      HX SPLENECTOMY      IR INSERT TUNL CVC W PORT OVER 5 YEARS  2019      Social History     Tobacco Use    Smoking status: Former Smoker     Packs/day: 0.50     Types: Cigarettes     Last attempt to quit: 2016     Years since quittin.0    Smokeless tobacco: Never Used   Substance Use Topics    Alcohol use: Not Currently     Comment: rarely      Family History   Problem Relation Age of Onset    Stroke Mother     Coronary Artery Disease Sister     Heart Disease Paternal Grandfather      Current Outpatient Medications   Medication Sig    umeclidinium brm/vilanterol tr (ANORO ELLIPTA IN) Take 1 Puff by inhalation daily.  albuterol (PROVENTIL HFA, VENTOLIN HFA, PROAIR HFA) 90 mcg/actuation inhaler Take 2 Puffs by inhalation every four (4) hours as needed for Wheezing.  sertraline (ZOLOFT) 50 mg tablet Take 1 Tab by mouth daily.  gabapentin (NEURONTIN) 300 mg capsule Take 2 Caps by mouth three (3) times daily. Max Daily Amount: 1,800 mg.    metoprolol succinate (TOPROL-XL) 25 mg XL tablet Take 1 Tab by mouth nightly.  megestroL (MEGACE) 40 mg tablet TAKE 1 TABLET BY MOUTH TWICE DAILY    lidocaine-prilocaine (EMLA) topical cream APPLY QUARTER SIZE AMOUNT TO PORT PRIOR TO CHEMOTHERAPY    dicyclomine (BENTYL) 10 mg capsule Take 10 mg by mouth two (2) times a day.  LORazepam (ATIVAN) 1 mg tablet Take 1 Tab by mouth two (2) times daily as needed for Anxiety (or insomnia). Max Daily Amount: 2 mg.  potassium chloride (K-DUR, KLOR-CON) 20 mEq tablet Take 1 Tab by mouth daily.  ondansetron hcl (ZOFRAN) 4 mg tablet Take 2 Tabs by mouth every eight (8) hours as needed for Nausea.     prochlorperazine (COMPAZINE) 10 mg tablet Take 1 Tab by mouth every six (6) hours as needed for Nausea.  lisinopril (PRINIVIL, ZESTRIL) 5 mg tablet TAKE 1 TABLET BY MOUTH DAILY    tamsulosin (FLOMAX) 0.4 mg capsule Take 0.4 mg by mouth daily.  pantoprazole (PROTONIX) 40 mg tablet Take 1 Tab by mouth daily.  oxyCODONE ER (OxyCONTIN) 10 mg ER tablet Take 1 Tab by mouth every twelve (12) hours for 10 days. Max Daily Amount: 20 mg.    traZODone (DESYREL) 50 mg tablet Take 1 Tab by mouth nightly.  senna-docusate (PERICOLACE) 8.6-50 mg per tablet Take 1 Tab by mouth two (2) times a day. No current facility-administered medications for this visit. No Known Allergies     Review of Systems: A complete review of systems was obtained, negative except as described above. Physical Exam:     General: alert, cooperative, no distress   Mental  status: mental status: alert, oriented to person, place, and time, normal mood, behavior, speech, dress, motor activity, and thought processes   Resp: resp: normal effort and no respiratory distress   Neuro: neuro: no gross deficits   Skin: skin: no discoloration or lesions of concern on visible areas     Due to this being a TeleHealth evaluation, many elements of the physical examination are unable to be assessed. Results:     Lab Results   Component Value Date/Time    WBC 7.4 05/14/2020 10:50 AM    HGB 10.4 (L) 05/14/2020 10:50 AM    HCT 30.9 (L) 05/14/2020 10:50 AM    PLATELET 683 93/74/1099 10:50 AM    MCV 88.5 05/14/2020 10:50 AM    ABS.  NEUTROPHILS 4.3 05/14/2020 10:50 AM     Lab Results   Component Value Date/Time    Sodium 138 05/14/2020 10:50 AM    Potassium 3.9 05/14/2020 10:50 AM    Chloride 104 05/14/2020 10:50 AM    CO2 27 05/14/2020 10:50 AM    Glucose 96 05/14/2020 10:50 AM    BUN 11 05/14/2020 10:50 AM    Creatinine 0.88 05/14/2020 10:50 AM    GFR est AA >60 05/14/2020 10:50 AM    GFR est non-AA >60 05/14/2020 10:50 AM    Calcium 9.2 05/14/2020 10:50 AM     Lab Results   Component Value Date/Time Bilirubin, total 0.3 05/14/2020 10:50 AM    ALT (SGPT) 13 05/14/2020 10:50 AM    AST (SGOT) 14 (L) 05/14/2020 10:50 AM    Alk. phosphatase 83 05/14/2020 10:50 AM    Protein, total 7.7 05/14/2020 10:50 AM    Albumin 3.4 (L) 05/14/2020 10:50 AM    Globulin 4.3 (H) 05/14/2020 10:50 AM     Lab Results   Component Value Date/Time    Iron 52 10/07/2019 12:53 PM    TIBC 235 (L) 10/07/2019 12:53 PM    Iron % saturation 22 10/07/2019 12:53 PM    Ferritin 880 (H) 10/07/2019 12:53 PM       No results found for: B12LT, FOL, RBCF  Lab Results   Component Value Date/Time    TSH 0.78 01/30/2020 09:50 AM     No results found for: HAMAT, HAAB, HABT, HAAT, HBSAG, HBSB, HBSAT, HBABN, HBCM, HBCAB, HBCAT, Casandra Short, HBEAB, HBEAG, XHEPCS, [id number], [facility], [facility], HBCLT, [address], [id number], [id number], [facility], [id number], HBCMLT, [id number], HCGAT      Imaging:     CT Results (most recent):  Results from [place] encounter on 04/15/20   CTA CHEST W OR W WO CONT    Narrative Indication: Chest pain, shortness of breath. COMPARISON: 3/27/2020. Contrast-enhanced CT angiogram of the chest performed using 100 cc Isovue-370. Three-dimensional postprocessing performed. CT dose reduction was achieved through use of a standardized protocol tailored  for this examination and are automatic exposure control for dose modulation dose  reduction. FINDINGS:    The pulmonary arteries are patent. There is no evidence for pulmonary embolus as  was questioned. There is mild right hilar adenopathy and mild subcarinal adenopathy. Lungs demonstrate emphysematous change but there is no focal consolidation,  mass, or nodule. There is mild atelectatic change in the lingula. No pleural effusion. Right adrenal metastasis is again noted. Impression IMPRESSION:  1. No acute abnormality. Specifically, no evidence for pulmonary embolus. 2. COPD. 3. Mediastinal and right hilar adenopathy. Stable.   4. Right adrenal metastasis appears stable. I personally reviewed the images. Stable size of the mediastinal nodes and right adrenal gland. Assessment & Plan:   Javier Armijo is a 63 y.o. male with COPD, remote h/o colon cancer comes in for evaluation and management of adenocarcinoma of unknown primary. 1. 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 Involving mediastinal LNs, with no other PET findings. Per Thoracic Tumor Board discussion, this could represent upper GI origin, urothelial origin, germ cell tumor, or lung origin. Search for primary lesion was overall negative: Cystoscopy, EGD, colonoscopy negative for malignancy although EGD abnormal with diffusely enlarged gastric folds. Despite h/o colon cancer in 2002, it would be very odd to see a recurrence in the mediastinal LNs. CEA 26.8. Other tumor markers negative (AFP, hCG, CA 19-9). Unfortunately, this disease is considered incurable, but is treatable. At this time, I recommend chemotherapy treatment using the Carboplatin-Paclitaxel regimen (which covers both lung and GI primaries). We discussed the risks and benefits of chemotherapy with Carboplatin and Paclitaxel. Potential side effects include but are not limited to: nausea, vomiting, diarrhea, constipation, taste changes, allergic reactions, myelosuppression, infection, fatigue, alopecia, neuropathy, mucositis, renal failure, infertility, and rarely, death. Additionally, chemotherapy leads to radiosensitization which can intensify the side effects of radiation therapy. The patient has consented to beginning chemotherapy and chemo ed packet given. Completed 6 cycles of Carbo-Taxol. CT on 12/16/19 shows a mixed response to treatment no change in mediastinal adenopathy and increased size of right adrenal mass. Receiving Keytruda in 2nd line. CT shows disease progression in the mediastinal nodes and adrenal mass.   Based on the gene expression profile revealing the likely primary to be bile duct, I recommended switching systemic therapy to Gemcitabine/Abraxane     Starting palliative chemotherapy   Gemcitabine/Abraxane - Cycle 3 Day 1    Tolerating treatment very well  Denies any side effects. A detailed system by system evaluation of side effect was performed to assess chemotherapy related toxicity. Blood counts are acceptable. Results reviewed with the patient  Symptom management form reviewed with patient. 2. Neoplasm pain: better  -- Follow up with palliative  -- oxycontin 10 mg ER every 12 hours  -- oxycodone 10 mg [id number]  -- Senna-docusate (pericolace) daily to prevent constipation. 3. H/o Stage I [T1NxMx] sigmoid colon cancer: Found in sigmoid adenoma during a colonoscopy; went on to undergo segmented resection of the sigmoid colon in 2002. Pathology per outside records:  2/6/2002 sigmoid colon polyp: Well differentiated adenocarcinoma arising in an adenoma, involving the mucosa and invading into the upper half of the submucosal stalk. No vascular space involvement is identified. 2/21/2002 Sigmoidectomy, liver biopsy:  -Sigmoid colon, segmented resection: No residual adenocarcinoma present. Polypectomy site with granulation tissue and vascular congestion. No LNs recovered. Distal and proximal margins benign.  -Liver biopsy: Negative for metastatic carcinoma. No significant inflammation or obvious cirrhosis identified. Very minimal steatosis (rare cells with fat globules). -Addendum: Reblocks of adipose tissue; three small benign lymphoid aggregates (< 0.1cm). 4. COPD: Quit smoking in approx 2016. SOB improved after starting inhaler. 5. HTN: Well controlled on Lisinopril and Metoprolol. 6. BPH: On Flomax. 7. 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 Increased numbness in feet, tingling and pain in left hand. Left hand dominant.  Increased Gabapentin 300mg BID on 10/16/19.   -- Continue gabapentin to 300 mg TID, #90 tabs with 2 refills e-scribed on 11/11/19.     8. Anemia from cancer/chronic disease    observation    9. Severe protein calorie malnutrition - better    Gaining weight  Dietary consult  Protein supplementation    10. Insomnia: Tried Ambien which did not work. Started on lorazepam 1 mg QHS which he reports helped him sleep better than the Ambien. Continue to monitor. 11. Anxiety/Depression: Follows up with palliative      Signed by: Jeremi Beaver MD                     May 26, 2020    I was in the office while conducting this encounter. The patient was at his home. Consent:  He and/or his healthcare decision maker is aware that this patient-initiated Telehealth encounter is a billable service, with coverage as determined by his insurance carrier. He is aware that he may receive a bill and has provided verbal consent to proceed: Yes    Pursuant to the emergency declaration under the [address] and the [facility], [place], [address] waiver authority and the [employer] and [employer] General Act, this Virtual  Visit was conducted, with patient's (and/or legal guardian's) consent, to reduce the patient's risk of exposure to COVID-19 and provide necessary medical care. Services were provided through a video synchronous discussion virtually to substitute for in-person visit.

## 2020-05-27 NOTE — TELEPHONE ENCOUNTER
Returned call to patient/sister and informed him MD recommend stopping Protonix and starting Carafate four times daily, as well as Maalox every 6 hours as needed and for the next week to eat a bland diet (plain rice, milk, dry toast or crackers, oats, plain apple sauce, etc.) - emphasis no spice, fried foods, or tomato based food. Patient/sister verbalized understanding.

## 2020-05-27 NOTE — TELEPHONE ENCOUNTER
Spent approx. 15 minutes on the phone with pt. He had his sister on the phone as well. Pt was confused on his long acting medication he said he takes it every 24 hours, however I tried to explain it is every 12 hours. He then said that is how he takes it. He also takes PCT every 5-6 hours. He then started to cry because he said he is trying to not go to the ER. I asked him what is bothering him and he said the bottom of his stomach hurts. He was not giving much information other than he is not wanting to go to the ER, he said he has a problem (short term memory loss is what he was referring to). I tried to educate him on how he needs to keep his infusion appointment tomorrow and we can better assess how he is doing such as address diarrhea etc. He may be having from chemo, it appears he is just having pain. I spoke with Katelynn Patton and she will call the patient.

## 2020-05-27 NOTE — TELEPHONE ENCOUNTER
Palliative Medicine  Nursing Note  [phone]  Fax [phone]      Telephone Call  Patient Name: Dasha Wilson  YOB: 1960    5/27/2020        Primary Decision Maker: Maggie Burgos - Son - [phone]    Secondary Decision Maker: Jayne Rey - Daughter - [phone]    Supplemental (Other) Decision Maker: Marv Roth - Child - [phone]   Advance Care Planning 5/19/2020   Patient's [address] is: Verbal statement (Legal Next of Kin remains as decision maker)   Primary Decision Maker Name -   Primary Decision Maker Phone Number -   Primary Decision Maker Relationship to Patient -   Secondary Decision Maker Name -   Confirm Advance Directive None   Patient Would Like to Complete Advance Directive Yes     Outgoing call placed to patient/sister \"Deja\" and he stated that he is taking Protonix as prescribed and still not getting any relief. He reported chest pain \"feeling like there is a hole in it\". He denies nausea or vomiting, and does not feel like an elephant sitting on his chest feeling. Sister reported that patient went to the ER a few days ago - he interrupted her and said \"that's not relevant right now\" - per patient he wants this pain in his chest gone and would only like to discuss that at this time. Advised I will discuss with MD and call him back with recommendations.     Information was shared with Dr. Fady Bunn, RN  Palliative Medicine

## 2020-05-27 NOTE — TELEPHONE ENCOUNTER
Can you please give mr gomez a call back. He says the medication is really bothering him.  Best contact [phone]

## 2020-05-28 NOTE — PROGRESS NOTES
Sonia Billy is a 63 y.o. AA male here for follow up visit for:  Chief Complaint   Patient presents with    Cancer     gallbladder/met    Chemotherapy   Cycle 4 Day 1 GEMZAR/Abraxane    1. Have you been to the ER, urgent care clinic since your last visit? Hospitalized since your last visit? 2. Have you seen or consulted any other health care providers outside of the [address] [place] [address] since your last visit? Include any pap smears or colon screening. Pt sees palliative me.

## 2020-05-28 NOTE — PROGRESS NOTES
[facility] Progress Note    Date: May 28, 2020    Name: Katrin Sharpe    MRN: [id number]         : 1960    Mr. Hines Arrived ambulatory and in no distress for cycle 4 day 1 of Abraxane/Gemzar regimen. Assessment was completed, patient c/o periodic lower abd pain along with chest pains. Patient denies pain at this current time. Patient still c/o memory loss. Port accessed without difficulty, labs drawn and processed. Chemotherapy Flowsheet 2020   Cycle C4D1   Date 2020   Drug / Regimen Abraxane/Gemzar   Dosage -   Pre Meds Given   Notes Given     Problem: Chemotherapy Treatment  Goal: *Chemotherapy regimen followed  Outcome: Progressing Towards Goal  Goal: *Hemodynamically stable  Outcome: Progressing Towards Goal  Goal: *Tolerating diet  Outcome: Progressing Towards Goal     Problem: Knowledge Deficit  Goal: *Verbalizes understanding of procedures and medications  Outcome: Progressing Towards Goal     Problem: Patient Education:  Go to Education Activity  Goal: Patient/Family Education  Outcome: Progressing Towards Goal    Mr. Hines's vitals were reviewed. Patient Vitals for the past 12 hrs:   Temp Pulse Resp BP SpO2   20 1032 98 °F (36.7 °C) 84 18 108/73 97 %         Lab results were obtained and reviewed. Recent Results (from the past 12 hour(s))   CBC WITH AUTOMATED DIFF    Collection Time: 20 10:29 AM   Result Value Ref Range    WBC 6.0 4.1 - 11.1 K/uL    RBC 3.58 (L) 4.10 - 5.70 M/uL    HGB 10.6 (L) 12.1 - 17.0 g/dL    HCT 32.2 (L) 36.6 - 50.3 %    MCV 89.9 80.0 - 99.0 FL    MCH 29.6 26.0 - 34.0 PG    MCHC 32.9 30.0 - 36.5 g/dL    RDW 18.7 (H) 11.5 - 14.5 %    PLATELET 972 639 - 779 K/uL    MPV 11.3 8.9 - 12.9 FL    NRBC 0.0 0  WBC    ABSOLUTE NRBC 0.00 0.00 - 0.01 K/uL    NEUTROPHILS 52 32 - 75 %    LYMPHOCYTES 33 12 - 49 %    MONOCYTES 13 5 - 13 %    EOSINOPHILS 1 0 - 7 %    BASOPHILS 1 0 - 1 %    IMMATURE GRANULOCYTES 0 0.0 - 0.5 %    ABS.  NEUTROPHILS 3.2 1.8 - 8.0 K/UL    ABS. LYMPHOCYTES 2.0 0.8 - 3.5 K/UL    ABS. MONOCYTES 0.8 0.0 - 1.0 K/UL    ABS. EOSINOPHILS 0.1 0.0 - 0.4 K/UL    ABS. BASOPHILS 0.0 0.0 - 0.1 K/UL    ABS. IMM. GRANS. 0.0 0.00 - 0.04 K/UL    DF AUTOMATED     METABOLIC PANEL, COMPREHENSIVE    Collection Time: 05/28/20 10:29 AM   Result Value Ref Range    Sodium 140 136 - 145 mmol/L    Potassium 3.8 3.5 - 5.1 mmol/L    Chloride 105 97 - 108 mmol/L    CO2 25 21 - 32 mmol/L    Anion gap 10 5 - 15 mmol/L    Glucose 124 (H) 65 - 100 mg/dL    BUN 10 6 - 20 MG/DL    Creatinine 0.92 0.70 - 1.30 MG/DL    BUN/Creatinine ratio 11 (L) 12 - 20      GFR est AA >60 >60 ml/min/1.73m2    GFR est non-AA >60 >60 ml/min/1.73m2    Calcium 9.6 8.5 - 10.1 MG/DL    Bilirubin, total 0.4 0.2 - 1.0 MG/DL    ALT (SGPT) 15 12 - 78 U/L    AST (SGOT) 14 (L) 15 - 37 U/L    Alk. phosphatase 80 45 - 117 U/L    Protein, total 7.3 6.4 - 8.2 g/dL    Albumin 3.5 3.5 - 5.0 g/dL    Globulin 3.8 2.0 - 4.0 g/dL    A-G Ratio 0.9 (L) 1.1 - 2.2       Patient spoke with MD, per MD patient will be referred to GI for further testing, will proceed with treatment today. Patient aware. Pre-medications  were administered as ordered and chemotherapy was initiated.   Medications Administered       0.9% sodium chloride infusion       Admin Date  05/28/2020 Action  New Bag Dose  25 mL/hr Rate  25 mL/hr Route  IntraVENous Administered By  Charu Hummel RN              dexamethasone (DECADRON) 4 mg/mL injection 10 mg       Admin Date  05/28/2020 Action  Given Dose  10 mg Route  IntraVENous Administered By  Charu Hummel RN              ondansetron [facility]) injection 8 mg       Admin Date  05/28/2020 Action  Given Dose  8 mg Route  IntraVENous Administered By  Charu Hummel RN              saline peripheral flush soln 10 mL       Admin Date  05/28/2020 Action  Given Dose  10 mL Route  InterCATHeter Administered By  Charu Hummel, MIGUELITO                    Mr. Carleen Guerrero tolerated treatment well, port flushed and de accessed, patient was discharged from [place] [address] in stable condition at [phone].      Future Appointments   Date Time Provider Carmen Wynn   6/11/2020 11:00 AM [facility] INFUSION NURSE 2 [address] COM   6/16/2020 10:30 AM John Hand MD [facility] [place]   6/25/2020 11:00 AM [facility] INFUSION NURSE 2 [address] COM   7/9/2020 11:00 AM [facility] INFUSION NURSE 1 [address] COM         [place] RN  May 28, 2020

## 2020-05-28 NOTE — PROGRESS NOTES
[address]  Social Work Navigator Encounter     Patient Name:  Beatrice Nath    Medical History: dx metastatic lung cancer    Advance Directives:    Narrative: Pt complaining hard to breath and pain. MD discussed having GI MD do a scope to determine what might be causing the discomfort. Pt states his memory is not good. SW unsure of how much his cognition has changed vs the stress of the discomfort.       Barriers to Care:     Assessment/Action:    Plan/Referral:     Other referral

## 2020-05-28 NOTE — PROGRESS NOTES
[address] 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 6/4/19 2R LN FNA and core biopsy: Adenocarcinoma. Comment: IHC stains negative for GATA3, AR, ER, p40. Immunohistochemistry shows that the tumor cells express CK7 with focal expression of CDX2. Tumor cells lack expression of CK20, TTF-1 and Napsin A. These findings are not entirely diagnostic and could be seen in either a primary pulmonary malignancy or a primary upper gastrointestinal tract malignancy. Other sites are also not excluded. Clinical and radiographic correlation is recommended. 6/25/19 2R LN, mediastinum: Metastatic adenocarcinoma (see Comment). Comment - The tumor appears poorly differentiated with areas of necrosis. Immunohistochemical staining reveals positive staining for cytokeratin 7 (strong, diffuse), CDX-2 (focal, weak to moderate) and CEA (focal, moderate to strong). The tumor cells are negative for cytokeratin 20, cytokeratin 5/6, p40, p63, TTF-1, napsin-A, PLAP, HCG, AFP and c-KIT (). The findings are not entirely specific with regard to primary site but would be consistent with upper GI/pancreaticobiliary tract. A pulmonary primary is less likely, but still in the differential.   -PDL1 30%, [facility] identified high tumor mutational burden - which is predictive of higher response to immunotherapy agents.     Prior Treatment:    Completed 6 cycles of Carbo-Taxol on 12/02/2019   Keytruda 200mg every 21 days, started 12/20/19, stopped 02/2020 - s/p 3 cycles    Current Treatment:    Gemcitabine/Abraxane - Cycle 4 Day 1      History of Present Illness:   Mr. Lissa Call is a 63 y.o. male with COPD, remote h/o colon cancer comes in for evaluation of mediastinal lymphadenopathy. Pt had recently p/w shortness of breath, dyspnea on exertion, and was found on chest CT to have R mediastinal lymphadenopathy, which also were PET avid. Case was discussed at Thoracic Tumor Board with thought that this could be pancreatic, urothelial, or germ cell origin. More tissue is recommended. Pt had EGD 3 yrs ago and was told he had GERD. Patient has h/o colon cancer in 2002. He states a cancerous polyp was found and then he underwent colectomy in 2002. This was done by Dr. Ivonne Baltazar at [facility]. Patient had colonoscopies regularly afterwards, last one was approx 2015. Last EGD was in 2018. As part of search for primary lesion, patient underwent EGD, colonoscopy by Dr. Albina Mcgill, notable for diffusely enlarged gastric folds, but biopsies negative. Pt underwent cytoscopy on 7/17/19 with findings negative for malignancy. He received 6 cycles of carbo-taxol and experienced many side effects from the chemotherapy including severe sensory neuropathy in feet. Last CT showed progression of disease in the right adrenal glans. He received Keytruda as second line therapy. Recent scans show disease progression. He is receiving palliative chemotherapy with gem/abraxane. Mr. Lissa Call is complaining of retrosternal pain. The pain is poorly described. He has had numerous ED eval and imaging. No new pathology has been found. He is significant emotional distress.         Past Medical History:   Diagnosis Date    Abnormal nuclear stress test 12/7/2015    Cardiomyopathy ([address] 2010    EF 45%    Chronic obstructive pulmonary disease (HCC)     Chronic systolic heart failure (HCC)     GERD (gastroesophageal reflux disease)     HTN (hypertension)     Lung cancer (HCC)     PAC (premature atrial contraction)     Tobacco use disorder       Past Surgical History:   Procedure Laterality Date    COLONOSCOPY N/A 7/10/2019    COLONOSCOPY AND ESOPHAGOGASTRODUODENOSCOPY (EGD) performed by Dieter Henderson MD at [facility] HX COLECTOMY      HX SPLENECTOMY      IR INSERT TUNL CVC W PORT OVER 5 YEARS  2019      Social History     Tobacco Use    Smoking status: Former Smoker     Packs/day: 0.50     Types: Cigarettes     Last attempt to quit: 2016     Years since quittin.1    Smokeless tobacco: Never Used   Substance Use Topics    Alcohol use: Not Currently     Comment: rarely      Family History   Problem Relation Age of Onset    Stroke Mother     Coronary Artery Disease Sister     Heart Disease Paternal Grandfather      Current Outpatient Medications   Medication Sig    sucralfate (CARAFATE) 100 mg/mL suspension Take 5 mL by mouth four (4) times daily.  aluminum-magnesium hydroxide (MAALOX) 200-200 mg/5 mL suspension Take 15 mL by mouth every six (6) hours as needed for Indigestion.  oxyCODONE ER (OxyCONTIN) 10 mg ER tablet Take 1 Tab by mouth every twelve (12) hours for 10 days. Max Daily Amount: 20 mg.    traZODone (DESYREL) 50 mg tablet Take 1 Tab by mouth nightly.  senna-docusate (PERICOLACE) 8.6-50 mg per tablet Take 1 Tab by mouth two (2) times a day.  umeclidinium brm/vilanterol tr (ANORO ELLIPTA IN) Take 1 Puff by inhalation daily.  albuterol (PROVENTIL HFA, VENTOLIN HFA, PROAIR HFA) 90 mcg/actuation inhaler Take 2 Puffs by inhalation every four (4) hours as needed for Wheezing.  sertraline (ZOLOFT) 50 mg tablet Take 1 Tab by mouth daily.  gabapentin (NEURONTIN) 300 mg capsule Take 2 Caps by mouth three (3) times daily. Max Daily Amount: 1,800 mg.    metoprolol succinate (TOPROL-XL) 25 mg XL tablet Take 1 Tab by mouth nightly.     megestroL (MEGACE) 40 mg tablet TAKE 1 TABLET BY MOUTH TWICE DAILY    lidocaine-prilocaine (EMLA) topical cream APPLY QUARTER SIZE AMOUNT TO PORT PRIOR TO CHEMOTHERAPY    dicyclomine (BENTYL) 10 mg capsule Take 10 mg by mouth two (2) times a day.  LORazepam (ATIVAN) 1 mg tablet Take 1 Tab by mouth two (2) times daily as needed for Anxiety (or insomnia). Max Daily Amount: 2 mg.  potassium chloride (K-DUR, KLOR-CON) 20 mEq tablet Take 1 Tab by mouth daily.  ondansetron hcl (ZOFRAN) 4 mg tablet Take 2 Tabs by mouth every eight (8) hours as needed for Nausea.  prochlorperazine (COMPAZINE) 10 mg tablet Take 1 Tab by mouth every six (6) hours as needed for Nausea.  lisinopril (PRINIVIL, ZESTRIL) 5 mg tablet TAKE 1 TABLET BY MOUTH DAILY    tamsulosin (FLOMAX) 0.4 mg capsule Take 0.4 mg by mouth daily. No current facility-administered medications for this visit. Facility-Administered Medications Ordered in Other Visits   Medication Dose Route Frequency    0.9% sodium chloride infusion  25 mL/hr IntraVENous CONTINUOUS    PACLitaxel-protein bound (ABRAXANE) 115.5 mg in 0.9% sodium chloride 23.1 mL chemo infusion  62.5 mg/m2 (Treatment Plan Recorded) IntraVENous ONCE    gemcitabine (GEMZAR) 925 mg in 0.9% sodium chloride 250 mL, overfill volume 25 mL chemo infusion  500 mg/m2 (Treatment Plan Recorded) IntraVENous ONCE    saline peripheral flush soln 10 mL  10 mL InterCATHeter PRN    heparin (porcine) pf 300-500 Units  300-500 Units InterCATHeter PRN      No Known Allergies     Review of Systems: A complete review of systems was obtained, negative except as described above. Physical Exam:     Vitals:    05/28/20 1042   BP: 108/73   Pulse: 84   Resp: 18   Temp: 98 °F (36.7 °C)   SpO2: 97%   Weight: 143 lb (64.9 kg)   Height: 5' 11\" (1.803 m)             ECOG PS: 1  General:  thin, no acute distress,ambulating with a cane.    Eyes: PERRLA, EOMI, anicteric sclerae  HENT: Atraumatic, OP clear, TMs intact without erythema  Neck: Supple  Lymphatic: No cervical, supraclavicular, axillary or inguinal adenopathy  Respiratory: CTAB, normal respiratory effort  CV: Normal rate, regular rhythm, no murmurs, no peripheral edema, port in place. GI: Soft, nontender, nondistended, no masses, no hepatomegaly, no splenomegaly  MS: Normal gait and station. Digits without clubbing or cyanosis. Skin: No rashes, ecchymoses, or petechiae. Normal temperature, turgor, and texture. Neuro/Psych: Alert, oriented. 5/5 strength in all 4 extremities. Appropriate affect, normal judgment/insight.           Results:     Lab Results   Component Value Date/Time    WBC 6.0 05/28/2020 10:29 AM    HGB 10.6 (L) 05/28/2020 10:29 AM    HCT 32.2 (L) 05/28/2020 10:29 AM    PLATELET 189 85/03/8342 10:29 AM    MCV 89.9 05/28/2020 10:29 AM    ABS. NEUTROPHILS 3.2 05/28/2020 10:29 AM     Lab Results   Component Value Date/Time    Sodium 140 05/28/2020 10:29 AM    Potassium 3.8 05/28/2020 10:29 AM    Chloride 105 05/28/2020 10:29 AM    CO2 25 05/28/2020 10:29 AM    Glucose 124 (H) 05/28/2020 10:29 AM    BUN 10 05/28/2020 10:29 AM    Creatinine 0.92 05/28/2020 10:29 AM    GFR est AA >60 05/28/2020 10:29 AM    GFR est non-AA >60 05/28/2020 10:29 AM    Calcium 9.6 05/28/2020 10:29 AM     Lab Results   Component Value Date/Time    Bilirubin, total 0.4 05/28/2020 10:29 AM    ALT (SGPT) 15 05/28/2020 10:29 AM    Alk.  phosphatase 80 05/28/2020 10:29 AM    Protein, total 7.3 05/28/2020 10:29 AM    Albumin 3.5 05/28/2020 10:29 AM    Globulin 3.8 05/28/2020 10:29 AM     Lab Results   Component Value Date/Time    Iron 52 10/07/2019 12:53 PM    TIBC 235 (L) 10/07/2019 12:53 PM    Iron % saturation 22 10/07/2019 12:53 PM    Ferritin 880 (H) 10/07/2019 12:53 PM       No results found for: B12LT, FOL, RBCF  Lab Results   Component Value Date/Time    TSH 0.78 01/30/2020 09:50 AM     No results found for: HAMAT, HAAB, HABT, HAAT, HBSAG, HBSB, HBSAT, HBABN, HBCM, HBCAB, HBCAT, Alona Euceda, [facility], [address] [place] [address], [facility], [id number], [address] [facility], [facility], [facility], [facility], [id number], [id number], [facility], [id number], [place], [id number], [facility]      Imaging:     CT Results (most recent):  Results from Hospital Encounter encounter on 04/15/20   CTA CHEST W OR W WO CONT    Narrative Indication: Chest pain, shortness of breath. COMPARISON: 3/27/2020. Contrast-enhanced CT angiogram of the chest performed using 100 cc Isovue-370. Three-dimensional postprocessing performed. CT dose reduction was achieved through use of a standardized protocol tailored  for this examination and are automatic exposure control for dose modulation dose  reduction. FINDINGS:    The pulmonary arteries are patent. There is no evidence for pulmonary embolus as  was questioned. There is mild right hilar adenopathy and mild subcarinal adenopathy. Lungs demonstrate emphysematous change but there is no focal consolidation,  mass, or nodule. There is mild atelectatic change in the lingula. No pleural effusion. Right adrenal metastasis is again noted. Impression IMPRESSION:  1. No acute abnormality. Specifically, no evidence for pulmonary embolus. 2. COPD. 3. Mediastinal and right hilar adenopathy. Stable. 4. Right adrenal metastasis appears stable. I personally reviewed the images. Stable size of the mediastinal nodes and right adrenal gland. Assessment & Plan:   Олег Rowe is a 63 y.o. male with COPD, remote h/o colon cancer comes in for evaluation and management of adenocarcinoma of unknown primary. 1. 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 Involving mediastinal LNs, with no other PET findings. Per Thoracic Tumor Board discussion, this could represent upper GI origin, urothelial origin, germ cell tumor, or lung origin. Search for primary lesion was overall negative: Cystoscopy, EGD, colonoscopy negative for malignancy although EGD abnormal with diffusely enlarged gastric folds.  Despite h/o colon cancer in 2002, it would be very odd to see a recurrence in the mediastinal LNs. CEA 26.8. Other tumor markers negative (AFP, hCG, CA 19-9). Unfortunately, this disease is considered incurable, but is treatable. At this time, I recommend chemotherapy treatment using the Carboplatin-Paclitaxel regimen (which covers both lung and GI primaries). We discussed the risks and benefits of chemotherapy with Carboplatin and Paclitaxel. Potential side effects include but are not limited to: nausea, vomiting, diarrhea, constipation, taste changes, allergic reactions, myelosuppression, infection, fatigue, alopecia, neuropathy, mucositis, renal failure, infertility, and rarely, death. Additionally, chemotherapy leads to radiosensitization which can intensify the side effects of radiation therapy. The patient has consented to beginning chemotherapy and chemo ed packet given. Completed 6 cycles of Carbo-Taxol. CT on 12/16/19 shows a mixed response to treatment no change in mediastinal adenopathy and increased size of right adrenal mass. Receiving Keytruda in 2nd line. CT shows disease progression in the mediastinal nodes and adrenal mass. Based on the gene expression profile revealing the likely primary to be bile duct, I recommended switching systemic therapy to Gemcitabine/Abraxane     Receiving palliative chemotherapy   Gemcitabine/Abraxane - Cycle 4 Day 1    Tolerating treatment very well  Denies any side effects. A detailed system by system evaluation of side effect was performed to assess chemotherapy related toxicity. Blood counts are acceptable. Results reviewed with the patient  Symptom management form reviewed with patient. CT shows stable disease      2. Chest pain    Unclear etiology - psychogenic  CT done in the [address] [place] [address] ED - no new finding  Does not seem cardiac origin  He did not like PP  On Carafate  I am referring him to Dr. Sallie Bridges for an EGD      3.  Neoplasm pain: better  -- Follow up with palliative  -- oxycontin 10 mg ER every 12 hours  -- oxycodone 10 mg q6ilrge  -- Senna-docusate (pericolace) daily to prevent constipation. 4. H/o Stage I [T1NxMx] sigmoid colon cancer: Found in sigmoid adenoma during a colonoscopy; went on to undergo segmented resection of the sigmoid colon in 2002. Pathology per outside records:  2/6/2002 sigmoid colon polyp: Well differentiated adenocarcinoma arising in an adenoma, involving the mucosa and invading into the upper half of the submucosal stalk. No vascular space involvement is identified. 2/21/2002 Sigmoidectomy, liver biopsy:  -Sigmoid colon, segmented resection: No residual adenocarcinoma present. Polypectomy site with granulation tissue and vascular congestion. No LNs recovered. Distal and proximal margins benign.  -Liver biopsy: Negative for metastatic carcinoma. No significant inflammation or obvious cirrhosis identified. Very minimal steatosis (rare cells with fat globules). -Addendum: Reblocks of adipose tissue; three small benign lymphoid aggregates (< 0.1cm). 5. COPD: Quit smoking in approx 2016. SOB improved after starting inhaler. 6. HTN: Well controlled on Lisinopril and Metoprolol. 7. BPH: On Flomax. 8. 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 Increased numbness in feet, tingling and pain in left hand. Left hand dominant. Increased Gabapentin 300mg BID on 10/16/19.   -- Continue gabapentin to 300 mg TID, #90 tabs with 2 refills e-scribed on 11/11/19.     9. Anemia from cancer/chronic disease    observation    10. Severe protein calorie malnutrition - better    Gaining weight  Dietary consult  Protein supplementation    11. Insomnia: Tried Ambien which did not work. Started on lorazepam 1 mg QHS which he reports helped him sleep better than the Ambien. Continue to monitor. 12. Anxiety/Depression: Follows up with palliative      Signed by: Claudetta Harrier, MD                     May 28, 2020        CC. Kentrell Shen. Ketty Suero MD  CC. Paradise Lilly MD  CC.  Audelia Kuo MD

## 2020-05-28 NOTE — LETTER
5/28/20 Patient: Carri Flood YOB: 1960 Date of Visit: 5/28/2020 Gely Vázquez NP 
[address] [address] VIA Facsimile: [phone] Dear Gely Vázquez NP, Thank you for referring Mr. Carri Flood to [address] [place] [address] for evaluation. My notes for this consultation are attached. If you have questions, please do not hesitate to call me. I look forward to following your patient along with you.  
 
 
Sincerely, 
 
Trevon Cortez NP

## 2020-05-29 NOTE — PROGRESS NOTES
Palliative Medicine Office Visit Palliative Medicine Nurse Check In 
[phone] Patient Name: Moriah Weber YOB: 1960 Date of Office Visit: 5/29/2020 Patient states: \"  \" 
 
From Check In Sheet (scanned in Media): 
Has a medical provider talked with you about cardiopulmonary resuscitation (CPR)? [x] Yes   [] No   [] Unable to obtain Nurse reminder to complete or update ACP FlowSheet: 
 
Is ACP on the Problem List?    [] Yes    [x] No 
IF ACP Document is ON FILE; Nurse to place ACP on Problem List  
 
Is there an ACP Note in Chart Review/Note? [] Yes    [x] No  
If NO: ALERT PROVIDER Advance Care Planning 5/19/2020 Patient's Healthcare Decision Maker is: Verbal statement (Legal Next of Kin remains as decision maker) Primary Decision Maker Name -  
Primary Decision Maker Phone Number -  
Primary Decision Maker Relationship to Patient - Secondary Decision Maker Name -  
Confirm Advance Directive None Patient Would Like to Complete Advance Directive Yes Is there anything that we should know about you as a person in order to provide you the best care possible? Have you been to the ER, urgent care clinic since your last visit? [x] Yes   [] No   [] Unable to obtain Have you been hospitalized since your last visit? [] Yes   [x] No   [] Unable to obtain Have you seen or consulted any other health care providers outside of the [address] [place] [address] since your last visit? [] Yes   [x] No   [] Unable to obtain Functional status (describe):  
 
 
 
 
Last BM: 5/29/2020  accessed (date): 5/29/2020 Bottle review (for opioid pain medication): 
Medication 1: OxyContin 10 mg Date filled:   
Directions: 1 tab by mouth every 12 hours# filled: # left: 5 # pills taking per day: 2 Last dose taken:5/29/2020 Medication 2:  
Date filled:  
Directions:  
# filled: # left: # pills taking per day: 
Last dose taken:

## 2020-05-29 NOTE — PROGRESS NOTES
Palliative Medicine Outpatient Services [place]: [phone]) Patient Name: Carri Flood YOB: 1960 Date of Current Visit: 05/14/20 Location of Current Visit:   
[] [facility] Office 
[] [facility] Office [] [facility] Office [x] Home-virtual visit 
[] Other: Cherylene Boys Date of Initial Visit: 1/15/2020 Referral from: Dr. Kait XAVIER Primary Care Physician: Taisha Dela Cruz NP 
  
 SUMMARY:  
Carri Flood is a 63y.o. year old with a  history of COPD, colon cancer in 2002, metastatic adenocarcinoma of unknown primary, who was referred to Palliative Medicine by Dr. Kait Petersen for management of symptoms and psychosocial support. The patients social history includes served in the [employer] for 22 years, lives with his girlfriend now. Treatment history-patient had progression of disease on carbo plus Taxol, currently on Keytruda infusions. He struggles with neoplasm related pain PALLIATIVE DIAGNOSES:  
 
  ICD-10-CM ICD-9-CM 1. Esophageal spasm K22.4 530.5 2. Acute chest wall pain R07.89 786.52   
3. Insomnia, unspecified type G47.00 780.52   
4. Adenocarcinoma of unknown primary (HCC) C80.1 199.1 oxyCODONE ER (OxyCONTIN) 10 mg ER tablet  
   diazePAM (VALIUM) 5 mg tablet  
   oxyCODONE IR (ROXICODONE) 10 mg tab immediate release tablet 5. Anxiety about health F41.8 300.09 PLAN:  
Patient Instructions Dear Carri Flodo , It was a pleasure seeing you today virtually for an urgent visit today We will see you again in 2 weeks If labs or imaging tests have been ordered for you today, please call the office  at [phone] 48 hours after completion to obtain the results. Your stated goal:  
-Better pain control 
-Better relationship with your providers thereby enhancing your experience with [facility] This is the plan we talked about: 1. Right-sided chest wall pain and flank pain -You continue to struggle with chest pain and pressure for which you have now been to the emergency room almost 3 times. You were told that this is not cardiac related and you do not think this is heart related because it moves all over your upper chest.  Over the last 2 weeks, we have tried a bland diet and increasing your Protonix to 2 times a day. you most likely have severe heartburn which is causing esophageal spasms. You have tried oxycodone sparingly for this without much benefit. But you do fall asleep right after taking the medicine so you are not able to appreciate any pain relief. But the fact that you sleep after oxycodone tells me that maybe the medicine is working. 
-Continue bland diet, avoid spicy and hot food. Eat oatmeal, rice and yogurt every day. - Continue Protonix 2 times a day 
-Take OxyContin 2 times a day 
-Take oxycodone 10 mg every 6 hours as needed 
-Continue Carafate which we started yesterday every 6 hours scheduled 
-Let us start Valium 5 mg for the esophageal spasm. We had tried lorazepam for you for anxiety several weeks ago which you did try but did not like the way it made you feel. We talked about how Valium is also a sedative and is meant to relax you and help you calm down as well as help with muscle relaxation in your chest area. -You are willing to try it, I sent a prescription for 10 days. Take 1 tablet every 6 hours as needed for chest pressure. Do not take the medicine if you do not have any chest pressure. 
-I have spoken with Dr. Marlyne Boas about this and we both agree that you need to see a gastroenterologist and get an endoscopy. 2. Severe chemotherapy induced neuropathy - You are tolerating Gabapentin 600 mg three times a day and this is helping some - We added Amitriptyline 25 mg at bedtime and this is helping as well. 3.  Constipation 
-Constipation is a common side effect of pain medications as they slow your bowels. -Continue senna 2 tabs two times a day. This is necessary to keep your bowels soft. - Add Miralax every other day as a laxative. - Goal is to have soft bowel movements every day or every other day. - Please call us if you do not have bowel movements for more than 3 days. 4. Insomnia - You are struggling with severe lack of sleep due to stress and anxiety 
-You finally picked up the trazodone from the pharmacy and have been taking it the last few days and this is helping. 
-You take trazodone sparingly as well 5. Short-term memory loss 
-You are much more clear today and we discussed your confusion and short-term memory loss in detail today. You tell me that this is baseline for you. You have always been someone who has very poor memory and you blamed this on your time served in the [employer]. You do admit that you get frustrated with very simple things and tend to obsess over things you cannot control. For example, we spent about 40 minutes today talking about your phone call to our on-call service last night and the response you received. You were not pleased with how the call center handled your phone call last night and you were even more disappointed that the on-call provider did not know everything about you and had to ask many questions. We talked about how on-call providers do have access to your medical record but they do not know you personally and therefore to allow them to ask you the questions that they need to ask in order to provide best care for you. You were hoping to reach me directly every time you call. We reviewed that you can call us anytime during business hours and have direct access to nurses who know you and reserve calling us after business hours only for emergencies. 7. Anxiety/ depression 
- Continue Zoloft 25 mg at bedtime 
-You are willing to meet with our  once a month and travel in between 8. DME recommendation Today we place an order for [facility] bed , you require changes in positioning not feasible with ordinary hospital bed, pillows , wedges or cushions, you also require the head of bed to elevated at 30 degrees This is what you have shared with us about Advance Care Planning: 
 
  Primary Decision Maker: Dalton Hameed - Son - [phone] Secondary Decision Maker: Leora Mendez - [phone] Supplemental (Other) Decision Maker: Sherrie March - [phone] Advance Care Planning 5/19/2020 Patient's Healthcare Decision Maker is: Verbal statement (Legal Next of Kin remains as decision maker) Primary Decision Maker Name -  
Primary Decision Maker Phone Number -  
Primary Decision Maker Relationship to Patient - Secondary Decision Maker Name -  
Confirm Advance Directive None Patient Would Like to Complete Advance Directive Yes The Palliative Medicine Team is here to support you and your family. Sincerely, 
 
 
Yolanda Lange MD and the Palliative Medicine Team 
 
 
 GOALS OF CARE / TREATMENT PREFERENCES:  
[====Goals of Care====] GOALS OF CARE: 
Patient / health care proxy stated goals: See Patient Instructions / Summary TREATMENT PREFERENCES:  
Code Status:  [x] Attempt Resuscitation       [] Do Not Attempt Resuscitation Advance Care Planning: 
[x] The [facility] Interdisciplinary Team has updated the ACP Navigator with Decision Maker and Patient Capacity Primary Decision Maker: Dalton Hameed - Larry - [phone] Secondary Decision Maker: Leora Mendez - [phone] Supplemental (Other) Decision Maker: Sherrie March - [phone] Advance Care Planning 5/19/2020 Patient's Healthcare Decision Maker is: Verbal statement (Legal Next of Kin remains as decision maker) Primary Decision Maker Name -  
Primary Decision Maker Phone Number -  
Primary Decision Maker Relationship to Patient -  
 Secondary Decision Maker Name -  
Confirm Advance Directive None Patient Would Like to Complete Advance Directive Yes Other: 
(If patient appropriate for POST, consider using PALLPOST smart phrase here) The palliative care team has discussed with patient / health care proxy about goals of care / treatment preferences for patient. 
[====Goals of Care====] PRESCRIPTIONS GIVEN:  
 
Medications Ordered Today Medications  oxyCODONE ER (OxyCONTIN) 10 mg ER tablet Sig: Take 1 Tab by mouth every twelve (12) hours for 30 days. Max Daily Amount: 20 mg. Dispense:  60 Tab Refill:  0  
 diazePAM (VALIUM) 5 mg tablet Sig: Take 1 Tab by mouth every six (6) hours as needed for Anxiety for up to 10 days. Max Daily Amount: 20 mg. Dispense:  40 Tab Refill:  0  
 oxyCODONE IR (ROXICODONE) 10 mg tab immediate release tablet Sig: Take 1 Tab by mouth every six (6) hours as needed for Pain for up to 15 days. Max Daily Amount: 40 mg. Dispense:  60 Tab Refill:  0  
  
 
 
 FOLLOW UP: Future Appointments Date Time Provider Carmen Wynn 6/11/2020 11:00 AM [facility] INFUSION NURSE 2 [address] [facility]  
6/16/2020 10:30 AM Noris Ho MD 40 [facility]  
6/25/2020 11:00 AM [facility] INFUSION NURSE 2 Ricky Dhillon [address] [facility]  
6/25/2020 11:15 AM Jamie Arambula NP ONC ELIAZAR SCHED  
7/9/2020 11:00 AM [facility] INFUSION NURSE 1 [facility]AC[facility] COM  
  
 
 
 PHYSICIANS INVOLVED IN CARE:  
Patient Care Team: 
Nellie Simpson NP as PCP - General (Nurse Practitioner) Alen Elam MD as Physician (Cardiology) Emily Cam MD (Gastroenterology) Shantell Dwyer MD (Hematology and Oncology) HISTORY:  
Reviewed patient-completed ESAS and advance care planning form. Reviewed patient record in prescription monitoring program. 
 
CHIEF COMPLAINT:  
No chief complaint on file. HPI/SUBJECTIVE: The patient is: [x] Verbal / [] Nonverbal  
 
 Patient seen at home along with his girlfriend Katherine Alexander virtually. Spent over 40 minutes discussing the above. He remains a very poor historian and unable to describe his pain or what medication is working. He just wants the pain gone and is dissatisfied with any option we give him. I am not convinced that he is compliant with the medications. See plan for details I spent additional 15 minutes talking to Dr. Anne Tena about the patient 
--- 
5/14/2020 visit Patient seen in [facility] center along with his girlfriend Katherine Alexander is very worried about back pain. He has been taking oxycodone 10 mg every 6 hours but it only lasts about 5 hours. His girlfriend started noticing that he repeats the same questions over and over again and does not remember things that happened earlier in the day. This is causing him confusion and severe anxiety. In the last 2 weeks, he struggled with a urine infection and urinary tract infection for which we had him on antibiotics and he is much better now. He thinks the confusion started before the antibiotics and terms if it is related to the opioids. But he has been on opioids for several months prior to this. No new medications, no illicit drug use. 
 
------------ Initial visit Patient is here alone. He starts off by talking about how his trust in the medical system has been broken especially with patient to physician communication. He lists all his concerns about his care over the last 6 months but is willing to be redirected and start with a clean [place] with our team.  His main complaint is right-sided flank pain for which he has been taking OxyContin and oxycodone. He has been chewing the OxyContin. He also struggles with lack of appetite, no desire to eat. He had significant constipation initially but now he is on bowel regimen. He is willing to work with  about his anxiety.  
 
 
Clinical Pain Assessment (nonverbal scale for nonverbal patients):  
 [++++ Clinical Pain Assessment++++] [++++Pain Severity++++]: Pain: 9 
[++++Pain Character++++]: deep, gnawing 
[++++Pain Duration++++]: months 
[++++Pain Effect++++]: functional 
[++++Pain Factors++++]: none in particular 
[++++Pain Frequency++++]: on and off 
[++++Pain Location++++]: right flank [++++ Clinical Pain Assessment++++] FUNCTIONAL ASSESSMENT:  
 
Palliative Performance Scale (PPS): PPS: 70 PSYCHOSOCIAL/SPIRITUAL SCREENING:  
 
Any spiritual / Religion concerns: 
[] Yes /  [x] No 
 
Caregiver Burnout: 
[] Yes /  [x] No /  [] No Caregiver Present Anticipatory grief assessment:  
[x] Normal  / [] Maladaptive ESAS Anxiety: Anxiety: 9 ESAS Depression: Depression: 8 REVIEW OF SYSTEMS:  
 
The following systems were [x] reviewed / [] unable to be reviewed Systems: constitutional, ears/nose/mouth/throat, respiratory, gastrointestinal, genitourinary, musculoskeletal, integumentary, neurologic, psychiatric, endocrine. Positive findings noted below. Modified ESAS Completed by: provider Fatigue: 10 Drowsiness: 8 Depression: 8 Pain: 9 Anxiety: 9 Nausea: 9 Anorexia: 5 Dyspnea: 7 [facility] Constipation: No  
Other Problem (Comment): 0 PHYSICAL EXAM:  
 
Wt Readings from Last 3 Encounters:  
20 143 lb (64.9 kg) 20 143 lb 12.8 oz (65.2 kg) 20 145 lb (65.8 kg) There were no vitals taken for this visit. Last bowel movement: See Nursing Note Constitutional   
[x] Appears well-developed and well-nourished in no apparent distress   
[] Abnormal: 
Mental status [x] Alert and awake [x] Oriented to person/place/time 
[x] Able to follow commands 
[] Abnormal:  
Eyes 
[x] EOM normal  
[x] Sclera normal  
[x] No visible ocular discharge 
[] Abnormal:  
HENT [x] Normocephalic, atraumatic [x] Mouth/Throat: Moist mucous membranes  
[x] External Ears normal 
[] Abnormal: 
Neck [x] No visualized mass 
[] Abnormal: 
Pulmonary/Chest  
 [x] Respiratory effort normal 
[x] No visualized signs of difficulty breathing or respiratory distress 
[] Abnormal: Musculoskeletal 
[x] Normal gait with no signs of ataxia [x] Normal range of motion of neck [] Abnormal: 
Neurological:  
[x] No facial asymmetry (Cranial nerve 7 motor function) [x] No gaze palsy 
[] Abnormal:  
Skin 
[x] No significant exanthematous lesions or discoloration noted on facial skin 
[] Abnormal: Psychiatric [x] Normal affect [x] No hallucinations [] Abnormal: 
 
Other pertinent observable physical exam findings: 
 
Due to this being a TeleHealth evaluation, many elements of the physical examination are unable to be assessed. HISTORY:  
 
Past Medical History:  
Diagnosis Date  Abnormal nuclear stress test 2015  Cardiomyopathy ([facility]  EF 45%  Chronic obstructive pulmonary disease ([facility]  Chronic systolic heart failure ([facility]  GERD (gastroesophageal reflux disease)  HTN (hypertension)  Lung cancer ([facility]  PAC (premature atrial contraction)  Tobacco use disorder Past Surgical History:  
Procedure Laterality Date  COLONOSCOPY N/A 7/10/2019 COLONOSCOPY AND ESOPHAGOGASTRODUODENOSCOPY (EGD) performed by Galina Daigle MD at [facility]  HX SPLENECTOMY  IR INSERT TUNL CVC W PORT OVER 5 YEARS  2019 Family History Problem Relation Age of Onset  Stroke Mother  Coronary Artery Disease Sister  Heart Disease Paternal Grandfather History reviewed, no pertinent family history. Social History Tobacco Use  Smoking status: Former Smoker Packs/day: 0.50 Types: Cigarettes Last attempt to quit: 2016 Years since quittin.1  Smokeless tobacco: Never Used Substance Use Topics  Alcohol use: Not Currently Comment: rarely No Known Allergies Current Outpatient Medications Medication Sig  
  oxyCODONE ER (OxyCONTIN) 10 mg ER tablet Take 1 Tab by mouth every twelve (12) hours for 30 days. Max Daily Amount: 20 mg.  
 diazePAM (VALIUM) 5 mg tablet Take 1 Tab by mouth every six (6) hours as needed for Anxiety for up to 10 days. Max Daily Amount: 20 mg.  
 oxyCODONE IR (ROXICODONE) 10 mg tab immediate release tablet Take 1 Tab by mouth every six (6) hours as needed for Pain for up to 15 days. Max Daily Amount: 40 mg.  
 sucralfate (CARAFATE) 100 mg/mL suspension Take 5 mL by mouth four (4) times daily.  aluminum-magnesium hydroxide (MAALOX) 200-200 mg/5 mL suspension Take 15 mL by mouth every six (6) hours as needed for Indigestion.  traZODone (DESYREL) 50 mg tablet Take 1 Tab by mouth nightly.  senna-docusate (PERICOLACE) 8.6-50 mg per tablet Take 1 Tab by mouth two (2) times a day.  umeclidinium brm/vilanterol tr (ANORO ELLIPTA IN) Take 1 Puff by inhalation daily.  albuterol (PROVENTIL HFA, VENTOLIN HFA, PROAIR HFA) 90 mcg/actuation inhaler Take 2 Puffs by inhalation every four (4) hours as needed for Wheezing.  sertraline (ZOLOFT) 50 mg tablet Take 1 Tab by mouth daily.  gabapentin (NEURONTIN) 300 mg capsule Take 2 Caps by mouth three (3) times daily. Max Daily Amount: 1,800 mg.  
 metoprolol succinate (TOPROL-XL) 25 mg XL tablet Take 1 Tab by mouth nightly.  megestroL (MEGACE) 40 mg tablet TAKE 1 TABLET BY MOUTH TWICE DAILY  lidocaine-prilocaine (EMLA) topical cream APPLY QUARTER SIZE AMOUNT TO PORT PRIOR TO CHEMOTHERAPY  dicyclomine (BENTYL) 10 mg capsule Take 10 mg by mouth two (2) times a day.  potassium chloride (K-DUR, KLOR-CON) 20 mEq tablet Take 1 Tab by mouth daily.  ondansetron hcl (ZOFRAN) 4 mg tablet Take 2 Tabs by mouth every eight (8) hours as needed for Nausea.  prochlorperazine (COMPAZINE) 10 mg tablet Take 1 Tab by mouth every six (6) hours as needed for Nausea.  lisinopril (PRINIVIL, ZESTRIL) 5 mg tablet TAKE 1 TABLET BY MOUTH DAILY  tamsulosin (FLOMAX) 0.4 mg capsule Take 0.4 mg by mouth daily. No current facility-administered medications for this visit. LAB DATA REVIEWED:  
 
Lab Results Component Value Date/Time WBC 6.0 05/28/2020 10:29 AM  
 HGB 10.6 (L) 05/28/2020 10:29 AM  
 PLATELET 358 66/02/8935 10:29 AM  
 
Lab Results Component Value Date/Time Sodium 140 05/28/2020 10:29 AM  
 Potassium 3.8 05/28/2020 10:29 AM  
 Chloride 105 05/28/2020 10:29 AM  
 CO2 25 05/28/2020 10:29 AM  
 BUN 10 05/28/2020 10:29 AM  
 Creatinine 0.92 05/28/2020 10:29 AM  
 Calcium 9.6 05/28/2020 10:29 AM  
  
Lab Results Component Value Date/Time Alk. phosphatase 80 05/28/2020 10:29 AM  
 Protein, total 7.3 05/28/2020 10:29 AM  
 Albumin 3.5 05/28/2020 10:29 AM  
 Globulin 3.8 05/28/2020 10:29 AM  
 
Lab Results Component Value Date/Time INR 1.0 11/25/2015 10:22 AM  
 Prothrombin time 10.6 11/25/2015 10:22 AM  
  
Lab Results Component Value Date/Time Iron 52 10/07/2019 12:53 PM  
 TIBC 235 (L) 10/07/2019 12:53 PM  
 Iron % saturation 22 10/07/2019 12:53 PM  
 Ferritin 880 (H) 10/07/2019 12:53 PM  
  
Reviewed today's labs and most recent imaging. CONTROLLED SUBSTANCES SAFETY ASSESSMENT (IF ON CONTROLLED SUBSTANCES):  
 
Reviewed opioid safety handout:  [x] Yes   [] No 
24 hour opioid dose >150mg morphine equivalent/day:  [] Yes   [] No 
Benzodiazepines:  [] Yes   [] No 
Sleep apnea:  [] Yes   [] No 
Urine Toxicology Testing within last 6 months:  [] Yes   [] No 
History of or new aberrant medication taking behaviors:  [] Yes   [] No 
Has Narcan been prescribed [] Yes   [] No 
 
   
 
Total time: 70 minutes Counseling / coordination time: 60 minutes 
> 50% counseling / coordination?:  
 
Consent: 
He and/or health care decision maker is aware that that he may receive a bill for this telehealth service, depending on his insurance coverage, and has provided verbal consent to proceed:  Yes 
 
 CPT Codes 41562-15830 for Established Patients may apply to this Telehealth VisitTime-based coding, delete if not needed: I spent at least 40 minutes with this established patient, and >50% of the time was spent counseling and/or coordinating care regarding Detailed medication reconciliation, history taking and analyzing reason behind delirium, counseling regarding medication safety Pursuant to the emergency declaration under the [address] [place] [address], [facility] waiver authority and the [employer] Act, this Virtual  Visit was conducted, with patient's consent, to reduce the patient's risk of exposure to COVID-19 and provide continuity of care for an established patient. Services were provided through a video synchronous discussion virtually to substitute for in-person clinic visit.

## 2020-05-29 NOTE — PATIENT INSTRUCTIONS
Dear Sami Carrera , It was a pleasure seeing you today virtually for an urgent visit today We will see you again in 2 weeks If labs or imaging tests have been ordered for you today, please call the office  at [phone] 48 hours after completion to obtain the results. Your stated goal:  
-Better pain control 
-Better relationship with your providers thereby enhancing your experience with [address] This is the plan we talked about: 1. Right-sided chest wall pain and flank pain   
-You continue to struggle with chest pain and pressure for which you have now been to the emergency room almost 3 times. You were told that this is not cardiac related and you do not think this is heart related because it moves all over your upper chest.  Over the last 2 weeks, we have tried a bland diet and increasing your Protonix to 2 times a day. you most likely have severe heartburn which is causing esophageal spasms. You have tried oxycodone sparingly for this without much benefit. But you do fall asleep right after taking the medicine so you are not able to appreciate any pain relief. But the fact that you sleep after oxycodone tells me that maybe the medicine is working. 
-Continue bland diet, avoid spicy and hot food. Eat oatmeal, rice and yogurt every day. - Continue Protonix 2 times a day 
-Take OxyContin 2 times a day 
-Take oxycodone 10 mg every 6 hours as needed 
-Continue Carafate which we started yesterday every 6 hours scheduled 
-Let us start Valium 5 mg for the esophageal spasm. We had tried lorazepam for you for anxiety several weeks ago which you did try but did not like the way it made you feel. We talked about how Valium is also a sedative and is meant to relax you and help you calm down as well as help with muscle relaxation in your chest area. -You are willing to try it, I sent a prescription for 10 days. Take 1 tablet every 6 hours as needed for chest pressure.   Do not take the medicine if you do not have any chest pressure. 
-I have spoken with Dr. Henry Styles about this and we both agree that you need to see a gastroenterologist and get an endoscopy. 2. Severe chemotherapy induced neuropathy - You are tolerating Gabapentin 600 mg three times a day and this is helping some - We added Amitriptyline 25 mg at bedtime and this is helping as well. 3.  Constipation 
-Constipation is a common side effect of pain medications as they slow your bowels. -Continue senna 2 tabs two times a day. This is necessary to keep your bowels soft. - Add Miralax every other day as a laxative. - Goal is to have soft bowel movements every day or every other day. - Please call us if you do not have bowel movements for more than 3 days. 4. Insomnia - You are struggling with severe lack of sleep due to stress and anxiety 
-You finally picked up the trazodone from the pharmacy and have been taking it the last few days and this is helping. 
-You take trazodone sparingly as well 5. Short-term memory loss 
-You are much more clear today and we discussed your confusion and short-term memory loss in detail today. You tell me that this is baseline for you. You have always been someone who has very poor memory and you blamed this on your time served in the [employer]. You do admit that you get frustrated with very simple things and tend to obsess over things you cannot control. For example, we spent about 40 minutes today talking about your phone call to our on-call service last night and the response you received. You were not pleased with how the call center handled your phone call last night and you were even more disappointed that the on-call provider did not know everything about you and had to ask many questions.   We talked about how on-call providers do have access to your medical record but they do not know you personally and therefore to allow them to ask you the questions that they need to ask in order to provide best care for you. You were hoping to reach me directly every time you call. We reviewed that you can call us anytime during business hours and have direct access to nurses who know you and reserve calling us after business hours only for emergencies. 7. Anxiety/ depression 
- Continue Zoloft 25 mg at bedtime 
-You are willing to meet with our  once a month and travel in between 8. DME recommendation Today we place an order for [facility] bed , you require changes in positioning not feasible with ordinary hospital bed, pillows , wedges or cushions, you also require the head of bed to elevated at 30 degrees This is what you have shared with us about Advance Care Planning: 
 
  Primary Decision Maker: Lapedrodemetris Law - Son - [phone] Secondary Decision Maker: Jessenia Wiggins - Daughter - [phone] Supplemental (Other) Decision Maker: Prashant Banner - [phone] Advance Care Planning 5/19/2020 Patient's Healthcare Decision Maker is: Verbal statement (Legal Next of Kin remains as decision maker) Primary Decision Maker Name -  
Primary Decision Maker Phone Number -  
Primary Decision Maker Relationship to Patient - Secondary Decision Maker Name -  
Confirm Advance Directive None Patient Would Like to Complete Advance Directive Yes The Palliative Medicine Team is here to support you and your family.   
 
 
Sincerely, 
 
 
Agustina North MD and the Palliative Medicine Team

## 2020-06-02 NOTE — TELEPHONE ENCOUNTER
Spoke with patient and care giver who reports he is having chest pain, patient informs he has not been able to sleep in days only a few hours at a time, diazepam on hand has taken only three times since prescribed,  he has a bad taste in his mouth, reports taking Carafate as directed before meals, reporting drinking cranberry apple juice all day advised to increase water intake for every glass of cranberry apple juice and informed for mouth dryness to get OTC Biotin mouth rinse,    and believes the chest pain is from an ear infection that is draining into his chest patient and caregiver given information for [facility] for f/u , no further questions or concerns presented

## 2020-06-02 NOTE — TELEPHONE ENCOUNTER
Sophie West is calling to speak to nurse. States urgent, patient having chest pain. Call transferred to nurse LK to discuss.

## 2020-06-05 NOTE — TELEPHONE ENCOUNTER
Claudia Fleming with [facility] is calling asking for a current up to date medication list on patient as soon as possible. Please fax list to [phone]. Advised would send to nurses.

## 2020-06-11 NOTE — TELEPHONE ENCOUNTER
Edith Smith is calling to speak to nurse regarding patient. States Mr. Kalina Zimmerman is in hospital having surgery. Advised would have nurse to call her back.

## 2020-06-11 NOTE — TELEPHONE ENCOUNTER
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

## 2020-06-11 NOTE — TELEPHONE ENCOUNTER
Jaylan Lebron w/ [facility] is calling to speak to nurse regarding patient. Advised nurse would call her back.

## 2020-06-12 NOTE — TELEPHONE ENCOUNTER
Returned call to Ms Kristin Castrejon advised [facility] will be reaching out to her to schedule delivery of hospital bed

## 2020-06-12 NOTE — TELEPHONE ENCOUNTER
Ms. Higinio Franz is calling to speak to nurse to see if patient's bed has been ordered . Advised nurse would call her back to discuss.

## 2020-06-16 NOTE — TELEPHONE ENCOUNTER
Spoke with Solange Paredes, PT with [facility] and he reported that he saw patient today for evaluation and will be doing therapy 2 times week x 4 weeks, and then gradually decreasing. OT will be in tomorrow to evaluate patient. He requested an order for speech therapy to assess and help patient with memory issues. He also requested order for nursing for med management - he stated that patient has so many medications and daughter and sister are currently in the home (both are from out of town). Verbal orders given for both speech and Nursing. Per Solange Paredes, patient also need a  eval as he will need caregiver support when family leaves. Patient also would benefit from stair lift, as his bedroom is upstairs and he has 16 steps and refuses to move hospital bed downstairs. Patient is a vet and may need some assistance with coverage. Advised that we have a  on staff that can reach out to patient.   Solange Paredes verbalized understanding

## 2020-06-16 NOTE — PROGRESS NOTES
Transition Care Management: 
 
Admission date: 6/5/2020 Discharge date: 6/15/2020 Hospital: [facility] Discharge diagnosis: Metastatic brain tumors Nurse Navigator note date:none Nurse Navigator note reviewed in detail: yes We reviewed the hospital course and discharge summary / recommendation including the discharge medications. The patient presented with increasing confusion, CT head revealed 3 new brain tumors, he had craniotomy and removal of one tumor, plan for radiation next week. He remains confused and has headaches. The patient is following the discharge plan as directed with the help of his sister and daughter with the following details noted in the [facility] visit note below. Palliative Medicine Outpatient Services [place]: [phone] Patient Name: Dasha Wilson YOB: 1960 Date of Current Visit: 6/16/2020 Location of Current Visit:   
[] [address] [place] [address] Office 
[] [place] [facility] Office [] [address] [facility] Office [x] Home-virtual visit 
[] Other: Yina Falcon Date of Initial Visit: 1/15/2020 Referral from: Dr. Vanessa XAVIER Primary Care Physician: Nell Beverly NP 
  
 SUMMARY:  
Dasha Wilson is a 63y.o. year old with a  history of COPD, colon cancer in 2002, metastatic adenocarcinoma of unknown primary, who was referred to Palliative Medicine by Dr. Vanessa Pickering for management of symptoms and psychosocial support. The patients social history includes served in the [employer] for 22 years, lives with his girlfriend now. Treatment history-patient had progression of disease on carbo plus Taxol, currently on Keytruda infusions. He struggles with neoplasm related pain New brain tumors diagnosed at [facility], status post craniotomy PALLIATIVE DIAGNOSES:  
 
  ICD-10-CM ICD-9-CM 1. Adenocarcinoma of unknown primary ([facility] C80.1 199.1 2. Brain tumor ([facility] D49.6 239.6 3. Neoplasm related pain G89.3 338.3 4. Esophageal spasm K22.4 530.5 5. Delirium due to general medical condition F05 293.0 6. Chronic tension-type headache, not intractable G44.229 339.12 PLAN:  
Patient Instructions Dear Олег Rowe , It was a pleasure seeing you today virtually along with your daughter Pamela Thomas and sister We will see you again in 2 weeks in person If labs or imaging tests have been ordered for you today, please call the office  at [phone] 48 hours after completion to obtain the results. Your stated goal:  
-Better pain control 
-Better relationship with your providers thereby enhancing your experience with [employer] This is the plan we talked about: 1. Brain tumor and hospitalization 
-We reviewed your hospitalization in detail. I am waiting for [facility] to send me the records. You had 1 brain tumor removed and you have 2 more brain tumors for which you can get radiation on 6/25. You continue to struggle with memory and unable to keep track of things. I am glad your daughter is now staying with you from [place] and your sister is visiting from [place] and is able to stay with you for a few more weeks. -You are currently on dexamethasone 2 mg 2 times a day with a weaning schedule. You are also on Fioricet 2 times a day for headaches. You want to talk about every single thing that happened in the hospitalization and want to make sure that the doctors treated you well. You want to see me in person in 2 weeks to review all of this. We will make an appointment in 2 weeks in person. 2.  Right-sided chest wall pain and flank pain   
-You continue to struggle with chest pain and pressure for which you have now been to the emergency room almost 3 times. You were told that this is not cardiac related and you do not think this is heart related because it moves all over your upper chest.  Over the last 2 weeks, we have tried a bland diet and increasing your Protonix to 2 times a day.    you most likely have severe heartburn which is causing esophageal spasms. You have tried oxycodone sparingly for this without much benefit. But you do fall asleep right after taking the medicine so you are not able to appreciate any pain relief. But the fact that you sleep after oxycodone tells me that maybe the medicine is working. 
-Continue bland diet, avoid spicy and hot food. Eat oatmeal, rice and yogurt every day. - Continue Protonix 2 times a day 
-Take OxyContin 2 times a day 
-Take oxycodone 10 mg every 6 hours as needed or 5 mg every 4 hours as needed. 
-Continue Carafate which we started yesterday every 6 hours scheduled 
-You have a follow-up scheduled with the GI doctor and an endoscopy in the future. 3. Severe chemotherapy induced neuropathy - You are tolerating Gabapentin 600 mg three times a day and this is helping some - We added Amitriptyline 25 mg at bedtime and this is helping as well. 4.  Constipation 
-Constipation is a common side effect of pain medications as they slow your bowels. -Continue senna 2 tabs two times a day. This is necessary to keep your bowels soft. - Add Miralax every other day as a laxative. - Goal is to have soft bowel movements every day or every other day. - Please call us if you do not have bowel movements for more than 3 days. 5. Insomnia - You are struggling with severe lack of sleep due to stress and anxiety 
-You finally picked up the trazodone from the pharmacy and have been taking it the last few days and this is helping. 
-You take trazodone sparingly as well 6. Anxiety/ depression 
- Continue Zoloft 25 mg at bedtime 7. ACP 
-You have refused to complete an AMD officially. You wanted your son to be your medical power of  but now you state that you want him removed and name your daughter Josy Connell as the medical power of . I will make the necessary changes. Let us complete an AMD the next time you visit me in person. This is what you have shared with us about Advance Care Planning: 
 
  Primary Decision Maker (Active): Judith Mendez - [phone] Supplemental (Other) Decision Maker: Mauricio Mendez - [phone] Supplemental (Other) Decision Maker: Melissa Esparza - [phone] Supplemental (Other) Decision Maker: Padmini Juarez - [phone] Advance Care Planning 6/16/2020 Patient's Healthcare Decision Maker is: Verbal statement (Legal Next of Kin remains as decision maker) Primary Decision Maker Name -  
Primary Decision Maker Phone Number -  
Primary Decision Maker Relationship to Patient - Secondary Decision Maker Name -  
Confirm Advance Directive None Patient Would Like to Complete Advance Directive Yes The Palliative Medicine Team is here to support you and your family. Sincerely, 
 
 
Perry Bryant MD and the Palliative Medicine Team 
 
 
 GOALS OF CARE / TREATMENT PREFERENCES:  
[====Goals of Care====] GOALS OF CARE: 
Patient / health care proxy stated goals: See Patient Instructions / Summary TREATMENT PREFERENCES:  
Code Status:  [x] Attempt Resuscitation       [] Do Not Attempt Resuscitation Advance Care Planning: 
[x] The [facility] Interdisciplinary Team has updated the ACP Navigator with Decision Maker and Patient Capacity Primary Decision Maker (Active): Judith Mendez - [phone] Supplemental (Other) Decision Maker: Mauricio Mendez - [phone] Supplemental (Other) Decision Maker: Melissa Esparza - [phone] Supplemental (Other) Decision Maker: Padmini Juarez - [phone] Advance Care Planning 6/16/2020 Patient's Healthcare Decision Maker is: Verbal statement (Legal Next of Kin remains as decision maker) Primary Decision Maker Name -  
Primary Decision Maker Phone Number -  
Primary Decision Maker Relationship to Patient - Secondary Decision Maker Name -  
 Confirm Advance Directive None Patient Would Like to Complete Advance Directive Yes Other: 
(If patient appropriate for POST, consider using PALLPOST smart phrase here) The palliative care team has discussed with patient / health care proxy about goals of care / treatment preferences for patient. 
[====Goals of Care====] PRESCRIPTIONS GIVEN:  
 
No orders of the defined types were placed in this encounter. FOLLOW UP: Future Appointments Date Time Provider Carmen Wynn 6/25/2020 11:00 AM [facility] INFUSION NURSE [address] [employer]  
6/25/2020 11:15 AM Loulou Siu NP ONC ELIAZAR SCHED  
6/30/2020  9:30 AM Luna Hernández MD [address] [facility] [place]  
7/9/2020 11:00  [facility] [address] [employer]  
7/23/2020 11:00 AM [facility] INFUSION NURSE 1 Ricky REDDY . [employer]  
8/6/2020 11:00 AM [facility] INFUSION NURSE 1 [facility] [employer] [facility]  
  
 
 
 PHYSICIANS INVOLVED IN CARE:  
Patient Care Team: 
Ed Cisneros NP as PCP - General (Nurse Practitioner) Rama Berger MD as Physician (Cardiology) Mario Sanchez MD (Gastroenterology) Jose Padron MD (Hematology and Oncology) HISTORY:  
Reviewed patient-completed ESAS and advance care planning form. Reviewed patient record in prescription monitoring program. 
 
CHIEF COMPLAINT:  
No chief complaint on file. HPI/SUBJECTIVE: The patient is: [x] Verbal / [] Nonverbal  
 
Patient seen today virtually at his home along with his daughter Isidoro Ruth and sister from [place]. He appears very apprehensive and talks about his hospitalization. He wants to review his discharge summary in detail as he does not understand what really went on in the hospital.  I have not received the records from [address] [place] [address] yet and will go over the details with patient when I receive them. He continues to have a headache and understands that he is on OxyContin, oxycodone and Fioricet for the same.   His esophageal spasm remains and he had a couple of episodes of the during hospitalization. --- 
5/14/2020 visit Patient seen in [facility] along with his girlfriend Nicky Calvo is very worried about back pain. He has been taking oxycodone 10 mg every 6 hours but it only lasts about 5 hours. His girlfriend started noticing that he repeats the same questions over and over again and does not remember things that happened earlier in the day. This is causing him confusion and severe anxiety. In the last 2 weeks, he struggled with a urine infection and urinary tract infection for which we had him on antibiotics and he is much better now. He thinks the confusion started before the antibiotics and terms if it is related to the opioids. But he has been on opioids for several months prior to this. No new medications, no illicit drug use. 
 
------------ Initial visit Patient is here alone. He starts off by talking about how his trust in the medical system has been broken especially with patient to physician communication. He lists all his concerns about his care over the last 6 months but is willing to be redirected and start with a clean [place] with our team.  His main complaint is right-sided flank pain for which he has been taking OxyContin and oxycodone. He has been chewing the OxyContin. He also struggles with lack of appetite, no desire to eat. He had significant constipation initially but now he is on bowel regimen. He is willing to work with  about his anxiety. Clinical Pain Assessment (nonverbal scale for nonverbal patients):  
[++++ Clinical Pain Assessment++++] [++++Pain Severity++++]: Pain: 0 
[++++Pain Character++++]: deep, gnawing 
[++++Pain Duration++++]: months 
[++++Pain Effect++++]: functional 
[++++Pain Factors++++]: none in particular 
[++++Pain Frequency++++]: on and off 
[++++Pain Location++++]: right flank [++++ Clinical Pain Assessment++++]  FUNCTIONAL ASSESSMENT:  
 
 Palliative Performance Scale (PPS): PPS: 70 PSYCHOSOCIAL/SPIRITUAL SCREENING:  
 
Any spiritual / Restorationist concerns: 
[] Yes /  [x] No 
 
Caregiver Burnout: 
[] Yes /  [x] No /  [] No Caregiver Present Anticipatory grief assessment:  
[x] Normal  / [] Maladaptive ESAS Anxiety: Anxiety: 10 
 
ESAS Depression: Depression: 10 REVIEW OF SYSTEMS:  
 
The following systems were [x] reviewed / [] unable to be reviewed Systems: constitutional, ears/nose/mouth/throat, respiratory, gastrointestinal, genitourinary, musculoskeletal, integumentary, neurologic, psychiatric, endocrine. Positive findings noted below. Modified ESAS Completed by: provider Depression: 10 Pain: 0 Anxiety: 10 Nausea: 0 Anorexia: 0 Dyspnea: 0 Constipation: No  
Other Problem (Comment): 0 PHYSICAL EXAM:  
 
Wt Readings from Last 3 Encounters:  
05/28/20 143 lb 12.8 oz (65.2 kg) 05/28/20 143 lb (64.9 kg) 05/19/20 145 lb (65.8 kg) There were no vitals taken for this visit. Last bowel movement: See Nursing Note Constitutional   
[x] Appears well-developed and well-nourished in no apparent distress   
[] Abnormal: 
Mental status [x] Alert and awake [x] Oriented to person/place/time 
[x] Able to follow commands 
[] Abnormal:  
Eyes 
[x] EOM normal  
[x] Sclera normal  
[x] No visible ocular discharge 
[] Abnormal:  
HENT 
[] Normocephalic, atraumatic-craniotomy staples in place [x] Mouth/Throat: Moist mucous membranes  
[x] External Ears normal 
[] Abnormal: 
Neck [x] No visualized mass 
[] Abnormal: 
Pulmonary/Chest  
[x] Respiratory effort normal 
[x] No visualized signs of difficulty breathing or respiratory distress 
[] Abnormal: Musculoskeletal 
[x] Normal gait with no signs of ataxia [x] Normal range of motion of neck [] Abnormal: 
Neurological:  
[x] No facial asymmetry (Cranial nerve 7 motor function) [x] No gaze palsy 
[] Abnormal:  
Skin [x] No significant exanthematous lesions or discoloration noted on facial skin 
[] Abnormal: Psychiatric [x] Normal affect [x] No hallucinations [] Abnormal: 
 
Other pertinent observable physical exam findings: 
 
Due to this being a TeleHealth evaluation, many elements of the physical examination are unable to be assessed. HISTORY:  
 
Past Medical History:  
Diagnosis Date  Abnormal nuclear stress test 2015  Cardiomyopathy ([facility]  EF 45%  Chronic obstructive pulmonary disease ([facility]  Chronic systolic heart failure ([facility]  GERD (gastroesophageal reflux disease)  HTN (hypertension)  Lung cancer ([facility]  PAC (premature atrial contraction)  Tobacco use disorder Past Surgical History:  
Procedure Laterality Date  COLONOSCOPY N/A 7/10/2019 COLONOSCOPY AND ESOPHAGOGASTRODUODENOSCOPY (EGD) performed by Marcie Marquez MD at [facility] SPLENECTOMY  IR INSERT TUNL CVC W PORT OVER 5 YEARS  2019 Family History Problem Relation Age of Onset  Stroke Mother  Coronary Artery Disease Sister  Heart Disease Paternal Grandfather History reviewed, no pertinent family history. Social History Tobacco Use  Smoking status: Former Smoker Packs/day: 0.50 Types: Cigarettes Last attempt to quit: 2016 Years since quittin.1  Smokeless tobacco: Never Used Substance Use Topics  Alcohol use: Not Currently Comment: rarely No Known Allergies Current Outpatient Medications Medication Sig  
 oxyCODONE ER (OxyCONTIN) 10 mg ER tablet Take 1 Tab by mouth every twelve (12) hours for 30 days. Max Daily Amount: 20 mg.  
 sucralfate (CARAFATE) 100 mg/mL suspension Take 5 mL by mouth four (4) times daily.  aluminum-magnesium hydroxide (MAALOX) 200-200 mg/5 mL suspension Take 15 mL by mouth every six (6) hours as needed for Indigestion.  traZODone (DESYREL) 50 mg tablet Take 1 Tab by mouth nightly.  senna-docusate (PERICOLACE) 8.6-50 mg per tablet Take 1 Tab by mouth two (2) times a day.  umeclidinium brm/vilanterol tr (ANORO ELLIPTA IN) Take 1 Puff by inhalation daily.  albuterol (PROVENTIL HFA, VENTOLIN HFA, PROAIR HFA) 90 mcg/actuation inhaler Take 2 Puffs by inhalation every four (4) hours as needed for Wheezing.  sertraline (ZOLOFT) 50 mg tablet Take 1 Tab by mouth daily.  gabapentin (NEURONTIN) 300 mg capsule Take 2 Caps by mouth three (3) times daily. Max Daily Amount: 1,800 mg.  
 metoprolol succinate (TOPROL-XL) 25 mg XL tablet Take 1 Tab by mouth nightly.  megestroL (MEGACE) 40 mg tablet TAKE 1 TABLET BY MOUTH TWICE DAILY  lidocaine-prilocaine (EMLA) topical cream APPLY QUARTER SIZE AMOUNT TO PORT PRIOR TO CHEMOTHERAPY  dicyclomine (BENTYL) 10 mg capsule Take 10 mg by mouth two (2) times a day.  potassium chloride (K-DUR, KLOR-CON) 20 mEq tablet Take 1 Tab by mouth daily.  ondansetron hcl (ZOFRAN) 4 mg tablet Take 2 Tabs by mouth every eight (8) hours as needed for Nausea.  prochlorperazine (COMPAZINE) 10 mg tablet Take 1 Tab by mouth every six (6) hours as needed for Nausea.  lisinopril (PRINIVIL, ZESTRIL) 5 mg tablet TAKE 1 TABLET BY MOUTH DAILY  tamsulosin (FLOMAX) 0.4 mg capsule Take 0.4 mg by mouth daily. No current facility-administered medications for this visit. LAB DATA REVIEWED:  
 
Lab Results Component Value Date/Time WBC 6.0 05/28/2020 10:29 AM  
 HGB 10.6 (L) 05/28/2020 10:29 AM  
 PLATELET 947 93/07/6684 10:29 AM  
 
Lab Results Component Value Date/Time Sodium 140 05/28/2020 10:29 AM  
 Potassium 3.8 05/28/2020 10:29 AM  
 Chloride 105 05/28/2020 10:29 AM  
 CO2 25 05/28/2020 10:29 AM  
 BUN 10 05/28/2020 10:29 AM  
 Creatinine 0.92 05/28/2020 10:29 AM  
 Calcium 9.6 05/28/2020 10:29 AM  
  
Lab Results Component Value Date/Time Alk. phosphatase 80 05/28/2020 10:29 AM  
 Protein, total 7.3 05/28/2020 10:29 AM  
 Albumin 3.5 05/28/2020 10:29 AM  
 Globulin 3.8 05/28/2020 10:29 AM  
 
Lab Results Component Value Date/Time INR 1.0 11/25/2015 10:22 AM  
 Prothrombin time 10.6 11/25/2015 10:22 AM  
  
Lab Results Component Value Date/Time Iron 52 10/07/2019 12:53 PM  
 TIBC 235 (L) 10/07/2019 12:53 PM  
 Iron % saturation 22 10/07/2019 12:53 PM  
 Ferritin 880 (H) 10/07/2019 12:53 PM  
  
Reviewed today's labs and most recent imaging. CONTROLLED SUBSTANCES SAFETY ASSESSMENT (IF ON CONTROLLED SUBSTANCES):  
 
Reviewed opioid safety handout:  [x] Yes   [] No 
24 hour opioid dose >150mg morphine equivalent/day:  [] Yes   [] No 
Benzodiazepines:  [] Yes   [] No 
Sleep apnea:  [] Yes   [] No 
Urine Toxicology Testing within last 6 months:  [] Yes   [] No 
History of or new aberrant medication taking behaviors:  [] Yes   [] No 
Has Narcan been prescribed [] Yes   [] No 
 
   
 
Total time: 70 minutes Counseling / coordination time: 60 minutes 
> 50% counseling / coordination?:  
 
Consent: 
He and/or health care decision maker is aware that that he may receive a bill for this telehealth service, depending on his insurance coverage, and has provided verbal consent to proceed: Yes CPT Codes 03416-74925 for Established Patients may apply to this Telehealth VisitTime-based coding, delete if not needed: I spent at least 40 minutes with this established patient, and >50% of the time was spent counseling and/or coordinating care regarding Detailed medication reconciliation, history taking and analyzing reason behind delirium, counseling regarding medication safety Pursuant to the emergency declaration under the [facility], [address] [place] [address] authority and the [employer] and [employer] General Act, this Virtual Visit was conducted, with patient's consent, to reduce the patient's risk of exposure to COVID-19 and provide continuity of care for an established patient. Services were provided through a video synchronous discussion virtually to substitute for in-person clinic visit.

## 2020-06-16 NOTE — TELEPHONE ENCOUNTER
No one from our office called son today, call was only to patient who is currently experiencing  some cognitive issues , encounter closed

## 2020-06-16 NOTE — PATIENT INSTRUCTIONS
Dear Gabi Galvez , It was a pleasure seeing you today virtually along with your daughter Rudy Ponce and sister We will see you again in 2 weeks in person If labs or imaging tests have been ordered for you today, please call the office  at [phone] 48 hours after completion to obtain the results. Your stated goal:  
-Better pain control 
-Better relationship with your providers thereby enhancing your experience with [facility] This is the plan we talked about: 1. Brain tumor and hospitalization 
-We reviewed your hospitalization in detail. I am waiting for [facility] to send me the records. You had 1 brain tumor removed and you have 2 more brain tumors for which you can get radiation on 6/25. You continue to struggle with memory and unable to keep track of things. I am glad your daughter is now staying with you from [place] and your sister is visiting from [place] and is able to stay with you for a few more weeks. -You are currently on dexamethasone 2 mg 2 times a day with a weaning schedule. You are also on Fioricet 2 times a day for headaches. You want to talk about every single thing that happened in the hospitalization and want to make sure that the doctors treated you well. You want to see me in person in 2 weeks to review all of this. We will make an appointment in 2 weeks in person. 2.  Right-sided chest wall pain and flank pain   
-You continue to struggle with chest pain and pressure for which you have now been to the emergency room almost 3 times. You were told that this is not cardiac related and you do not think this is heart related because it moves all over your upper chest.  Over the last 2 weeks, we have tried a bland diet and increasing your Protonix to 2 times a day. you most likely have severe heartburn which is causing esophageal spasms. You have tried oxycodone sparingly for this without much benefit.   But you do fall asleep right after taking the medicine so you are not able to appreciate any pain relief. But the fact that you sleep after oxycodone tells me that maybe the medicine is working. 
-Continue bland diet, avoid spicy and hot food. Eat oatmeal, rice and yogurt every day. - Continue Protonix 2 times a day 
-Take OxyContin 2 times a day 
-Take oxycodone 10 mg every 6 hours as needed or 5 mg every 4 hours as needed. 
-Continue Carafate which we started yesterday every 6 hours scheduled 
-You have a follow-up scheduled with the GI doctor and an endoscopy in the future. 3. Severe chemotherapy induced neuropathy - You are tolerating Gabapentin 600 mg three times a day and this is helping some - We added Amitriptyline 25 mg at bedtime and this is helping as well. 4.  Constipation 
-Constipation is a common side effect of pain medications as they slow your bowels. -Continue senna 2 tabs two times a day. This is necessary to keep your bowels soft. - Add Miralax every other day as a laxative. - Goal is to have soft bowel movements every day or every other day. - Please call us if you do not have bowel movements for more than 3 days. 5. Insomnia - You are struggling with severe lack of sleep due to stress and anxiety 
-You finally picked up the trazodone from the pharmacy and have been taking it the last few days and this is helping. 
-You take trazodone sparingly as well 6. Anxiety/ depression 
- Continue Zoloft 25 mg at bedtime 7. ACP 
-You have refused to complete an AMD officially. You wanted your son to be your medical power of  but now you state that you want him removed and name your daughter Merly Wooten as the medical power of . I will make the necessary changes. Let us complete an AMD the next time you visit me in person. This is what you have shared with us about Advance Care Planning: 
 
  Primary Decision Maker (Active): Judith Stein - Daughter - [phone] Supplemental (Other) Decision Maker: Luke Bland - Daughter - [phone] Supplemental (Other) Decision Maker: [facility] - [phone] Supplemental (Other) Decision Maker: Sunil Morrison - Son - [phone] Advance Care Planning 6/16/2020 Patient's Healthcare Decision Maker is: Verbal statement (Legal Next of Kin remains as decision maker) Primary Decision Maker Name -  
Primary Decision Maker Phone Number -  
Primary Decision Maker Relationship to Patient - Secondary Decision Maker Name -  
Confirm Advance Directive None Patient Would Like to Complete Advance Directive Yes The Palliative Medicine Team is here to support you and your family.   
 
 
Sincerely, 
 
 
Portillo Donahue MD and the Palliative Medicine Team

## 2020-06-16 NOTE — TELEPHONE ENCOUNTER
Lalo Menard with [address] [facility] calling to see if Nurse NJ received requested records at [facility] Fax. Advised if they were not received Josette Bailey would call her back.

## 2020-06-16 NOTE — TELEPHONE ENCOUNTER
Patient calling very very upset that SW called his son. Was Yelling at me to never, never call his son again. Advised that I did not call son and got him under control. States that SW called son and he wants to talk to her. Advised that I would have SW call him back.

## 2020-06-16 NOTE — TELEPHONE ENCOUNTER
Bryce Hartley with [facility] is calling to speak to nurse NJ. Call transferred to nurse. Regarding: Cough  ----- Message from Senia Benitez sent at 9/30/2017  8:38 PM CDT -----  Patient Name: Kyle Funes  Specialist or PCP:Navneet Marie  Pregnant (If Yes, how long?):No  Symptoms:cough wants to know if he can have cough lollipops  Call Back #:[phone]  Is the patient’s permanent residence located in [place], [place], or a [facility]? Yes [facility] [id number]  Call Center Account #:[phone]

## 2020-06-16 NOTE — PROGRESS NOTES
Palliative Medicine Office Visit Palliative Medicine Nurse Check In 
[phone] Patient Name: Marbella Brand YOB: 1960 Date of Office Visit: 6/16/2020 Patient states: \"  Recent hospital discharge from [address] [place] [address] for brain tumors \" From Check In Sheet (scanned in Media): 
Has a medical provider talked with you about cardiopulmonary resuscitation (CPR)? [x] Yes   [] No   [] Unable to obtain Nurse reminder to complete or update ACP FlowSheet: 
 
Is ACP on the Problem List?    [] Yes    [x] No 
IF ACP Document is ON FILE; Nurse to place ACP on Problem List  
 
Is there an ACP Note in Chart Review/Note? [] Yes    [x] No  
If NO: ALERT PROVIDER Primary Decision Maker: Aida Bell - Son - [phone] Secondary Decision Maker: Boby Lake - Daughter - [phone] Supplemental (Other) Decision Maker: Billy Vázquez - [phone] Advance Care Planning 6/16/2020 Patient's Healthcare Decision Maker is: Verbal statement (Legal Next of Kin remains as decision maker) Primary Decision Maker Name -  
Primary Decision Maker Phone Number -  
Primary Decision Maker Relationship to Patient - Secondary Decision Maker Name -  
Confirm Advance Directive None Patient Would Like to Complete Advance Directive Yes Is there anything that we should know about you as a person in order to provide you the best care possible? Have you been to the ER, urgent care clinic since your last visit? [x] Yes   [] No   [] Unable to obtain Have you been hospitalized since your last visit? [x] Yes   [] No   [] Unable to obtain Have you seen or consulted any other health care providers outside of the [address] [place] [address] since your last visit? [] Yes   [x] No   [] Unable to obtain Functional status (describe):  
 
 
 
Last BM:  
 
 accessed (date): 6/16/2020 Bottle review (for opioid pain medication): 
Medication 1:  
Date filled: Directions:  
# filled: # left: # pills taking per day: 
Last dose taken: 
 
Medication 2:  
Date filled:  
Directions:  
# filled: # left: # pills taking per day: 
Last dose taken: 
 
Medication 3:  
Date filled:  
Directions:  
# filled: # left: # pills taking per day: 
Last dose taken: 
 
Medication 4:  
Date filled:  
Directions:  
# filled: # left: # pills taking per day: 
Last dose taken:

## 2020-06-17 NOTE — TELEPHONE ENCOUNTER
[facility]  Palliative Social Work  Telephone Call      Time in: 11:50  Time out: 12:05    Note:  LCSW reached out to pt to [place] needs for care provision and equipment. [facility], informing pt the [place] can provide a stair lift as well as in-home care if he receives a [place] pension. Pt said he receives a [place] pension, but he is not registered with [facility].  SW encouraged pt to register with them and see an MD so he can receive medical benefits from the [place]. Pt stated he is willing to do this. SW gained permission to send pt an email with information on registering for their services. One of pt's daughters present and heard this conversation. Plan:  LCSW emailed information to patient on registering with the [facility]. LCSW to see patient with PM MD during his appointment on 6/30. Ongoing psychosocial support including assessments, links with resources and counseling prn.      Robinson Damian, VIDHI, MSSW, LCSW  Palliative   [facility] Palliative Medicine  [phone]

## 2020-06-19 NOTE — TELEPHONE ENCOUNTER
Outgoing call placed to patient - he reported that he continues to have headaches - he reported head feel swollen, and his pain was 8-9/10, and he took pain medication. Advised MD recommend increasing Dexamethasone 2 mg to three times daily.     Follow up visit schedule 6/30/20 @ 9:30 am

## 2020-06-19 NOTE — TELEPHONE ENCOUNTER
Amie Mckoy , a speech therapist, is calling to advise the followin)  Speech therapy evaluation completed    2)  Request a verbal order to see patient 1 time a week for 4 weeks    3) states patient fell yesterday, not injured per patient. Message left on voicemail. Request call back from nurse.

## 2020-06-22 NOTE — TELEPHONE ENCOUNTER
Returned call to patient and he reported that he continues to have headache - he reported pain level 9/10 - patient stated that he was not able to  medication on Friday because the pharmacy did not have it in stock. Patient stated the found a bottle in his home this morning and will start Dexamethasone 2 mg 3 times daily (8,12 and 3pm) and restart Trazodone 50 mg nightly. Patient/friend Devi Michel) verbalized understanding.     Patient stated that he is schedule to have staples removed from his head tomorrow, as well as to see the radiation oncologist @ [facility]

## 2020-06-22 NOTE — TELEPHONE ENCOUNTER
Patient calling stating that he has not been able to sleep. States they gave him some medication and he has been unable to sleep.

## 2020-06-24 NOTE — TELEPHONE ENCOUNTER
Returned call to patient and he stated that he went to his appointment today [facility] and he was having really bad back pain. Patient inquired if he should take his medications with him when he is going to appointments. Advised if he has scheduled medication during the day, he probably should get a small pill case, so he can take his pills for the day with him, so he is not sitting in pain during his appointments.   Patient verbalized understanding    Patient saw neurosurgeon yesterday, 6/23 and radiation oncology for treatment planning today, 6/24 - records requested

## 2020-06-24 NOTE — TELEPHONE ENCOUNTER
Returned call to patient who wanted to know what is Oncology Dr Del Toro Latha role in current treatment as he is having brain radiation treatment with [facility], LPN reached out to Oncology and spoke with nurse Latha to reach out to patient for continued support and treatment plan.

## 2020-06-24 NOTE — PROGRESS NOTES
Case d/w NP Ángel Geiger and Stalin Garcia. He is on for tomorrow for an OPIC and virtual visit at [facility]. He will get treated as usual tomorrow. This nurse tried calling pt, no answer and unable to leave a VM. Then called another number on file, [phone] and the phone continued to ring and ring.

## 2020-06-25 NOTE — TELEPHONE ENCOUNTER
Returned call to Enrike Steve, speech therapist - gave verbal order to for speech therapy once weekly.   Advised to return call if she has any additional questions

## 2020-06-25 NOTE — PROGRESS NOTES
Called [facility]. Was told he is getting SBRT starting 7/8. He is going for a MRI on Friday due to he wasn't able to lay flat for long period of the scan. Will request records to be sent over.

## 2020-06-25 NOTE — TELEPHONE ENCOUNTER
Christian Herrera, a Speech Therapist with [facility] [address] is calling stating that patient and sister only wanted 1 visit this week and not two, so she is requesting a call back from nurse to give a verbal order for 1 visit this week and 1 visit next week. Message left on voicemail.

## 2020-06-26 NOTE — TELEPHONE ENCOUNTER
Returned call to [facility] and gave verbal orders to reduce nursing to once weekly, and d/c OT per patient's request.

## 2020-06-26 NOTE — TELEPHONE ENCOUNTER
[facility] is calling to speak to nurse. States patient wants to decrease nursing to 1 time a week. .  Advised that nurse would call her back to discuss.

## 2020-06-29 NOTE — TELEPHONE ENCOUNTER
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 -Appointment not scheduled prior to need for scheduled refill      Pharmacy: [facility] [address] [place] [address]        Medication:oxyCODONE ER (OxyCONTIN) 10 mg ER   Dose and directions: Take 1 Tab by mouth every twelve (12) hours  Number dispensed:60  Date filled ( or Pharmacy):5/21/2020  #left:     reviewed: _yes     Date of Urine Drug Screen:  _n/a     Opioid Safety Handout given:  _yes     Appropriate for refill:  _yes     Action:  _ please refill

## 2020-06-30 NOTE — PROGRESS NOTES
Palliative Medicine Outpatient Services [place]: [phone] ([phone]) Patient Name: Babak Collier YOB: 1960 Date of Current Visit: 7/1/2020 Location of Current Visit:  
[x] [facility] Office 
[] [facility] Office [] [facility] Office 
[] Home-virtual visit 
[] Other: Sandra Lin Date of Initial Visit: 1/15/2020 Referral from: Dr. Paula XAVIER Primary Care Physician: Asia Miranda NP 
  
 SUMMARY:  
Babak Collier is a 63y.o. year old with a  history of COPD, colon cancer in 2002, metastatic adenocarcinoma of unknown primary, who was referred to Palliative Medicine by Dr. Paula Serrano for management of symptoms and psychosocial support. The patients social history includes served in the [employer] for 22 years, lives with his girlfriend now. Treatment history-patient had progression of disease on carbo plus Taxol, currently on Keytruda infusions. He struggles with neoplasm related pain 3 new brain tumors diagnosed at [facility], status post craniotomy and removal of one tumor, about to start stereotactic radiation to right frontal and temporal tumors on July 8. Plan for total 5 doses of radiation. PALLIATIVE DIAGNOSES:  
 
  ICD-10-CM ICD-9-CM 1. Intractable headache, unspecified chronicity pattern, unspecified headache type R51 784.0 2. Chemotherapy-induced peripheral neuropathy (HCC) G62.0 357.7 T45. 1X5A E933.1 3. Metastatic cancer (HCC) C79.9 199.1 dexAMETHasone (DECADRON) 2 mg tablet  
   lidocaine (LIDODERM) 5 % 4. Benign neoplasm of supratentorial region of brain ([facility] [address] D33.0 225.0 5. Insomnia, unspecified type G47.00 780.52 6. Psychosocial stressors Z65.8 V62.89   
7. Urge incontinence N39.41 788. 31 tolterodine ER (DETROL-LA) 2 mg ER capsule PLAN:  
Patient Instructions Dear Babak Collier , It was a pleasure seeing you today in person along with your daughter Sandra Frost and girlfriend Elvis Rankin We will see you again in 4 weeks If labs or imaging tests have been ordered for you today, please call the office  at [phone] 48 hours after completion to obtain the results. Your stated goal:  
-Better pain control 
-Better relationship with your providers thereby enhancing your experience with [facility] This is the plan we talked about: 1. Brain tumor and hospitalization 
-We reviewed your hospitalization in detail along with your recent visit with the radiation oncologist.  We reviewed you imaging results and nagi pictures to help explain the 3 brain tumors and their location. You had one tumor removed and the other 2 tumors you are going to receive stereotactic radiation. We talked about the difference between stereotactic radiation and whole brain radiation. We covered the side effects in detail, severe fatigue is expected and I like for you to be prepared for that. You do need someone to live with you all the time to make sure you are doing okay. Nicky Tena is going to be living with you and help manage your medications. Headaches-you no longer take Fioricet and the headaches have gotten much better with increasing dexamethasone to 2 mg 3 times a day. Once you start radiation, decrease dexamethasone to 2 mg 2 times a day and then I will let you know when to start weaning it off. 
 
2.  Right-sided chest wall pain and flank pain   
-You continue to struggle with chest pain and pressure for which you have now been to the emergency room almost 3 times. You were told that this is not cardiac related and you do not think this is heart related because it moves all over your upper chest.  
-Continue bland diet, avoid spicy and hot food. Eat oatmeal, rice and yogurt every day. - Continue Protonix 2 times a day 
-Take OxyContin 2 times a day 
-Take oxycodone 10 mg every 6 hours as needed or 5 mg every 4 hours as needed. 
-Continue Carafate which we started yesterday every 6 hours scheduled 3. Severe chemotherapy induced neuropathy - You are tolerating Gabapentin 600 mg three times a day and this is helping some - We tried Amitrptyline and you reacted very poorly to it. 4.  Constipation 
-Constipation is a common side effect of pain medications as they slow your bowels. -Continue senna 2 tabs two times a day. This is necessary to keep your bowels soft. - Add Miralax every other day as a laxative. - Goal is to have soft bowel movements every day or every other day. - Please call us if you do not have bowel movements for more than 3 days. 5. Insomnia - You are struggling with severe lack of sleep due to stress and anxiety 
-You finally picked up the trazodone from the pharmacy and have been taking it the last few days and this is helping. 
-You take trazodone sparingly as well 6. Anxiety/ depression 
- Continue Zoloft 25 mg at bedtime 7. ACP 
-You completed an AMD today naming Kiko Fierro as your primary medical power of . This is what you have shared with us about Advance Care Planning: 
 
  Primary Decision Maker (Active): Octavio Boyceon - Daughter - [phone] Supplemental (Other) Decision Maker: Mary Hou - Daughter - [phone] Supplemental (Other) Decision Maker: Raffy Carrillo  [phone] Advance Care Planning 6/16/2020 Patient's Healthcare Decision Maker is: Verbal statement (Legal Next of Kin remains as decision maker) Primary Decision Maker Name -  
Primary Decision Maker Phone Number -  
Primary Decision Maker Relationship to Patient - Secondary Decision Maker Name -  
Confirm Advance Directive None Patient Would Like to Complete Advance Directive Yes The Palliative Medicine Team is here to support you and your family. Sincerely, 
 
 
Marli Velásquez MD and the Palliative Medicine Team 
 
 
 GOALS OF CARE / TREATMENT PREFERENCES:  
[====Goals of Care====] GOALS OF CARE: 
 Patient / health care proxy stated goals: See Patient Instructions / Summary TREATMENT PREFERENCES:  
Code Status:  [x] Attempt Resuscitation       [] Do Not Attempt Resuscitation Advance Care Planning: 
[x] The Pall Med Interdisciplinary Team has updated the ACP Navigator with Decision Maker and Patient Capacity Primary Decision Maker (Active): Alireza Shi - Daughter - [phone] Secondary Decision Maker: Augustus Corey - Daughter - [phone] Advance Care Planning 2020 Patient's Healthcare Decision Maker is: Named in scanned ACP document Primary Decision Maker Name -  
Primary Decision Maker Phone Number -  
Primary Decision Maker Relationship to Patient - Secondary Decision Maker Name -  
Confirm Advance Directive Yes, on file Patient Would Like to Complete Advance Directive - Other: 
(If patient appropriate for POST, consider using PALLPOST smart phrase here) The palliative care team has discussed with patient / health care proxy about goals of care / treatment preferences for patient. 
[====Goals of Care====] PRESCRIPTIONS GIVEN:  
 
Medications Ordered Today Medications  dexAMETHasone (DECADRON) 2 mg tablet Sig: Take 1 Tab by mouth two (2) times daily (with meals). Dispense:  60 Tab Refill:  0  
 lidocaine (LIDODERM) 5 % Si Patch by TransDERmal route every twelve (12) hours for 30 days. Apply patch to the affected area for 12 hours a day and remove for 12 hours a day. Dispense:  30 Each Refill:  3  
 tolterodine ER (DETROL-LA) 2 mg ER capsule Sig: Take 1 Cap by mouth daily. Dispense:  30 Cap Refill:  1 FOLLOW UP: Future Appointments Date Time Provider Carmen Wynn 2020 11:00 AM [facility] INFUSION NURSE [address] [facility]  
2020 11:00 AM [facility] INFUSION NURSE [address] [facility]  
2020 10:30 AM Nathalia Prajapati MD [facility]  
 7/28/2020  1:30 PM Rickie Bingham MD [address] [facility] [address] [facility]  
8/6/2020 11:00 AM [facility] INFUSION NURSE 1 [facility]  
  
 
 
 PHYSICIANS INVOLVED IN CARE:  
Patient Care Team: 
Fay Gilford, NP as PCP - General (Nurse Practitioner) Juanita Kelley MD as Physician (Cardiology) Tamera Blood MD (Gastroenterology) Trudi Mccray MD (Hematology and Oncology) HISTORY:  
Reviewed patient-completed ESAS and advance care planning form. Reviewed patient record in prescription monitoring program. 
 
CHIEF COMPLAINT:  
No chief complaint on file. HPI/SUBJECTIVE: The patient is: [x] Verbal / [] Nonverbal  
 
Patient seen today in person along with his daughter Rashi Rollins and girlfriend Yury Mcallister. We wanted to review all his medications in person so we spent time reviewing his meds. His headaches have gotten much better since adding dexamethasone. He is eating really well and very happy about that. He continues to need OxyContin and oxycodone for the back pain and abdominal pain. He is unable to follow a bland diet, wants to eat fried food all the time. He knows he has heartburn on the days he eats Colon's but he still wants to eat it. Patient and family wanted to discuss the tumor location, treatment options everything in detail. We spent a significant amount of time talking about that. He has not met with Dr. Renea Kim and does not know what the next steps are in terms of chemo treatment. --- 
5/14/2020 visit Patient seen in infusion center along with his girlfriend Yury Mcallister is very worried about back pain. He has been taking oxycodone 10 mg every 6 hours but it only lasts about 5 hours. His girlfriend started noticing that he repeats the same questions over and over again and does not remember things that happened earlier in the day. This is causing him confusion and severe anxiety.   In the last 2 weeks, he struggled with a urine infection and urinary tract infection for which we had him on antibiotics and he is much better now. He thinks the confusion started before the antibiotics and terms if it is related to the opioids. But he has been on opioids for several months prior to this. No new medications, no illicit drug use. 
 
------------ Initial visit Patient is here alone. He starts off by talking about how his trust in the medical system has been broken especially with patient to physician communication. He lists all his concerns about his care over the last 6 months but is willing to be redirected and start with a clean [place] with our team.  His main complaint is right-sided flank pain for which he has been taking OxyContin and oxycodone. He has been chewing the OxyContin. He also struggles with lack of appetite, no desire to eat. He had significant constipation initially but now he is on bowel regimen. He is willing to work with  about his anxiety. Clinical Pain Assessment (nonverbal scale for nonverbal patients):  
[++++ Clinical Pain Assessment++++] [++++Pain Severity++++]: Pain: 7 
[++++Pain Character++++]: deep, gnawing 
[++++Pain Duration++++]: months 
[++++Pain Effect++++]: functional 
[++++Pain Factors++++]: none in particular 
[++++Pain Frequency++++]: on and off 
[++++Pain Location++++]: right flank [++++ Clinical Pain Assessment++++] FUNCTIONAL ASSESSMENT:  
 
Palliative Performance Scale (PPS): 
 60 PSYCHOSOCIAL/SPIRITUAL SCREENING:  
 
Any spiritual / Church concerns: 
[] Yes /  [x] No 
 
Caregiver Burnout: 
[] Yes /  [x] No /  [] No Caregiver Present Anticipatory grief assessment:  
[x] Normal  / [] Maladaptive ESAS Anxiety: Anxiety: 8 ESAS Depression: Depression: 6 REVIEW OF SYSTEMS:  
 
The following systems were [x] reviewed / [] unable to be reviewed Systems: constitutional, ears/nose/mouth/throat, respiratory, gastrointestinal, genitourinary, musculoskeletal, integumentary, neurologic, psychiatric, endocrine. Positive findings noted below. Modified ESAS Completed by: provider Fatigue: 7 Drowsiness: 4 Depression: 6 Pain: 7 Anxiety: 8 Nausea: 5 Anorexia: 0 Dyspnea: 6 Best Well-Bein Constipation: Yes Other Problem (Comment): 0 PHYSICAL EXAM:  
 
Wt Readings from Last 3 Encounters:  
20 144 lb 8 oz (65.5 kg) 20 143 lb 12.8 oz (65.2 kg) 20 143 lb (64.9 kg) Blood pressure 119/73, pulse 77, resp. rate 18, height 5' 11\" (1.803 m), weight 144 lb 8 oz (65.5 kg), SpO2 96 %. Last bowel movement: See Nursing Note Constitutional   
[x] Appears well-developed and well-nourished in no apparent distress   
[] Abnormal: 
Mental status [x] Alert and awake [x] Oriented to person/place/time 
[x] Able to follow commands HEENT-healing well left craniotomy scar No oral thrush Cardiovascular-regular heart sounds Respiratory-normal breath sounds Abdomen-soft, bowel sounds present Musculoskeletal-no edema HISTORY:  
 
Past Medical History:  
Diagnosis Date  Abnormal nuclear stress test 2015  Cardiomyopathy ([facility] [address]  EF 45%  Chronic obstructive pulmonary disease ([address]  Chronic systolic heart failure ([address]  GERD (gastroesophageal reflux disease)  HTN (hypertension)  Lung cancer ([address]  PAC (premature atrial contraction)  Tobacco use disorder Past Surgical History:  
Procedure Laterality Date  COLONOSCOPY N/A 7/10/2019 COLONOSCOPY AND ESOPHAGOGASTRODUODENOSCOPY (EGD) performed by Sharon Rivera MD at [facility]  HX SPLENECTOMY  IR INSERT TUNL CVC W PORT OVER 5 YEARS  2019 Family History Problem Relation Age of Onset  Stroke Mother  Coronary Artery Disease Sister  Heart Disease Paternal Grandfather History reviewed, no pertinent family history. Social History Tobacco Use  Smoking status: Former Smoker Packs/day: 0.50 Types: Cigarettes Last attempt to quit: 2016 Years since quittin.1  Smokeless tobacco: Never Used Substance Use Topics  Alcohol use: Not Currently Comment: rarely No Known Allergies Current Outpatient Medications Medication Sig  butalbital-acetaminophen-caffeine (FIORICET, ESGIC) -40 mg per tablet Take 1 Tab by mouth every four (4) hours as needed for Headache.  oxyCODONE IR (ROXICODONE) 10 mg tab immediate release tablet Take 10 mg by mouth every six (6) hours as needed for Pain.  dexAMETHasone (DECADRON) 2 mg tablet Take 1 Tab by mouth two (2) times daily (with meals).  lidocaine (LIDODERM) 5 % 1 Patch by TransDERmal route every twelve (12) hours for 30 days. Apply patch to the affected area for 12 hours a day and remove for 12 hours a day.  tolterodine ER (DETROL-LA) 2 mg ER capsule Take 1 Cap by mouth daily.  oxyCODONE ER (OxyCONTIN) 10 mg ER tablet Take 1 Tab by mouth every twelve (12) hours for 30 days. Max Daily Amount: 20 mg.  
 sucralfate (CARAFATE) 100 mg/mL suspension SHAKE LIQUID AND TAKE 5 ML BY MOUTH FOUR TIMES DAILY  senna-docusate (PERICOLACE) 8.6-50 mg per tablet Take 1 Tab by mouth two (2) times a day.  umeclidinium brm/vilanterol tr (ANORO ELLIPTA IN) Take 1 Puff by inhalation daily.  albuterol (PROVENTIL HFA, VENTOLIN HFA, PROAIR HFA) 90 mcg/actuation inhaler Take 2 Puffs by inhalation every four (4) hours as needed for Wheezing.  gabapentin (NEURONTIN) 300 mg capsule Take 2 Caps by mouth three (3) times daily. Max Daily Amount: 1,800 mg.  
 metoprolol succinate (TOPROL-XL) 25 mg XL tablet Take 1 Tab by mouth nightly.  lidocaine-prilocaine (EMLA) topical cream APPLY QUARTER SIZE AMOUNT TO PORT PRIOR TO CHEMOTHERAPY  dicyclomine (BentyL) 10 mg capsule Take 1 Cap by mouth two (2) times daily as needed for Abdominal Cramps.  traZODone (DESYREL) 50 mg tablet TAKE 1 TABLET BY MOUTH EVERY NIGHT  sertraline (ZOLOFT) 50 mg tablet Take 1 Tab by mouth daily.  megestroL (MEGACE) 40 mg tablet TAKE 1 TABLET BY MOUTH TWICE DAILY  potassium chloride (K-DUR, KLOR-CON) 20 mEq tablet Take 1 Tab by mouth daily.  ondansetron hcl (ZOFRAN) 4 mg tablet Take 2 Tabs by mouth every eight (8) hours as needed for Nausea.  prochlorperazine (COMPAZINE) 10 mg tablet Take 1 Tab by mouth every six (6) hours as needed for Nausea.  lisinopril (PRINIVIL, ZESTRIL) 5 mg tablet TAKE 1 TABLET BY MOUTH DAILY No current facility-administered medications for this visit. LAB DATA REVIEWED:  
 
Lab Results Component Value Date/Time WBC 6.0 05/28/2020 10:29 AM  
 HGB 10.6 (L) 05/28/2020 10:29 AM  
 PLATELET 153 64/87/7167 10:29 AM  
 
Lab Results Component Value Date/Time Sodium 140 05/28/2020 10:29 AM  
 Potassium 3.8 05/28/2020 10:29 AM  
 Chloride 105 05/28/2020 10:29 AM  
 CO2 25 05/28/2020 10:29 AM  
 BUN 10 05/28/2020 10:29 AM  
 Creatinine 0.92 05/28/2020 10:29 AM  
 Calcium 9.6 05/28/2020 10:29 AM  
  
Lab Results Component Value Date/Time Alk. phosphatase 80 05/28/2020 10:29 AM  
 Protein, total 7.3 05/28/2020 10:29 AM  
 Albumin 3.5 05/28/2020 10:29 AM  
 Globulin 3.8 05/28/2020 10:29 AM  
 
Lab Results Component Value Date/Time INR 1.0 11/25/2015 10:22 AM  
 Prothrombin time 10.6 11/25/2015 10:22 AM  
  
Lab Results Component Value Date/Time Iron 52 10/07/2019 12:53 PM  
 TIBC 235 (L) 10/07/2019 12:53 PM  
 Iron % saturation 22 10/07/2019 12:53 PM  
 Ferritin 880 (H) 10/07/2019 12:53 PM  
  
Reviewed today's labs and most recent imaging.  
 
 
 CONTROLLED SUBSTANCES SAFETY ASSESSMENT (IF ON CONTROLLED SUBSTANCES):  
 
Reviewed opioid safety handout:  [x] Yes   [] No 
24 hour opioid dose >150mg morphine equivalent/day:  [] Yes   [] No 
Benzodiazepines:  [] Yes   [] No 
Sleep apnea:  [] Yes   [] No 
 Urine Toxicology Testing within last 6 months:  [] Yes   [] No 
History of or new aberrant medication taking behaviors:  [] Yes   [] No 
Has Narcan been prescribed [] Yes   [] No 
 
   
 
Total time: 70 minutes Counseling / coordination time: 60 minutes 
> 50% counseling / coordination?:

## 2020-06-30 NOTE — ACP (ADVANCE CARE PLANNING)
Advance Medical Directive Today Mr. Florencia Ragsdale completed a new Advance Medical Directive, naming healthcare agents: 
 
  Primary Decision Maker (Active): Lauraine Kanner - Daughter - [phone] Secondary Decision Maker: Blank Pulliam - Daughter - [phone] Advance Care Planning 6/30/2020 Patient's Healthcare Decision Maker is: Named in scanned ACP document Primary Decision Maker Name -  
Primary Decision Maker Phone Number -  
Primary Decision Maker Relationship to Patient - Secondary Decision Maker Name -  
Confirm Advance Directive Yes, on file Patient Would Like to Complete Advance Directive -  
 
 
Mr. Florencia Ragsdale decided in the event death is imminent and medical treatment will not help with recovery, then he does not want treatments to prolong life but wants to be made comfortable. Mr. Florencia Ragsdale said, \"I want it to be this way. \" 
 
 
Mr. Florencia Ragsdale also decided in the event he is unaware of his, surroundings, or others and it is reasonably certain awareness will never be recovered , then he does not want treatments to prolong life but wants to be made comfortable. Original AMD provided to Mr. Florencia Ragsdale. Copies of AMD provided for healthcare agents and scanned into [facility]. Jakob Serrano, CCM, MSSW, LCSW Palliative  [address] [facility] 
[phone]

## 2020-06-30 NOTE — PATIENT INSTRUCTIONS
Dear Jenny Nieto , It was a pleasure seeing you today in person along with your daughter Rain Whitehead and girlfriend Raine Werner We will see you again in 4 weeks If labs or imaging tests have been ordered for you today, please call the office  at [phone] 48 hours after completion to obtain the results. Your stated goal:  
-Better pain control 
-Better relationship with your providers thereby enhancing your experience with [facility] This is the plan we talked about: 1. Brain tumor and hospitalization 
-We reviewed your hospitalization in detail along with your recent visit with the radiation oncologist.  We reviewed you imaging results and nagi pictures to help explain the 3 brain tumors and their location. You had one tumor removed and the other 2 tumors you are going to receive stereotactic radiation. We talked about the difference between stereotactic radiation and whole brain radiation. We covered the side effects in detail, severe fatigue is expected and I like for you to be prepared for that. You do need someone to live with you all the time to make sure you are doing okay. Raine Werner is going to be living with you and help manage your medications. Headaches-you no longer take Fioricet and the headaches have gotten much better with increasing dexamethasone to 2 mg 3 times a day. Once you start radiation, decrease dexamethasone to 2 mg 2 times a day and then I will let you know when to start weaning it off. 
 
2.  Right-sided chest wall pain and flank pain   
-You continue to struggle with chest pain and pressure for which you have now been to the emergency room almost 3 times. You were told that this is not cardiac related and you do not think this is heart related because it moves all over your upper chest.  
-Continue bland diet, avoid spicy and hot food. Eat oatmeal, rice and yogurt every day. - Continue Protonix 2 times a day 
-Take OxyContin 2 times a day -Take oxycodone 10 mg every 6 hours as needed or 5 mg every 4 hours as needed. 
-Continue Carafate which we started yesterday every 6 hours scheduled 3. Severe chemotherapy induced neuropathy - You are tolerating Gabapentin 600 mg three times a day and this is helping some - We tried Amitrptyline and you reacted very poorly to it. 4.  Constipation 
-Constipation is a common side effect of pain medications as they slow your bowels. -Continue senna 2 tabs two times a day. This is necessary to keep your bowels soft. - Add Miralax every other day as a laxative. - Goal is to have soft bowel movements every day or every other day. - Please call us if you do not have bowel movements for more than 3 days. 5. Insomnia - You are struggling with severe lack of sleep due to stress and anxiety 
-You finally picked up the trazodone from the pharmacy and have been taking it the last few days and this is helping. 
-You take trazodone sparingly as well 6. Anxiety/ depression 
- Continue Zoloft 25 mg at bedtime 7. ACP 
-You completed an AMD today naming Yesenia Ai as your primary medical power of . This is what you have shared with us about Advance Care Planning: 
 
  Primary Decision Maker (Active): Ethan Charles - Daughter - [phone] Supplemental (Other) Decision Maker: Josse Hunt - Daughter - [phone] Supplemental (Other) Decision Maker: Erika Roni - [phone] Advance Care Planning 6/16/2020 Patient's Healthcare Decision Maker is: Verbal statement (Legal Next of Kin remains as decision maker) Primary Decision Maker Name -  
Primary Decision Maker Phone Number -  
Primary Decision Maker Relationship to Patient - Secondary Decision Maker Name -  
Confirm Advance Directive None Patient Would Like to Complete Advance Directive Yes The Palliative Medicine Team is here to support you and your family.   
 
 
Sincerely, 
 
 
 Myra Monahan MD and the Palliative Medicine Team

## 2020-06-30 NOTE — PROGRESS NOTES
Palliative Medicine Office Visit Palliative Medicine Nurse Check In 
[phone] Patient Name: Kayli Sweeney YOB: 1960 Date of Office Visit: 6/30/2020 Patient states: \"  \" 
 
From Check In Sheet (scanned in Media): 
Has a medical provider talked with you about cardiopulmonary resuscitation (CPR)? [x] Yes   [] No   [] Unable to obtain Nurse reminder to complete or update ACP FlowSheet: 
 
Is ACP on the Problem List?    [] Yes    [] No 
IF ACP Document is ON FILE; Nurse to place ACP on Problem List  
 
Is there an ACP Note in Chart Review/Note? [] Yes    [] No  
If NO: ALERT PROVIDER Advance Care Planning 6/16/2020 Patient's Healthcare Decision Maker is: Verbal statement (Legal Next of Kin remains as decision maker) Primary Decision Maker Name -  
Primary Decision Maker Phone Number -  
Primary Decision Maker Relationship to Patient - Secondary Decision Maker Name -  
Confirm Advance Directive None Patient Would Like to Complete Advance Directive Yes Is there anything that we should know about you as a person in order to provide you the best care possible? Have you been to the ER, urgent care clinic since your last visit? [x] Yes   [] No   [] Unable to obtain Have you been hospitalized since your last visit? [x] Yes   [] No   [] Unable to obtain Have you seen or consulted any other health care providers outside of the [address] [place] [address] since your last visit? [x] Yes   [] No   [] Unable to obtain Functional status (describe):  
 
 
 
Last BM: 6/30/2020  accessed (date): 6/30/2020 Bottle review (for opioid pain medication): 
Medication 1:  
Date filled:  
Directions:  
# filled: # left: # pills taking per day: 
Last dose taken: 
 
Medication 2:  
Date filled:  
Directions:  
# filled: # left: # pills taking per day: 
Last dose taken: 
 
Medication 3:  
Date filled:  
Directions:  
# filled: # left: # pills taking per day: 
Last dose taken: 
 
Medication 4:  
Date filled:  
Directions:  
# filled: # left: # pills taking per day: 
Last dose taken:

## 2020-07-01 NOTE — TELEPHONE ENCOUNTER
Rosalio Rao is calling to speak to nurse. Confused on medications and what patient should be taking. Message left on voicemail.

## 2020-07-01 NOTE — TELEPHONE ENCOUNTER
Ms. Cosmo Ivett is calling asking for a call back. States patient is having bad stomach pain and wants to know what she can give him. States patient at [facility] and does not know if that did it. Advised nurse would call her back.

## 2020-07-01 NOTE — TELEPHONE ENCOUNTER
Patient complaints of abdominal cramping off and on through out the niht after eating [employer] fish sandwich yesterday, patient has 1 tab of Bentyl on hand and simethicone , advised patient to take this morning and nurse would call back to follow up in a few hours on efficacy

## 2020-07-01 NOTE — TELEPHONE ENCOUNTER
Returned call to f/u on patient abdominal cramping , patient reports he feels a lot better after taking bentyl and simethicone, currently watching television and eating cookies , asking for a rx for bentyl to patients local pharmacy

## 2020-07-02 NOTE — TELEPHONE ENCOUNTER
Patient wants to know whdy he is still having chest pains, patient continues to take bentyl, protonix and gas x , wants to know what is going on because if he needs to stay away from certain foods or do things differently so he wont have this pain he will, patient advised he needs to reschedule appointment with GI for possible EGD  with Dr Thedora Fabry, patient missed appointment due to being admitted into [facility],  call to Dr Thedora Fabry office who will schedule patient for visit

## 2020-07-06 NOTE — TELEPHONE ENCOUNTER
Patient's daughter Mendy Trujillo called. She said he has radiation on the 8th and Chemo on the 9th. Plliative said they were not sure this is the best course for him to do back to back. Please call to discuss.     -YANNICKI No One is listed on patient's PHI to discuss his care

## 2020-07-06 NOTE — TELEPHONE ENCOUNTER
We will take off for chemo Thursday. Will do a VV at 10am on Thursday and his daughter will be with him. Please send link to phone [phone]. Alert-The patient is alert, awake and responds to voice. The patient is oriented to time, place, and person. The triage nurse is able to obtain subjective information.

## 2020-07-07 NOTE — TELEPHONE ENCOUNTER
Yury Mcallister is calling regarding patient, asking to speak with Dr. Lady Goff nurse regarding a medication question.     Best contact: [phone]

## 2020-07-07 NOTE — TELEPHONE ENCOUNTER
Returned call patient answered but was not sure what medication was needed, will have Elvis Rankin return call when she gets back in

## 2020-07-07 NOTE — TELEPHONE ENCOUNTER
Triage for Controlled Substance Refill Request    Pain Diagnosis: _Metastatic cancer    Last Outpatient Visit: _6/30/2020    Next Outpatient Visit: _7/28/2020    Reason for refill needed outside of office visit?   -Appointment not scheduled prior to need for scheduled refill      Pharmacy: [facility] [address] [place] [address]    Medication:  Dose and directions:  Number dispensed:  Date filled ( or Pharmacy):  # left:       reviewed: _yes    Date of Urine Drug Screen:  _n/a    Opioid Safety Handout given:  _yes    Appropriate for refill:  _yes     Action:  _ please refill

## 2020-07-09 NOTE — TELEPHONE ENCOUNTER
Kwasi Macdonald, speech therapist with [facility], is calling to get an appointment removed. Patient's daughter has requested no more visits this week. The patient had chemo radiation yesterday (7/8/20) and again tomorrow (7/10/20), and the daughter feels he will be too weak for visits. Therapist would also like to speak to the doctor or nurse for any feedback in prognosis. She questions how will progress be made when visits keep being canceled.     # [phone] Evelin Coffman

## 2020-07-09 NOTE — TELEPHONE ENCOUNTER
Patient called with c/o back pain over last few days since radiation has started , causing difficulty with him getting comfortable and getting a good nights rest , requesting flexeril, Advised patient to take tylenol in 2 tabs 650mg twice daily for a few days and advise on its relief , patient verbalized understanding no further questions or concerns presented

## 2020-07-09 NOTE — PROGRESS NOTES
Herberth Szymanski is a 63 y.o. male here for follow up for: Chief Complaint Patient presents with  Lung Cancer 1. Have you been to the ER, urgent care clinic since your last visit? Hospitalized since your last visit. no 
 
2. Have you seen or consulted any other health care providers outside of the [address] [place] [address] since your last visit? Include any pap smears or colon screening. Radiation on the 8th. *Pt sees palliative med. Pt had radiation yesterday. Pt states he has bad back pain. since laying down too much from being sick. Has therapist coming out to help. Using patches to help Wants to know if Flexeril will help with his back. Will check with palliative med.

## 2020-07-09 NOTE — TELEPHONE ENCOUNTER
Advised okay for patient to cancel appointment this week, Nancyist advises she will speak with family and patient to determine if he would like to postpone current speech therapy until after treatments

## 2020-07-09 NOTE — PROGRESS NOTES
[address] [facility], [facility], [address] 
[phone] Hematology / Oncology Established Visit Reason for Visit:  
Chey Silva is a 63 y.o. male who is seen by synchronous (real-time) audio-video technology for follow up of metastatic carcinoma Hematology Oncology Treatment History:  
 
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  
6/4/19 4R LN biopsy: rare malignant cells admixed with abundant blood clot 6/4/19 4L LN FNA and core biopsy: Adenocarcinoma. 6/4/19 2R LN FNA and core biopsy: Adenocarcinoma. Comment: IHC stains negative for GATA3, AR, ER, p40. Immunohistochemistry shows that the tumor cells express CK7 with focal expression of CDX2. Tumor cells lack expression of CK20, TTF-1 and Napsin A. These findings are not entirely diagnostic and could be seen in either a primary pulmonary malignancy or a primary upper gastrointestinal tract malignancy. Other sites are also not excluded. Clinical and radiographic correlation is recommended. 6/25/19 2R LN, mediastinum: Metastatic adenocarcinoma (see Comment). Comment - The tumor appears poorly differentiated with areas of necrosis. Immunohistochemical staining reveals positive staining for cytokeratin 7 (strong, diffuse), CDX-2 (focal, weak to moderate) and CEA (focal, moderate to strong). The tumor cells are negative for cytokeratin 20, cytokeratin 5/6, p40, p63, TTF-1, napsin-A, PLAP, HCG, AFP and c-KIT (). The findings are not entirely specific with regard to primary site but would be consistent with upper GI/pancreaticobiliary tract.  A pulmonary primary is less likely, but still in the differential.  
-PDL1 30%, [employer] identified high tumor mutational burden - which is predictive of higher response to immunotherapy agents. Prior Treatment:  
 Completed 6 cycles of Carbo-Taxol on 12/02/2019 Keytruda 200mg every 21 days, started 12/20/19, stopped 02/2020 - s/p 3 cycles Current Treatment:  
 Gemcitabine/Abraxane - s/p 4 Cycles History of Present Illness:  
Mr. Florencia Ragsdale is a 63 y.o. male with COPD, remote h/o colon cancer comes in for evaluation of mediastinal lymphadenopathy. Pt had recently p/w shortness of breath, dyspnea on exertion, and was found on chest CT to have R mediastinal lymphadenopathy, which also were PET avid. Case was discussed at Thoracic Tumor Board with thought that this could be pancreatic, urothelial, or germ cell origin. More tissue is recommended. Pt had EGD 3 yrs ago and was told he had GERD. Patient has h/o colon cancer in 2002. He states a cancerous polyp was found and then he underwent colectomy in 2002. This was done by Dr. Sridhar Cleaning at [facility]. Patient had colonoscopies regularly afterwards, last one was approx 2015. Last EGD was in 2018. As part of search for primary lesion, patient underwent EGD, colonoscopy by Dr. Maria Eugenia Fleming, notable for diffusely enlarged gastric folds, but biopsies negative. Pt underwent cytoscopy on 7/17/19 with findings negative for malignancy. He received 6 cycles of carbo-taxol and experienced many side effects from the chemotherapy including severe sensory neuropathy in feet. Last CT showed progression of disease in the right adrenal glans. He received Keytruda as second line therapy. Recent scans show disease progression. He is receiving palliative chemotherapy with gem/abraxane. He has been diagnosed with mutiple brain metastasis. He underwent craniotomy for the right frontal lobe lesion and is currently receiving SBRT to the other two brain lesions. He continues to have numbness in both feet and difficulty with walking. Past Medical History:  
Diagnosis Date  Abnormal nuclear stress test 2015  Cardiomyopathy ([facility]  EF 45%  Chronic obstructive pulmonary disease ([facility]  [facility] systolic heart failure ([facility]  GERD (gastroesophageal reflux disease)  HTN (hypertension)  Lung cancer ([facility]  PAC (premature atrial contraction)  Tobacco use disorder Past Surgical History:  
Procedure Laterality Date  COLONOSCOPY N/A 7/10/2019 COLONOSCOPY AND ESOPHAGOGASTRODUODENOSCOPY (EGD) performed by Shantell Rader MD at [facility]  HX SPLENECTOMY  IR INSERT TUNL CVC W PORT OVER 5 YEARS  2019 Social History Tobacco Use  Smoking status: Former Smoker Packs/day: 0.50 Types: Cigarettes Last attempt to quit: 2016 Years since quittin.2  Smokeless tobacco: Never Used Substance Use Topics  Alcohol use: Not Currently Comment: rarely Family History Problem Relation Age of Onset  Stroke Mother  Coronary Artery Disease Sister  Heart Disease Paternal Grandfather Current Outpatient Medications Medication Sig  butalbital-acetaminophen-caffeine (FIORICET, ESGIC) -40 mg per tablet Take 1 Tab by mouth three (3) times daily.  albuterol (PROVENTIL HFA, VENTOLIN HFA, PROAIR HFA) 90 mcg/actuation inhaler Take 2 Puffs by inhalation every four (4) hours as needed for Wheezing.  oxyCODONE IR (ROXICODONE) 10 mg tab immediate release tablet Take 1 Tab by mouth every six (6) hours as needed for Pain for up to 15 days. Max Daily Amount: 40 mg.  
 dicyclomine (BentyL) 10 mg capsule Take 1 Cap by mouth two (2) times daily as needed for Abdominal Cramps.  traZODone (DESYREL) 50 mg tablet TAKE 1 TABLET BY MOUTH EVERY NIGHT  oxyCODONE IR (ROXICODONE) 10 mg tab immediate release tablet Take 10 mg by mouth every six (6) hours as needed for Pain.  dexAMETHasone (DECADRON) 2 mg tablet Take 1 Tab by mouth two (2) times daily (with meals).  lidocaine (LIDODERM) 5 % 1 Patch by TransDERmal route every twelve (12) hours for 30 days. Apply patch to the affected area for 12 hours a day and remove for 12 hours a day.  tolterodine ER (DETROL-LA) 2 mg ER capsule Take 1 Cap by mouth daily.  oxyCODONE ER (OxyCONTIN) 10 mg ER tablet Take 1 Tab by mouth every twelve (12) hours for 30 days. Max Daily Amount: 20 mg.  
 sucralfate (CARAFATE) 100 mg/mL suspension SHAKE LIQUID AND TAKE 5 ML BY MOUTH FOUR TIMES DAILY  senna-docusate (PERICOLACE) 8.6-50 mg per tablet Take 1 Tab by mouth two (2) times a day.  umeclidinium brm/vilanterol tr (ANORO ELLIPTA IN) Take 1 Puff by inhalation daily.  sertraline (ZOLOFT) 50 mg tablet Take 1 Tab by mouth daily.  gabapentin (NEURONTIN) 300 mg capsule Take 2 Caps by mouth three (3) times daily. Max Daily Amount: 1,800 mg.  
 megestroL (MEGACE) 40 mg tablet TAKE 1 TABLET BY MOUTH TWICE DAILY  lidocaine-prilocaine (EMLA) topical cream APPLY QUARTER SIZE AMOUNT TO PORT PRIOR TO CHEMOTHERAPY  potassium chloride (K-DUR, KLOR-CON) 20 mEq tablet Take 1 Tab by mouth daily.  ondansetron hcl (ZOFRAN) 4 mg tablet Take 2 Tabs by mouth every eight (8) hours as needed for Nausea.  prochlorperazine (COMPAZINE) 10 mg tablet Take 1 Tab by mouth every six (6) hours as needed for Nausea.  lisinopril (PRINIVIL, ZESTRIL) 5 mg tablet TAKE 1 TABLET BY MOUTH DAILY  metoprolol succinate (TOPROL-XL) 25 mg XL tablet Take 1 Tab by mouth nightly. No current facility-administered medications for this visit. No Known Allergies Review of Systems: A complete review of systems was obtained, negative except as described above. Physical Exam:  
 
General: alert, cooperative, no distress Mental  status: normal mood, behavior, speech, dress, motor activity, and thought processes, able to follow commands HENT: NCAT Neck: no visualized mass Resp: no respiratory distress Neuro: no gross deficits Skin: no discoloration or lesions of concern on visible areas Psychiatric: normal affect, consistent with stated mood, no evidence of hallucinations Due to this being a TeleHealth evaluation (During [facility] public health emergency), many elements of the physical examination are unable to be assessed. Evaluation of the following organ systems was limited: Vitals/Constitutional/EENT/Resp/CV/GI//MS/Neuro/Skin/Heme-Lymph-Imm. Results:  
 
Lab Results Component Value Date/Time WBC 6.0 05/28/2020 10:29 AM  
 HGB 10.6 (L) 05/28/2020 10:29 AM  
 HCT 32.2 (L) 05/28/2020 10:29 AM  
 PLATELET 468 38/55/5177 10:29 AM  
 MCV 89.9 05/28/2020 10:29 AM  
 ABS. NEUTROPHILS 3.2 05/28/2020 10:29 AM  
 
Lab Results Component Value Date/Time Sodium 140 05/28/2020 10:29 AM  
 Potassium 3.8 05/28/2020 10:29 AM  
 Chloride 105 05/28/2020 10:29 AM  
 CO2 25 05/28/2020 10:29 AM  
 Glucose 124 (H) 05/28/2020 10:29 AM  
 BUN 10 05/28/2020 10:29 AM  
 Creatinine 0.92 05/28/2020 10:29 AM  
 GFR est AA >60 05/28/2020 10:29 AM  
 GFR est non-AA >60 05/28/2020 10:29 AM  
 Calcium 9.6 05/28/2020 10:29 AM  
 
Lab Results Component Value Date/Time Bilirubin, total 0.4 05/28/2020 10:29 AM  
 ALT (SGPT) 15 05/28/2020 10:29 AM  
 Alk. phosphatase 80 05/28/2020 10:29 AM  
 Protein, total 7.3 05/28/2020 10:29 AM  
 Albumin 3.5 05/28/2020 10:29 AM  
 Globulin 3.8 05/28/2020 10:29 AM  
 
Lab Results Component Value Date/Time Iron 52 10/07/2019 12:53 PM  
 TIBC 235 (L) 10/07/2019 12:53 PM  
 Iron % saturation 22 10/07/2019 12:53 PM  
 Ferritin 880 (H) 10/07/2019 12:53 PM  
 
 
No results found for: B12LT, FOL, RBCF Lab Results Component Value Date/Time  TSH 0.78 01/30/2020 09:50 AM  
 
No results found for: HAMAT, HAAB, HABT, HAAT, HBSAG, HBSB, HBSAT, HBABN, HBCM, HBCAB, HBCAT, XBCABS, HBEAB, HBEAG, XHEPCS, [id number], [address] [facility], [facility], [facility], [address], [id number], [id number], [address] [place] [address], [id number], [facility], [id number], [facility] 
 
 
 Imaging: CT Results (most recent): 
Results from Abstract encounter on 06/17/20 CT HEAD WO CONT I personally reviewed the images. Stable size of the mediastinal nodes and right adrenal gland. Assessment & Plan:  
Avril Chaidez is a 63 y.o. male with COPD, remote h/o colon cancer comes in for evaluation and management of adenocarcinoma of unknown primary. 1. 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 Involving mediastinal LNs, with no other PET findings. Per Thoracic Tumor Board discussion, this could represent upper GI origin, urothelial origin, germ cell tumor, or lung origin. Search for primary lesion was overall negative: Cystoscopy, EGD, colonoscopy negative for malignancy although EGD abnormal with diffusely enlarged gastric folds. Despite h/o colon cancer in 2002, it would be very odd to see a recurrence in the mediastinal LNs. CEA 26.8. Other tumor markers negative (AFP, hCG, CA 19-9). Unfortunately, this disease is considered incurable, but is treatable. At this time, I recommend chemotherapy treatment using the Carboplatin-Paclitaxel regimen (which covers both lung and GI primaries). We discussed the risks and benefits of chemotherapy with Carboplatin and Paclitaxel. Potential side effects include but are not limited to: nausea, vomiting, diarrhea, constipation, taste changes, allergic reactions, myelosuppression, infection, fatigue, alopecia, neuropathy, mucositis, renal failure, infertility, and rarely, death. Additionally, chemotherapy leads to radiosensitization which can intensify the side effects of radiation therapy. The patient has consented to beginning chemotherapy and chemo ed packet given. Completed 6 cycles of Carbo-Taxol. CT on 12/16/19 shows a mixed response to treatment no change in mediastinal adenopathy and increased size of right adrenal mass. Receiving Keytruda in 2nd line. CT shows disease progression in the mediastinal nodes and adrenal mass. Based on the gene expression profile revealing the likely primary to be bile duct, I recommended switching systemic therapy to Gemcitabine/Abraxane Receiving palliative chemotherapy Gemcitabine/Abraxane - s/p 4 Cycles CT shows stable disease systemically. However he has developed multiple brain metastasis. He underwent right left frontal craniotomy. He is receiving SBRT to the right frontal and right temporal lesions. Since he has not suffered progression in systemic disease, I advised to continue current therapy. We have limited treatment options at this time. 2. Neoplasm pain: better 
-- Follow up with palliative 
-- oxycontin 10 mg ER every 12 hours -- oxycodone 10 mg [id number] 
-- Senna-docusate (pericolace) daily to prevent constipation. 3. H/o Stage I [T1NxMx] sigmoid colon cancer: Found in sigmoid adenoma during a colonoscopy; went on to undergo segmented resection of the sigmoid colon in 2002. Pathology per outside records: 
2/6/2002 sigmoid colon polyp: Well differentiated adenocarcinoma arising in an adenoma, involving the mucosa and invading into the upper half of the submucosal stalk. No vascular space involvement is identified. 2/21/2002 Sigmoidectomy, liver biopsy: 
-Sigmoid colon, segmented resection: No residual adenocarcinoma present. Polypectomy site with granulation tissue and vascular congestion. No LNs recovered. Distal and proximal margins benign. 
-Liver biopsy: Negative for metastatic carcinoma. No significant inflammation or obvious cirrhosis identified. Very minimal steatosis (rare cells with fat globules). -Addendum: Reblocks of adipose tissue; three small benign lymphoid aggregates (< 0.1cm). 4. COPD: Quit smoking in approx 2016. SOB improved after starting inhaler. 5. HTN: Well controlled on Lisinopril and Metoprolol. 6. BPH: On Flomax. 7. 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 Increased numbness in feet, tingling and pain in left hand. Left hand dominant. Increased Gabapentin 300mg BID on 10/16/19.  
-- Continue gabapentin to 300 mg TID, #90 tabs with 2 refills e-scribed on 11/11/19.  
 
8. Anemia from cancer/chronic disease 
 
observation 10. Severe protein calorie malnutrition - better Dietary consult Protein supplementation 12. Anxiety/Depression: Follows up with palliative Signed by: Ana Min MD 
                   July 9, 2020 
 
 
 
CC. Onur Oh. Lorena Bose MD 
CC. Clifton Catherine MD 
CC.  Abdullahi Ruiz MD

## 2020-07-10 NOTE — TELEPHONE ENCOUNTER
Judith Chappell is calling to speak to nurse. States patient is having severe pain in his stomach region. States it is very very bad. Advised nurse would call her back.

## 2020-07-10 NOTE — TELEPHONE ENCOUNTER
Returned call to [facility] who reports patient feels a little better right now, he has started moving for the morning and is now headed out to radiation, Reminded Chata of appointment with GI physician on next week and until then continue bland diet and continue with Bentyl and simethicone

## 2020-07-17 PROBLEM — K35.30 ACUTE APPENDICITIS WITH LOCALIZED PERITONITIS, WITHOUT PERFORATION, ABSCESS, OR GANGRENE: Status: ACTIVE | Noted: 2020-01-01

## 2020-07-17 NOTE — ED NOTES
Assisted out of clothes and into gown, socks provided.   Belongings include sweat shirt and sweat pants, t shirt, socks, slippers, wallet and phone

## 2020-07-17 NOTE — ED PROVIDER NOTES
EMERGENCY DEPARTMENT HISTORY AND PHYSICAL EXAM      Please note that this dictation was completed with [employer], the computer voice recognition software. Quite often unanticipated grammatical, syntax, homophones, and other interpretive errors are inadvertently transcribed by the computer software. Please disregard these errors and any errors that have escaped final proofreading. Thank you. Date: 7/17/2020  Patient Name: Lj Scott  Patient Age and Sex: 63 y.o. male    History of Presenting Illness     Chief Complaint   Patient presents with    Abdominal Pain     Appendix       History Provided By: Patient    HPI: Lj Scott, 63 y.o. male with past medical history as documented below presents to the ED with c/o of acute onset of one week of progressive RLQ pain. Pt describes pain as moderate in intensity, achy and worse with movement and having a BM. Pt states that he is followed by Dr. Bernie Stroud for remote hx of colon cancer s/p colectomy in 2002. 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 Pt reports having had recent chemotherapy. Pt notes having decreased PO intake and nausea. He denies fever. He reports taking OTC medications without relief. He had a CTA of his chest and CT abdomen as an outpatient and was notified of acute appendicitis and referred to the ED for evaluation. He currently rates pain at 9/10 intensity. Pt denies any other alleviating or exacerbating factors. Additionally, pt specifically denies any recent fever, chills, headache, vomiting, CP, SOB, lightheadedness, dizziness, numbness, weakness, lower extremity swelling, heart palpitations, urinary sxs, diarrhea, constipation, melena, hematochezia, cough, or congestion. There are no other complaints, changes or physical findings at this time.      PCP: Sania Higginbotham NP    Past History   Past Medical History:  Past Medical History:   Diagnosis Date    Abnormal nuclear stress test 2015    Cardiomyopathy ([facility] [address]     EF 45%    Chronic obstructive pulmonary disease (HCC)     Chronic systolic heart failure (HCC)     GERD (gastroesophageal reflux disease)     HTN (hypertension)     Lung cancer (HCC)     PAC (premature atrial contraction)     Tobacco use disorder        Past Surgical History:  Past Surgical History:   Procedure Laterality Date    COLONOSCOPY N/A 7/10/2019    COLONOSCOPY AND ESOPHAGOGASTRODUODENOSCOPY (EGD) performed by Aida Butler MD at [address] [facility] HX COLECTOMY      HX SPLENECTOMY      IR INSERT TUNL CVC W PORT OVER 5 YEARS  2019       Family History:  Family History   Problem Relation Age of Onset    Stroke Mother     Coronary Artery Disease Sister     Heart Disease Paternal Grandfather        Social History:  Social History     Tobacco Use    Smoking status: Former Smoker     Packs/day: 0.50     Types: Cigarettes     Last attempt to quit: 2016     Years since quittin.2    Smokeless tobacco: Never Used   Substance Use Topics    Alcohol use: Not Currently     Comment: rarely    Drug use: No       Allergies:  No Known Allergies    Current Medications:  Current Facility-Administered Medications on File Prior to Encounter   Medication Dose Route Frequency Provider Last Rate Last Dose    [COMPLETED] iopamidoL (ISOVUE-370) 76 % injection 100 mL  100 mL IntraVENous RAD ONCE PreisnerAnne Marie PA   100 mL at 20 1641    [COMPLETED] sodium chloride (NS) flush 10 mL  10 mL IntraVENous RAD ONCE PreisnerAnne Marie PA   10 mL at 20 1642    [COMPLETED] iohexoL (OMNIPAQUE) 240 mg iodine/mL solution 50 mL  50 mL Oral RAD ONCE PreisnerAnne Marie PA   50 mL at 20 1642     Current Outpatient Medications on File Prior to Encounter   Medication Sig Dispense Refill    sucralfate (CARAFATE) 100 mg/mL suspension SHAKE LIQUID AND TAKE 5 ML BY MOUTH FOUR TIMES DAILY 414 mL 0    butalbital-acetaminophen-caffeine (FIORICET, ESGIC) -40 mg per tablet Take 1 Tab by mouth three (3) times daily. 90 Tab 1    albuterol (PROVENTIL HFA, VENTOLIN HFA, PROAIR HFA) 90 mcg/actuation inhaler Take 2 Puffs by inhalation every four (4) hours as needed for Wheezing. 1 Inhaler 5    oxyCODONE IR (ROXICODONE) 10 mg tab immediate release tablet Take 1 Tab by mouth every six (6) hours as needed for Pain for up to 15 days. Max Daily Amount: 40 mg. 60 Tab 0    dicyclomine (BentyL) 10 mg capsule Take 1 Cap by mouth two (2) times daily as needed for Abdominal Cramps. 60 Cap 1    traZODone (DESYREL) 50 mg tablet TAKE 1 TABLET BY MOUTH EVERY NIGHT 30 Tab 2    oxyCODONE IR (ROXICODONE) 10 mg tab immediate release tablet Take 10 mg by mouth every six (6) hours as needed for Pain.  dexAMETHasone (DECADRON) 2 mg tablet Take 1 Tab by mouth two (2) times daily (with meals). 60 Tab 0    lidocaine (LIDODERM) 5 % 1 Patch by TransDERmal route every twelve (12) hours for 30 days. Apply patch to the affected area for 12 hours a day and remove for 12 hours a day. 30 Each 3    tolterodine ER (DETROL-LA) 2 mg ER capsule Take 1 Cap by mouth daily. 30 Cap 1    oxyCODONE ER (OxyCONTIN) 10 mg ER tablet Take 1 Tab by mouth every twelve (12) hours for 30 days. Max Daily Amount: 20 mg. 60 Tab 0    senna-docusate (PERICOLACE) 8.6-50 mg per tablet Take 1 Tab by mouth two (2) times a day. 60 Tab 3    umeclidinium brm/vilanterol tr (ANORO ELLIPTA IN) Take 1 Puff by inhalation daily.  sertraline (ZOLOFT) 50 mg tablet Take 1 Tab by mouth daily. 30 Tab 2    gabapentin (NEURONTIN) 300 mg capsule Take 2 Caps by mouth three (3) times daily. Max Daily Amount: 1,800 mg. 180 Cap 3    metoprolol succinate (TOPROL-XL) 25 mg XL tablet Take 1 Tab by mouth nightly.  30 Tab 5    megestroL (MEGACE) 40 mg tablet TAKE 1 TABLET BY MOUTH TWICE DAILY 60 Tab 0    lidocaine-prilocaine (EMLA) topical cream APPLY QUARTER SIZE AMOUNT TO PORT PRIOR TO CHEMOTHERAPY 30 g 1    potassium chloride (K-DUR, KLOR-CON) 20 mEq tablet Take 1 Tab by mouth daily. 30 Tab 0    ondansetron hcl (ZOFRAN) 4 mg tablet Take 2 Tabs by mouth every eight (8) hours as needed for Nausea. 45 Tab 3    prochlorperazine (COMPAZINE) 10 mg tablet Take 1 Tab by mouth every six (6) hours as needed for Nausea. 45 Tab 3    lisinopril (PRINIVIL, ZESTRIL) 5 mg tablet TAKE 1 TABLET BY MOUTH DAILY 30 Tab 0       Review of Systems   Review of Systems   Constitutional: Positive for appetite change. Negative for chills and fever. HENT: Negative. Negative for congestion, facial swelling, rhinorrhea, sore throat, trouble swallowing and voice change. Eyes: Negative. Respiratory: Negative. Negative for apnea, cough, chest tightness, shortness of breath and wheezing. Cardiovascular: Negative. Negative for chest pain, palpitations and leg swelling. Gastrointestinal: Positive for abdominal pain and nausea. Negative for abdominal distention, blood in stool, constipation, diarrhea and vomiting. Endocrine: Negative. Negative for cold intolerance, heat intolerance and polyuria. Genitourinary: Negative. Negative for difficulty urinating, dysuria, flank pain, frequency, hematuria and urgency. Musculoskeletal: Negative. Negative for arthralgias, back pain, myalgias, neck pain and neck stiffness. Skin: Negative. Negative for color change and rash. Neurological: Negative. Negative for dizziness, syncope, facial asymmetry, speech difficulty, weakness, light-headedness, numbness and headaches. Hematological: Negative. Does not bruise/bleed easily. Psychiatric/Behavioral: Negative. Negative for confusion and self-injury. The patient is not nervous/anxious. Physical Exam   Physical Exam  Vitals signs and nursing note reviewed. Constitutional:       General: He is in acute distress. Appearance: He is underweight. He is toxic-appearing. HENT:      Head: Normocephalic and atraumatic.       Mouth/Throat: Pharynx: No posterior oropharyngeal erythema. Eyes:      Conjunctiva/sclera: Conjunctivae normal.      Pupils: Pupils are equal, round, and reactive to light. Neck:      Musculoskeletal: Normal range of motion. Cardiovascular:      Rate and Rhythm: Normal rate and regular rhythm. Heart sounds: Normal heart sounds. No murmur. No friction rub. No gallop. Pulmonary:      Effort: Pulmonary effort is normal. No respiratory distress. Breath sounds: Normal breath sounds. No wheezing or rales. Chest:      Chest wall: No tenderness. Abdominal:      General: Bowel sounds are normal. There is no distension. Palpations: Abdomen is soft. There is no mass. Tenderness: There is abdominal tenderness in the right lower quadrant. There is no guarding or rebound. Positive signs include Rovsing's sign, McBurney's sign and psoas sign. Musculoskeletal: Normal range of motion. General: No tenderness or deformity. Skin:     General: Skin is warm. Findings: No rash. Neurological:      Mental Status: He is alert and oriented to person, place, and time. Cranial Nerves: No cranial nerve deficit. Motor: No abnormal muscle tone. Coordination: Coordination normal.      Deep Tendon Reflexes: Reflexes normal.   Psychiatric:         Behavior: Behavior is cooperative. Diagnostic Study Results     Labs -  Recent Results (from the past 24 hour(s))   CREATININE, POC    Collection Time: 07/17/20  4:17 PM   Result Value Ref Range    Creatinine (POC) 0.9 0.6 - 1.3 mg/dL    GFRAA, POC >60 >60 ml/min/1.73m2    GFRNA, POC >60 >60 ml/min/1.73m2   CBC WITH AUTOMATED DIFF    Collection Time: 07/17/20  6:14 PM   Result Value Ref Range    WBC 10.3 4.1 - 11.1 K/uL    RBC 4.30 4. 10 - 5.70 M/uL    HGB 13.1 12.1 - 17.0 g/dL    HCT 40.5 36.6 - 50.3 %    MCV 94.2 80.0 - 99.0 FL    MCH 30.5 26.0 - 34.0 PG    MCHC 32.3 30.0 - 36.5 g/dL    RDW 18.4 (H) 11.5 - 14.5 %    PLATELET 286 810 - 841 K/uL MPV 12.5 8.9 - 12.9 FL    NRBC 0.0 0  WBC    ABSOLUTE NRBC 0.00 0.00 - 0.01 K/uL    NEUTROPHILS 59 32 - 75 %    LYMPHOCYTES 28 12 - 49 %    MONOCYTES 13 5 - 13 %    EOSINOPHILS 0 0 - 7 %    BASOPHILS 0 0 - 1 %    IMMATURE GRANULOCYTES 0 0.0 - 0.5 %    ABS. NEUTROPHILS 6.0 1.8 - 8.0 K/UL    ABS. LYMPHOCYTES 2.9 0.8 - 3.5 K/UL    ABS. MONOCYTES 1.3 (H) 0.0 - 1.0 K/UL    ABS. EOSINOPHILS 0.0 0.0 - 0.4 K/UL    ABS. BASOPHILS 0.0 0.0 - 0.1 K/UL    ABS. IMM. GRANS. 0.0 0.00 - 0.04 K/UL    DF AUTOMATED     METABOLIC PANEL, COMPREHENSIVE    Collection Time: 07/17/20  6:14 PM   Result Value Ref Range    Sodium 139 136 - 145 mmol/L    Potassium 3.9 3.5 - 5.1 mmol/L    Chloride 103 97 - 108 mmol/L    CO2 29 21 - 32 mmol/L    Anion gap 7 5 - 15 mmol/L    Glucose 88 65 - 100 mg/dL    BUN 13 6 - 20 MG/DL    Creatinine 0.78 0.70 - 1.30 MG/DL    BUN/Creatinine ratio 17 12 - 20      GFR est AA >60 >60 ml/min/1.73m2    GFR est non-AA >60 >60 ml/min/1.73m2    Calcium 9.6 8.5 - 10.1 MG/DL    Bilirubin, total 0.3 0.2 - 1.0 MG/DL    ALT (SGPT) 14 12 - 78 U/L    AST (SGOT) 12 (L) 15 - 37 U/L    Alk.  phosphatase 90 45 - 117 U/L    Protein, total 7.6 6.4 - 8.2 g/dL    Albumin 3.4 (L) 3.5 - 5.0 g/dL    Globulin 4.2 (H) 2.0 - 4.0 g/dL    A-G Ratio 0.8 (L) 1.1 - 2.2     TYPE & SCREEN    Collection Time: 07/17/20  6:14 PM   Result Value Ref Range    Crossmatch Expiration 07/20/2020     ABO/Rh(D) B POSITIVE     Antibody screen NEG    URINALYSIS W/ REFLEX CULTURE    Collection Time: 07/17/20  7:19 PM    Specimen: Urine   Result Value Ref Range    Color YELLOW/STRAW      Appearance CLEAR CLEAR      Specific gravity 1.010 1.003 - 1.030      pH (UA) 6.0 5.0 - 8.0      Protein Negative NEG mg/dL    Glucose Negative NEG mg/dL    Ketone Negative NEG mg/dL    Bilirubin Negative NEG      Blood Negative NEG      Urobilinogen 0.2 0.2 - 1.0 EU/dL    Nitrites Negative NEG      Leukocyte Esterase Negative NEG      UA:UC IF INDICATED CULTURE NOT INDICATED BY UA RESULT CNI      WBC 0-4 0 - 4 /hpf    RBC 0-5 0 - 5 /hpf    Epithelial cells FEW FEW /lpf    Bacteria Negative NEG /hpf    Hyaline cast 0-2 0 - 5 /lpf       Radiologic Studies -   No orders to display     CT Results  (Last 48 hours)               07/17/20 1641  CTA CHEST W OR W WO CONT Final result    Impression:  IMPRESSION:   1. CHEST: No pulmonary embolus. Stable emphysematous change. Stable mediastinal   lymphadenopathy. 2.ABDOMEN: Stable right adrenal mass. Slight increase in retrocaval lymph node. Other incidental and postoperative changes including splenectomy. 3. PELVIS: Acute appendicitis involving predominantly the appendiceal tip,   without abscess or rupture. The findings were called to Dr. Marcela Barrios on 7/17/2020 at 1730 hours by CT   technologists. The patient was directed to the emergency department. [id number]           Narrative:  INDICATION:  Epigastric, right abdominal and chest pain. 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 EXAM: .  CT ANGIOGRAPHY OF THE CHEST WAS PERFORMED WITH POSTPROCESSING, 3D 3D   RECONSTRUCTION, AND MAXIMUM INTENSITY PROJECTION. .  CT ABDOMEN WITH CONTRAST. CT PELVIS WITH CONTRAST. POST-PROCESSING WAS PERFORMED. Sequential axial images   of the  chest, abdomen and pelvis were performed following intravenous and oral   contrast. Soft tissue, lung and bone windows were examined. Post-processing was   performed for coronal and sagittal reformatting. CT dose reduction was achieved   through use of a standardized protocol tailored for this examination and   automatic exposure control for dose modulation. COMPARISON:  CT abdomen and pelvis 2/13/2020, CT chest 4/15/2020. CONTRAST:  100 mL Isovue 370. FINDINGS:       CHEST: No pulmonary embolus. Subcarinal lymphadenopathy appears stable.  Lymph   nodes are not as well visualized in the arterial phase, but the right   paratracheal lymph node described on the prior study measures approximately 2.9   x 2.2 cm, compared to 2.7 x 2.7 cm on the prior study, roughly stable. AP window   lymph nodes previously described appears stable. A normal to indeterminate sized   right hilar lymph node measures 0.9 cm in short axis diameter and is stable. No   pleural effusions. Stable emphysematous change with bleb formation and   interstitial prominence. Bibasilar minimal atelectasis or scar. ABDOMEN: Liver is parenchyma are homogeneous. The spleen is not visualized. There are surgical clips in the left upper quadrant. The gallbladder is stable   and unremarkable. It is mildly distended. The pancreas is normal. The right   adrenal mass described on previous examinations enhances heterogeneously and   measures 6.1 x 3.9 cm, roughly stable since the prior study. A previously   described retrocaval lymph node measures 1.8 x 1.4 cm, compared to 1.4 x 1.4 cm   on the prior study, with slight enlargement. The kidneys excrete contrast   symmetrically bilaterally. Small cysts are stable in the kidneys. Bowel loops   are nondilated and there is no ascites. PELVIS:  The appendix is enlarged distally, to a maximum of 1.6 cm caliber, with   wall thickening and enhancement and periappendiceal stranding of fat. The   proximal appendix is normal. There is no focal fluid collection or abscess and   no free air. The urinary bladder is normally distended. The distal ureters are   normal. The proximal common iliac arteries are ectatic but patent. 07/17/20 1641  CT ABD PELV W CONT Final result    Impression:  IMPRESSION:   1. CHEST: No pulmonary embolus. Stable emphysematous change. Stable mediastinal   lymphadenopathy. 2.ABDOMEN: Stable right adrenal mass. Slight increase in retrocaval lymph node. Other incidental and postoperative changes including splenectomy. 3. PELVIS: Acute appendicitis involving predominantly the appendiceal tip,   without abscess or rupture.        The findings were called to Dr. Bel Matthew on 7/17/2020 at 1730 hours by CT   technologists. The patient was directed to the emergency department. [id number]           Narrative:  INDICATION:  Epigastric, right abdominal and chest pain. 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 EXAM: .  CT ANGIOGRAPHY OF THE CHEST WAS PERFORMED WITH POSTPROCESSING, 3D 3D   RECONSTRUCTION, AND MAXIMUM INTENSITY PROJECTION. .  CT ABDOMEN WITH CONTRAST. CT PELVIS WITH CONTRAST. POST-PROCESSING WAS PERFORMED. Sequential axial images   of the  chest, abdomen and pelvis were performed following intravenous and oral   contrast. Soft tissue, lung and bone windows were examined. Post-processing was   performed for coronal and sagittal reformatting. CT dose reduction was achieved   through use of a standardized protocol tailored for this examination and   automatic exposure control for dose modulation. COMPARISON:  CT abdomen and pelvis 2/13/2020, CT chest 4/15/2020. CONTRAST:  100 mL Isovue 370. FINDINGS:       CHEST: No pulmonary embolus. Subcarinal lymphadenopathy appears stable. Lymph   nodes are not as well visualized in the arterial phase, but the right   paratracheal lymph node described on the prior study measures approximately 2.9   x 2.2 cm, compared to 2.7 x 2.7 cm on the prior study, roughly stable. AP window   lymph nodes previously described appears stable. A normal to indeterminate sized   right hilar lymph node measures 0.9 cm in short axis diameter and is stable. No   pleural effusions. Stable emphysematous change with bleb formation and   interstitial prominence. Bibasilar minimal atelectasis or scar. ABDOMEN: Liver is parenchyma are homogeneous. The spleen is not visualized. There are surgical clips in the left upper quadrant. The gallbladder is stable   and unremarkable. It is mildly distended.  The pancreas is normal. The right   adrenal mass described on previous examinations enhances heterogeneously and   measures 6.1 x 3.9 cm, roughly stable since the prior study. A previously   described retrocaval lymph node measures 1.8 x 1.4 cm, compared to 1.4 x 1.4 cm   on the prior study, with slight enlargement. The kidneys excrete contrast   symmetrically bilaterally. Small cysts are stable in the kidneys. Bowel loops   are nondilated and there is no ascites. PELVIS:  The appendix is enlarged distally, to a maximum of 1.6 cm caliber, with   wall thickening and enhancement and periappendiceal stranding of fat. The   proximal appendix is normal. There is no focal fluid collection or abscess and   no free air. The urinary bladder is normally distended. The distal ureters are   normal. The proximal common iliac arteries are ectatic but patent. CXR Results  (Last 48 hours)    None          Medical Decision Making   I am the first provider for this patient. I reviewed the vital signs, available nursing notes, past medical history, past surgical history, family history and social history. Vital Signs-Reviewed the patient's vital signs.   Patient Vitals for the past 24 hrs:   Temp Pulse Resp BP SpO2   07/17/20 2330 97.7 °F (36.5 °C) 64 13 112/75 90 %   07/17/20 2300 98.4 °F (36.9 °C) (!) 58 15 143/87 99 %   07/17/20 2245  (!) 55 14 138/75 100 %   07/17/20 2230  (!) 57 17 152/90 96 %   07/17/20 2220  (!) 58 15 (!) 153/93 100 %   07/17/20 2215  80 14 (!) 145/93 (!) 84 %   07/17/20 2210  81 17 (!) 148/99 100 %   07/17/20 2208 98.3 °F (36.8 °C) 75 18 (!) 156/98 99 %   07/17/20 2206  75 13 (!) 156/98 100 %   07/17/20 1836 98.7 °F (37.1 °C) 84 18 105/74 95 %       Pulse Oximetry Analysis - 95% on RA    Cardiac Monitor:   Rate: 81 bpm  Rhythm: Normal Sinus Rhythm      Records Reviewed: Nursing Notes, Old Medical Records, Previous electrocardiograms, Previous Radiology Studies and Previous Laboratory Studies    Provider Notes (Medical Decision Making):   Pt presents with acute abdominal pain;  DDx includes: Gastroenteritis, hepatitis, pancreatitis, obstruction, appendicitis, viral illness, IBD, diverticulitis, mesenteric ischemia, AAA or descending dissection, ACS, kidney stone. Will get labs, treat symptomatically and obtain serial abdominal exams to determine if additional imaging is indicated. Will reassess and monitor closely. ED Course:   Initial assessment performed. The patients presenting problems have been discussed, and they are in agreement with the care plan formulated and outlined with them. I have encouraged them to ask questions as they arise throughout their visit. HYPERTENSION COUNSELING  For 10 minutes, education was provided to the patient today regarding their hypertension. Patient is made aware of their elevated blood pressure and is instructed to follow up this week with their Primary Care for a recheck. Patient is counseled regarding consequences of chronic, uncontrolled hypertension including kidney disease, heart disease, stroke or even death. Patient states their understanding and agrees to follow up this week. Additionally, during their visit, I discussed sodium restriction, maintaining ideal body weight and regular exercise program as physiologic means to achieve blood pressure control. The patient will strive towards this. I reviewed our electronic medical record system for any past medical records that were available that may contribute to the patient's current condition, the nursing notes and vital signs from today's visit.   Moni Naranjo MD    ED Orders Placed :  Orders Placed This Encounter    CBC WITH AUTOMATED DIFF    METABOLIC PANEL, COMPREHENSIVE    URINALYSIS W/ REFLEX CULTURE    DIET CLEAR LIQUID    VITAL SIGNS    AMBULATE WITH ASSISTANCE    OUT OF BED IN CHAIR    NOTIFY PROVIDER: VITAL SIGNS CHANGES    INTAKE AND OUTPUT    INCENTIVE SPIROMETRY    APPLY/MAINTAIN SEQUENTIAL COMPRESSION DEVICE    FULL CODE    TYPE & SCREEN    morphine injection 4 mg    ondansetron (ZOFRAN) injection 4 mg    sodium chloride 0.9 % bolus infusion 1,000 mL    piperacillin-tazobactam (ZOSYN) 3.375 g in 0.9% sodium chloride (MBP/ADV) 100 mL    piperacillin-tazobactam (ZOSYN) 3.375 g in 0.9% sodium chloride (MBP/ADV) 100 mL    DISCONTD: lidocaine (PF) (XYLOCAINE) 10 mg/mL (1 %) injection 0.1 mL    DISCONTD: fentaNYL citrate (PF) injection 50 mcg    DISCONTD: midazolam (VERSED) injection 1 mg    DISCONTD: lactated Ringers infusion    DISCONTD: fentaNYL citrate (PF) injection 25 mcg    DISCONTD: morphine 10 mg/ml injection 2 mg    DISCONTD: ondansetron (ZOFRAN) injection 4 mg    DISCONTD: meperidine (DEMEROL) injection 12.5 mg    DISCONTD: bupivacaine (PF) (MARCAINE) 0.5 % (5 mg/mL) injection    dextrose 5% - 0.45% NaCl with KCl 20 mEq/L infusion    sodium chloride (NS) flush 5-40 mL    sodium chloride (NS) flush 5-40 mL    HYDROcodone-acetaminophen (NORCO) 5-325 mg per tablet 1 Tab    HYDROmorphone (PF) (DILAUDID) injection 0.5 mg    ondansetron (ZOFRAN) injection 4 mg    dextrose 5% - 0.45% NaCl with KCl 20 mEq/L 20 mEq/L infusion    INITIAL PHYSICIAN ORDER: OBSERVATION/OUTPATIENT IN A BED OBSERVATION; Surgical; appendicitis     ED Medications Administered:  Medications   dextrose 5% - 0.45% NaCl with KCl 20 mEq/L infusion (125 mL/hr IntraVENous New Bag 7/17/20 2219)   sodium chloride (NS) flush 5-40 mL (has no administration in time range)   sodium chloride (NS) flush 5-40 mL (has no administration in time range)   HYDROcodone-acetaminophen (NORCO) 5-325 mg per tablet 1 Tab (has no administration in time range)   HYDROmorphone (PF) (DILAUDID) injection 0.5 mg (has no administration in time range)   ondansetron (ZOFRAN) injection 4 mg (has no administration in time range)   morphine injection 4 mg (4 mg IntraVENous Given 7/17/20 1818)   ondansetron (ZOFRAN) injection 4 mg (4 mg IntraVENous Given 7/17/20 1818)   sodium chloride 0.9 % bolus infusion 1,000 mL (1,000 mL IntraVENous New Bag 7/17/20 1822)   piperacillin-tazobactam (ZOSYN) 3.375 g in 0.9% sodium chloride (MBP/ADV) 100 mL (3.375 g IntraVENous New Bag 7/17/20 1820)   piperacillin-tazobactam (ZOSYN) 3.375 g in 0.9% sodium chloride (MBP/ADV) 100 mL (3.375 g IntraVENous New Bag 7/17/20 2116)         Progress Note:  CT reviewed, no PE noted, acute appendicitis noted, plan for labs, IV abx, IVF bolus and General Surgery evaluation    Progress Note:  Per chart review, pt does have adenocarcinoma of unknown primary. Progress Note:  I have just re-evaluated the patient. Patient reports improvement of pain and sx's   I have reviewed His vital signs and determined there is currently no worsening in their condition or physical exam. Results have been reviewed with them and their questions have been answered. We will continue to review further results as they come available. Consult Note:  Taqueria Garcia MD spoke with Dr. Tish Graham,   Specialty: General Surgery  Discussed pt's hx, disposition, and available diagnostic and imaging results. Reviewed care plans. Agree with management and plan thus far. Consultant will evaluate pt for admission. CRITICAL CARE NOTE :  IMPENDING DETERIORATION -Cardiovascular and Surgical Abdomen  ASSOCIATED RISK FACTORS - Hypotension, Metabolic changes, Dehydration and Vascular Compromise  MANAGEMENT- Bedside Assessment and Supervision of Care  INTERPRETATION -  CT Scan, Blood Pressure and Cardiac Output Measures   INTERVENTIONS - hemodynamic mngmt and Metabolic interventions  CASE REVIEW - Medical Sub-Specialist, Nursing, Family and Surgeon  TREATMENT RESPONSE -Improved  PERFORMED BY - Self    NOTES   :  I personally spent 45 minutes of critical care time. This is time spent at this critically ill patient's bedside actively involved in patient care as well as the coordination of care and discussions with the patient's family.  This includes time involved in lab review, consultations with specialist, family decision-making, bedside attention and documentation. During this entire length of time I was immediately available to the patient. This does not include any procedural time which has been billed separately. Critical Care: The reason for providing this level of medical care for this critically-ill patient was due to a critical illness that impaired one or more vital organ systems, such that there was a high probability of imminent or life-threatening deterioration in the patient's condition. This care involved the highest level of preparedness to intervene urgently. This care involved high complexity decision making to assess, manipulate, and support vital system functions, to treat this degree of vital organ system failure, and to prevent further life threatening deterioration of the patients condition requiring frequent assessments and interventions. Allen Crump MD    Disposition: Admit  Patient is being admitted to the hospital. The results of their tests and reasons for their admission have been discussed with them and/or available family. I have discussed the case with the admitting specialist and expressed my high concern for decompensation if patient is discharged from the ED. The patient and/or available family convey agreement and understanding for the need to be admitted and for their admission diagnosis. Consultation has been made with the inpatient physician specialist for hospitalization. Diagnosis     Clinical Impression:   1. Acute appendicitis, unspecified acute appendicitis type    2. Acute appendicitis with localized peritonitis, without perforation, abscess, or gangrene    3. Acute abdominal pain    4. Adenocarcinoma of unknown origin ([facility] [address]    5. Accelerated hypertension      Attestation:  Mauro Moran MD, am the attending of record for this patient.  I personally performed the services described in this documentation on this date, 7/17/2020 for patientJose Florencia Ragsdale. I have reviewed the chart and verified that the record is accurate and complete. This note will not be viewable in [address]

## 2020-07-17 NOTE — TELEPHONE ENCOUNTER
Johnnie Villar is calling to speak to nurse. States patient has GI appt she thinks today at 9:00am and needs to know where to go. Advised would have nurse call her back to discuss.

## 2020-07-17 NOTE — TELEPHONE ENCOUNTER
Rajiv Estuardo is calling back and asks for Fide to call pt's cell. She apologized but has another question.      [phone]

## 2020-07-18 NOTE — PERIOP NOTES
TRANSFER - OUT REPORT:    Verbal report given to Sujata RN(name) on Leandra Montelongo  being transferred to 2113(unit) for routine progression of care       Report consisted of patients Situation, Background, Assessment and   Recommendations(SBAR). Information from the following report(s) OR Summary, Intake/Output and MAR was reviewed with the receiving nurse. Opportunity for questions and clarification was provided.       Patient transported with:   Registered Nurse

## 2020-07-18 NOTE — DISCHARGE SUMMARY
Physician Discharge Summary     Patient ID:  Merced Camacho  [id number]  89 y.o.  1960    Admit Date: 7/17/2020    Discharge Date: 7/18/2020    Admission Diagnoses: Acute appendicitis with localized peritonitis, without perforation, abscess, or gangrene [K35.30]    Discharge Diagnoses:  Principal Problem:    Acute appendicitis with localized peritonitis, without perforation, abscess, or gangrene (7/17/2020)    Active Problems:    Adenocarcinoma of unknown origin ([facility] [address] (6/25/2019)         Admission Condition: Poor    Discharge Condition: Good    Last Procedure: Procedure(s):  [address] Course:   Normal hospital course for this procedure. Consults: None    Disposition: home    Patient Instructions:   Current Discharge Medication List      START taking these medications    Details   HYDROcodone-acetaminophen (NORCO) 5-325 mg per tablet Take 1 Tab by mouth every four (4) hours as needed for Pain for up to 3 days. Max Daily Amount: 6 Tabs. Qty: 12 Tab, Refills: 0    Associated Diagnoses: Acute appendicitis with localized peritonitis, without perforation, abscess, or gangrene      amoxicillin-clavulanate (AUGMENTIN) 875-125 mg per tablet Take 1 Tab by mouth every twelve (12) hours for 10 days. Qty: 20 Tab, Refills: 0         CONTINUE these medications which have NOT CHANGED    Details   sucralfate (CARAFATE) 100 mg/mL suspension SHAKE LIQUID AND TAKE 5 ML BY MOUTH FOUR TIMES DAILY  Qty: 414 mL, Refills: 0      butalbital-acetaminophen-caffeine (FIORICET, ESGIC) -40 mg per tablet Take 1 Tab by mouth three (3) times daily. Qty: 90 Tab, Refills: 1      albuterol (PROVENTIL HFA, VENTOLIN HFA, PROAIR HFA) 90 mcg/actuation inhaler Take 2 Puffs by inhalation every four (4) hours as needed for Wheezing. Qty: 1 Inhaler, Refills: 5      !! oxyCODONE IR (ROXICODONE) 10 mg tab immediate release tablet Take 1 Tab by mouth every six (6) hours as needed for Pain for up to 15 days.  Max Daily Amount: 40 mg.  Qty: 60 Tab, Refills: 0    Associated Diagnoses: Adenocarcinoma of unknown primary ([facility]      dicyclomine (BentyL) 10 mg capsule Take 1 Cap by mouth two (2) times daily as needed for Abdominal Cramps. Qty: 60 Cap, Refills: 1      traZODone (DESYREL) 50 mg tablet TAKE 1 TABLET BY MOUTH EVERY NIGHT  Qty: 30 Tab, Refills: 2    Associated Diagnoses: Insomnia, unspecified type      !! oxyCODONE IR (ROXICODONE) 10 mg tab immediate release tablet Take 10 mg by mouth every six (6) hours as needed for Pain. dexAMETHasone (DECADRON) 2 mg tablet Take 1 Tab by mouth two (2) times daily (with meals). Qty: 60 Tab, Refills: 0    Associated Diagnoses: Metastatic cancer ([facility]      lidocaine (LIDODERM) 5 % 1 Patch by TransDERmal route every twelve (12) hours for 30 days. Apply patch to the affected area for 12 hours a day and remove for 12 hours a day. Qty: 30 Each, Refills: 3    Associated Diagnoses: Metastatic cancer ([facility]      tolterodine ER (DETROL-LA) 2 mg ER capsule Take 1 Cap by mouth daily. Qty: 30 Cap, Refills: 1    Associated Diagnoses: Urge incontinence      oxyCODONE ER (OxyCONTIN) 10 mg ER tablet Take 1 Tab by mouth every twelve (12) hours for 30 days. Max Daily Amount: 20 mg.  Qty: 60 Tab, Refills: 0    Associated Diagnoses: Adenocarcinoma of unknown primary ([facility]      senna-docusate (PERICOLACE) 8.6-50 mg per tablet Take 1 Tab by mouth two (2) times a day. Qty: 60 Tab, Refills: 3      umeclidinium brm/vilanterol tr (ANORO ELLIPTA IN) Take 1 Puff by inhalation daily. sertraline (ZOLOFT) 50 mg tablet Take 1 Tab by mouth daily. Qty: 30 Tab, Refills: 2    Associated Diagnoses: Depression, unspecified depression type      gabapentin (NEURONTIN) 300 mg capsule Take 2 Caps by mouth three (3) times daily. Max Daily Amount: 1,800 mg. Qty: 180 Cap, Refills: 3    Associated Diagnoses: Acute neoplasm-related pain;  [facility] ([facility]      metoprolol succinate (TOPROL-XL) 25 mg XL tablet Take 1 Tab by mouth nightly. Qty: 30 Tab, Refills: 5      megestroL (MEGACE) 40 mg tablet TAKE 1 TABLET BY MOUTH TWICE DAILY  Qty: 60 Tab, Refills: 0    Associated Diagnoses: Anorexia      lidocaine-prilocaine (EMLA) topical cream APPLY QUARTER SIZE AMOUNT TO PORT PRIOR TO CHEMOTHERAPY  Qty: 30 g, Refills: 1    Associated Diagnoses: Adenocarcinoma of unknown primary (HCC)      potassium chloride (K-DUR, KLOR-CON) 20 mEq tablet Take 1 Tab by mouth daily. Qty: 30 Tab, Refills: 0    Associated Diagnoses: Hypokalemia      ondansetron hcl (ZOFRAN) 4 mg tablet Take 2 Tabs by mouth every eight (8) hours as needed for Nausea. Qty: 45 Tab, Refills: 3    Associated Diagnoses: Adenocarcinoma of unknown primary ([address]      prochlorperazine (COMPAZINE) 10 mg tablet Take 1 Tab by mouth every six (6) hours as needed for Nausea. Qty: 45 Tab, Refills: 3    Associated Diagnoses: Adenocarcinoma of unknown primary ([address]      lisinopril (PRINIVIL, ZESTRIL) 5 mg tablet TAKE 1 TABLET BY MOUTH DAILY  Qty: 30 Tab, Refills: 0       !! - Potential duplicate medications found. Please discuss with provider. Activity: No driving while on analgesics and No heavy lifting for 4 weeks  Diet: Regular Diet  Wound Care: Keep wound clean and dry    Follow-up with surgery in 2 weeks.   Follow-up tests/labs none    Signed:  Dewayne Collins MD  [facility]  7/18/2020  7:48 AM

## 2020-07-18 NOTE — DISCHARGE INSTRUCTIONS
Appendectomy: What to Expect at Home  Your Recovery     Your doctor removed your appendix either by making many small cuts, called incisions, in your belly (laparoscopic surgery) or through open surgery. In open surgery, the doctor makes one large incision. The incisions leave scars that usually fade over time. After your surgery, it is normal to feel weak and tired for several days after you return home. Your belly may be swollen and may be painful. If you had laparoscopic surgery, you may have pain in your shoulder for about 24 hours. You may also feel sick to your stomach and have diarrhea, constipation, gas, or a headache. This usually goes away in a few days. Your recovery time depends on the type of surgery you had. If you had laparoscopic surgery, you will probably be able to return to work or a normal routine 1 to 3 weeks after surgery. If you had an open surgery, it may take 2 to 4 weeks. If your appendix ruptured, you may have a drain in your incision. Your body will work fine without an appendix. You will not have to make any changes in your diet or lifestyle. This care sheet gives you a general idea about how long it will take for you to recover. But each person recovers at a different pace. Follow the steps below to get better as quickly as possible. How can you care for yourself at home? Activity  · Rest when you feel tired. Getting enough sleep will help you recover. · Try to walk each day. Start by walking a little more than you did the day before. Bit by bit, increase the amount you walk. Walking boosts blood flow and helps prevent pneumonia and constipation. · For about 2 weeks, avoid lifting anything that would make you strain. This may include a child, heavy grocery bags and milk containers, a heavy briefcase or backpack, cat litter or dog food bags, or a vacuum .   · Avoid strenuous activities, such as bicycle riding, jogging, weight lifting, or aerobic exercise, until your doctor says it is okay. · You may be able to take showers (unless you have a drain near your incision) 24 to 48 hours after surgery. Pat the incision dry. Do not take a bath for the first 2 weeks, or until your doctor tells you it is okay. If you have a drain near your incision, follow your doctor's instructions. · You may drive when you are no longer taking pain medicine and can quickly move your foot from the gas pedal to the brake. You must also be able to sit comfortably for a long period of time, even if you do not plan on going far. You might get caught in traffic. · You will probably be able to go back to work in 1 to 3 weeks. If you had an open surgery, it may take 3 to 4 weeks. · Your doctor will tell you when you can have sex again. Diet  · You can eat your normal diet. If your stomach is upset, try bland, low-fat foods like plain rice, broiled chicken, toast, and yogurt. · Drink plenty of fluids (unless your doctor tells you not to). · You may notice that your bowel movements are not regular right after your surgery. This is common. Try to avoid constipation and straining with bowel movements. You may want to take a fiber supplement every day. If you have not had a bowel movement after a couple of days, ask your doctor about taking a mild laxative. Medicines  · If your appendix ruptured, you will need to take antibiotics. Take them as directed. Do not stop taking them just because you feel better. You need to take the full course of antibiotics. · Be safe with medicines. Take pain medicines exactly as directed. ¨ If the doctor gave you a prescription medicine for pain, take it as prescribed. ¨ If you are not taking a prescription pain medicine, take an over-the-counter medicine such as acetaminophen (Tylenol), ibuprofen (Advil, Motrin), or naproxen (Aleve). Read and follow all instructions on the label. ¨ Do not take two or more pain medicines at the same time unless the doctor told you to. Many pain medicines have acetaminophen, which is Tylenol. Too much Tylenol can be harmful. · If you think your pain medicine is making you sick to your stomach:  ¨ Take your medicine after meals (unless your doctor has told you not to). ¨ Ask your doctor for a different pain medicine. Incision care  · If you had an open surgery, you may have staples in your incision. The doctor will take these out in 7 to 10 days. · If you have strips of tape on the incision, leave the tape on for a week or until it falls off. · You may wash the area with warm, soapy water 24 to 48 hours after your surgery, unless your doctor tells you not to. Pat the area dry. · Keep the area clean and dry. You may cover it with a gauze bandage if it weeps or rubs against clothing. Change the bandage every day. · If your appendix ruptured, you may have an incision with packing in it. Change the packing as often as your doctor tells you to. ¨ Packing changes may hurt at first. Taking pain medicine about half an hour before you change the dressing can help. ¨ If your dressing sticks to your wound, try soaking it with warm water for about 10 minutes before you remove it. You can do this in the shower or by placing a wet washcloth over the dressing. ¨ Remove the old packing and flush the incision with water. Gently pat the top area dry. ¨ The size of the incision determines how much gauze you need to put inside. Fold the gauze over once, but do not wad it up so that it hurts. Put it in the wound carefully. You want to keep the sides of the wound from touching. A cotton swab may help you push the gauze in as needed. ¨ Put a gauze pad over the wound, and tape it down. ¨ You may notice greenish gray fluid seeping from your wound as you start to heal. This is normal. It is a sign that your wound is healing. Follow-up care is a key part of your treatment and safety.  Be sure to make and go to all appointments, and call your doctor if you are having problems. It's also a good idea to know your test results and keep a list of the medicines you take. When should you call for help? Call 911 anytime you think you may need emergency care. For example, call if:  · You passed out (lost consciousness). · You have sudden chest pain and shortness of breath, or you cough up blood. · You have severe trouble breathing. Call your doctor now or seek immediate medical care if:  · You are sick to your stomach and cannot drink fluids. · You have severe diarrhea. · You have pain that does not get better when you take your pain medicine. · You have signs of infection, such as:  ¨ Increased pain, swelling, warmth, or redness. ¨ Red streaks leading from the wound. ¨ Pus draining from the wound. ¨ A fever. · You have loose stitches, or your incision comes open. · Bright red blood has soaked through a large bandage. · You have signs of a blood clot, such as:  ¨ Pain in your calf, back of knee, thigh, or groin. ¨ Redness and swelling in your leg or groin. Watch closely for any changes in your health, and be sure to contact your doctor if:  · You had a laparoscopic surgery and your shoulder pain lasts more than 24 hours. · You have leakage around your drain or you have no new fluid in the drain for 24 hours. · The amount of drainage suddenly increases, or the color and texture changes. · You do not have a bowel movement after taking a laxative. Where can you learn more? Go to [employer]  Enter X801 in the search box to learn more about \"Appendectomy: What to Expect at Home. \"   © 1651-4000 [employer], [employer]. Care instructions adapted under license by [facility] (which disclaims liability or warranty for this information).  This care instruction is for use with your licensed healthcare professional. If you have questions about a medical condition or this instruction, always ask your healthcare professional. [employer] disclaims any warranty or liability for your use of this information.   Content Version: 76.3.662670; Current as of: November 14, 2014

## 2020-07-18 NOTE — ROUTINE PROCESS
TRANSFER - IN REPORT:    Verbal report received from GENET Majano RN(name) on Merced Camacho  being received from OR(unit) for routine post - op      Report consisted of patients Situation, Background, Assessment and   Recommendations(SBAR). Information from the following report(s) OR Summary was reviewed with the receiving nurse. Opportunity for questions and clarification was provided. Assessment completed upon patients arrival to unit and care assumed.

## 2020-07-18 NOTE — PROGRESS NOTES
Problem: Falls - Risk of  Goal: *Absence of Falls  Description: Document Ayaan Calix Fall Risk and appropriate interventions in the flowsheet.   Outcome: Progressing Towards Goal  Note: Fall Risk Interventions:       Mentation Interventions: Adequate sleep, hydration, pain control, Bed/chair exit alarm, Door open when patient unattended    Medication Interventions: Bed/chair exit alarm, Patient to call before getting OOB, Teach patient to arise slowly                   Problem: Patient Education: Go to Patient Education Activity  Goal: Patient/Family Education  Outcome: Progressing Towards Goal

## 2020-07-18 NOTE — ROUTINE PROCESS
Two prescriptions sent to pharmacy ([place] and Augmentin). Discharge medications reviewed with patient and appropriate educational materials and side effects teaching were provided. Both peripheral IVs removed and catheter intact. Right chest port heparinized and deaccessed without complication. Signs of infection reviewed along with incision care. Patient proved with contact info for Dr. Libby Martinez and advised to call on Monday to set follow up appointment for 2 weeks. Patient escorted to main entrance for discharge. All personal belongings with patient.

## 2020-07-18 NOTE — ANESTHESIA PREPROCEDURE EVALUATION
Relevant Problems   No relevant active problems       Anesthetic History   No history of anesthetic complications            Review of Systems / Medical History  Patient summary reviewed, nursing notes reviewed and pertinent labs reviewed    Pulmonary    COPD      Smoker         Neuro/Psych   Within defined limits           Cardiovascular    Hypertension      CHF  Dysrhythmias : PVC      Exercise tolerance: >4 METS  Comments: Ejection fraction was  estimated in the range of 50 % to 55 %.     GI/Hepatic/Renal     GERD           Endo/Other        Cancer (Non-small cell lung cancer (NSCLC) ([facility] [address]))     Other Findings              Physical Exam    Airway  Mallampati: II  TM Distance: 4 - 6 cm  Neck ROM: normal range of motion   Mouth opening: Normal     Cardiovascular  Regular rate and rhythm,  S1 and S2 normal,  no murmur, click, rub, or gallop             Dental  No notable dental hx       Pulmonary  Breath sounds clear to auscultation               Abdominal  GI exam deferred       Other Findings            Anesthetic Plan    ASA: 3  Anesthesia type: general    Monitoring Plan: BIS      Induction: Intravenous  Anesthetic plan and risks discussed with: Patient

## 2020-07-18 NOTE — OP NOTES
OPERATION        DATE OF OPERATION:  7/17/2020    PREOPERATIVE DIAGNOSIS:  Acute Appendicitis    POSTOPERATIVE DIAGNOSIS:  Acute Appendicitis    PROCEDURE:   Laparoscopic Appendectomy    SURGEON:  Devi Bae MD, FACS. ANESTHESIA:  General Endotrachial Anesthesia    Circ-1: Will Lopez RN  Circ-Relief: Leigh Andrade RN  Scrub Tech-1: Anastacia Roberto  Surg Asst-1: Prashanth King RN  Surg Asst-Relief: Nayeli Zapata      FINDINGS:  Acute appendicitis without evidence of perforation, no evidence of intraperitoneal malignancy            SPECIMENS REMOVED:  Appendix and Mesoappendix    DESCRIPTION OF OPERATION:  After appropriate consent was obtained, the patient who was competent was brought to the operating room, made comfortable in the supine position, and administered general endotracheal anesthesia. Kumar catheter was placed, and the patient was prepped and draped in standard fashion. A 0.5% Marcaine solution was used to infiltrate the planned trocar sites. A 15 blade was then used to incise just under the umbilicus was extended sharply down to and through the midline fascia. A Landon trocar was placed, and the abdominal cavity was insufflated with CO2 gas to 15 cm water pressure. Under direct visualization, two 5 mm trocars were placed in the lower abdomen. Using standard laparoscopic technique, the abdominal cavity was explored. The appendix was identified and found to be pathologically enlarged and inflamed. The base of the appendix was identified, and a window was made in the mesentery at the appendiceal base. An Endo-CLOVIS vascular staple cartridge was then fired across the appendix at the base, ligating and dividing it. A second firing of an Endo-CLOVIS vascular staple cartridge was used to divide the mesoappendix; and when this was all freed, the appendix was placed in an endoscopic retrieval bag and withdrawn from the abdomen through the umbilical trocar site.  The staple lines were inspected and found to be hemostatic. The abdominal cavity was further explored, and no other pathologic findings noted, particularly there was no evidence of peritoneal implants, gallbladder neoplasm or liver lesions. The trocars were removed under direct visualization without evidence of bleeding. The umbilical fascial defect was approximated with 0-Vicryl suture, and the skin incisions were closed with 5-0 Monocryl subcuticular stitch. The wounds were cleaned and dried and dressed with Dermabond. The patient was awakened, extubated, and transferred to the recovery room in good condition. There were no operative complications. There was minimal blood loss during the case. Beth Powell.  Rafi Rollins MD, [facility]

## 2020-07-18 NOTE — H&P
Surgery History and Physcial    Subjective:      Laverne Fitzgerald is a 63 y.o. male who presents for evaluation of abdominal pain. The pain is located in the RLQ without radiation. He states it has been present for a week and is getting worse. Pt is followed by Dr. Win Addison with remote h/o colon cancer in 2002. He  underwent colectomy at that time by Dr. Lou Coffman at [facility].  He underwent splenectomy in 2004. 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 He received 6 cycles of carbo-taxol and experienced many side effects from the chemotherapy including severe sensory neuropathy in feet. Last CT showed progression of disease in the right adrenal glans. He received Keytruda as second line therapy. Recent scans show disease progression. He is receiving palliative chemotherapy with gem/abraxane, per Dr. Newberry Royalty note.     He has been diagnosed with mutiple brain metastasis. He underwent craniotomy for the right frontal lobe lesion and is currently receiving SBRT to the other two brain lesions. He continues to have numbness in both feet and difficulty with walking. Lymph node biopsies are suggestive of upper GI or pancreaticobiliary primary. Disease appears stable on current CT. Pt has a port in place which is accessed in the ED tonight.     Patient Active Problem List    Diagnosis Date Noted    Acute appendicitis with localized peritonitis, without perforation, abscess, or gangrene 07/17/2020    Non-small cell lung cancer (NSCLC) ([address] 12/18/2019    Chemotherapy-induced neutropenia ([address] 09/04/2019    Adenocarcinoma of unknown origin ([address] 06/25/2019    Abnormal nuclear stress test 12/07/2015    GERD (gastroesophageal reflux disease)     HTN (hypertension)     Tobacco use disorder     Chronic systolic heart failure (HCC)      Past Medical History:   Diagnosis Date    Abnormal nuclear stress test 12/7/2015    Cardiomyopathy ([address]     EF 45%    Chronic obstructive pulmonary disease (HCC)     Chronic systolic heart failure (HCC)     GERD (gastroesophageal reflux disease)     HTN (hypertension)     Lung cancer (HCC)     PAC (premature atrial contraction)     Tobacco use disorder       Past Surgical History:   Procedure Laterality Date    COLONOSCOPY N/A 7/10/2019    COLONOSCOPY AND ESOPHAGOGASTRODUODENOSCOPY (EGD) performed by Rubin Orozco MD at [address] HX COLECTOMY      HX SPLENECTOMY      IR INSERT TUNL CVC W PORT OVER 5 YEARS  2019      Social History     Tobacco Use    Smoking status: Former Smoker     Packs/day: 0.50     Types: Cigarettes     Last attempt to quit: 2016     Years since quittin.2    Smokeless tobacco: Never Used   Substance Use Topics    Alcohol use: Not Currently     Comment: rarely      Family History   Problem Relation Age of Onset    Stroke Mother     Coronary Artery Disease Sister     Heart Disease Paternal Grandfather       Prior to Admission medications    Medication Sig Start Date End Date Taking? Authorizing Provider   sucralfate (CARAFATE) 100 mg/mL suspension SHAKE LIQUID AND TAKE 5 ML BY MOUTH FOUR TIMES DAILY 20   John Hand MD   butalbital-acetaminophen-caffeine (FIORICET, ESGIC) -40 mg per tablet Take 1 Tab by mouth three (3) times daily. 20   John Hand MD   albuterol (PROVENTIL HFA, VENTOLIN HFA, PROAIR HFA) 90 mcg/actuation inhaler Take 2 Puffs by inhalation every four (4) hours as needed for Wheezing. 20   John Hand MD   oxyCODONE IR (ROXICODONE) 10 mg tab immediate release tablet Take 1 Tab by mouth every six (6) hours as needed for Pain for up to 15 days. Max Daily Amount: 40 mg. 20  John Hand MD   dicyclomine (BentyL) 10 mg capsule Take 1 Cap by mouth two (2) times daily as needed for Abdominal Cramps.  20   John Hand MD   traZODone (DESYREL) 50 mg tablet TAKE 1 TABLET BY MOUTH EVERY NIGHT 6/30/20   John Rhoades MD   oxyCODONE IR (ROXICODONE) 10 mg tab immediate release tablet Take 10 mg by mouth every six (6) hours as needed for Pain. Alexandra Schmidt   dexAMETHasone (DECADRON) 2 mg tablet Take 1 Tab by mouth two (2) times daily (with meals). 6/30/20   John Rhoades MD   lidocaine (LIDODERM) 5 % 1 Patch by TransDERmal route every twelve (12) hours for 30 days. Apply patch to the affected area for 12 hours a day and remove for 12 hours a day. 6/30/20 7/30/20  John Rhoades MD   tolterodine ER (DETROL-LA) 2 mg ER capsule Take 1 Cap by mouth daily. 6/30/20   John Rhoades MD   oxyCODONE ER (OxyCONTIN) 10 mg ER tablet Take 1 Tab by mouth every twelve (12) hours for 30 days. Max Daily Amount: 20 mg. 6/29/20 7/29/20  John Rhoades MD   senna-docusate (PERICOLACE) 8.6-50 mg per tablet Take 1 Tab by mouth two (2) times a day. 5/7/20   John Rhoades MD   umeclidinium brm/vilanterol tr (ANORO ELLIPTA IN) Take 1 Puff by inhalation daily. Rae, MD Evelia   sertraline (ZOLOFT) 50 mg tablet Take 1 Tab by mouth daily. 3/23/20   John Rhoades MD   gabapentin (NEURONTIN) 300 mg capsule Take 2 Caps by mouth three (3) times daily. Max Daily Amount: 1,800 mg. 3/11/20   John Rhoades MD   metoprolol succinate (TOPROL-XL) 25 mg XL tablet Take 1 Tab by mouth nightly. 3/11/20   John Rhoades MD   megestroL (MEGACE) 40 mg tablet TAKE 1 TABLET BY MOUTH TWICE DAILY 3/9/20   John Rhoades MD   lidocaine-prilocaine (EMLA) topical cream APPLY QUARTER SIZE AMOUNT TO PORT PRIOR TO CHEMOTHERAPY 3/5/20   Nicole Jiménez NP   potassium chloride (K-DUR, KLOR-CON) 20 mEq tablet Take 1 Tab by mouth daily. 11/11/19   Emilie Hayes, NP   ondansetron hcl (ZOFRAN) 4 mg tablet Take 2 Tabs by mouth every eight (8) hours as needed for Nausea. 10/23/19   Emilie Hayes, NP   prochlorperazine (COMPAZINE) 10 mg tablet Take 1 Tab by mouth every six (6) hours as needed for Nausea.  10/23/19   Freddy, Holly Jensen, RENETTA   lisinopril (PRINIVIL, ZESTRIL) 5 mg tablet TAKE 1 TABLET BY MOUTH DAILY 10/23/19   Aminata Jarvis NP     No Known Allergies      Review of Systems   Constitutional: Negative for appetite change, chills, diaphoresis and fever. Respiratory: Negative for shortness of breath and wheezing. Cardiovascular: Negative for chest pain and palpitations. Gastrointestinal: Positive for abdominal pain. Negative for diarrhea, nausea and vomiting. Musculoskeletal: Negative for myalgias. Hematological: Does not bruise/bleed easily. Objective:     Visit Vitals  /74 (BP 1 Location: Left arm)   Pulse 84   Temp 98.7 °F (37.1 °C)   Resp 18   Ht 5' 11\" (1.803 m)   Wt 149 lb (67.6 kg)   SpO2 95%   BMI 20.78 kg/m²       Physical Exam  Constitutional:       General: He is not in acute distress. Appearance: He is well-developed. HENT:      Head: Normocephalic and atraumatic. Cardiovascular:      Rate and Rhythm: Normal rate and regular rhythm. Heart sounds: Normal heart sounds. Pulmonary:      Breath sounds: Normal breath sounds. No wheezing or rales. Abdominal:      General: Bowel sounds are normal. There is no distension. Palpations: Abdomen is soft. There is no mass. Tenderness: There is abdominal tenderness in the right lower quadrant. There is no guarding or rebound. Positive signs include McBurney's sign. Negative signs include Colby's sign, Rovsing's sign and psoas sign. Comments: - hip shake  Doesn't tolerate heel tap due to neuropathy   Musculoskeletal: Normal range of motion. Lymphadenopathy:      Cervical: No cervical adenopathy.          Imaging:  images and reports reviewed    Lab Review:    Recent Results (from the past 24 hour(s))   CREATININE, POC    Collection Time: 07/17/20  4:17 PM   Result Value Ref Range    Creatinine (POC) 0.9 0.6 - 1.3 mg/dL    GFRAA, POC >60 >60 ml/min/1.73m2    GFRNA, POC >60 >60 ml/min/1.73m2   CBC WITH AUTOMATED DIFF Collection Time: 07/17/20  6:14 PM   Result Value Ref Range    WBC 10.3 4.1 - 11.1 K/uL    RBC 4.30 4. 10 - 5.70 M/uL    HGB 13.1 12.1 - 17.0 g/dL    HCT 40.5 36.6 - 50.3 %    MCV 94.2 80.0 - 99.0 FL    MCH 30.5 26.0 - 34.0 PG    MCHC 32.3 30.0 - 36.5 g/dL    RDW 18.4 (H) 11.5 - 14.5 %    PLATELET 908 329 - 170 K/uL    MPV 12.5 8.9 - 12.9 FL    NRBC 0.0 0  WBC    ABSOLUTE NRBC 0.00 0.00 - 0.01 K/uL    NEUTROPHILS 59 32 - 75 %    LYMPHOCYTES 28 12 - 49 %    MONOCYTES 13 5 - 13 %    EOSINOPHILS 0 0 - 7 %    BASOPHILS 0 0 - 1 %    IMMATURE GRANULOCYTES 0 0.0 - 0.5 %    ABS. NEUTROPHILS 6.0 1.8 - 8.0 K/UL    ABS. LYMPHOCYTES 2.9 0.8 - 3.5 K/UL    ABS. MONOCYTES 1.3 (H) 0.0 - 1.0 K/UL    ABS. EOSINOPHILS 0.0 0.0 - 0.4 K/UL    ABS. BASOPHILS 0.0 0.0 - 0.1 K/UL    ABS. IMM. GRANS. 0.0 0.00 - 0.04 K/UL    DF AUTOMATED     METABOLIC PANEL, COMPREHENSIVE    Collection Time: 07/17/20  6:14 PM   Result Value Ref Range    Sodium 139 136 - 145 mmol/L    Potassium 3.9 3.5 - 5.1 mmol/L    Chloride 103 97 - 108 mmol/L    CO2 29 21 - 32 mmol/L    Anion gap 7 5 - 15 mmol/L    Glucose 88 65 - 100 mg/dL    BUN 13 6 - 20 MG/DL    Creatinine 0.78 0.70 - 1.30 MG/DL    BUN/Creatinine ratio 17 12 - 20      GFR est AA >60 >60 ml/min/1.73m2    GFR est non-AA >60 >60 ml/min/1.73m2    Calcium 9.6 8.5 - 10.1 MG/DL    Bilirubin, total 0.3 0.2 - 1.0 MG/DL    ALT (SGPT) 14 12 - 78 U/L    AST (SGOT) 12 (L) 15 - 37 U/L    Alk.  phosphatase 90 45 - 117 U/L    Protein, total 7.6 6.4 - 8.2 g/dL    Albumin 3.4 (L) 3.5 - 5.0 g/dL    Globulin 4.2 (H) 2.0 - 4.0 g/dL    A-G Ratio 0.8 (L) 1.1 - 2.2     TYPE & SCREEN    Collection Time: 07/17/20  6:14 PM   Result Value Ref Range    Crossmatch Expiration 07/20/2020     ABO/Rh(D) B POSITIVE     Antibody screen NEG    URINALYSIS W/ REFLEX CULTURE    Collection Time: 07/17/20  7:19 PM    Specimen: Urine   Result Value Ref Range    Color YELLOW/STRAW      Appearance CLEAR CLEAR      Specific gravity 1.010 1.003 - 1.030      pH (UA) 6.0 5.0 - 8.0      Protein Negative NEG mg/dL    Glucose Negative NEG mg/dL    Ketone Negative NEG mg/dL    Bilirubin Negative NEG      Blood Negative NEG      Urobilinogen 0.2 0.2 - 1.0 EU/dL    Nitrites Negative NEG      Leukocyte Esterase Negative NEG      UA:UC IF INDICATED CULTURE NOT INDICATED BY UA RESULT CNI      WBC 0-4 0 - 4 /hpf    RBC 0-5 0 - 5 /hpf    Epithelial cells FEW FEW /lpf    Bacteria Negative NEG /hpf    Hyaline cast 0-2 0 - 5 /lpf         Assessment:     Abdominal pain, suspect acute appendicitis without perforation or abscess in a palliative chemotherapy pt with 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 Plan:     1. I recommend proceeding with Surgery:  Appendectomy and Laparoscopy. 2. Discussed aspects of surgical intervention, methods, risks including by not limited to infection, bleeding, hematoma, and perforation of the intestines or solid organs, conversion to open operation, possible inability to perform appendectomy due to concurrent malignancy, and the risks of general anesthetic. The patient understands the risks; any and all questions were answered to the patient's satisfaction.

## 2020-07-18 NOTE — ANESTHESIA POSTPROCEDURE EVALUATION
Procedure(s):  APPENDECTOMY LAPAROSCOPIC. general    Anesthesia Post Evaluation        Patient location during evaluation: PACU  Note status: Adequate. Level of consciousness: responsive to verbal stimuli and sleepy but conscious  Pain management: satisfactory to patient  Airway patency: patent  Anesthetic complications: no  Cardiovascular status: acceptable  Respiratory status: acceptable  Hydration status: acceptable  Comments: +Post-Anesthesia Evaluation and Assessment    Patient: Philipp Merrill MRN: [id number]  SSN: [id number]   YOB: 1960  Age: 63 y.o. Sex: male      Cardiovascular Function/Vital Signs    /87 (BP 1 Location: Left arm, BP Patient Position: At rest)   Pulse (!) 58   Temp 36.9 °C (98.4 °F)   Resp 15   Ht 5' 11\" (1.803 m)   Wt 67.6 kg (149 lb)   SpO2 99%   BMI 20.78 kg/m²     Patient is status post Procedure(s):  APPENDECTOMY LAPAROSCOPIC. Nausea/Vomiting: Controlled. Postoperative hydration reviewed and adequate. Pain:  Pain Scale 1: Numeric (0 - 10) (07/17/20 2300)  Pain Intensity 1: 0 (07/17/20 2300)   Managed. Neurological Status:   Neuro (WDL): Exceptions to WDL (07/17/20 2300)   At baseline. Mental Status and Level of Consciousness: Arousable. Pulmonary Status:   O2 Device: Room air (07/17/20 2300)   Adequate oxygenation and airway patent. Complications related to anesthesia: None    Post-anesthesia assessment completed. No concerns. Signed By: Beverly Salazar MD    7/18/2020  Post anesthesia nausea and vomiting:  controlled      INITIAL Post-op Vital signs:   Vitals Value Taken Time   /87 7/17/2020 11:00 PM   Temp 36.9 °C (98.4 °F) 7/17/2020 11:00 PM   Pulse 60 7/17/2020 11:04 PM   Resp 11 7/17/2020 11:04 PM   SpO2 99 % 7/17/2020 11:04 PM   Vitals shown include unvalidated device data.

## 2020-07-20 NOTE — TELEPHONE ENCOUNTER
Returned call to patient and he stated that he have not heard from [employer] as yet - advised I will give them a call and have them call to schedule arrival time - patient verbalized understanding

## 2020-07-22 NOTE — TELEPHONE ENCOUNTER
Calling patient to advise of Virtual Visit with Dr. Alexandria Meyers on  7/23/2020 at 12:30pm.  Rolando Mcneal confirmed.

## 2020-07-23 NOTE — TELEPHONE ENCOUNTER
Returned call and LVM giving verbal orders for Rama Moreira, physical therapist to hold off on reassessing patient until next week.   Advised to call back as needed

## 2020-07-23 NOTE — PROGRESS NOTES
Palliative Medicine Office Visit  Palliative Medicine Nurse Check In  [phone]    Patient Name: Lucas Fay  YOB: 1960      Date of Office Visit: 7/23/2020    Patient states: \" recent appendectomy, has stopped all pain medications  \"    From Check In Sheet (scanned in Media):  Has a medical provider talked with you about cardiopulmonary resuscitation (CPR)? [x] Yes   [] No   [] Unable to obtain    Nurse reminder to complete or update ACP FlowSheet:    Is ACP on the Problem List?    [] Yes    [] No  IF ACP Document is ON FILE; Nurse to place ACP on Problem List     Is there an ACP Note in Chart Review/Note? [] Yes    [] No   If NO: ALERT PROVIDER       Primary Decision Maker (Active): Christian Mcnairia - Daughter - [phone]    Secondary Decision Maker: Steve Osorio - Daughter - [phone]  Advance Care Planning 7/23/2020   Patient's Healthcare Decision Maker is: Named in scanned ACP document   Primary Decision Maker Name -   Primary Decision Maker Phone Number -   Primary Decision Maker Relationship to Patient -   Secondary Decision Maker Name -   Confirm Advance Directive Yes, on file   Patient Would Like to Complete Advance Directive -         Is there anything that we should know about you as a person in order to provide you the best care possible? Have you been to the ER, urgent care clinic since your last visit? [] Yes   [x] No   [] Unable to obtain    Have you been hospitalized since your last visit? [] Yes   [x] No   [] Unable to obtain    Have you seen or consulted any other health care providers outside of the [address] [place] [address] since your last visit?    [] Yes   [x] No   [] Unable to obtain    Functional status (describe):         Last BM: 7/23/2020     accessed (date): 7/23/2020    Bottle review (for opioid pain medication):  Medication 1:   Date filled:   Directions:   # filled:   # left:   # pills taking per day:  Last dose taken:    Medication 2:   Date filled:   Directions:   # filled:   # left:   # pills taking per day:  Last dose taken:    Medication 3:   Date filled:   Directions:   # filled:   # left:   # pills taking per day:  Last dose taken:    Medication 4:   Date filled:   Directions:   # filled:   # left:   # pills taking per day:  Last dose taken:

## 2020-07-23 NOTE — PATIENT INSTRUCTIONS
Dear Tiffanie Cleaning ,    It was a pleasure seeing you today in person along with your daughter Marjorie Petersen and girlfriend Erika Russell    We will see you again in 4 weeks    If labs or imaging tests have been ordered for you today, please call the office  at [phone] 48 hours after completion to obtain the results. Your stated goal:   -Better pain control  -Better relationship with your providers thereby enhancing your experience with [facility]      This is the plan we talked about:    1. Esophageal spasm /chest pain  -You have been struggling with epigastric/chest spasms for a long time now. You had an EKG during repeated ER visits which did not show a cardiac source of chest pain. We were in the process of having you seen by a gastroenterologist and preferably an endoscopy but that has not happened yet. Your most recent visit with the gastroenterologist ended up resulting in a CT of the abdomen where we found out about appendicitis and you just had surgery for the same.  -My office will reach out to [facility] and see if they can see you again and preferably do an endoscopy at this time.  -In the meantime, continue pantoprazole 40 mg 2 times a day diligently. There are days that you forget to take this and I think it is very important that you continue to take this medication.  -Continue with the GI cocktail which helps soothe your stomach. -Use Carafate at least 3 times a day especially prior to meals. Avoid spicy, fried, citric/acidic foods. You have noticed that you have increased pain on days that you eat fast food. -You decided to stop your OxyContin and oxycodone, your last dose was prior to the hospitalization on 7/17. You do not have any withdrawal symptoms at this time. It is better we stay away from opioids at this time. 2.  Brain tumor and hospitalization  -You completed radiation to the brain, you currently on dexamethasone 1 mg daily.   Your follow-ups coming up with your [facility] team..      3. Severe chemotherapy induced neuropathy  - You are tolerating Gabapentin 600 mg three times a day and this is helping some  - We tried Amitrptyline and you reacted very poorly to it. 4.  Constipation  -Constipation is a common side effect of pain medications as they slow your bowels. -Continue senna 2 tabs two times a day. This is necessary to keep your bowels soft. - Add Miralax every other day as a laxative. - Goal is to have soft bowel movements every day or every other day. - Please call us if you do not have bowel movements for more than 3 days. 5. Insomnia  - You are struggling with severe lack of sleep due to stress and anxiety  -You finally picked up the trazodone from the pharmacy and have been taking it the last few days and this is helping.  -You take trazodone sparingly as well    6. Anxiety/ depression  - Continue Zoloft 25 mg at bedtime    7. ACP  -You completed an AMD today naming [place] as your primary medical power of . This is what you have shared with us about Advance Care Planning:      Primary Decision Maker (Active): Brisa Garcia - Daughter - [phone]    Secondary Decision Maker: Selena Paez - Daughter - [phone]  Advance Care Planning 7/23/2020   Patient's Healthcare Decision Maker is: Named in scanned ACP document   Primary Decision Maker Name -   Primary Decision Maker Phone Number -   Primary Decision Maker Relationship to Patient -   Secondary Decision Maker Name -   Confirm Advance Directive Yes, on file   Patient Would Like to Complete Advance Directive -           The Palliative Medicine Team is here to support you and your family.        Sincerely,      Luisa Amos MD and the Palliative Medicine Team

## 2020-07-23 NOTE — TELEPHONE ENCOUNTER
Nusrat Crowe, physical therapist is calling to update Dr. Teena Louise. Pool Sesay states that he needs a verbal order for a reassessment to occur next week. Originally it was supposed to be this week but the daughter requested it to be pushed out to next week.      Best contact: [phone]

## 2020-07-23 NOTE — PROGRESS NOTES
Transition Care Management:    Admission date: 7/17/2020  Discharge date: 7/18/2020  Hospital: [facility] [address]  Discharge diagnosis: Acute appendicitis with localized peritonitis without perforation  Nurse Navigator note date:  Nurse Navigator note reviewed in detail: yes    We reviewed the hospital course and discharge summary / recommendation including the discharge medications. The patient presented with severe abdominal pain and was found to have acute appendicitis. He underwent laparoscopic appendectomy. Abdominal pain has improved. The patient is following the discharge plan as directed with the following details noted in the [facility] visit note below. Palliative Medicine Outpatient Services  Rod[place]: [phone] ([phone])    Patient Name: New Trevino  YOB: 1960    Date of Current Visit: 7/1/2020  Location of Current Visit:   [] [facility] Office  [] [facility] Office  [] [facility] Office  [x] Home-synchronous A/V virtual visit  [] Other: [address] [facility]    Date of Initial Visit: 1/15/2020  Referral from: Dr. Flash XAVIER  Primary Care Physician: Andrew Bernal NP      SUMMARY:   New Trevino is a 63y.o. year old with a  history of COPD, colon cancer in 2002, metastatic adenocarcinoma of unknown primary, who was referred to Palliative Medicine by Dr. Flash Palencia for management of symptoms and psychosocial support. The patients social history includes served in the [employer] for 22 years, lives with his girlfriend now. Treatment history-patient had progression of disease on carbo plus Taxol, currently on Keytruda infusions. He struggles with neoplasm related pain      3 new brain tumors diagnosed at [facility], status post craniotomy and removal of one tumor, about to start stereotactic radiation to right frontal and temporal tumors on July 8. Plan for total 5 doses of radiation. Status post appendectomy 7/17/2020. Continues to have epigastric pain     PALLIATIVE DIAGNOSES:       ICD-10-CM ICD-9-CM    1. Adenocarcinoma of unknown primary ([facility]  C80.1 199.1    2. Chemotherapy-induced peripheral neuropathy (HCC)  G62.0 357.7     T45. 1X5A E933.1    3. Brain tumor ([facility]  D49.6 239.6    4. Esophageal spasm  K22.4 530.5           PLAN:   Patient Instructions     Dear Chapin Dwyer ,    It was a pleasure seeing you today in person along with your daughter Kassie Chávez and girlfriend Yeimy Arvizu    We will see you again in 4 weeks    If labs or imaging tests have been ordered for you today, please call the office  at [phone] 48 hours after completion to obtain the results. Your stated goal:   -Better pain control  -Better relationship with your providers thereby enhancing your experience with [address] [place] [address]      This is the plan we talked about:    1. Esophageal spasm /chest pain  -You have been struggling with epigastric/chest spasms for a long time now. You had an EKG during repeated ER visits which did not show a cardiac source of chest pain. We were in the process of having you seen by a gastroenterologist and preferably an endoscopy but that has not happened yet. Your most recent visit with the gastroenterologist ended up resulting in a CT of the abdomen where we found out about appendicitis and you just had surgery for the same.  -My office will reach out to [facility] and see if they can see you again and preferably do an endoscopy at this time.  -In the meantime, continue pantoprazole 40 mg 2 times a day diligently. There are days that you forget to take this and I think it is very important that you continue to take this medication.  -Continue with the GI cocktail which helps soothe your stomach. -Use Carafate at least 3 times a day especially prior to meals. Avoid spicy, fried, citric/acidic foods. You have noticed that you have increased pain on days that you eat fast food. -You decided to stop your OxyContin and oxycodone, your last dose was prior to the hospitalization on 7/17. You do not have any withdrawal symptoms at this time. It is better we stay away from opioids at this time. 2.  Brain tumor and hospitalization  -You completed radiation to the brain, you currently on dexamethasone 1 mg daily. Your follow-ups coming up with your [facility] team..      3. Severe chemotherapy induced neuropathy  - You are tolerating Gabapentin 600 mg three times a day and this is helping some  - We tried Amitrptyline and you reacted very poorly to it. 4.  Constipation  -Constipation is a common side effect of pain medications as they slow your bowels. -Continue senna 2 tabs two times a day. This is necessary to keep your bowels soft. - Add Miralax every other day as a laxative. - Goal is to have soft bowel movements every day or every other day. - Please call us if you do not have bowel movements for more than 3 days. 5. Insomnia  - You are struggling with severe lack of sleep due to stress and anxiety  -You finally picked up the trazodone from the pharmacy and have been taking it the last few days and this is helping.  -You take trazodone sparingly as well    6. Anxiety/ depression  - Continue Zoloft 25 mg at bedtime    7. ACP  -You completed an AMD today naming Axel Boyd as your primary medical power of . This is what you have shared with us about Advance Care Planning:      Primary Decision Maker (Active): Lidia Radha Velásquez Daughter - [phone]    Secondary Decision Maker: Jessica Velásquez Daughter - [phone]  Advance Care Planning 7/23/2020   Patient's Healthcare Decision Maker is: Named in scanned ACP document   Primary Decision Maker Name -   Primary Decision Maker Phone Number -   Primary Decision Maker Relationship to Patient -   Secondary Decision Maker Name -   Confirm Advance Directive Yes, on file   Patient Would Like to Complete Advance Directive -           The Palliative Medicine Team is here to support you and your family.        Sincerely,      Jose Bermudez, MD and the Palliative Medicine Team       GOALS OF CARE / TREATMENT PREFERENCES:   [====Goals of Care====]  GOALS OF CARE:  Patient / health care proxy stated goals: See Patient Instructions / Summary    TREATMENT PREFERENCES:   Code Status:  [x] Attempt Resuscitation       [] Do Not Attempt Resuscitation    Advance Care Planning:  [x] The Pall [facility] Interdisciplinary Team has updated the ACP Navigator with Decision Maker and Patient Capacity      Primary Decision Maker (Active): Wai Mueller - Daughter - [phone]    Secondary Decision Maker: Hanna Tolbert - Daughter - [phone]  Advance Care Planning 7/23/2020   Patient's Healthcare Decision Maker is: Named in scanned ACP document   Primary Decision Maker Name -   Primary Decision Maker Phone Number -   Primary Decision Maker Relationship to Patient -   Secondary Decision Maker Name -   Confirm Advance Directive Yes, on file   Patient Would Like to Complete Advance Directive -       Other:  (If patient appropriate for POST, consider using PALLPOST smart phrase here)    The palliative care team has discussed with patient / health care proxy about goals of care / treatment preferences for patient.  [====Goals of Care====]     PRESCRIPTIONS GIVEN:     No orders of the defined types were placed in this encounter.           FOLLOW UP:     Future Appointments   Date Time Provider Carmen Wynn   7/30/2020  9:00  [facility] [address] [facility]   8/13/2020  9:00 AM [facility] INFUSION NURSE 1 [address] COM   8/13/2020  9:15 AM Nii Cardoso NP ONC ELIAZAR SCHED   8/27/2020  9:00 AM [facility] INFUSION NURSE 1 [address] COM   9/10/2020 10:30 AM [facility]           PHYSICIANS INVOLVED IN CARE:   Patient Care Team:  Berenda Oppenheim, NP as PCP - General (Nurse Practitioner)  Daniela Rosales MD as Physician (Cardiology)  Tej Arreola MD (Gastroenterology)  Luan Jain MD (Hematology and Oncology) HISTORY:   Reviewed patient-completed ESAS and advance care planning form. Reviewed patient record in prescription monitoring program.    CHIEF COMPLAINT:   No chief complaint on file. HPI/SUBJECTIVE:    The patient is: [x] Verbal / [] Nonverbal     Patient seen at home along with his girlfriend Aisha Guerrero today. He appears quite sleepy, just woke up. We reviewed his hospitalization. He had a gastroenterology appointment at [facility], during abdominal examination he was found to have severe abdominal pain and therefore he was sent for an emergency CT which then revealed acute appendicitis and eventually he was operated the same day. He went to see the GI doctor for upper epigastric pain but he never got a chance to talk about it. He continues to have midsternal/epigastric pain which she has been complaining for over 2 months. It comes and goes, on days that it is severe, he is miserable but there are days that he has no pain. He has been taking Protonix sparingly, noncompliant with bland diet. ---  5/14/2020 visit    Patient seen in [facility] center along with his girlfriend Aisha Guerrero is very worried about back pain. He has been taking oxycodone 10 mg every 6 hours but it only lasts about 5 hours. His girlfriend started noticing that he repeats the same questions over and over again and does not remember things that happened earlier in the day. This is causing him confusion and severe anxiety. In the last 2 weeks, he struggled with a urine infection and urinary tract infection for which we had him on antibiotics and he is much better now. He thinks the confusion started before the antibiotics and terms if it is related to the opioids. But he has been on opioids for several months prior to this. No new medications, no illicit drug use.    ------------  Initial visit    Patient is here alone.   He starts off by talking about how his trust in the medical system has been broken especially with patient to physician communication. He lists all his concerns about his care over the last 6 months but is willing to be redirected and start with a clean [place] with our team.  His main complaint is right-sided flank pain for which he has been taking OxyContin and oxycodone. He has been chewing the OxyContin. He also struggles with lack of appetite, no desire to eat. He had significant constipation initially but now he is on bowel regimen. He is willing to work with  about his anxiety. Clinical Pain Assessment (nonverbal scale for nonverbal patients):   [++++ Clinical Pain Assessment++++]  [++++Pain Severity++++]: Pain: 0  [++++Pain Character++++]: deep, gnawing  [++++Pain Duration++++]: months  [++++Pain Effect++++]: functional  [++++Pain Factors++++]: none in particular  [++++Pain Frequency++++]: on and off  [++++Pain Location++++]: right flank  [++++ Clinical Pain Assessment++++]       FUNCTIONAL ASSESSMENT:     Palliative Performance Scale (PPS):  PPS: 7060       PSYCHOSOCIAL/SPIRITUAL SCREENING:     Any spiritual / Orthodox concerns:  [] Yes /  [x] No    Caregiver Burnout:  [] Yes /  [x] No /  [] No Caregiver Present      Anticipatory grief assessment:   [x] Normal  / [] Maladaptive       ESAS Anxiety: Anxiety: 0    ESAS Depression: Depression: 0       REVIEW OF SYSTEMS:     The following systems were [x] reviewed / [] unable to be reviewed  Systems: constitutional, ears/nose/mouth/throat, respiratory, gastrointestinal, genitourinary, musculoskeletal, integumentary, neurologic, psychiatric, endocrine. Positive findings noted below.   Modified ESAS Completed by: provider   Fatigue: 10 Drowsiness: 10   Depression: 0 Pain: 0   Anxiety: 0 Nausea: 0   Anorexia: 5 Dyspnea: 5   Best Well-Bein Constipation: No   Other Problem (Comment): 0          PHYSICAL EXAM:     Wt Readings from Last 3 Encounters:   20 154 lb 12.8 oz (70.2 kg)   20 144 lb 8 oz (65.5 kg)   20 143 lb 12.8 oz (65.2 kg)     There were no vitals taken for this visit. Last bowel movement: See Nursing Note    Constitutional    [x] Appears well-developed and well-nourished in no apparent distress    [] Abnormal:  Mental status  [x] Alert and awake  [x] Oriented to person/place/time  [x] Able to follow commands  HEENT-healing well left craniotomy scar  No oral thrush    Cardiovascular-regular heart sounds  Respiratory-normal breath sounds  Abdomen-soft, bowel sounds present  Musculoskeletal-no edema             HISTORY:     Past Medical History:   Diagnosis Date    Abnormal nuclear stress test 2015    Cardiomyopathy ([address]     EF 45%    Chronic obstructive pulmonary disease (HCC)     Chronic systolic heart failure (HCC)     GERD (gastroesophageal reflux disease)     HTN (hypertension)     Lung cancer (HCC)     PAC (premature atrial contraction)     Tobacco use disorder       Past Surgical History:   Procedure Laterality Date    COLONOSCOPY N/A 7/10/2019    COLONOSCOPY AND ESOPHAGOGASTRODUODENOSCOPY (EGD) performed by Rashi Rivero MD at [facility] HX COLECTOMY      HX SPLENECTOMY      IR INSERT TUNL CVC W PORT OVER 5 YEARS  2019      Family History   Problem Relation Age of Onset    Stroke Mother     Coronary Artery Disease Sister     Heart Disease Paternal Grandfather       History reviewed, no pertinent family history. Social History     Tobacco Use    Smoking status: Former Smoker     Packs/day: 0.50     Types: Cigarettes     Last attempt to quit: 2016     Years since quittin.2    Smokeless tobacco: Never Used   Substance Use Topics    Alcohol use: Not Currently     Comment: rarely     No Known Allergies   Current Outpatient Medications   Medication Sig    amoxicillin-clavulanate (AUGMENTIN) 875-125 mg per tablet Take 1 Tab by mouth every twelve (12) hours for 10 days.     sucralfate (CARAFATE) 100 mg/mL suspension SHAKE LIQUID AND TAKE 5 ML BY MOUTH FOUR TIMES DAILY  butalbital-acetaminophen-caffeine (FIORICET, ESGIC) -40 mg per tablet Take 1 Tab by mouth three (3) times daily.  albuterol (PROVENTIL HFA, VENTOLIN HFA, PROAIR HFA) 90 mcg/actuation inhaler Take 2 Puffs by inhalation every four (4) hours as needed for Wheezing.  dicyclomine (BentyL) 10 mg capsule Take 1 Cap by mouth two (2) times daily as needed for Abdominal Cramps.  traZODone (DESYREL) 50 mg tablet TAKE 1 TABLET BY MOUTH EVERY NIGHT    oxyCODONE IR (ROXICODONE) 10 mg tab immediate release tablet Take 10 mg by mouth every six (6) hours as needed for Pain.  dexAMETHasone (DECADRON) 2 mg tablet Take 1 Tab by mouth two (2) times daily (with meals).  lidocaine (LIDODERM) 5 % 1 Patch by TransDERmal route every twelve (12) hours for 30 days. Apply patch to the affected area for 12 hours a day and remove for 12 hours a day.  tolterodine ER (DETROL-LA) 2 mg ER capsule Take 1 Cap by mouth daily.  oxyCODONE ER (OxyCONTIN) 10 mg ER tablet Take 1 Tab by mouth every twelve (12) hours for 30 days. Max Daily Amount: 20 mg.    senna-docusate (PERICOLACE) 8.6-50 mg per tablet Take 1 Tab by mouth two (2) times a day.  umeclidinium brm/vilanterol tr (ANORO ELLIPTA IN) Take 1 Puff by inhalation daily.  sertraline (ZOLOFT) 50 mg tablet Take 1 Tab by mouth daily.  gabapentin (NEURONTIN) 300 mg capsule Take 2 Caps by mouth three (3) times daily. Max Daily Amount: 1,800 mg.    metoprolol succinate (TOPROL-XL) 25 mg XL tablet Take 1 Tab by mouth nightly.  megestroL (MEGACE) 40 mg tablet TAKE 1 TABLET BY MOUTH TWICE DAILY    lidocaine-prilocaine (EMLA) topical cream APPLY QUARTER SIZE AMOUNT TO PORT PRIOR TO CHEMOTHERAPY    potassium chloride (K-DUR, KLOR-CON) 20 mEq tablet Take 1 Tab by mouth daily.  ondansetron hcl (ZOFRAN) 4 mg tablet Take 2 Tabs by mouth every eight (8) hours as needed for Nausea.     prochlorperazine (COMPAZINE) 10 mg tablet Take 1 Tab by mouth every six (6) hours as needed for Nausea.  lisinopril (PRINIVIL, ZESTRIL) 5 mg tablet TAKE 1 TABLET BY MOUTH DAILY     No current facility-administered medications for this visit. LAB DATA REVIEWED:     Lab Results   Component Value Date/Time    WBC 10.3 07/17/2020 06:14 PM    HGB 13.1 07/17/2020 06:14 PM    PLATELET 401 03/70/7194 06:14 PM     Lab Results   Component Value Date/Time    Sodium 139 07/17/2020 06:14 PM    Potassium 3.9 07/17/2020 06:14 PM    Chloride 103 07/17/2020 06:14 PM    CO2 29 07/17/2020 06:14 PM    BUN 13 07/17/2020 06:14 PM    Creatinine 0.78 07/17/2020 06:14 PM    Calcium 9.6 07/17/2020 06:14 PM      Lab Results   Component Value Date/Time    Alk. phosphatase 90 07/17/2020 06:14 PM    Protein, total 7.6 07/17/2020 06:14 PM    Albumin 3.4 (L) 07/17/2020 06:14 PM    Globulin 4.2 (H) 07/17/2020 06:14 PM     Lab Results   Component Value Date/Time    INR 1.0 11/25/2015 10:22 AM    Prothrombin time 10.6 11/25/2015 10:22 AM      Lab Results   Component Value Date/Time    Iron 52 10/07/2019 12:53 PM    TIBC 235 (L) 10/07/2019 12:53 PM    Iron % saturation 22 10/07/2019 12:53 PM    Ferritin 880 (H) 10/07/2019 12:53 PM      Reviewed today's labs and most recent imaging.        CONTROLLED SUBSTANCES SAFETY ASSESSMENT (IF ON CONTROLLED SUBSTANCES):     Reviewed opioid safety handout:  [x] Yes   [] No  24 hour opioid dose >150mg morphine equivalent/day:  [] Yes   [] No  Benzodiazepines:  [] Yes   [] No  Sleep apnea:  [] Yes   [] No  Urine Toxicology Testing within last 6 months:  [] Yes   [] No  History of or new aberrant medication taking behaviors:  [] Yes   [] No  Has Narcan been prescribed [] Yes   [] No          Total time: 70 minutes  Counseling / coordination time: 60 minutes  > 50% counseling / coordination?:     Consent:  He and/or health care decision maker is aware that that he may receive a bill for this telehealth service, depending on his insurance coverage, and has provided verbal consent to proceed: Yes    CPT Codes 46982-36196 for Established Patients may apply to this Telehealth VisitTime-based coding, delete if not needed: I spent at least 40 minutes with this established patient, and >50% of the time was spent counseling and/or coordinating care regarding Symptom review, medication reconciliation coordination of care  Pursuant to the emergency declaration under the [address] and the [place] [address] waiver authority and the [employer] and [employer] Act, this Virtual  Visit was conducted, with patient's consent, to reduce the patient's risk of exposure to COVID-19 and provide continuity of care for an established patient. Services were provided through a video synchronous discussion virtually to substitute for in-person clinic visit.

## 2020-07-26 NOTE — ED NOTES
Pt reports to the ER w/ sharp chest pain x 1 month that has amplified today. Pt has history of lung cancer and has recently on 7/17/2020 had his appendix removed. Pt is taking narcotics for pain, but has little relief. Pt has mild lethargy on arrival and girlfriend is at bedside helping to provide information on his care at home. Reports he doesn't like to take oxycodone due to amplifying chest pain. Alert and oriented x 4. Skin warm dry and intact. Ambulates independently. Emergency Department Nursing Plan of Care The Nursing Plan of Care is developed from the Nursing assessment and Emergency Department Attending provider initial evaluation. The plan of care may be reviewed in the ED Provider note. The Plan of Care was developed with the following considerations:  
Patient / Family readiness to learn indicated by:verbalized understanding Persons(s) to be included in education: patient Barriers to Learning/Limitations:No 
 
Signed [place] [facility] 7/25/2020   9:30 PM

## 2020-07-26 NOTE — ED PROVIDER NOTES
EMERGENCY DEPARTMENT HISTORY AND PHYSICAL EXAM 
 
 
Date: 7/25/2020 Patient Name: New Trevino History of Presenting Illness Chief Complaint Patient presents with  Chest Pain Left sided chest pain x 1 week. Patient is on  chemo for lung cancer. History Provided By: Patient HPI: New Trevino, 63 y.o. male with past medical history significant for lung cancer, GERD, COPD, CHF, and hypertension who presents via private vehicle to the ED with cc of left-sided chest pain for the past week and a half. Patient states that he is currently receiving chemotherapy for an adenocarcinoma of unknown origin in the lung. He has been prescribed Oxycodone and OxyContin which he states makes his pain worse. He recently had an appendectomy and was taken off of his oxycodone and put on hydrocodone which he states does not help with his pain. His pain is described as a pleuritic pain that is worse with inspiration and nothing makes the pain better. He denies any shortness of breath, lightheadedness, or dizziness. PMHx: Lung cancer, GERD, COPD, CHF, hypertension Social Hx: Former smoker, denies alcohol use, denies illegal drug use PCP: Andrew Bernal NP There are no other complaints, changes, or physical findings at this time. No current facility-administered medications on file prior to encounter. Current Outpatient Medications on File Prior to Encounter Medication Sig Dispense Refill  amoxicillin-clavulanate (AUGMENTIN) 875-125 mg per tablet Take 1 Tab by mouth every twelve (12) hours for 10 days. 20 Tab 0  
 sucralfate (CARAFATE) 100 mg/mL suspension SHAKE LIQUID AND TAKE 5 ML BY MOUTH FOUR TIMES DAILY 414 mL 0  
 butalbital-acetaminophen-caffeine (FIORICET, ESGIC) -40 mg per tablet Take 1 Tab by mouth three (3) times daily.  90 Tab 1  
 albuterol (PROVENTIL HFA, VENTOLIN HFA, PROAIR HFA) 90 mcg/actuation inhaler Take 2 Puffs by inhalation every four (4) hours as needed for Wheezing. 1 Inhaler 5  
 dicyclomine (BentyL) 10 mg capsule Take 1 Cap by mouth two (2) times daily as needed for Abdominal Cramps. 60 Cap 1  
 traZODone (DESYREL) 50 mg tablet TAKE 1 TABLET BY MOUTH EVERY NIGHT 30 Tab 2  
 oxyCODONE IR (ROXICODONE) 10 mg tab immediate release tablet Take 10 mg by mouth every six (6) hours as needed for Pain.  dexAMETHasone (DECADRON) 2 mg tablet Take 1 Tab by mouth two (2) times daily (with meals). 60 Tab 0  
 lidocaine (LIDODERM) 5 % 1 Patch by TransDERmal route every twelve (12) hours for 30 days. Apply patch to the affected area for 12 hours a day and remove for 12 hours a day. 30 Each 3  
 tolterodine ER (DETROL-LA) 2 mg ER capsule Take 1 Cap by mouth daily. 30 Cap 1  
 oxyCODONE ER (OxyCONTIN) 10 mg ER tablet Take 1 Tab by mouth every twelve (12) hours for 30 days. Max Daily Amount: 20 mg. 60 Tab 0  
 senna-docusate (PERICOLACE) 8.6-50 mg per tablet Take 1 Tab by mouth two (2) times a day. 60 Tab 3  
 umeclidinium brm/vilanterol tr (ANORO ELLIPTA IN) Take 1 Puff by inhalation daily.  sertraline (ZOLOFT) 50 mg tablet Take 1 Tab by mouth daily. 30 Tab 2  
 gabapentin (NEURONTIN) 300 mg capsule Take 2 Caps by mouth three (3) times daily. Max Daily Amount: 1,800 mg. 180 Cap 3  
 metoprolol succinate (TOPROL-XL) 25 mg XL tablet Take 1 Tab by mouth nightly. 30 Tab 5  
 megestroL (MEGACE) 40 mg tablet TAKE 1 TABLET BY MOUTH TWICE DAILY 60 Tab 0  
 lidocaine-prilocaine (EMLA) topical cream APPLY QUARTER SIZE AMOUNT TO PORT PRIOR TO CHEMOTHERAPY 30 g 1  
 potassium chloride (K-DUR, KLOR-CON) 20 mEq tablet Take 1 Tab by mouth daily. 30 Tab 0  
 ondansetron hcl (ZOFRAN) 4 mg tablet Take 2 Tabs by mouth every eight (8) hours as needed for Nausea. 45 Tab 3  prochlorperazine (COMPAZINE) 10 mg tablet Take 1 Tab by mouth every six (6) hours as needed for Nausea.  45 Tab 3  
  lisinopril (PRINIVIL, ZESTRIL) 5 mg tablet TAKE 1 TABLET BY MOUTH DAILY 30 Tab 0 Past History Past Medical History: 
Past Medical History:  
Diagnosis Date  Abnormal nuclear stress test 2015  Cardiomyopathy ([facility]  EF 45%  [facility] pulmonary disease [facility]  Chronic systolic heart failure ([facility]  GERD (gastroesophageal reflux disease)  HTN (hypertension)  Lung cancer ([facility]  Lung cancer ([facility]   PAC (premature atrial contraction)  Tobacco use disorder Past Surgical History: 
Past Surgical History:  
Procedure Laterality Date  COLONOSCOPY N/A 7/10/2019 COLONOSCOPY AND ESOPHAGOGASTRODUODENOSCOPY (EGD) performed by Bhanu Pritchett MD at [facility]  HX SPLENECTOMY  IR INSERT TUNL CVC W PORT OVER 5 YEARS  2019 Family History: 
Family History Problem Relation Age of Onset  Stroke Mother  Coronary Artery Disease Sister  Heart Disease Paternal Grandfather Social History: 
Social History Tobacco Use  Smoking status: Former Smoker Packs/day: 0.50 Types: Cigarettes Last attempt to quit: 2016 Years since quittin.2  Smokeless tobacco: Never Used Substance Use Topics  Alcohol use: Not Currently Comment: rarely  Drug use: No  
 
Allergies: 
No Known Allergies Review of Systems Review of Systems Constitutional: Negative for chills and fever. HENT: Negative for congestion, rhinorrhea, sneezing and sore throat. Eyes: Negative for redness and visual disturbance. Respiratory: Negative for shortness of breath. Cardiovascular: Positive for chest pain. Negative for leg swelling. Gastrointestinal: Negative for abdominal pain, nausea and vomiting. Genitourinary: Negative for difficulty urinating and frequency. Musculoskeletal: Negative for back pain, myalgias and neck stiffness. Skin: Negative for rash. Neurological: Negative for dizziness, syncope, weakness and headaches. Hematological: Negative for adenopathy. All other systems reviewed and are negative. Physical Exam  
Physical Exam 
Vitals signs and nursing note reviewed. Constitutional:   
   Appearance: Normal appearance. He is well-developed. HENT:  
   Head: Normocephalic and atraumatic. Neck: Musculoskeletal: Full passive range of motion without pain, normal range of motion and neck supple. Cardiovascular:  
   Rate and Rhythm: Normal rate and regular rhythm. Pulses: Normal pulses. Heart sounds: Normal heart sounds. No murmur. Pulmonary:  
   Effort: Pulmonary effort is normal. No respiratory distress. Breath sounds: Normal breath sounds. Chest:  
   Chest wall: No mass, tenderness (No reproducible tenderness or deformity appreciated on chest wall exam) or crepitus. Abdominal:  
   General: Bowel sounds are normal.  
   Palpations: Abdomen is soft. Tenderness: There is no abdominal tenderness. There is no guarding or rebound. Skin: 
   General: Skin is warm and dry. Findings: No erythema or rash. Neurological:  
   Mental Status: He is alert and oriented to person, place, and time. Psychiatric:     
   Speech: Speech normal.     
   Behavior: Behavior normal.     
   Thought Content: Thought content normal.     
   Judgment: Judgment normal.  
 
 
Diagnostic Study Results Labs - Recent Results (from the past 12 hour(s)) EKG, 12 LEAD, INITIAL Collection Time: 07/25/20  9:23 PM  
Result Value Ref Range Ventricular Rate 63 BPM  
 Atrial Rate 63 BPM  
 P-R Interval 148 ms QRS Duration 98 ms Q-T Interval 412 ms QTC Calculation (Bezet) 421 ms Calculated P Axis 73 degrees Calculated R Axis 49 degrees Calculated T Axis 72 degrees Diagnosis Normal sinus rhythm Normal ECG When compared with ECG of 15-APR-2020 08:05, 
 premature ventricular complexes are no longer present TROPONIN I Collection Time: 07/25/20  9:51 PM  
Result Value Ref Range Troponin-I, Qt. <0.05 <0.05 ng/mL CBC WITH AUTOMATED DIFF Collection Time: 07/25/20  9:51 PM  
Result Value Ref Range WBC 7.7 4.1 - 11.1 K/uL  
 RBC 3.80 (L) 4.10 - 5.70 M/uL  
 HGB 11.8 (L) 12.1 - 17.0 g/dL HCT 34.6 (L) 36.6 - 50.3 % MCV 91.1 80.0 - 99.0 FL  
 MCH 31.1 26.0 - 34.0 PG  
 MCHC 34.1 30.0 - 36.5 g/dL  
 RDW 17.2 (H) 11.5 - 14.5 % PLATELET 855 762 - 951 K/uL MPV 12.1 8.9 - 12.9 FL  
 NRBC 0.0 0  WBC ABSOLUTE NRBC 0.00 0.00 - 0.01 K/uL NEUTROPHILS 47 32 - 75 % LYMPHOCYTES 39 12 - 49 % MONOCYTES 13 5 - 13 % EOSINOPHILS 1 0 - 7 % BASOPHILS 0 0 - 1 % IMMATURE GRANULOCYTES 0 0.0 - 0.5 % ABS. NEUTROPHILS 3.6 1.8 - 8.0 K/UL  
 ABS. LYMPHOCYTES 3.0 0.8 - 3.5 K/UL  
 ABS. MONOCYTES 1.0 0.0 - 1.0 K/UL  
 ABS. EOSINOPHILS 0.1 0.0 - 0.4 K/UL  
 ABS. BASOPHILS 0.0 0.0 - 0.1 K/UL  
 ABS. IMM. GRANS. 0.0 0.00 - 0.04 K/UL  
 DF AUTOMATED METABOLIC PANEL, COMPREHENSIVE Collection Time: 07/25/20  9:51 PM  
Result Value Ref Range Sodium 145 136 - 145 mmol/L Potassium 3.4 (L) 3.5 - 5.1 mmol/L Chloride 109 (H) 97 - 108 mmol/L  
 CO2 24 21 - 32 mmol/L Anion gap 12 5 - 15 mmol/L Glucose 95 65 - 100 mg/dL BUN 12 6 - 20 MG/DL Creatinine 1.07 0.70 - 1.30 MG/DL  
 BUN/Creatinine ratio 11 (L) 12 - 20 GFR est AA >60 >60 ml/min/1.73m2 GFR est non-AA >60 >60 ml/min/1.73m2 Calcium 8.9 8.5 - 10.1 MG/DL Bilirubin, total 0.2 0.2 - 1.0 MG/DL  
 ALT (SGPT) 15 12 - 78 U/L  
 AST (SGOT) 18 15 - 37 U/L Alk. phosphatase 75 45 - 117 U/L Protein, total 6.7 6.4 - 8.2 g/dL Albumin 3.1 (L) 3.5 - 5.0 g/dL Globulin 3.6 2.0 - 4.0 g/dL A-G Ratio 0.9 (L) 1.1 - 2.2 Radiologic Studies -  
CTA CHEST W OR W WO CONT Final Result IMPRESSION:  
1. No evidence for pulmonary embolism. 2. Stable mediastinal lymphadenopathy. 3. Emphysema. Cta Chest W Or W Wo Cont Result Date: 7/26/2020 IMPRESSION: 1. No evidence for pulmonary embolism. 2. Stable mediastinal lymphadenopathy. 3. Emphysema. Medical Decision Making I am the first provider for this patient. I reviewed the vital signs, available nursing notes, past medical history, past surgical history, family history and social history. Vital Signs-Reviewed the patient's vital signs. Patient Vitals for the past 24 hrs: 
 Temp Pulse Resp BP SpO2  
07/26/20 0221  93  103/75   
07/26/20 0001  76 18  97 %  
07/25/20 2300  66 16  98 %  
07/25/20 2200  65 12  99 % 07/25/20 2108 98.2 °F (36.8 °C) 78  116/77 97 % Pulse Oximetry Analysis - 97% on RA Cardiac Monitor:  
Rate: 78 bpm 
Rhythm: Normal Sinus Rhythm ED EKG interpretation: 21:23 Rhythm: normal sinus rhythm; and regular . Rate (approx.): 63; Axis: normal; P wave: normal; QRS interval: normal ; ST/T wave: normal; Other findings: normal. This EKG was interpreted by Lisset Doty MD,ED Provider. Records Reviewed: Nursing Notes, Old Medical Records, Previous electrocardiograms, Previous Radiology Studies and Previous Laboratory Studies Provider Notes (Medical Decision Making):  
27-year-old male presents with left pleuritic chest pain for the past week to 10 days. Differential includes PE, bony metastasis, pleurisy, pleural effusion, ACS, pneumonia, and costochondritis/chest wall pain. On review of patient's prior visits, he has had a CTA of his chest done 1 week ago as well as a CT of his abdomen and pelvis. His CTA at that time did not show any signs of PE or anything that would explain his pain. He is not taking anything for pain right now other than sucralfate. Will repeat his labs and imaging and reassess. ED Course:  
Initial assessment performed.  The patients presenting problems have been discussed, and they are in agreement with the care plan formulated and outlined with them. I have encouraged them to ask questions as they arise throughout their visit. Progress Note 12:29 AM 
I have re-evaluated pt and he states his pain is minimally improved after the 4 mg of IV morphine. His labs are unremarkable and we are currently awaiting his CTA for PE. Patient's CTA is negative for acute pathology. I will write him a prescription for 2 mg Dilaudid, 10 tablets, and have him follow-up with his palliative care provider on Monday for further management. Progress Note:  
Updated pt on all returned results and findings. Discussed the importance of proper follow up as referred below along with return precautions. Pt in agreement with the care plan and expresses agreement with and understanding of all items discussed. Disposition: 
Discharge Note: The pt is ready for discharge. The pt's signs, symptoms, diagnosis, and discharge instructions have been discussed and pt has conveyed their understanding. The pt is to follow up as recommended or return to ER should their symptoms worsen. Plan has been discussed and pt is in agreement. PLAN: 
1. Discharge Medication List as of 7/26/2020  1:34 AM  
  
START taking these medications Details HYDROmorphone (Dilaudid) 2 mg tablet Take 1 Tab by mouth every six (6) hours as needed for Pain for up to 10 doses. Max Daily Amount: 8 mg. Indications: excessive pain, Normal, Disp-10 Tab,R-0 CONTINUE these medications which have NOT CHANGED Details  
amoxicillin-clavulanate (AUGMENTIN) 875-125 mg per tablet Take 1 Tab by mouth every twelve (12) hours for 10 days. , Normal, Disp-20 Tab,R-0  
  
sucralfate (CARAFATE) 100 mg/mL suspension SHAKE LIQUID AND TAKE 5 ML BY MOUTH FOUR TIMES DAILY, Normal, Disp-414 mL,R-0  
  
butalbital-acetaminophen-caffeine (FIORICET, ESGIC) -40 mg per tablet Take 1 Tab by mouth three (3) times daily. , Normal, Disp-90 Tab,R-1  
  
albuterol (PROVENTIL HFA, VENTOLIN HFA, PROAIR HFA) 90 mcg/actuation inhaler Take 2 Puffs by inhalation every four (4) hours as needed for Wheezing., Normal, Disp-1 Inhaler,R-5  
  
dicyclomine (BentyL) 10 mg capsule Take 1 Cap by mouth two (2) times daily as needed for Abdominal Cramps., Normal, Disp-60 Cap, R-1  
  
traZODone (DESYREL) 50 mg tablet TAKE 1 TABLET BY MOUTH EVERY NIGHT, Normal, Disp-30 Tab, R-2  
  
oxyCODONE IR (ROXICODONE) 10 mg tab immediate release tablet Take 10 mg by mouth every six (6) hours as needed for Pain., Historical Med  
  
dexAMETHasone (DECADRON) 2 mg tablet Take 1 Tab by mouth two (2) times daily (with meals). , Normal, Disp-60 Tab, R-0  
  
lidocaine (LIDODERM) 5 % 1 Patch by TransDERmal route every twelve (12) hours for 30 days. Apply patch to the affected area for 12 hours a day and remove for 12 hours a day., Normal, Disp-30 Each, R-3  
  
tolterodine ER (DETROL-LA) 2 mg ER capsule Take 1 Cap by mouth daily. , Normal, Disp-30 Cap, R-1  
  
oxyCODONE ER (OxyCONTIN) 10 mg ER tablet Take 1 Tab by mouth every twelve (12) hours for 30 days. Max Daily Amount: 20 mg., Normal, Disp-60 Tab, R-0  
  
senna-docusate (PERICOLACE) 8.6-50 mg per tablet Take 1 Tab by mouth two (2) times a day., Normal, Disp-60 Tab, R-3  
  
umeclidinium brm/vilanterol tr (ANORO ELLIPTA IN) Take 1 Puff by inhalation daily. , Historical Med  
  
sertraline (ZOLOFT) 50 mg tablet Take 1 Tab by mouth daily. , Normal, Disp-30 Tab, R-2  
  
gabapentin (NEURONTIN) 300 mg capsule Take 2 Caps by mouth three (3) times daily.  Max Daily Amount: 1,800 mg., Normal, Disp-180 Cap, R-3  
  
metoprolol succinate (TOPROL-XL) 25 mg XL tablet Take 1 Tab by mouth nightly., Normal, Disp-30 Tab, R-5  
  
megestroL (MEGACE) 40 mg tablet TAKE 1 TABLET BY MOUTH TWICE DAILY, Normal, Disp-60 Tab, R-0  
  
 lidocaine-prilocaine (EMLA) topical cream APPLY QUARTER SIZE AMOUNT TO PORT PRIOR TO CHEMOTHERAPY, Normal, Disp-30 g, R-1  
  
potassium chloride (K-DUR, KLOR-CON) 20 mEq tablet Take 1 Tab by mouth daily. , Normal, Disp-30 Tab, R-0  
  
ondansetron hcl (ZOFRAN) 4 mg tablet Take 2 Tabs by mouth every eight (8) hours as needed for Nausea., Normal, Disp-45 Tab, R-3  
  
prochlorperazine (COMPAZINE) 10 mg tablet Take 1 Tab by mouth every six (6) hours as needed for Nausea., Normal, Disp-45 Tab, R-3  
  
lisinopril (PRINIVIL, ZESTRIL) 5 mg tablet TAKE 1 TABLET BY MOUTH DAILY, Normal, Disp-30 Tab, R-0  
  
  
 
2. Follow-up Information Follow up With Specialties Details Why Contact Info Yasir Hernandez MD Hematology and Oncology, Internal Medicine, Hematology, Oncology Call in 1 day  [address] [place] [address] [id number] [phone] Sunday MD [facility] Call in 1 day  [address] [facility] [address] 
[phone] Return to ED if worse Diagnosis Clinical Impression: 1. Chest pain, unspecified type 2. Pleuritic pain Please note that this dictation was completed with Dragon, computer voice recognition software. Quite often unanticipated grammatical, syntax, homophones, and other interpretive errors are inadvertently transcribed by the computer software. Please disregard these errors. Additionally, please excuse any errors that have escaped final proofreading.

## 2020-07-26 NOTE — ED NOTES
Pt resting comfortably in bed w/ gf at bedside; denies needing anything at this time. Will continue to monitor

## 2020-07-26 NOTE — ED NOTES
Pt asleep in bed; girlfriend at bedside and notified the CT machine is warming up and they are next to be seen. Pt taken to restroom. Call bell at bedside

## 2020-07-26 NOTE — DISCHARGE INSTRUCTIONS
Patient Education        Chest Pain: Care Instructions  Your Care Instructions     There are many things that can cause chest pain. Some are not serious and will get better on their own in a few days. But some kinds of chest pain need more testing and treatment. Your doctor may have recommended a follow-up visit in the next 8 to 12 hours. If you are not getting better, you may need more tests or treatment. Even though your doctor has released you, you still need to watch for any problems. The doctor carefully checked you, but sometimes problems can develop later. If you have new symptoms or if your symptoms do not get better, get medical care right away. If you have worse or different chest pain or pressure that lasts more than 5 minutes or you passed out (lost consciousness), [id number] or seek other emergency help right away. A medical visit is only one step in your treatment. Even if you feel better, you still need to do what your doctor recommends, such as going to all suggested follow-up appointments and taking medicines exactly as directed. This will help you recover and help prevent future problems. How can you care for yourself at home? · Rest until you feel better. · Take your medicine exactly as prescribed. Call your doctor if you think you are having a problem with your medicine. · Do not drive after taking a prescription pain medicine. When should you call for help? [id number]:   · You passed out (lost consciousness). · You have severe difficulty breathing. · You have symptoms of a heart attack. These may include:  ? Chest pain or pressure, or a strange feeling in your chest.  ? Sweating. ? Shortness of breath. ? Nausea or vomiting. ? Pain, pressure, or a strange feeling in your back, neck, jaw, or upper belly or in one or both shoulders or arms. ? Lightheadedness or sudden weakness. ? A fast or irregular heartbeat.   After you call 911, the  may tell you to chew 1 adult-strength or 2 to 4 low-dose aspirin. Wait for an ambulance. Do not try to drive yourself. Call your doctor today if:   · You have any trouble breathing. · Your chest pain gets worse. · You are dizzy or lightheaded, or you feel like you may faint. · You are not getting better as expected. · You are having new or different chest pain. Where can you learn more? Go to http://judi-juliana.info/  Enter A120 in the search box to learn more about \"Chest Pain: Care Instructions. \"  Current as of: June 26, 2019               Content Version: 12.5  © 7132-3741 [employer]. Care instructions adapted under license by [employer] (which disclaims liability or warranty for this information). If you have questions about a medical condition or this instruction, always ask your healthcare professional. [place] [address] any warranty or liability for your use of this information. Patient Education        Learning About Cancer Pain  What is cancer pain? Cancer pain may be caused by the cancer or by the treatments and tests used. The pain may make it hard for you to do your normal activities, such as sleeping or eating. Over time, cancer pain can cause appetite and sleep problems, isolation, and depression. But most cancer pain can be managed with medicines and other methods. This may not mean that you have no pain but that it stays at a level that you can bear. Treating your pain will make you feel better. You will be more active, eat and sleep better, and enjoy your family and friends. What are some key points about cancer pain? · You are the only person who can say how much pain you have. If you tell your doctor when you have pain or when pain changes, your doctor can help you. · Cancer pain can almost always be relieved if you work with your doctor to create a treatment plan that is right for you.   · Pain is often easier to control right after it starts instead of waiting until it becomes bad. · Take your medicines exactly as prescribed. Call your doctor if you think you are having a problem with your medicine. · You may find that taking your medicine works most of the time, but your pain flares up during extra activity or for no clear reason. This is called breakthrough pain. Ask your doctor what you can do if this happens. Your doctor can give you a prescription for fast-acting medicines that you can take for breakthrough pain. · People who take opioid pain medicines for cancer pain rarely develop opioid use disorder. Moderate to severe opioid use disorder is sometimes called addiction. Your body may come to expect daily doses of medicine to control the pain. But your doctor can gradually lower the amount you are taking when and if the cause of your pain is gone. What treatments can help you manage cancer pain? Medical treatments to manage cancer pain include:  · Prescription and over-the-counter medicines. Many types of medicines are used. Your doctor may suggest different combinations of medicines. · Surgery, chemotherapy, radiation, and hormone therapy. These may be used to remove or destroy a tumor that is causing pain. · Nerve treatments. These are used to help with nerve pain. They include:  ? A nerve block that uses medicine injected into or near the nerve. ? A nerve surgery that cuts the nerve to stop the pain. ? Other treatments, such as injecting a chemical or using heat or cold, to destroy the nerve and stop the pain. Nonmedical treatments include:  · Physical therapy, gentle massage, acupuncture, and heat or cold to ease pain. · Stretching, yoga, and exercises to help you keep your strength, flexibility, and mobility. · Relaxation, biofeedback, or meditation to relieve stress and anxiety. · Short-term crisis therapy with a counselor. This may help you manage your cancer pain or the discomfort from cancer treatments.   · Education and emotional support. Learning as much as you can about your pain may help. So can sharing your feelings with others. A support group can be a safe and comfortable place to talk about your illness. How can you manage cancer pain? Your doctor needs all the information you can give about what your pain feels like. It often helps to write things down in a pain diary. · Write down when your pain starts, what it feels like, and how long it lasts. Use words like dull, aching, sharp, shooting, throbbing, or burning. · Note changes in your pain. Is it constant, or does it come and go? Do you have more than one kind of pain? How long does it last?  · Rate your pain on a scale of 0 to 10, with 0 being no pain and 10 being the worst pain you can imagine. · Write exactly where you feel pain. You can use a drawing. Say whether the pain is just in that one place or several places at once. Or tell your doctor if it travels from one place to another. · Write down what makes your pain better or worse. Note when you used a treatment, how well it worked, and any side effects. If you and your doctor are not able to control your pain, ask about seeing a pain specialist. A pain specialist is a health professional who focuses on treating resistant pain. Talk to your doctor if you are having problems with depression. Treating depression can make it easier to manage your cancer pain. Where can you learn more? Go to http://www.gray.com/  Enter K531 in the search box to learn more about \"Learning About Cancer Pain. \"  Current as of: August 22, 2019               Content Version: 12.5  © 8809-2633 [employer]. Care instructions adapted under license by [employer] (which disclaims liability or warranty for this information).  If you have questions about a medical condition or this instruction, always ask your healthcare professional. [address] any warranty or liability for your use of this information.

## 2020-07-27 NOTE — TELEPHONE ENCOUNTER
Sepideh Morgan is calling to state that patient is having some chest pain. Advised that nurse would call her back.

## 2020-07-27 NOTE — TELEPHONE ENCOUNTER
Returned call to patient who reports he went to ED for Chest pain, nothing could be found be ED patient informed to contact GI back for follow up appointment 8/12/2020

## 2020-07-27 NOTE — PROGRESS NOTES
Patient contacted regarding recent discharge and COVID-19 risk. Discussed COVID-19 related testing which was not done at this time. Test results were not done. Patient informed of results, if available? no   
Care Transition Nurse/ Ambulatory Care Manager contacted the patient by telephone to perform post discharge assessment. Verified name and  with patient as identifiers. Patient has following risk factors of: heart failure, COPD and immunocompromised. CTN/ACM reviewed discharge instructions, medical action plan and red flags related to discharge diagnosis. Reviewed and educated them on any new and changed medications related to discharge diagnosis. Advised obtaining a 90-day supply of all daily and as-needed medications. Advance Care Planning:  
Does patient have an Advance Directive: decision makers updated Education provided regarding infection prevention, and signs and symptoms of COVID-19 and when to seek medical attention with patient who verbalized understanding. Discussed exposure protocols and quarantine from [address] you at higher risk for severe illness  and given an opportunity for questions and concerns. The patient agrees to contact the COVID-19 hotline [phone] or PCP office for questions related to their healthcare. CTN/ACM provided contact information for future reference. From CDC: Are you at higher risk for severe illness?  Wash your hands often.  Avoid close contact (6 feet, which is about two arm lengths) with people who are sick.  Put distance between yourself and other people if COVID-19 is spreading in your community.  Clean and disinfect frequently touched surfaces.  Avoid all cruise travel and non-essential air travel.  Call your healthcare professional if you have concerns about COVID-19 and your underlying condition or if you are sick.  
 
For more information on steps you can take to protect yourself, see CDC's How to Protect Yourself Patient/family/caregiver given information for [employer] and agrees to enroll no Patient's preferred e-mail:  n/a Patient's preferred phone number: n/a Based on Loop alert triggers, patient will be contacted by nurse care manager for worsening symptoms. Plan for follow-up call in 7-14 days based on severity of symptoms and risk factors.

## 2020-07-28 NOTE — TELEPHONE ENCOUNTER
Joaquin Freedman with [facility] is calling to request verbal order since there have been a lot of missed visits/delays in treating patient. She is requesting a Verbal order for:  Speech Therapy, Physical Therapy, and nurse. Wants to add 1 visit each so each can go in and do a re-assessment to see where patient is . Advised nurse would call her back to discuss.

## 2020-07-29 PROBLEM — J93.83 OTHER PNEUMOTHORAX: Status: ACTIVE | Noted: 2020-01-01
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
## 2020-07-30 NOTE — ED NOTES
Pt moaning in pain. Pain medication given. O2 dropped to 86, good pleth - O2 increased to 3L. HOB lowered, juice given, blankets fixed and lights dimmed for comfort.

## 2020-07-30 NOTE — ED NOTES
Pt going to [facility] to room 446 and [facility] ETA is 2200. Report given to Sherry Reina RN.

## 2020-07-30 NOTE — PROGRESS NOTES
0730: Bedside shift change report given to Nadege RN (oncoming nurse) by Carli Cha RN (offgoing nurse). Report included the following information SBAR, Kardex, Procedure Summary, Intake/Output, MAR, and Recent Results. 1930: Bedside shift change report given to David RN (oncoming nurse) by Thaddeus Woods RN (offgoing nurse). Report included the following information SBAR, Kardex, Procedure Summary, Intake/Output, MAR and Recent Results. Problem: Falls - Risk of 
Goal: *Absence of Falls Description: Document Amada Rm Fall Risk and appropriate interventions in the flowsheet. Outcome: Progressing Towards Goal 
Note: Fall Risk Interventions: 
Mobility Interventions: Patient to call before getting OOB, PT Consult for mobility concerns, PT Consult for assist device competence Medication Interventions: Assess postural VS orthostatic hypotension, Patient to call before getting OOB, Teach patient to arise slowly Elimination Interventions: Call light in reach, Toilet paper/wipes in reach, Toileting schedule/hourly rounds Problem: Patient Education: Go to Patient Education Activity Goal: Patient/Family Education Outcome: Progressing Towards Goal 
  
Problem: Pressure Injury - Risk of 
Goal: *Prevention of pressure injury Description: Document Ismael Scale and appropriate interventions in the flowsheet. Outcome: Progressing Towards Goal 
Note: Pressure Injury Interventions: Activity Interventions: Increase time out of bed, Pressure redistribution bed/mattress(bed type), PT/OT evaluation Mobility Interventions: HOB 30 degrees or less, Pressure redistribution bed/mattress (bed type), PT/OT evaluation Nutrition Interventions: Document food/fluid/supplement intake, Discuss nutritional consult with provider, Offer support with meals,snacks and hydration Problem: Patient Education: Go to Patient Education Activity Goal: Patient/Family Education Outcome: Progressing Towards Goal 
  
Problem: Pain Goal: *Control of Pain Outcome: Progressing Towards Goal

## 2020-07-30 NOTE — PROGRESS NOTES
TRANSFER - IN REPORT: 
 
Verbal report received from Varun(name) on Kayli Sweeney  being received from [facility] (unit) for routine progression of care Report consisted of patients Situation, Background, Assessment and  
Recommendations(SBAR). Information from the following report(s) SBAR, Kardex, MAR, Accordion and Cardiac Rhythm NSR was reviewed with the receiving nurse. Opportunity for questions and clarification was provided. Assessment completed upon patients arrival to unit and care assumed.

## 2020-07-30 NOTE — PROGRESS NOTES
Problem: Falls - Risk of 
Goal: *Absence of Falls Description: Document Sasha Andino Fall Risk and appropriate interventions in the flowsheet. Outcome: Progressing Towards Goal 
Note: Fall Risk Interventions: 
Mobility Interventions: Communicate number of staff needed for ambulation/transfer, Strengthening exercises (ROM-active/passive), Utilize walker, cane, or other assistive device Medication Interventions: Patient to call before getting OOB, Teach patient to arise slowly 0040 VSS, pt on 2L O2 96%. Dr Elliott Zhang notified of pt's arrival on unit. No other orders given. Medication orders re-entered into \"[facility]'s admission encounter\" per pharmacy. 0110 Attempted to call daughter, Nereyda Linares, to verify pt's medications no answer. Pt unable to verified, states 'it's too many medications, I dont know\". Will pass on to oncoming RN and attempt to call daughter again in the morning. 0730 Bedside shift change report given to Coreen Rodas RN (oncoming nurse) by Jamir Dominguez RN (offgoing nurse). Report included the following information SBAR, Kardex, MAR, Accordion and Cardiac Rhythm NSR.
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
## 2020-07-31 PROBLEM — S22.42XA FRACTURE OF MULTIPLE RIBS OF LEFT SIDE: Status: ACTIVE | Noted: 2020-01-01

## 2020-07-31 NOTE — PROGRESS NOTES
2009 Pt refused his scheduled oxycodene he stated \"it doesn't help, i'm always in pain and when the other nurse gave it to me in the morning it just made it worse\". I explained to the pt how the dosage had been doubled from his home dose to help with his chest tube and pneumo. Pt continued to deny med, med wasted and witnessed in pyxis. Pt stated he was in chronic pain but did not want any pain medicine. 2250 Spoke to POA/daughter and updated her on pt's status today. 0730 Bedside shift change report given to MIGUELITO Coburn (oncoming nurse) by Marilyn Anton RN (offgoing nurse). Report included the following information SBAR, Kardex, MAR, Accordion and Cardiac Rhythm NSR, PVC.

## 2020-07-31 NOTE — PROGRESS NOTES
Problem: Mobility Impaired (Adult and Pediatric) Goal: *Acute Goals and Plan of Care (Insert Text) Description: FUNCTIONAL STATUS PRIOR TO ADMISSION: Pt reports modified indep mobility with use of RW>SPC; questionable historian HOME SUPPORT PRIOR TO ADMISSION: Pt states that he lives alone, mentions daughter as local support Physical Therapy Goals Initiated 7/30/2020 1. Patient will move from supine to sit and sit to supine  in bed with moderate assistance  within 7 day(s). 2.  Patient will transfer from bed to chair and chair to bed with moderate assistance  using the least restrictive device within 7 day(s). 3.  Patient will perform sit to stand with min assistance  within 7 day(s). 4.  Patient will ambulate with minimal assistance/contact guard assist for at least 10 feet with the least restrictive device within 7 day(s). Outcome: Progressing Towards Goal 
  
PHYSICAL THERAPY TREATMENT Patient: Alex Pro (33 y.o. male) Date: 7/31/2020 Diagnosis: Other pneumothorax [J93.83]  
<principal problem not specified> Precautions:   
Chart, physical therapy assessment, plan of care and goals were reviewed. ASSESSMENT Patient continues with skilled PT services and is progressing towards goals. Cleared PT activity with cyril soto to remove the suction from drain to walk in the sahu. Received patient sleeping in bed, difficult to wake. Once up, patient was agreeable to participate in therapy. Patient demonstrates significant improvement in functional mobility performing transfers and bed mobility at overall min assist level today as compared with max assist required yesterday. He tolerated 140 ft ambulation with a rolling walker with CGA. Gait was slow, steady and narrow based with the walker. Patient only c/o pain left anterior rib area when touched, otherwise voiced no pain.   
 
Current Level of Function Impacting Discharge (mobility/balance): Min assist supine to sit to stand; Ambulates with CGA. Other factors to consider for discharge: Lives alone PLAN : 
Patient continues to benefit from skilled intervention to address the above impairments. Continue treatment per established plan of care. to address goals. Recommendation for discharge: (in order for the patient to meet his/her long term goals) Physical therapy at least 2 days/week in the home AND ensure assist and/or supervision for safety with community reintegration  vs SNF pending continued progress and caregiver support available to him at home. This discharge recommendation: 
Has not yet been discussed the attending provider and/or case management IF patient discharges home will need the following DME: to be determined (TBD) - patient voiced owning a walker SUBJECTIVE:  
Patient stated I can get up.  OBJECTIVE DATA SUMMARY:  
Critical Behavior: 
 Alert & oriented Sleeping on arrival.  Extra time with verbal and tactile cue to wake. Functional Mobility Training: 
Bed Mobility: 
  
Supine to Sit: Minimum assistance;Assist x1(HOB max elevated) Scooting: Minimum assistance;Assist x1 Transfers: 
Sit to Stand: Minimum assistance;Assist x1(pulls up on walker - VC to correct with no carry over) Stand to Sit: Minimum assistance;Assist x1 (hands on walker) Balance: 
Sitting: Intact; Without support Standing: Impaired; Without support Standing - Static: Constant support;Good Standing - Dynamic : Constant support;Fair;Good Ambulation/Gait Training: 
Distance (ft): 140 Feet (ft) Assistive Device: Walker, rolling;Gait belt Ambulation - Level of Assistance: Contact guard assistance;Assist x1 Gait Abnormalities: Decreased step clearance Base of Support: Narrowed Step Length: Right shortened Pain Rating: 
Increased pain with touch left anterior rib area Activity Tolerance:  
Fair Please refer to the flowsheet for vital signs taken during this treatment. After treatment patient left in no apparent distress:  
Sitting in chair and Call bell within reach COMMUNICATION/COLLABORATION:  
The patients plan of care was discussed with: Physical therapist and Registered nurse. Milo Hendricks, KANU Time Calculation: 32 mins

## 2020-07-31 NOTE — PROGRESS NOTES
Thoracic Surgery Simple Progress Note Admit Date: 2020 POD: * No surgery found * Procedure:  * No surgery found * Subjective:  
 
Patient has complaints: No significant medical complaints Resting on AM rounds--did not awaken Review of Systems: 
 
CARDIAC: positive for HTN 
RESP: positive for Stage IV AdenoCa lung, COPD, left rib Fx & L PTX NEURO:  negative INCISION: Clean, dry, and intact EXT: Denies new swelling or pain in the legs or calves. Objective:  
 
Blood pressure 125/66, pulse 64, temperature 98.5 °F (36.9 °C), resp. rate 16, weight 138 lb 10.7 oz (62.9 kg), SpO2 96 %. Temp (24hrs), Av.4 °F (36.9 °C), Min:97.8 °F (36.6 °C), Max:98.8 °F (37.1 °C) Hemodynamics PAP 
  CO 
  CI No intake/output data recorded.  1901 -  0700 In: 480 [P.O.:480] Out: 800 [Urine:750] EXAM: 
GENERAL: VSS, afrible, alert and cooperative HEART:  regular rate and rhythm LUNG: clear to auscultation bilaterally, no resp distress. L CT 2 27ml/24 hrs output. CXR w L apical PTX NEURO:  normal without focal findings 
mental status, speech normal, A&O x3 
INCISION: Clean, dry, and intact EXTREMITIES:No evidence of DVT seen on physical exam. 
GI/: Abd soft, nontender with. Voiding Labs:No results found for this or any previous visit (from the past 24 hour(s)). Assessment:  
No evidence of DVT. Active Problems: 
  Other pneumothorax (2020) Plan/Recommendations:  
 
CT to -20 suction OOB, ambulation with assistance IS Pain mgmt CXR @ 12 noon See orders Jimenez Toledo 
2020

## 2020-08-01 NOTE — PROGRESS NOTES
Mr. Tania Acevedo was admitted with a traumatic pneumo and rib fractures. No acute events overnight. This morning he complains of non specific abdominal pain after eating. He is s/p an appendectomy on July 17. Afebrile HD stable CT 125ml, no air leak today On exam he is seated upright Alert and oriented Not tahcypneic Not in distress Lungs CTa b/l Dressing c/d/i Chest wall tender to palpation Rrr, no murmurs 
abd soft, non tender to direct palpation, incisions well healing CXR- pending Diagnoses  1- traumatic pneumothorax  2: rib fractures  3: Stage IV Lung cancer Mr. Mere Mancia is progressing well from the standpoint of his pneumo. Will place CT back to water seal tomorrow. He is complaining of Abd pain; his abd is soft, no peritoneal signs and he is afebrile. Will send a CBC to look for any signs of an elevated WBC. If he has an elevated WBC and his abd pain does not resolve we may ask General Surgery to see him. Continue rehab efforts.

## 2020-08-02 NOTE — PROGRESS NOTES
Patient was yelling down the hallway and when nurse arrived he stated he was wanting to leave AMA because he has been asking for pain meds for 3 hours however refused his scheduled oxycontin around 0500 and states he thinks that's what gives him chest pains. Patient went back to sleep at that time. Nurse was told by tech about 30 minutes prior to him yelling down the hallway about his pain but was in a room with another patient. Patient was given dilaudid IV.

## 2020-08-02 NOTE — PROGRESS NOTES
Mr. Becca Echols was admitted with a traumatic pneumo and rib fractures. No acute events overnight. Moments of confusion/delerium. 
  
Afebrile HD stable 
  
CT <100ml, no air leak today 
  
On exam he is seated upright Alert and oriented Not tahcypneic Not in distress Lungs CTa b/l Dressing c/d/i Chest wall tender to palpation Rrr, no murmurs 
abd soft, non tender to direct palpation, incisions well healing 
  
CXR- good expansion, no pneumothorax WBC 9 
  
Diagnoses  1- traumatic pneumothorax  2: rib fractures  3: Stage IV Lung cancer 
  
Mr. Burgos is progressing well from the standpoint of his pneumo. CT to water seal.  Anticipate removing CT tomorrow. Abd pain improved, no inc WBC.

## 2020-08-03 NOTE — PROGRESS NOTES
Problem: Falls - Risk of 
Goal: *Absence of Falls Description: Document Varsha Chiu Fall Risk and appropriate interventions in the flowsheet. Outcome: Progressing Towards Goal 
Note: Fall Risk Interventions: 
Mobility Interventions: Patient to call before getting OOB, call bell in reach, gripper socks, walker, bed in low position Medication Interventions: Patient to call before getting OOB Elimination Interventions: Call light in reach, Toileting schedule/hourly rounds, Patient to call for help with toileting needs Problem: Pressure Injury - Risk of 
Goal: *Prevention of pressure injury Description: Document Ismael Scale and appropriate interventions in the flowsheet. Outcome: Progressing Towards Goal 
Note: Pressure Injury Interventions: Activity Interventions: Pressure redistribution bed/mattress(bed type) Mobility Interventions: Float heels, Pressure redistribution bed/mattress (bed type) Nutrition Interventions: Document food/fluid/supplement intake Friction and Shear Interventions: Minimize layers, Lift sheet Problem: Pain Goal: *Control of Pain Outcome: Progressing Towards Goal 
  
Problem: Breathing Pattern - Ineffective Goal: *Absence of hypoxia Outcome: Not Progressing Towards Goal

## 2020-08-03 NOTE — DISCHARGE INSTRUCTIONS
DISCHARGE SUMMARY from Nurse    PATIENT INSTRUCTIONS:    After general anesthesia or intravenous sedation, for 24 hours or while taking prescription Narcotics:  · Limit your activities  · Do not drive and operate hazardous machinery  · Do not make important personal or business decisions  · Do  not drink alcoholic beverages  · If you have not urinated within 8 hours after discharge, please contact your surgeon on call. Report the following to your surgeon:  · Excessive pain, swelling, redness or odor of or around the surgical area  · Temperature over 100.5  · Nausea and vomiting lasting longer than 4 hours or if unable to take medications  · Any signs of decreased circulation or nerve impairment to extremity: change in color, persistent  numbness, tingling, coldness or increase pain  · Any questions    What to do at Home:      *  Please give a list of your current medications to your Primary Care Provider. *  Please update this list whenever your medications are discontinued, doses are      changed, or new medications (including over-the-counter products) are added. *  Please carry medication information at all times in case of emergency situations. These are general instructions for a healthy lifestyle:    No smoking/ No tobacco products/ Avoid exposure to second hand smoke  Surgeon General's Warning:  Quitting smoking now greatly reduces serious risk to your health. Obesity, smoking, and sedentary lifestyle greatly increases your risk for illness    A healthy diet, regular physical exercise & weight monitoring are important for maintaining a healthy lifestyle    You may be retaining fluid if you have a history of heart failure or if you experience any of the following symptoms:  Weight gain of 3 pounds or more overnight or 5 pounds in a week, increased swelling in our hands or feet or shortness of breath while lying flat in bed.   Please call your doctor as soon as you notice any of these symptoms; do not wait until your next office visit. The discharge information has been reviewed with the patient. The patient verbalized understanding. Discharge medications reviewed with the patient and appropriate educational materials and side effects teaching were provided. ___________________________________________________________________________________________________________________________________  [address] [facility]  [address] [place] [address]  [phone]      Leave the chest tube dressing in place for 48 hours(8/5/2020)then you can remove it and shower    For relief of discomfort:   Take the pain medicine you were given at discharge   Aleve one or two tablets every 12 hrs (with food) along with pain medicine (this can be purchased over the counter at your local pharmacy/drug store) Advil may be substituted. COUGHING:   Coughing is helpful     ACTIVITY:   Exercise has many benefits. It promotes healing, expands your lungs, helps you cough, relaxes your body, tones muscles, lowers blood pressure, and increases your appetite. DO:  1. Walk. Walking is the preferred mode of exercise. Walk everyday and increase the distance each day. Walk outside if weather permits. 2. Climb stairs  3. Ride in a car  4. Perform breathing exercises    DO NOT:  1. Lift heavy objects (8-10)  2. Do heavy yard or housework  3. Scuba dive  4. Take an air plane flight       CALL THE OFFICE IF YOU DEVELOP:     Increasing pain that is not controlled by pain medicine   Increasing redness or drainage around the incision   Unusual or increasing shortness of breath   Fever greater than 101 degrees   Change in the color of your sputum to yellow or green, especially if you are also have increasing shortness of breath and a fever greater than 101.     YOUR MEDICATIONS:        FOLLOW-UP APPOINTMENT:  AFTER DISCHARGE CALL THE OFFICE TO SCHEDULE YOUR VISIT TO SEE US IN 1 Week. You will need a chest x ray at the time of your visit. Please arrive 45 minutes prior to you appointment time in order to have a chest X-Ray. Check in at [address] on the ground floor of the [facility]. After your X-ray come to the [address] [place] [address] Road for your appointment.     Physician Signature

## 2020-08-03 NOTE — PROGRESS NOTES
0830 
Ambulated with Pt in room to recliner. Fairly tolerated. 9:00 Encouraged PT to ambulate in hallway. Pt refused. Stated he was uncomfortable due to chest tube being removed earlier this morning. Educated on importance of ambulation. 10:20 Pt refused to ambulate in hallway. Stated \" I will walk later\". Educated pt on importance of ambulation. Will continue to encourage.

## 2020-08-03 NOTE — PROGRESS NOTES
Hospital follow-up PCP transitional care appointment has been scheduled with Israel Brown NP for Friday, 8/14/20 at 2:00 p.m. Pending patient discharge.   Alessia Sabillon, Care Management Specialist.

## 2020-08-03 NOTE — PROGRESS NOTES
Thoracic Surgery Simple Progress Note Admit Date: 2020 POD: * No surgery found * Procedure:  Left chest tube placement in ED at [facility] on 2020 Subjective:  
 
Patient has complaints: No significant medical complaints Review of Systems: 
 
CARDIAC: positive for H/O systolic heart failure RESP: Positive for left PTX and rib fractures. H/O lung cancer NEURO:  negative EXT: Denies new swelling or pain in the legs or calves. Objective:  
 
Blood pressure 119/77, pulse 68, temperature 98.5 °F (36.9 °C), resp. rate 16, weight 139 lb 12.4 oz (63.4 kg), SpO2 97 %. Temp (24hrs), Av.4 °F (36.9 °C), Min:98 °F (36.7 °C), Max:98.6 °F (37 °C) Hemodynamics PAP 
  CO 
  CI 
 
 07 -  1900 In: -  
Out: 561 [ACOIT:085]  1901 -  0700 In: -  
Out: 275 [Urine:225] EXAM: 
GENERAL: VSS, afrible, alert and cooperative HEART:  regular rate and rhythm LUNG: clear to auscultation bilaterally, left chest tube in place , no air leak, CXR reviewed NEURO:  normal without focal findings 
mental status, speech normal, alert and oriented x iii EXTREMITIES:No evidence of DVT seen on physical exam. 
GI/: Abd soft, nonterder with + bowel sounds. Voiding Labs:No results found for this or any previous visit (from the past 24 hour(s)). Assessment:  
No evidence of DVT. Active Problems: 
  Other pneumothorax (2020) Fracture of multiple ribs of left side (2020) Plan/Recommendations:  
Chest tube removed Post pull CXR at 1100 if stable will discharge home See orders Signed By: Sushant BARRIOS

## 2020-08-03 NOTE — PROGRESS NOTES
Problem: Mobility Impaired (Adult and Pediatric) Goal: *Acute Goals and Plan of Care (Insert Text) Description: FUNCTIONAL STATUS PRIOR TO ADMISSION: Pt reports modified indep mobility with use of RW>SPC; questionable historian HOME SUPPORT PRIOR TO ADMISSION: Pt states that he lives alone, mentions daughter as local support Physical Therapy Goals Initiated 7/30/2020 1. Patient will move from supine to sit and sit to supine  in bed with moderate assistance  within 7 day(s). 2.  Patient will transfer from bed to chair and chair to bed with moderate assistance  using the least restrictive device within 7 day(s). 3.  Patient will perform sit to stand with min assistance  within 7 day(s). 4.  Patient will ambulate with minimal assistance/contact guard assist for at least 10 feet with the least restrictive device within 7 day(s). Outcome: Progressing Towards Goal 
  
PHYSICAL THERAPY TREATMENT Patient: Laverne Fitzgerald (52 y.o. male) Date: 8/3/2020 Diagnosis: Other pneumothorax [J93.83] Other pneumothorax Precautions:   
Chart, physical therapy assessment, plan of care and goals were reviewed. ASSESSMENT Patient continues with skilled PT services and is progressing towards goals. Overall patient is doing well with mobility. Able to transition supine to sit on his own, sit to stand with CGA, and ambulate short distance (4ft) at bedside with the walker with CGA. Gait with the walker was steady with no overt loss of balance. Patient appeared weak and slow therefore CGA was provided. Patient requested to stand to use the urinal and was able to do so with good static stand balance. BP was assessed prior to walking and found to be hypotensive, patient denied being light headed or dizzy. Reassessed in standing and found to be orthostatic (see vitals below). Patient initially denied feeling light headed and requested to walk as planned.  With education provided re: risk of falls and injury with low BP if light headed, patient later admitted feeling light headed at the time bp was assessed in standing, denies when seated and supine. Nursing was made aware of BP (by unit tech) and requested patient back to bed. BP:  
100/75 supine 92/62  Sitting (after standing to use the urinal) 63/33  Standing (after performing steps in place) Current Level of Function Impacting Discharge (mobility/balance): CGA. Unable to reassess ambulation today d/t orthostatic BP Other factors to consider for discharge: Lives alone; fall risk; dtr available to assist (as per pt rept) PLAN : 
Patient continues to benefit from skilled intervention to address the above impairments. Continue treatment per established plan of care. to address goals. Recommendation for discharge: (in order for the patient to meet his/her long term goals) Physical therapy at least 2 days/week in the home AND ensure assist and/or supervision for safety with community reintegration. This discharge recommendation: 
Has not yet been discussed the attending provider and/or case management IF patient discharges home will need the following DME: none - patient owns DME SUBJECTIVE:  
Patient stated Are we going to walk.   Patient disappointed re: having to postpone walking in the sahu d/t orthostatic BP OBJECTIVE DATA SUMMARY:  
Critical Behavior: 
Alert & oriented to person, place, situation Functional Mobility Training: 
Bed Mobility: 
  
Supine to Sit: Stand-by assistance Sit to Supine: Stand-by assistance Scooting: Supervision Transfers: 
Sit to Stand: Contact guard assistance Stand to Sit: Contact guard assistance Balance: 
Sitting: Intact; Without support Standing: Intact; Without support Standing - Static: Good Standing - Dynamic : Fair;Constant support Ambulation/Gait Training: 
Deferred d/t orthostatic BP 
  
  
  
  
  
  
  
  
  
  
  
  
  
 Therapeutic Exercises:  
Standing: stepping in place, small squats. Performed in an attempt to improve BP with no improvement Pain Rating: 
Patient voiced improvement in pain. Activity Tolerance:  
Poor and signs and symptoms of orthostatic hypotension Please refer to the flowsheet for vital signs taken during this treatment. After treatment patient left in no apparent distress:  
Supine in bed, Call bell within reach, and Side rails x 3 
 
COMMUNICATION/COLLABORATION:  
The patients plan of care was discussed with: Registered nurse. Andrew Akbar, PT Time calculation: 22 mins

## 2020-08-03 NOTE — PROGRESS NOTES
Discharge paperwork reviewed with Pt as well as Pt daughter Rafat Chemejia. Medications reviewed with daughter Rafat Chemejia. Opportunities for questions provided. Pt discharged home with daughter with all personal belongings. E-sign not functioning properly. Signed paper copy placed in Pt file.

## 2020-08-03 NOTE — PROGRESS NOTES
Hospital follow-up visit has been scheduled with [facility] for Weds, August 5th.  The Office will contact patient prior to arrival.  Dodie Culver, Care Management Specialist.

## 2020-08-04 NOTE — TELEPHONE ENCOUNTER
Triage for Controlled Substance Refill Request     Pain Diagnosis: Metastatic cancer     Last Outpatient Visit: 7/23/2020     Next Outpatient Visit: 8/20/2020     Reason for refill needed outside of office visit?   -Appointment not scheduled prior to need for scheduled refill        Pharmacy: [id number] - [place] [address]     Medication: Oxycodone 10 mg  Dose and directions: 1 tab every 6 hours as needed  Number dispensed: 60  Date filled ( or Pharmacy):7/7/20  # left:         reviewed: yes     Date of Urine Drug Screen: n/a     Opioid Safety Handout given: yes     Appropriate for refill: yes      Action: please refill

## 2020-08-04 NOTE — DISCHARGE SUMMARY
Physician Discharge Summary Patient ID: 
Mariela Gant 
[id number] 
28 y.o. 
1960 Admit Date: 7/30/2020 Discharge Date: 8/3/2020 Admission Diagnoses: Other pneumothorax [J93.83] Discharge Diagnoses:  Principal Problem: 
  Other pneumothorax (7/29/2020) Active Problems: 
  Fracture of multiple ribs of left side (7/31/2020) Admission Condition: Fair Discharge Condition: [address] Last Procedure: left chest tube insertion on 7/29/2020 Hospital Course: He presented to the ED at [address] [place] [address] on 7/29/20 from home with c/o SOB and left sided pain. He underwent CXR and ribs imaging that revealed a PTX and rib fractures. ED MD placed a chest tube and he was transferred to [facility] for thoracic surgery management. Chest tube was placed to gravity on 7/31/20, CXR on 8/1/20 reveal increase in PTX, he was then placed back on -20Cm suction until 8/2/20 at midnight. CXR on 8/3/20 was stable and chest tube was removed. Post removal CXR was stable. Rib fracture pain management was achieved and he was discharged home with [facility] and Home PT services. Consults: Case management. PT and OT Significant Diagnostic Studies: radiology: CXR: see EMR for details Disposition: Home with his daughter Patient Instructions:  
Cannot display discharge medications since this patient is not currently admitted. Activity: Activity as tolerated Diet: Resume previous diet Wound Care: As directed Follow-up with TS on 8/10/20 Follow-up tests/labs CXR Sign: 
Naila Carney NP

## 2020-08-04 NOTE — TELEPHONE ENCOUNTER
Returned call to Prashanth Sheth with [facility] to gave verbal orders to resume and extend care for [facility] and PT

## 2020-08-04 NOTE — TELEPHONE ENCOUNTER
Adry Emery with [facility] is calling to state he did a resumption of care visit. We need verbal order to resume care and a verbal order to extend care. Advised would have nurse call him back to discuss.

## 2020-08-04 NOTE — TELEPHONE ENCOUNTER
Palliative Medicine  Nursing Note  [phone])  Fax [phone]    Hospital Transition of Care Call  Patient Name: Aida Viera  YOB: 1960    8/4/2020        Primary Decision Maker (Active): Pastora Sidney - Daughter - [phone]    Secondary Decision Maker: Elidia Irving - Daughter - [phone]   Advance Care Planning 7/30/2020   Patient's Healthcare Decision Maker is: -   Primary Decision Maker Name -   Primary Decision Maker Phone Number -   Primary Decision Maker Relationship to Patient -   Secondary Decision Maker Name -   Confirm Advance Directive Yes, on file   Patient Would Like to Complete Advance Directive -       Admit Date: 7/30/2020  Discharge Date: 8/3/2020    Patient discharged on August 3, 2020 from [facility]    During admission patient was treated for pneumothorax and multiple fractured ribs on left side    Patient discharged home with no new prescriptions and instruction to follow up with TS on 8/10/20 to have repeat labs and chest x-ray. Palliative Medicine Nurse contacted the patient by telephone to perform post discharge assessment.  Phone rang without answer and voicemail is full    Follow up visit with Palliative Medicine schedule for 8/20/20

## 2020-08-04 NOTE — PROGRESS NOTES
Patient contacted regarding recent discharge and COVID-19 risk. Discussed COVID-19 related testing which was not done at this time. Test results were not done. Patient informed of results, if available? no   
Care Transition Nurse/ Ambulatory Care Manager contacted the family daughter Raj Jenkins by telephone to perform post discharge assessment. Verified name and  with family as identifiers. Patient has following risk factors of: COPD and immunocompromised. CTN/ACM reviewed discharge instructions, medical action plan and red flags related to discharge diagnosis. Reviewed and educated them on any new and changed medications related to discharge diagnosis. Advised obtaining a 90-day supply of all daily and as-needed medications. Advance Care Planning:  
Does patient have an Advance Directive: yes, reviewed and current Education provided regarding infection prevention, and signs and symptoms of COVID-19 and when to seek medical attention with family who verbalized understanding. Discussed exposure protocols and quarantine from [address] you at higher risk for severe illness  and given an opportunity for questions and concerns. The family agrees to contact the COVID-19 hotline [phone] or PCP office for questions related to their healthcare. CTN/ACM provided contact information for future reference. From CDC: Are you at higher risk for severe illness?  Wash your hands often.  Avoid close contact (6 feet, which is about two arm lengths) with people who are sick.  Put distance between yourself and other people if COVID-19 is spreading in your community.  Clean and disinfect frequently touched surfaces.  Avoid all cruise travel and non-essential air travel.  Call your healthcare professional if you have concerns about COVID-19 and your underlying condition or if you are sick.  
 
For more information on steps you can take to protect yourself, see CDC's How to Protect Yourself Patient/family/caregiver given information for [employer] and agrees to enroll no Plan for follow-up call in 7-14 days based on severity of symptoms and risk factors. Patients daughter reports patient had pain overnight but is sleeping now. Reviewed follow up appts with palliative for 8/20 and Thoracic surgery 8/10 and that dispatch health visit was recommended for hospital follow up - provided phone number and she will call to schedule time for tomorrow. PCP follow up listed also for 8/14 and patients daughter reports since seeing palliative has not seen pcp in a long time . Explained that if palliative is managing his care and they have [address] follow up he doesn't need PCP follow up also. COVID teaching provided. No questions .

## 2020-08-04 NOTE — TELEPHONE ENCOUNTER
Was already advised in oct 2019 he needed fu appt prior to refills and pt refused appt. Last seen 1-. Refills denied.

## 2020-08-06 NOTE — TELEPHONE ENCOUNTER
Leticia with [facility] called to give us an update on the patient. They did a post-discharge follow up on the patient. He was d/c'd from the hospital on 8/3. She stated that he is doing okay, but weak. Having left-sided chest tightness. Slight cough. Clear lungs bilaterally. O2 sats 94-95% on room air. Vital signs stable. PT evaluated him, but won't start until next week. Chemo resumes week of 8/10. She will send the full report to us later.     Patient has a hospital follow-up on Monday, 8-10 @ 2:30pm with DOMINIK Rosales.

## 2020-08-06 NOTE — TELEPHONE ENCOUNTER
Kimberli Madsen, speech therapist with [facility], is calling requesting verbal order for speech therapy for cognition for 4 weeks.      Best contact: [phone]

## 2020-08-10 NOTE — PROGRESS NOTES
Hospital Follow up. Rib Fracture. 1. Have you been to the ER, urgent care clinic since your last visit? Hospitalized since your last visit? No 
 
2. Have you seen or consulted any other health care providers outside of the [address] [place] [address] since your last visit? Include any pap smears or colon screening.  No

## 2020-08-10 NOTE — PATIENT INSTRUCTIONS
High-Calorie and High-Protein Diet: Care Instructions Your Care Instructions A high-calorie, high-protein diet gives you more energy and extra nutrition to help your body heal. Your doctor and dietitian can help you design a diet based on your health and what you prefer to eat. Always talk with your doctor or dietitian before you make changes in your diet. Follow-up care is a key part of your treatment and safety. Be sure to make and go to all appointments, and call your doctor if you are having problems. It's also a good idea to know your test results and keep a list of the medicines you take. How can you care for yourself at home? · Eat three meals a day, plus snacks in between and at bedtime. · Include favorite foods in your meals. This will help make meals more pleasant. · Drink your beverage at the end of the meal, because drinking before or during the meal can fill you up. · Eat high-protein foods, such as: ? Meat, fish, and poultry. ? Milk and milk products. Add powdered milk to other foods (such as pudding or soups) to boost the protein. ? Eggs. ? Cooked dried beans and peas. ? Peanut butter, nuts, and seeds. ? Tofu. 
? Cheeses. ? Protein bars. · Eat high-calorie foods, such as: 
? Butter, honey, and brown sugar, added to foods to make them taste better. ? Oils, sauces, and gravies. ? Peanut butter. ? Whole milk, yogurt, mayonnaise, and sour cream. 
? Granola cereal with fruit and granola bars. ? Muffins, pancakes, waffles, and other breads. ? Milkshakes, puddings, and custard. · Try a liquid meal replacement, such as Ensure, Boost, or instant breakfast drinks, if you have trouble eating solid foods. They will give you both calories and protein. Soups and smoothies also are good sources of nutrition. · Keep snacks around that are easy to eat, such as pudding, energy bars, ice cream, and flavored ice pops. · If you can, take a walk before you eat, to make you hungrier. · Drink plenty of fluids. If you have kidney, heart, or liver disease and have to limit fluids, talk with your doctor before you increase the amount of fluids you drink. · Try to avoid eating foods that don't give you much nutrition, such as potato chips, candy bars, and soft drinks. Where can you learn more? Go to http://judi-juliana.info/ Enter P283 in the search box to learn more about \"High-Calorie and High-Protein Diet: Care Instructions. \" Current as of: August 22, 2019               Content Version: 12.5 © 7695-0466 Healthwise, [employer]. Care instructions adapted under license by [employer] (which disclaims liability or warranty for this information). If you have questions about a medical condition or this instruction, always ask your healthcare professional. [address] any warranty or liability for your use of this information.

## 2020-08-10 NOTE — PROGRESS NOTES
Subjective:  
  
Kathleen Todd is a 63 y.o. male presents for hospital follow-up care 1 week following admission for Left PTX & rib fractures, and left chest tube insertion on 2020 . [facility] home with c/o SOB and left sided pain. He underwent CXR and ribs imaging that revealed a Left PTX and rib fractures. ED MD placed a chest tube and he was transferred to [facility] for thoracic surgery management. Cori Fermin Appetite is fair. Eating a regular diet without difficulty. Bowel movements are regular. The patient is voiding without difficulty. Pain is controlled with current analgesics. Medication(s) being used: narcotic analgesics prescribed by Dr. Krishna Price. Mr. Carleen Guerrero has a reminder for a \"due or due soon\" health maintenance. I have asked that he contact his primary care provider for follow-up on this health maintenance. Objective: CXR today (8/10/2020) shows: 
IMPRESSION Impression: New bilateral lower lobe atelectasis. No pneumothorax. Visit Vitals /72 (BP 1 Location: Right arm, BP Patient Position: Sitting) Pulse 62 Resp 18 Ht 5' 11\" (1.803 m) Wt 139 lb (63 kg) SpO2 95% BMI 19.39 kg/m² General:  alert, cooperative, no distress, appears stated age, frail Pulm: Full, clear BS bialt. No wheezes Incision:   healing well, no drainage, no erythema, no hernia, no dehiscence, incision well approximated Assessment:  
 
Doing well. Needs to use IS more frequently. Plan/Recommendations/Medical Decision Makin. Follow-up PRN 2. Follow-up with Dr. Krishna Price 3. Encouraged to use IS more frequently We discussed the importance of proper diet and 30 minutes/day of proper exercise. Mr. Carleen Guererro & daughter verbalized understanding and questions were answered to the best of my knowledge and ability. Protein rich diet educational materials were provided. All questions were personally answered. Thank you for allowing us to participate in the care of your patient. DOMINIK Hernandez Thoracic Surgery 8/10/2020

## 2020-08-13 NOTE — TELEPHONE ENCOUNTER
Triage for Controlled Substance Refill Request    Pain Diagnosis: _Depression, unspecified depression type     Last Outpatient Visit: _7/23/2020    Next Outpatient Visit: _5/30/5734    Reason for refill needed outside of office visit?   -Appointment not scheduled prior to need for scheduled refill    Pharmacy: [id number] [place] [id number] [facility] ([place]) 50 mg  Dose and directions:1 tab by mouth daily   Number dispensed:30  Date filled ( or Pharmacy):  # left:       reviewed: _yes     Date of Urine Drug Screen:  _n/a    Opioid Safety Handout given:  _yes     Appropriate for refill:  _yes     Action:  _ please refill

## 2020-08-13 NOTE — PROGRESS NOTES
[facility] Progress Note    Date: 2020    Name: Ronda Beaver    MRN: [id number]         : 1960    Mr. Nayely Palomino arrived ambulatory at 0900 and in no distress for C5D1 Gemzar/Abraxane. Assessment was completed, no acute issues at this time, no new complaints voiced. Patient has some rib pain from broken ribs; recently hospitalized. Venous Access Device [place], [place] [id number] 19 Upper chest (subclavicular area, right (Active)   Central Line Being Utilized No 20 1215   Criteria for Appropriate Use Irritant/vesicant medication 20 09   Site Assessment Clean, dry, & intact 20 121   Date of Last Dressing Change 20 09   Dressing Status New;Clean, dry, & intact 20 09   Dressing Type Bandaid;Gauze 20 1215   Action Taken Blood drawn 20 09   Date Accessed (Medial Site) 20 09   Access Time (Medial Site) 0915 20 09   Access Needle Size (Site #1) 20 G 20 09   Access Needle Length (Medial Site) 0.75 inches 20 09   Positive Blood Return (Medial Site) Yes 20 121   Action Taken (Medial Site) Flushed; De-accessed 20 121   Alcohol Cap Used Yes 20 0900      + blood return noted, labs drawn and sent. Proceeded to appt with Dr. Mr. Yuri Connell vitals were reviewed. Patient Vitals for the past 12 hrs:   Temp Pulse Resp BP SpO2   20 0900 97.5 °F (36.4 °C) 76 18 98/65 98 %       Lab results were obtained and reviewed.   Recent Results (from the past 12 hour(s))   CBC WITH AUTOMATED DIFF    Collection Time: 20  9:48 AM   Result Value Ref Range    WBC 7.7 4.1 - 11.1 K/uL    RBC 3.65 (L) 4.10 - 5.70 M/uL    HGB 10.9 (L) 12.1 - 17.0 g/dL    HCT 32.7 (L) 36.6 - 50.3 %    MCV 89.6 80.0 - 99.0 FL    MCH 29.9 26.0 - 34.0 PG    MCHC 33.3 30.0 - 36.5 g/dL    RDW 17.0 (H) 11.5 - 14.5 %    PLATELET 110 002 - 525 K/uL    MPV 11.9 8.9 - 12.9 FL    NRBC 0.0 0  WBC    ABSOLUTE NRBC 0.00 0.00 - 0.01 K/uL    NEUTROPHILS 63 32 - 75 %    LYMPHOCYTES 24 12 - 49 %    MONOCYTES 12 5 - 13 %    EOSINOPHILS 1 0 - 7 %    BASOPHILS 0 0 - 1 %    IMMATURE GRANULOCYTES 0 0.0 - 0.5 %    ABS. NEUTROPHILS 4.8 1.8 - 8.0 K/UL    ABS. LYMPHOCYTES 1.9 0.8 - 3.5 K/UL    ABS. MONOCYTES 0.9 0.0 - 1.0 K/UL    ABS. EOSINOPHILS 0.0 0.0 - 0.4 K/UL    ABS. BASOPHILS 0.0 0.0 - 0.1 K/UL    ABS. IMM. GRANS. 0.0 0.00 - 0.04 K/UL    DF AUTOMATED     METABOLIC PANEL, COMPREHENSIVE    Collection Time: 08/13/20  9:48 AM   Result Value Ref Range    Sodium 141 136 - 145 mmol/L    Potassium 3.6 3.5 - 5.1 mmol/L    Chloride 108 97 - 108 mmol/L    CO2 26 21 - 32 mmol/L    Anion gap 7 5 - 15 mmol/L    Glucose 140 (H) 65 - 100 mg/dL    BUN 11 6 - 20 MG/DL    Creatinine 0.98 0.70 - 1.30 MG/DL    BUN/Creatinine ratio 11 (L) 12 - 20      GFR est AA >60 >60 ml/min/1.73m2    GFR est non-AA >60 >60 ml/min/1.73m2    Calcium 8.8 8.5 - 10.1 MG/DL    Bilirubin, total 0.2 0.2 - 1.0 MG/DL    ALT (SGPT) 18 12 - 78 U/L    AST (SGOT) 14 (L) 15 - 37 U/L    Alk. phosphatase 82 45 - 117 U/L    Protein, total 6.6 6.4 - 8.2 g/dL    Albumin 2.8 (L) 3.5 - 5.0 g/dL    Globulin 3.8 2.0 - 4.0 g/dL    A-G Ratio 0.7 (L) 1.1 - 2.2       Patient labs within parameters for treatment; MD gave ok to treat. Pre-medications  were administered as ordered and chemotherapy was initiated.      Medication:  Medications Administered     0.9% sodium chloride infusion     Admin Date  08/13/2020 Action  New Bag Dose  25 mL/hr Rate  25 mL/hr Route  IntraVENous Administered By  Estrella Mcclellan A          0.9% sodium chloride injection 10 mL     Admin Date  08/13/2020 Action  Given Dose  10 mL Route  IntraVENous Administered By  Estrella RIOS          dexamethasone (PF) (DECADRON) 10 mg/mL injection 10 mg     Admin Date  08/13/2020 Action  Given Dose  10 mg Route  IntraVENous Administered By  Estrella RIOS          gemcitabine (GEMZAR) 925 mg in 0.9% sodium chloride 250 mL, overfill volume 25 mL chemo infusion     Admin Date  08/13/2020 Action  New Bag Dose  925 mg Rate  598.7 mL/hr Route  IntraVENous Administered By  Konstantin Whittington RN          heparin (porcine) pf 300-500 Units     Admin Date  08/13/2020 Action  Given Dose  500 Units Route  InterCATHeter Administered By  Estrella RIOS          ondansetron [facility]) injection 8 mg     Admin Date  08/13/2020 Action  Given Dose  8 mg Route  IntraVENous Administered By  Estrella RIOS          PACLitaxel-protein bound (ABRAXANE) 115.5 mg in 0.9% sodium chloride 23.1 mL chemo infusion     Admin Date  08/13/2020 Action  New Bag Dose  115.5 mg Rate  46.2 mL/hr Route  IntraVENous Administered By  Estrella Mcclellan A          saline peripheral flush soln 10 mL     Admin Date  08/13/2020 Action  Given Dose  10 mL Route  InterCATHeter Administered By  Cha Sour Date  08/13/2020 Action  Given Dose  10 mL Route  InterCATHeter Administered By  Estrella Mcclellan A                Patient port flushed; heparinized; and de-accessed per protocol. Mr. Gavin Doyle tolerated treatment well and was discharged from [place] [address] in stable condition at 1230. He is aware of when to return for his next appointment.     Future Appointments   Date Time Provider Carmen Wynn   8/20/2020 12:30 PM Luisa Amos MD [address] [facility] BS AMB   8/27/2020  9:00 AM [facility]URSE [facility]   9/10/2020 10:30 AM [facility] INFUSION NURSE 1 [facility]   9/10/2020 10:45 AM Jodi Chau MD [facility] BS AMB   9/24/2020  9:00 AM [facility] INFUSION NURSE 1 [address] [facility]       Amanda Lay  August 13, 2020

## 2020-08-13 NOTE — PROGRESS NOTES
Nasra Brush is a 63 y.o. male here for follow up for: Chief Complaint Patient presents with  Lung Cancer  Chemotherapy Cycle 5 Day 1 GEMZAR/Abraxane 1. Have you been to the ER, urgent care clinic since your last visit? Hospitalized since your last visit? Yes 7/17 - 18th for appendicitis (had appendectomy)  7/25 for left sided chest pain. And 7/30 - 8/3 for SOB and Left sided pain (PTX + rib fracture). Had chest tube inserted 7/29/20. Chest tube removed 8/3/20. 
 
2. Have you seen or consulted any other health care providers outside of the [address] [place] [address] since your last visit? Include any pap smears or colon screening. no 
 
*Pt sees palliative med. Pt states he is feeling ok now.
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
## 2020-08-18 NOTE — PROGRESS NOTES
Patient resolved from Transition of Care episode on 8/18/20 Discussed COVID-19 related testing which was not done at this time. Test results were not done. Patient informed of results, if available? no  
 
Patient/family has been provided the following resources and education related to COVID-19:  
           
          Signs, symptoms and red flags related to COVID-19 CDC exposure and quarantine guidelines Conduit exposure contact - [phone] Contact for their local Department of Health Patient currently reports that the following symptoms have improved:  Patient reports an \"unusual\" sensation in his lung where his chest tube was. States he was told he could have some fluid at the bottom of his lung and daughter reports he hasnt done  his \"lung exercises\" as much as he was told. he reports that he has had this feeling for a few days it isnt painful just \"uncomfortable\" in his lung. He doesnt feel SOB and is not coughing. Recommended he call the thoracic surgeon to notify them as he may need some imaging to ensure he isnt building any fluid in his lung. No further questions. He is seeing Dr Lani Ray on 8/20 by virtual visit. .Will route note to cardiothoracic PA Clive Mcmullen to notify of patient concern No further outreach scheduled with this CTN/ACM/LPN/HC/ MA. Episode of Care resolved. Patient has this CTN/ACM/LPN/HC/MA contact information if future needs arise.

## 2020-08-18 NOTE — TELEPHONE ENCOUNTER
After confirming ID, I spoke with patient at length this afternoon. He reports \"spitting up stuff with a bad taste\" associated with nausea without belly pain. He reports lower chest pain, without shortness of breath. No cough or fevers. Reassured that I do not believe he has dropped his lung & encouraged to use IS and watch for fevers and/or SOB. Encouraged to reach out to Dr. Agustin Mooney office as well, as this may be GI. He verbalized understanding and thanked me for call. Daughter in background on speaker phone & had not further questions.      Signed By: DOMINIK Nguyen     August 18, 2020

## 2020-08-19 NOTE — DISCHARGE INSTRUCTIONS
Patient Education        Gastroesophageal Reflux Disease (GERD): Care Instructions  Your Care Instructions     Gastroesophageal reflux disease (GERD) is the backward flow of stomach acid into the esophagus. The esophagus is the tube that leads from your throat to your stomach. A one-way valve prevents the stomach acid from backing up into this tube. When you have GERD, this valve does not close tightly enough. This can also cause pain and swelling in your esophagus (esophagitis). If you have mild GERD symptoms including heartburn, you may be able to control the problem with antacids or over-the-counter medicine. Changing your diet and eating habits, such as not eating late at night, losing weight, and making other lifestyle changes can also help reduce symptoms. Follow-up care is a key part of your treatment and safety. Be sure to make and go to all appointments, and call your doctor if you are having problems. It's also a good idea to know your test results and keep a list of the medicines you take. How can you care for yourself at home? · Take your medicines exactly as prescribed. Call your doctor if you think you are having a problem with your medicine. · Your doctor may recommend over-the-counter medicine. For mild or occasional indigestion, antacids, such as Tums, Gaviscon, Mylanta, or Maalox, may help. Your doctor also may recommend over-the-counter acid reducers, such as Pepcid AC (famotidine), Tagamet HB (cimetidine), or Prilosec (omeprazole). Read and follow all instructions on the label. If you use these medicines often, talk with your doctor. · Change your eating habits. ? It's best to eat several small meals instead of two or three large meals. ? After you eat, wait 2 to 3 hours before you lie down. ? Chocolate, mint, and alcohol can make GERD worse. ? Spicy foods, foods that have a lot of acid (like tomatoes and oranges), and coffee can make GERD symptoms worse in some people.  If your symptoms are worse after you eat a certain food, you may want to stop eating that food to see if your symptoms get better. · Do not smoke or chew tobacco. Smoking can make GERD worse. If you need help quitting, talk to your doctor about stop-smoking programs and medicines. These can increase your chances of quitting for good. · If you have GERD symptoms at night, raise the head of your bed 6 to 8 inches by putting the frame on blocks or placing a foam wedge under the head of your mattress. (Adding extra pillows does not work.)  · Do not wear tight clothing around your middle. · Lose weight if you need to. Losing just 5 to 10 pounds can help. When should you call for help? Call your doctor now or seek immediate medical care if:  · You have new or different belly pain. · Your stools are black and tarlike or have streaks of blood. Watch closely for changes in your health, and be sure to contact your doctor if:  · Your symptoms have not improved after 2 days. · Food seems to catch in your throat or chest.  Where can you learn more? Go to http://judi-juliana.info/  Enter G726 in the search box to learn more about \"Gastroesophageal Reflux Disease (GERD): Care Instructions. \"  Current as of: August 12, 2019               Content Version: 12.5  © 3821-3500 [employer]. Care instructions adapted under license by [employer] (which disclaims liability or warranty for this information). If you have questions about a medical condition or this instruction, always ask your healthcare professional. [place] [address] any warranty or liability for your use of this information.

## 2020-08-19 NOTE — PROGRESS NOTES
Palliative Medicine Outpatient Services [place]: [phone]) Patient Name: Tiffanie Cleaning YOB: 1960 Date of Current Visit: 7/1/2020 Location of Current Visit:  
[] [facility] Office 
[] [facility] Office [] [id number] [place] [facility] Office [x] Home-synchronous A/V virtual visit 
[] Other: Anisha Henri Date of Initial Visit: 1/15/2020 Referral from: Dr. Mark XAVIER Primary Care Physician: Arsalan Lowe NP 
  
 SUMMARY:  
Tiffanie Cleaning is a 63y.o. year old with a  history of COPD, colon cancer in 2002, metastatic adenocarcinoma of unknown primary, who was referred to Palliative Medicine by Dr. Mark Harman for management of symptoms and psychosocial support. The patients social history includes served in the [employer] for 22 years, lives with his girlfriend now. Treatment history-patient had progression of disease on carbo plus Taxol, currently on Keytruda infusions. He struggles with neoplasm related pain 3 new brain tumors diagnosed at [facility], status post craniotomy and removal of one tumor, about to start stereotactic radiation to right frontal and temporal tumors on July 8. Plan for total 5 doses of radiation. Status post appendectomy 7/17/2020. Continues to have epigastric pain ER visit on 8/18/2020 epigastric pain PALLIATIVE DIAGNOSES:  
 
  ICD-10-CM ICD-9-CM 1. Esophageal spasm  K22.4 530.5 morphine IR (MS IR) 15 mg tablet 2. Gastroesophageal reflux disease, esophagitis presence not specified  K21.9 530.81 morphine IR (MS IR) 15 mg tablet 3. Depression, unspecified depression type  F32.9 311 sertraline (ZOLOFT) 50 mg tablet 4. Adenocarcinoma of unknown primary ([facility] [address]  C80.1 199.1 gabapentin (NEURONTIN) 300 mg capsule  
   morphine IR (MS IR) 15 mg tablet PLAN:  
Patient Instructions Dear Tiffanie Cleaning , It was a pleasure seeing you today in person along with your daughter Marjorie Petersen and girlfriend Erika Russell We will see you again in 4 weeks If labs or imaging tests have been ordered for you today, please call the office  at [phone] 48 hours after completion to obtain the results. Your stated goal:  
-Better pain control 
-Better relationship with your providers thereby enhancing your experience with [facility] This is the plan we talked about: 1. Esophageal spasm /chest pain 
-You have been struggling with epigastric/chest spasms for a long time now. -You have had multiple ER visits for the same thinking that it is chest pain. Each time, you have been ruled out for cardiac causes of chest pain and you get better with IV Pepcid. 
-You have been taking pantoprazole daily, now add Pepcid 20 mg 2 times a day 
-You are not taking sucralfate on a regular basis. Please restart sucralfate 5 mL 3 times a day. -You want to use opioids to help with this pain until we can get an endoscopy. I will send a prescription for morphine sulfate immediate release 15 mg tablet to be taken 2-3 times a day only. I do not want you taking opioids for noncancer related pain. 
-You have an appointment with [facility] on August 31 but they are trying to get you an earlier appointment for an endoscopy. This is crucial in further management of your pain. 2.  Brain tumor and hospitalization 
-You completed radiation to the brain, you completed dexamethasone. You have an MRI planned for next month. 3. Severe chemotherapy induced neuropathy - You are tolerating Gabapentin 600 mg three times a day and this is helping some - We tried Amitrptyline and you reacted very poorly to it. 4.  Constipation 
-Constipation is a common side effect of pain medications as they slow your bowels. -Continue senna 2 tabs two times a day. This is necessary to keep your bowels soft. - Add Miralax every other day as a laxative. - Goal is to have soft bowel movements every day or every other day. - Please call us if you do not have bowel movements for more than 3 days. 5. Insomnia - You are struggling with severe lack of sleep due to stress and anxiety 
-You finally picked up the trazodone from the pharmacy and have been taking it the last few days and this is helping. 
-You take trazodone sparingly as well 6. Anxiety/ depression 
-Let us increase Zoloft to 50 mg 2 times a day 7. ACP 
-You completed an AMD today naming Abbey Spangler as your primary medical power of . 8.  Your disease and goals of care 
-We reviewed your recent visit with Dr. Flash Palencia in detail with your family. Dr. Flash Palencia is willing to continue the monthly infusion but does not think that overall your cancer is responding to it. The fact that you progressed with brain tumors is worrisome. However, the cancer in your body is otherwise stable which is what is allowing us to continue the monthly infusions. We have very limited treatment options if we were to discontinue Keytruda. He had the option of electively stopping Keytruda and focusing on comfort oriented care with hospice but you are not ready for that yet. This is what you have shared with us about Advance Care Planning: 
 
  Primary Decision Maker (Active): Houston Mcneal - Daughter - [phone] Secondary Decision Maker: Kellee Jimenez - Daughter - [phone] Advance Care Planning 8/19/2020 Patient's Healthcare Decision Maker is: Named in scanned ACP document Primary Decision Maker Name -  
Primary Decision Maker Phone Number -  
Primary Decision Maker Relationship to Patient - Secondary Decision Maker Name -  
Confirm Advance Directive Yes, on file Patient Would Like to Complete Advance Directive - The Palliative Medicine Team is here to support you and your family. Sincerely, 
 
 
Jasmin Castillo MD and the Palliative Medicine Team 
 
 
 GOALS OF CARE / TREATMENT PREFERENCES:  
[====Goals of Care====] GOALS OF CARE: 
 Patient / health care proxy stated goals: See Patient Instructions / Summary TREATMENT PREFERENCES:  
Code Status:  [x] Attempt Resuscitation       [] Do Not Attempt Resuscitation Advance Care Planning: 
[x] The Pall Med Interdisciplinary Team has updated the ACP Navigator with Decision Maker and Patient Capacity Primary Decision Maker (Active): Ben Raman - Daughter - [phone] Secondary Decision Maker: Prasanna Umana - Daughter - [phone] Advance Care Planning 8/19/2020 Patient's Healthcare Decision Maker is: Named in scanned ACP document Primary Decision Maker Name -  
Primary Decision Maker Phone Number -  
Primary Decision Maker Relationship to Patient - Secondary Decision Maker Name -  
Confirm Advance Directive Yes, on file Patient Would Like to Complete Advance Directive - Other: 
(If patient appropriate for POST, consider using PALLPOST smart phrase here) The palliative care team has discussed with patient / health care proxy about goals of care / treatment preferences for patient. 
[====Goals of Care====] PRESCRIPTIONS GIVEN:  
 
Medications Ordered Today Medications  DISCONTD: dexAMETHasone (DECADRON) 2 mg tablet Sig: Take 1 Tab by mouth two (2) times daily (with meals). Dispense:  60 Tab Refill:  0  
 sertraline (ZOLOFT) 50 mg tablet Sig: Take 1 Tab by mouth two (2) times a day. Dispense:  60 Tab Refill:  2  
 gabapentin (NEURONTIN) 300 mg capsule Sig: Take 2 Caps by mouth three (3) times daily. Max Daily Amount: 1,800 mg. Dispense:  180 Cap Refill:  3  
 morphine IR (MS IR) 15 mg tablet Sig: Take 1 Tab by mouth every eight (8) hours as needed for Pain for up to 30 days. Max Daily Amount: 45 mg. Dispense:  90 Tab Refill:  0  
  
 
 
 FOLLOW UP: Future Appointments Date Time Provider Carmen Wynn 8/27/2020  9:00 AM [facility] INFUSION NURSE 1 [place] [id number] 9/10/2020 10:30 AM [facility] INFUSION NURSE 1 [address] [facility]  
9/10/2020 10:45 AM Franca Desai MD ONC BS AMB  
9/24/2020  9:00 AM [facility] INFUSION NURSE 1 [facility] COM  
  
 
 
 PHYSICIANS INVOLVED IN CARE:  
Patient Care Team: 
Jackie Flores NP as PCP - General (Nurse Practitioner) Neha Keating MD as Physician (Cardiology) Tabatha Mckoy MD (Gastroenterology) Thomos Bumpers, MD (Hematology and Oncology) Akira Yeung RN as Ambulatory Care Manager Shiraz Carter MD as Palliative Care (Palliative Medicine) HISTORY:  
Reviewed patient-completed ESAS and advance care planning form. Reviewed patient record in prescription monitoring program. 
 
CHIEF COMPLAINT:  
No chief complaint on file. HPI/SUBJECTIVE: The patient is: [x] Verbal / [] Nonverbal  
 
Patient seen at home along with his daughter Mookie Collins today. We reviewed his ER visit yesterday. He tells me that the pain was so severe he felt like he was going to die and therefore went to the emergency room. This is likely his fourth visit to the ER for similar complaints. Each time he has had a cardiac work-up which revealed a noncardiac source of his epigastric pain he received IV Pepcid with good relief. He woke up this morning and felt very poorly and continued to have epigastric pain. He took morphine long-acting medication from several months ago. He does not want to use oxycodone anymore and wants to try immediate release morphine for the epigastric pain. He continues to adhere to a non-bland diet, eats whatever he wants, noncompliant with regular pantoprazole and sucralfate. His daughter is helping manage his medications as much as possible but sometimes he does does not take the pills in the med box that she arranges for him. We talked about the importance of getting an endoscopy as soon as possible.   Daughter was instructed to call [place] [facility] today to request an earlier appointment for upper endoscopy. --- 
5/14/2020 visit Patient seen in [facility] along with his girlfriend Maurice Villa is very worried about back pain. He has been taking oxycodone 10 mg every 6 hours but it only lasts about 5 hours. His girlfriend started noticing that he repeats the same questions over and over again and does not remember things that happened earlier in the day. This is causing him confusion and severe anxiety. In the last 2 weeks, he struggled with a urine infection and urinary tract infection for which we had him on antibiotics and he is much better now. He thinks the confusion started before the antibiotics and terms if it is related to the opioids. But he has been on opioids for several months prior to this. No new medications, no illicit drug use. 
 
------------ Initial visit Patient is here alone. He starts off by talking about how his trust in the medical system has been broken especially with patient to physician communication. He lists all his concerns about his care over the last 6 months but is willing to be redirected and start with a clean [place] with our team.  His main complaint is right-sided flank pain for which he has been taking OxyContin and oxycodone. He has been chewing the OxyContin. He also struggles with lack of appetite, no desire to eat. He had significant constipation initially but now he is on bowel regimen. He is willing to work with  about his anxiety. Clinical Pain Assessment (nonverbal scale for nonverbal patients):  
[++++ Clinical Pain Assessment++++] [++++Pain Severity++++]: Pain: 8 
[++++Pain Character++++]: deep, gnawing 
[++++Pain Duration++++]: months 
[++++Pain Effect++++]: functional 
[++++Pain Factors++++]: none in particular 
[++++Pain Frequency++++]: on and off 
[++++Pain Location++++]: right flank [++++ Clinical Pain Assessment++++]  FUNCTIONAL ASSESSMENT:  
 
 Palliative Performance Scale (PPS): PPS: 3633 PSYCHOSOCIAL/SPIRITUAL SCREENING:  
 
Any spiritual / Restorationism concerns: 
[] Yes /  [x] No 
 
Caregiver Burnout: 
[] Yes /  [x] No /  [] No Caregiver Present Anticipatory grief assessment:  
[x] Normal  / [] Maladaptive ESAS Anxiety: Anxiety: 5 ESAS Depression: Depression: 5 REVIEW OF SYSTEMS:  
 
The following systems were [x] reviewed / [] unable to be reviewed Systems: constitutional, ears/nose/mouth/throat, respiratory, gastrointestinal, genitourinary, musculoskeletal, integumentary, neurologic, psychiatric, endocrine. Positive findings noted below. Modified ESAS Completed by: provider Fatigue: 5 Drowsiness: 5 Depression: 5 Pain: 8 Anxiety: 5 Nausea: 0 Anorexia: 2 Dyspnea: 5 [facility] Well-Bein Constipation: No  
Other Problem (Comment): 0 PHYSICAL EXAM:  
 
Wt Readings from Last 3 Encounters:  
20 139 lb (63 kg) 20 139 lb (63 kg) 08/10/20 139 lb (63 kg) There were no vitals taken for this visit. Last bowel movement: See Nursing Note Constitutional   
[x] Appears well-developed and well-nourished in no apparent distress   
[] Abnormal: 
Mental status [x] Alert and awake [x] Oriented to person/place/time 
[x] Able to follow commands HEENT-healing well left craniotomy scar No oral thrush Cardiovascular-regular heart sounds Respiratory-normal breath sounds Abdomen-soft, bowel sounds present Musculoskeletal-no edema HISTORY:  
 
Past Medical History:  
Diagnosis Date  Abnormal nuclear stress test 2015  Cardiomyopathy ([address]  EF 45%  Chronic obstructive pulmonary disease ([address]  Chronic systolic heart failure ([address]  GERD (gastroesophageal reflux disease)  HTN (hypertension)  Lung cancer ([address]  Lung cancer ([address] 2019  PAC (premature atrial contraction)  Tobacco use disorder Past Surgical History:  
Procedure Laterality Date  COLONOSCOPY N/A 7/10/2019 COLONOSCOPY AND ESOPHAGOGASTRODUODENOSCOPY (EGD) performed by Merwyn Soulier, MD at [facility]  HX SPLENECTOMY  IR INSERT TUNL CVC W PORT OVER 5 YEARS  2019 Family History Problem Relation Age of Onset  Stroke Mother  Coronary Artery Disease Sister  Heart Disease Paternal Grandfather History reviewed, no pertinent family history. Social History Tobacco Use  Smoking status: Former Smoker Packs/day: 0.50 Types: Cigarettes Last attempt to quit: 2016 Years since quittin.3  Smokeless tobacco: Never Used Substance Use Topics  Alcohol use: Not Currently Comment: rarely No Known Allergies Current Outpatient Medications Medication Sig  sertraline (ZOLOFT) 50 mg tablet Take 1 Tab by mouth two (2) times a day.  gabapentin (NEURONTIN) 300 mg capsule Take 2 Caps by mouth three (3) times daily. Max Daily Amount: 1,800 mg.  morphine IR (MS IR) 15 mg tablet Take 1 Tab by mouth every eight (8) hours as needed for Pain for up to 30 days. Max Daily Amount: 45 mg.  
 famotidine (Pepcid) 20 mg tablet Take 1 Tab by mouth two (2) times a day for 10 days.  naloxone (Narcan) 4 mg/actuation nasal spray USE 1 SPRAY NASALLY THEN DISCARD. REPEAT WITH NEW SPRAY EVERY 2 MINUTES AS NEEDED FOR OPIOID OVERDOSE SYMPTOMS, ALTERNATING NOSTRILS  sucralfate (CARAFATE) 100 mg/mL suspension SHAKE LIQUID AND TAKE 5 ML BY MOUTH FOUR TIMES DAILY  pantoprazole (PROTONIX) 40 mg tablet TAKE 1 TABLET BY MOUTH DAILY  butalbital-acetaminophen-caffeine (FIORICET, ESGIC) -40 mg per tablet Take 1 Tab by mouth three (3) times daily.  albuterol (PROVENTIL HFA, VENTOLIN HFA, PROAIR HFA) 90 mcg/actuation inhaler Take 2 Puffs by inhalation every four (4) hours as needed for Wheezing.  traZODone (DESYREL) 50 mg tablet TAKE 1 TABLET BY MOUTH EVERY NIGHT  tolterodine ER (DETROL-LA) 2 mg ER capsule Take 1 Cap by mouth daily.  senna-docusate (PERICOLACE) 8.6-50 mg per tablet Take 1 Tab by mouth two (2) times a day.  umeclidinium brm/vilanterol tr (ANORO ELLIPTA IN) Take 1 Puff by inhalation daily.  metoprolol succinate (TOPROL-XL) 25 mg XL tablet Take 1 Tab by mouth nightly.  megestroL (MEGACE) 40 mg tablet TAKE 1 TABLET BY MOUTH TWICE DAILY  lidocaine-prilocaine (EMLA) topical cream APPLY QUARTER SIZE AMOUNT TO PORT PRIOR TO CHEMOTHERAPY  potassium chloride (K-DUR, KLOR-CON) 20 mEq tablet Take 1 Tab by mouth daily.  ondansetron hcl (ZOFRAN) 4 mg tablet Take 2 Tabs by mouth every eight (8) hours as needed for Nausea.  prochlorperazine (COMPAZINE) 10 mg tablet Take 1 Tab by mouth every six (6) hours as needed for Nausea.  lisinopril (PRINIVIL, ZESTRIL) 5 mg tablet TAKE 1 TABLET BY MOUTH DAILY No current facility-administered medications for this visit. LAB DATA REVIEWED:  
 
Lab Results Component Value Date/Time WBC 6.6 08/18/2020 11:42 PM  
 HGB 11.6 (L) 08/18/2020 11:42 PM  
 PLATELET 728 55/44/6215 11:42 PM  
 
Lab Results Component Value Date/Time Sodium 136 08/18/2020 11:42 PM  
 Potassium 4.9 08/18/2020 11:42 PM  
 Chloride 107 08/18/2020 11:42 PM  
 CO2 26 08/18/2020 11:42 PM  
 BUN 9 08/18/2020 11:42 PM  
 Creatinine 0.76 08/18/2020 11:42 PM  
 Calcium 9.1 08/18/2020 11:42 PM  
  
Lab Results Component Value Date/Time Alk. phosphatase 86 08/18/2020 11:42 PM  
 Protein, total 7.3 08/18/2020 11:42 PM  
 Albumin 3.2 (L) 08/18/2020 11:42 PM  
 Globulin 4.1 (H) 08/18/2020 11:42 PM  
 
Lab Results Component Value Date/Time INR 1.0 11/25/2015 10:22 AM  
 Prothrombin time 10.6 11/25/2015 10:22 AM  
  
Lab Results Component Value Date/Time  Iron 52 10/07/2019 12:53 PM  
 TIBC 235 (L) 10/07/2019 12:53 PM  
 Iron % saturation 22 10/07/2019 12:53 PM  
 Ferritin 880 (H) 10/07/2019 12:53 PM  
  
 Reviewed today's labs and most recent imaging. CONTROLLED SUBSTANCES SAFETY ASSESSMENT (IF ON CONTROLLED SUBSTANCES):  
 
Reviewed opioid safety handout:  [x] Yes   [] No 
24 hour opioid dose >150mg morphine equivalent/day:  [] Yes   [] No 
Benzodiazepines:  [] Yes   [] No 
Sleep apnea:  [] Yes   [] No 
Urine Toxicology Testing within last 6 months:  [] Yes   [] No 
History of or new aberrant medication taking behaviors:  [] Yes   [] No 
Has Narcan been prescribed [] Yes   [] No 
 
   
 
Total time: 70 minutes Counseling / coordination time: 60 minutes 
> 50% counseling / coordination?:  
 
Consent: 
He and/or health care decision maker is aware that that he may receive a bill for this telehealth service, depending on his insurance coverage, and has provided verbal consent to proceed: Yes CPT Codes 66236-91927 for Established Patients may apply to this Telehealth VisitTime-based coding, delete if not needed: I spent at least 40 minutes with this established patient, and >50% of the time was spent counseling and/or coordinating care regarding Symptom review, medication reconciliation coordination of care Pursuant to the emergency declaration under the [facility]5 waiver authority and the [employer] General Act, this Virtual  Visit was conducted, with patient's consent, to reduce the patient's risk of exposure to COVID-19 and provide continuity of care for an established patient. Services were provided through a video synchronous discussion virtually to substitute for in-person clinic visit.

## 2020-08-19 NOTE — PATIENT INSTRUCTIONS
Dear Herberth Szymanski , It was a pleasure seeing you today in person along with your daughter Taj Malin and girlfriend Cristiano We will see you again in 4 weeks If labs or imaging tests have been ordered for you today, please call the office  at [phone] 48 hours after completion to obtain the results. Your stated goal:  
-Better pain control 
-Better relationship with your providers thereby enhancing your experience with [facility] This is the plan we talked about: 1. Esophageal spasm /chest pain 
-You have been struggling with epigastric/chest spasms for a long time now. -You have had multiple ER visits for the same thinking that it is chest pain. Each time, you have been ruled out for cardiac causes of chest pain and you get better with IV Pepcid. 
-You have been taking pantoprazole daily, now add Pepcid 20 mg 2 times a day 
-You are not taking sucralfate on a regular basis. Please restart sucralfate 5 mL 3 times a day. -You want to use opioids to help with this pain until we can get an endoscopy. I will send a prescription for morphine sulfate immediate release 15 mg tablet to be taken 2-3 times a day only. I do not want you taking opioids for noncancer related pain. 
-You have an appointment with [facility] on August 31 but they are trying to get you an earlier appointment for an endoscopy. This is crucial in further management of your pain. 2.  Brain tumor and hospitalization 
-You completed radiation to the brain, you completed dexamethasone. You have an MRI planned for next month. 3. Severe chemotherapy induced neuropathy - You are tolerating Gabapentin 600 mg three times a day and this is helping some - We tried Amitrptyline and you reacted very poorly to it. 4.  Constipation 
-Constipation is a common side effect of pain medications as they slow your bowels. -Continue senna 2 tabs two times a day. This is necessary to keep your bowels soft. - Add Miralax every other day as a laxative. - Goal is to have soft bowel movements every day or every other day. - Please call us if you do not have bowel movements for more than 3 days. 5. Insomnia - You are struggling with severe lack of sleep due to stress and anxiety 
-You finally picked up the trazodone from the pharmacy and have been taking it the last few days and this is helping. 
-You take trazodone sparingly as well 6. Anxiety/ depression 
-Let us increase Zoloft to 50 mg 2 times a day 7. ACP 
-You completed an AMD today naming Trudy Bright as your primary medical power of . 8.  Your disease and goals of care 
-We reviewed your recent visit with Dr. Paula Rodgers in detail with your family. Dr. Paula Rodgers is willing to continue the monthly infusion but does not think that overall your cancer is responding to it. The fact that you progressed with brain tumors is worrisome. However, the cancer in your body is otherwise stable which is what is allowing us to continue the monthly infusions. We have very limited treatment options if we were to discontinue Keytruda. He had the option of electively stopping Keytruda and focusing on comfort oriented care with hospice but you are not ready for that yet. This is what you have shared with us about Advance Care Planning: 
 
  Primary Decision Maker (Active): Nohemi Pineda - Daughter - [phone] Secondary Decision Maker: Catarino Rowell - Daughter - [phone] Advance Care Planning 8/19/2020 Patient's Healthcare Decision Maker is: Named in scanned ACP document Primary Decision Maker Name -  
Primary Decision Maker Phone Number -  
Primary Decision Maker Relationship to Patient - Secondary Decision Maker Name -  
Confirm Advance Directive Yes, on file Patient Would Like to Complete Advance Directive - The Palliative Medicine Team is here to support you and your family.   
 
 
Sincerely, 
 
 
 Zacarias Tian MD and the Palliative Medicine Team

## 2020-08-19 NOTE — ED NOTES
Pt c/o midsternal CP and N/V x3 days. Pt states he was just discharged from the hospital last week after being admitted for L side rib fractures and L pneumothorax.

## 2020-08-19 NOTE — ED NOTES
I have reviewed discharge instructions with the patient. The patient verbalized understanding. Pt stable. IV removed. Follow up with GI. Discharged to home in wheelchair with daughter.

## 2020-08-23 NOTE — ED PROVIDER NOTES
EMERGENCY DEPARTMENT HISTORY AND PHYSICAL EXAM 
 
Please note that this dictation was completed with [employer], the computer voice recognition software. Quite often unanticipated grammatical, syntax, homophones, and other interpretive errors are inadvertently transcribed by the computer software. Please disregard these errors. Please excuse any errors that have escaped final proofreading. Date: 8/18/2020 Patient Name: Alex Pro Patient Age and Sex: 63 y.o. male History of Presenting Illness Chief Complaint Patient presents with  Chest Pain Pt arrives via EMS- per EMS pt has history of lung cancer- pt having midsternal chest pain for past couple days  Cough  
  coughing up phlegm for past couple days History Provided By: Patient HPI: Alex Pro, is a 63 y.o. male whose medical history is noted below and includes a metastatic adenocarcinoma of unknown primary for which he is on palliative chemo presents to the ED with burning epigastric pain that started a few days ago. Started as postprandial pain after he ate a spicy meal. The symptoms have gotten better but not resolved and he is wondering if there is anything else he can take. He is on acid reflux medications but these do not seem to help at this time. Has chronic productive cough, clear phlegm, not changed from baseline. No new sob, cp, fever, chills. No sick contacts. No vomiting. Pt denies any other alleviating or exacerbating factors. No other associated signs or symptoms. There are no other complaints, changes or physical findings at this time. PCP: Boris Perez NP Past History All documented elements of the PSFH reviewed and verified by me. -Javier Hwang MD 
 
Past Medical History: 
Past Medical History:  
Diagnosis Date  Abnormal nuclear stress test 12/7/2015  Cardiomyopathy ([address] 2010 EF 45%  Chronic obstructive pulmonary disease ([address]  Chronic systolic heart failure ([address]  GERD (gastroesophageal reflux disease)  HTN (hypertension)  [facility]  Lung cancer ([facility]   PAC (premature atrial contraction)  Tobacco use disorder Past Surgical History: 
Past Surgical History:  
Procedure Laterality Date  COLONOSCOPY N/A 7/10/2019 COLONOSCOPY AND ESOPHAGOGASTRODUODENOSCOPY (EGD) performed by Alice Aponte MD at [facility]  HX SPLENECTOMY  IR INSERT TUNL CVC W PORT OVER 5 YEARS  2019 Family History: 
Family History Problem Relation Age of Onset  Stroke Mother  Coronary Artery Disease Sister  Heart Disease Paternal Grandfather Social History: 
Social History Tobacco Use  Smoking status: Former Smoker Packs/day: 0.50 Types: Cigarettes Last attempt to quit: 2016 Years since quittin.3  Smokeless tobacco: Never Used Substance Use Topics  Alcohol use: Not Currently Comment: rarely  Drug use: No  
 
 
Allergies: 
No Known Allergies Review of Systems All other systems reviewed and negative Constitutional: No fever, normal appetite Eyes: No eye pain or vision changes ENT: No congestion, no sore throat Cardiovascular: No chest pain, no palpitations Respiratory: No cough or SOB Gastrointestinal: No vomiting or diarrhea, +epigastric pain Genitourinary: No dysuria or hematuria Musculoskeletal: No joint pain or swelling, extremities not tender Hematologic/Lymphatic: No palpable or tender lymphadenopathy, no bleeding tendency Neurological: No numbness no focal weakness Psychiatric: Not SI/HI, no auditory or visual hallucinations Physical Exam  
Reviewed patients vital signs and nursing note Constitutional: alert, no acute distress Eyes: EOMI, normal conjunctiva, PERRLA 
ENT: moist mucous membranes, no nasal congestion Neck: Active, full ROM of neck, no tenderness to palpation Skin: No rashes, no ecchymosis Respiratory: Equal chest expansion. Normal work of breathing. Lungs clear to auscultation. Cardiovascular: Regular rate and rhythm. Equal and normal pulses in all extremities, no peripheral edema Gastrointestinal: abdomen non distended, soft, not tender MSK: Full, active ROM in all 4 extremities, no midline back pain Neurologic: alert and oriented at patient's baseline, normal speech; no unilateral or focal weakness Psych: Cooperative with exam; Appropriate mood and affect Diagnostic Study Results Labs - I have personally reviewed and interpreted all laboratory results. Juana Brown MD, MSc Recent Results (from the past 200 hour(s)) EKG, 12 LEAD, INITIAL Collection Time: 08/18/20  9:06 PM  
Result Value Ref Range Ventricular Rate 69 BPM  
 Atrial Rate 69 BPM  
 P-R Interval 138 ms QRS Duration 84 ms Q-T Interval 388 ms QTC Calculation (Bezet) 415 ms Calculated P Axis 66 degrees Calculated R Axis 50 degrees Calculated T Axis 71 degrees Diagnosis Normal sinus rhythm Normal ECG When compared with ECG of 29-JUL-2020 15:34, 
premature ventricular complexes are no longer present QT has shortened Confirmed by Radhames Kirby M.D. ([id number]) on 8/19/2020 12:42:08 PM 
  
CBC WITH AUTOMATED DIFF Collection Time: 08/18/20 11:42 PM  
Result Value Ref Range WBC 6.6 4.1 - 11.1 K/uL  
 RBC 3.77 (L) 4.10 - 5.70 M/uL  
 HGB 11.6 (L) 12.1 - 17.0 g/dL HCT 34.0 (L) 36.6 - 50.3 % MCV 90.2 80.0 - 99.0 FL  
 MCH 30.8 26.0 - 34.0 PG  
 MCHC 34.1 30.0 - 36.5 g/dL  
 RDW 16.8 (H) 11.5 - 14.5 % PLATELET 979 438 - 126 K/uL MPV 12.9 8.9 - 12.9 FL  
 NRBC 0.0 0  WBC ABSOLUTE NRBC 0.00 0.00 - 0.01 K/uL NEUTROPHILS 57 32 - 75 % LYMPHOCYTES 38 12 - 49 % MONOCYTES 3 (L) 5 - 13 % EOSINOPHILS 1 0 - 7 % BASOPHILS 0 0 - 1 % IMMATURE GRANULOCYTES 1 (H) 0.0 - 0.5 % ABS. NEUTROPHILS 3.8 1.8 - 8.0 K/UL  
 ABS. LYMPHOCYTES 2.5 0.8 - 3.5 K/UL ABS. MONOCYTES 0.2 0.0 - 1.0 K/UL  
 ABS. EOSINOPHILS 0.0 0.0 - 0.4 K/UL  
 ABS. BASOPHILS 0.0 0.0 - 0.1 K/UL  
 ABS. IMM. GRANS. 0.0 0.00 - 0.04 K/UL  
 DF AUTOMATED METABOLIC PANEL, COMPREHENSIVE Collection Time: 08/18/20 11:42 PM  
Result Value Ref Range Sodium 136 136 - 145 mmol/L Potassium 4.9 3.5 - 5.1 mmol/L Chloride 107 97 - 108 mmol/L  
 CO2 26 21 - 32 mmol/L Anion gap 3 (L) 5 - 15 mmol/L Glucose 89 65 - 100 mg/dL BUN 9 6 - 20 MG/DL Creatinine 0.76 0.70 - 1.30 MG/DL  
 BUN/Creatinine ratio 12 12 - 20 GFR est AA >60 >60 ml/min/1.73m2 GFR est non-AA >60 >60 ml/min/1.73m2 Calcium 9.1 8.5 - 10.1 MG/DL Bilirubin, total 0.5 0.2 - 1.0 MG/DL  
 ALT (SGPT) 15 12 - 78 U/L  
 AST (SGOT) 26 15 - 37 U/L Alk. phosphatase 86 45 - 117 U/L Protein, total 7.3 6.4 - 8.2 g/dL Albumin 3.2 (L) 3.5 - 5.0 g/dL Globulin 4.1 (H) 2.0 - 4.0 g/dL A-G Ratio 0.8 (L) 1.1 - 2.2    
TROPONIN I Collection Time: 08/18/20 11:42 PM  
Result Value Ref Range Troponin-I, Qt. <0.05 <0.05 ng/mL Radiologic Studies - I have personally reviewed and interpreted all imaging studies and agree with radiology interpretation and report. - Sofie Stern MD, MSc 
XR CHEST PA LAT Final Result IMPRESSION:  
No acute process. Medical Decision Making I am the first provider for this patient. Records Reviewed: I reviewed our electronic medical record system for any past medical records that were available that may contribute to the patient's current condition, including their PMH, surgical history, social and family history. Reviewed the nursing notes and vital signs from today's visit. Nursing notes will be reviewed as they become available in realtime while the pt has been in the ED. Sofie Setrn MD Msc 
 
Vital Signs-Reviewed the patient's vital signs.  
 
ECG interpretation: Normal sinus rhythm with rate 69, normal intervals, and no changes concerning for acute ischemia. This ECG has been viewed and interpreted by me personally. Aguila Domingo MD, Msc 
 
Provider Notes (Medical Decision Making):  
Patient is an overall well appearing gentleman who presents with burning, postprandial epigastric pain that started 2 days ago. He has several chronic medical issues and also has been diagnosed with metastatic adenocarcinoma. Tries normally to take care of himself as well as he is able, has had no sick contacts. His symptoms feel like his usual reflux but the medications he takes are not fully resolving the pain. Ddx: gerd, pud, cholecystitis, pancreatitis, acs, PNA Plan: full metabolic panel, trop, ecg, cxr 
 
 
ED Course:  
Initial assessment performed. The patients presenting problems have been discussed, and they are in agreement with the care plan formulated and outlined with them. I have encouraged them to ask questions as they arise throughout their visit. Based on my interpretation of his labs, there are no acute findings today. Trop is negative. Cxr also shows no acute findings. Famotidine seems to have helped significantly. Progress note: 
Patient has been reassessed and reports feeling considerably better, has normal vital signs and feels comfortable going home. I think this is reasonable as no findings today suggest a life-threatening condition. DISPOSITION: DISCHARGE The patient's results have been reviewed with patient and available family and/or caregiver. They verbally convey their understanding and agreement of the patient's signs, symptoms, diagnosis, treatment and prognosis and additionally agree to follow up as recommended in the discharge instructions or to return to the Emergency Department should the patient's condition change prior to their follow-up appointment. The patient and available family and/or caregiver verbally agree with the care plan and all of their questions have been answered.  The discharge instructions have also been provided to the them with educational information regarding the patient's diagnosis as well a list of reasons why the patient would want to return to the ER prior to their follow-up appointment should any concerns arise, the patient's condition change or symptoms worsen. Ivan Pride MD, Msc PLAN: 
Discharge Medication List as of 8/19/2020  2:06 AM  
  
START taking these medications Details  
famotidine (Pepcid) 20 mg tablet Take 1 Tab by mouth two (2) times a day for 10 days. , Normal, Disp-20 Tab,R-0 CONTINUE these medications which have NOT CHANGED Details  
sertraline (ZOLOFT) 50 mg tablet Take 1 Tab by mouth daily. , Normal, Disp-30 Tab,R-2  
  
naloxone (Narcan) 4 mg/actuation nasal spray USE 1 SPRAY NASALLY THEN DISCARD. REPEAT WITH NEW SPRAY EVERY 2 MINUTES AS NEEDED FOR OPIOID OVERDOSE SYMPTOMS, ALTERNATING NOSTRILS, Normal, Disp-2 Each,R-2  
  
sucralfate (CARAFATE) 100 mg/mL suspension SHAKE LIQUID AND TAKE 5 ML BY MOUTH FOUR TIMES DAILY, Normal, Disp-414 mL,R-0  
  
pantoprazole (PROTONIX) 40 mg tablet TAKE 1 TABLET BY MOUTH DAILY, Normal, Disp-30 Tab,R-3  
  
oxyCODONE IR (ROXICODONE) 10 mg tab immediate release tablet Take 1 Tab by mouth every six (6) hours as needed for Pain for up to 15 days. Max Daily Amount: 40 mg., Normal, Disp-60 Tab,R-0  
  
butalbital-acetaminophen-caffeine (FIORICET, ESGIC) -40 mg per tablet Take 1 Tab by mouth three (3) times daily. , Normal, Disp-90 Tab,R-1  
  
albuterol (PROVENTIL HFA, VENTOLIN HFA, PROAIR HFA) 90 mcg/actuation inhaler Take 2 Puffs by inhalation every four (4) hours as needed for Wheezing., Normal, Disp-1 Inhaler,R-5  
  
dicyclomine (BentyL) 10 mg capsule Take 1 Cap by mouth two (2) times daily as needed for Abdominal Cramps., Normal, Disp-60 Cap, R-1  
  
traZODone (DESYREL) 50 mg tablet TAKE 1 TABLET BY MOUTH EVERY NIGHT, Normal, Disp-30 Tab, R-2  
  
 tolterodine ER (DETROL-LA) 2 mg ER capsule Take 1 Cap by mouth daily. , Normal, Disp-30 Cap, R-1  
  
dexAMETHasone (DECADRON) 2 mg tablet Take 1 Tab by mouth two (2) times daily (with meals). , Normal, Disp-60 Tab, R-0  
  
senna-docusate (PERICOLACE) 8.6-50 mg per tablet Take 1 Tab by mouth two (2) times a day., Normal, Disp-60 Tab, R-3  
  
umeclidinium brm/vilanterol tr (ANORO ELLIPTA IN) Take 1 Puff by inhalation daily. , Historical Med  
  
metoprolol succinate (TOPROL-XL) 25 mg XL tablet Take 1 Tab by mouth nightly., Normal, Disp-30 Tab, R-5  
  
gabapentin (NEURONTIN) 300 mg capsule Take 2 Caps by mouth three (3) times daily. Max Daily Amount: 1,800 mg., Normal, Disp-180 Cap, R-3  
  
megestroL (MEGACE) 40 mg tablet TAKE 1 TABLET BY MOUTH TWICE DAILY, Normal, Disp-60 Tab, R-0  
  
lidocaine-prilocaine (EMLA) topical cream APPLY QUARTER SIZE AMOUNT TO PORT PRIOR TO CHEMOTHERAPY, Normal, Disp-30 g, R-1  
  
potassium chloride (K-DUR, KLOR-CON) 20 mEq tablet Take 1 Tab by mouth daily. , Normal, Disp-30 Tab, R-0  
  
ondansetron hcl (ZOFRAN) 4 mg tablet Take 2 Tabs by mouth every eight (8) hours as needed for Nausea., Normal, Disp-45 Tab, R-3  
  
prochlorperazine (COMPAZINE) 10 mg tablet Take 1 Tab by mouth every six (6) hours as needed for Nausea., Normal, Disp-45 Tab, R-3  
  
lisinopril (PRINIVIL, ZESTRIL) 5 mg tablet TAKE 1 TABLET BY MOUTH DAILY, Normal, Disp-30 Tab, R-0  
  
  
1.  
2.    
Follow-up Information Follow up With Specialties Details Why Contact Info Mickie Hernandez NP Nurse Practitioner Call in 1 day  [address] [facility] 
[phone] [facility] EMERGENCY DEPT Emergency Medicine  As needed, If symptoms worsen [address] [facility] 
[phone] 3. Return to ED if worse Royal Norwood MD, am the attending of record for this patient encounter. Diagnosis Clinical Impression: 1. Gastroesophageal reflux disease, esophagitis presence not specified Attestation: 
I personally performed the services described in this documentation on this date 8/18/2020 for patient Steven Banuelos.   Becky Ramirez MD

## 2020-08-26 NOTE — TELEPHONE ENCOUNTER
Elton Ayala, physical therapist w/ [facility] is calling asking for a verbal order to move PT treatment from this week to next week. The daughter states he has an endoscopy appointment and chemo appointment this week and he will be too tired.     Lul Brow: [phone]

## 2020-08-27 NOTE — PROGRESS NOTES
[facility] Progress Note    Date: 2020    Name: Kayli Sweeney    MRN: [id number]         : 1960    Mr. Justina Patricia arrived ambulatory at 0900 and in no distress for C6D1 Gemzar/Abraxane. Assessment was completed, no acute issues at this time, no new complaints voiced. Venous Access Device [place], [place] [id number] 19 Upper chest (subclavicular area, right (Active)   Central Line Being Utilized No 20 1325   Criteria for Appropriate Use Irritant/vesicant medication 20 0900   Site Assessment Clean, dry, & intact 20 1325   Date of Last Dressing Change 20 09   Dressing Status New;Clean, dry, & intact 20 09   Dressing Type Bandaid;Gauze 20 1325   Action Taken Blood drawn 20 09   Date Accessed (Medial Site) 20 09   Access Time (Medial Site) 0915 20 09   Access Needle Size (Site #1) 20 G 20 09   Access Needle Length (Medial Site) 0.75 inches 20 09   Positive Blood Return (Medial Site) Yes 20 132   Action Taken (Medial Site) Flushed; De-accessed 20 132   Alcohol Cap Used Yes 20 1400     + blood return noted, labs drawn and sent. Mr. Claudene Gang vitals were reviewed. Patient Vitals for the past 12 hrs:   Temp Pulse Resp BP SpO2   20 0919 98 °F (36.7 °C) 69 16 116/67 99 %       Lab results were obtained and reviewed.   Recent Results (from the past 12 hour(s))   CBC WITH AUTOMATED DIFF    Collection Time: 20  9:26 AM   Result Value Ref Range    WBC 7.1 4.1 - 11.1 K/uL    RBC 3.66 (L) 4.10 - 5.70 M/uL    HGB 10.9 (L) 12.1 - 17.0 g/dL    HCT 32.5 (L) 36.6 - 50.3 %    MCV 88.8 80.0 - 99.0 FL    MCH 29.8 26.0 - 34.0 PG    MCHC 33.5 30.0 - 36.5 g/dL    RDW 17.1 (H) 11.5 - 14.5 %    PLATELET 189 101 - 269 K/uL    MPV 11.2 8.9 - 12.9 FL    NRBC 0.0 0  WBC    ABSOLUTE NRBC 0.00 0.00 - 0.01 K/uL    NEUTROPHILS 60 32 - 75 %    LYMPHOCYTES 28 12 - 49 %    MONOCYTES 11 5 - 13 % EOSINOPHILS 1 0 - 7 %    BASOPHILS 0 0 - 1 %    IMMATURE GRANULOCYTES 0 0.0 - 0.5 %    ABS. NEUTROPHILS 4.3 1.8 - 8.0 K/UL    ABS. LYMPHOCYTES 2.0 0.8 - 3.5 K/UL    ABS. MONOCYTES 0.8 0.0 - 1.0 K/UL    ABS. EOSINOPHILS 0.1 0.0 - 0.4 K/UL    ABS. BASOPHILS 0.0 0.0 - 0.1 K/UL    ABS. IMM. GRANS. 0.0 0.00 - 0.04 K/UL    DF AUTOMATED     METABOLIC PANEL, COMPREHENSIVE    Collection Time: 08/27/20  9:26 AM   Result Value Ref Range    Sodium 139 136 - 145 mmol/L    Potassium 3.9 3.5 - 5.1 mmol/L    Chloride 104 97 - 108 mmol/L    CO2 23 21 - 32 mmol/L    Anion gap 12 5 - 15 mmol/L    Glucose 127 (H) 65 - 100 mg/dL    BUN 11 6 - 20 MG/DL    Creatinine 1.00 0.70 - 1.30 MG/DL    BUN/Creatinine ratio 11 (L) 12 - 20      GFR est AA >60 >60 ml/min/1.73m2    GFR est non-AA >60 >60 ml/min/1.73m2    Calcium 9.4 8.5 - 10.1 MG/DL    Bilirubin, total 0.2 0.2 - 1.0 MG/DL    ALT (SGPT) 18 12 - 78 U/L    AST (SGOT) 15 15 - 37 U/L    Alk. phosphatase 74 45 - 117 U/L    Protein, total 6.8 6.4 - 8.2 g/dL    Albumin 3.2 (L) 3.5 - 5.0 g/dL    Globulin 3.6 2.0 - 4.0 g/dL    A-G Ratio 0.9 (L) 1.1 - 2.2       Patient's labs within parameters for treatment. Pre-medications  were administered as ordered and chemotherapy was initiated.      Medication:  Medications Administered     0.9% sodium chloride infusion     Admin Date  08/27/2020 Action  New Bag Dose  25 mL/hr Rate  25 mL/hr Route  IntraVENous Administered By  Kaden Dam A          0.9% sodium chloride injection 10 mL     Admin Date  08/27/2020 Action  Given Dose  10 mL Route  IntraVENous Administered By  Kaden Dam A          dexamethasone (DECADRON) 4 mg/mL injection 10 mg     Admin Date  08/27/2020 Action  Given Dose  10 mg Route  IntraVENous Administered By  Kaden RIOS          gemcitabine (GEMZAR) 925 mg in 0.9% sodium chloride 250 mL, overfill volume 25 mL chemo infusion     Admin Date  08/27/2020 Action  New Bag Dose  925 mg Rate  598.7 mL/hr Route  IntraVENous Administered By  Veena RIOS          heparin (porcine) pf 300-500 Units     Admin Date  08/27/2020 Action  Given Dose  500 Units Route  InterCATHeter Administered By  Veena RIOS          ondansetron [facility]) injection 8 mg     Admin Date  08/27/2020 Action  Given Dose  8 mg Route  IntraVENous Administered By  Veena RIOS          PACLitaxel-protein bound (ABRAXANE) 115.5 mg in 0.9% sodium chloride 23.1 mL chemo infusion     Admin Date  08/27/2020 Action  New Bag Dose  115.5 mg Rate  46.2 mL/hr Route  IntraVENous Administered By  Veena RIOS                Patient port flushed; heparinized; and de-accessed per protocol. Mr. Lonnie Lara tolerated treatment well and was discharged from [place] [address] in stable condition at 1330. He is aware of when to return for his next appointment.     Future Appointments   Date Time Provider Carmen Wynn   9/10/2020 10:30 AM 0 [address]   9/10/2020 10:45 AM Pattie Hernandez MD ONC BS AMB   9/16/2020 11:30 AM Josey Gary MD [address] [place] [address] BS AMB   9/24/2020  9:00 AM [facility] [place] INFUSION NURSE 1 [facility]       Amanda Lay  August 27, 2020

## 2020-08-27 NOTE — TELEPHONE ENCOUNTER
Kris Parker with [facility] health is calling to advise nurse and MD that patient's family wishes to hold therapy this week due to patient receiving chemo treatment.

## 2020-09-10 NOTE — PROGRESS NOTES
Msg sent to Ryder Palacios regarding lactose intolerant needs and weight loss of approx. 7 lbs over 1ish months. Coupons given to pt also.

## 2020-09-10 NOTE — PROGRESS NOTES
Romana Mon is a 63 y.o. male here for follow up for: Chief Complaint Patient presents with  Lung Cancer  Chemotherapy Cycle 6 Day 1 GEMZAR/Abraxane 1. Have you been to the ER, urgent care clinic since your last visit? Hospitalized since your last visit? ED 8/19 for chest pain and cough. [facility] ED past Sunday because he could not breath. Put on Lovenox. 2. Have you seen or consulted any other health care providers outside of the [address] [place] [address] since your last visit? Include any pap smears or colon screening. no 
 
*Pt sees palliative med. Pt has lost 5 lbs last 2 weeks Not much of appetite. . Will be having EGD 10/2/20. He has 2 abscesses in lungs found at [facility]. Was put on Lovenox. (Pt has blood clots)

## 2020-09-10 NOTE — PROGRESS NOTES
[facility] Progress Note    Date: September 10, 2020    Name: Aida Viera    MRN: [id number]         : 1960    Mr. Viviana Gonzalez arrived ambulatory at [phone] and in no distress for C6 Gemzar/Abraxane. Assessment was completed, no acute issues at this time, no new complaints voiced. Venous Access Device [place], [place] [id number] 19 Upper chest (subclavicular area, right (Active)   Central Line Being Utilized No 09/10/20 1205   Criteria for Appropriate Use Irritant/vesicant medication 09/10/20 1045   Site Assessment Clean, dry, & intact 09/10/20 1205   Date of Last Dressing Change 09/10/20 09/10/20 1045   Dressing Status New;Clean, dry, & intact 09/10/20 1045   Dressing Type Bandaid;Gauze 09/10/20 1205   Action Taken Blood drawn 09/10/20 1045   Date Accessed (Medial Site) 09/10/20 09/10/20 1045   Access Time (Medial Site) 1055 09/10/20 1045   Access Needle Size (Site #1) 20 G 09/10/20 1045   Access Needle Length (Medial Site) 0.75 inches 09/10/20 1045   Positive Blood Return (Medial Site) Yes 09/10/20 1205   Action Taken (Medial Site) Flushed; De-accessed 09/10/20 1205   Alcohol Cap Used Yes 09/10/20 1045      + blood return noted, labs drawn and sent. Proceeded to appt with Dr. BILL notified that patient has recent [facility] admission for Blood Clots; MD new order to hold chemotherapy for today. MD also discussed treatment options with patient and family and hospice. Patient agreed that he would take a week and decide his options. Mr. Dylan Mills vitals were reviewed. Patient Vitals for the past 12 hrs:   Temp Pulse Resp BP SpO2   09/10/20 1057 97.6 °F (36.4 °C) 79 18 100/68 99 %       Lab results were obtained and reviewed.   Recent Results (from the past 12 hour(s))   CBC WITH AUTOMATED DIFF    Collection Time: 09/10/20 11:15 AM   Result Value Ref Range    WBC 5.5 4.1 - 11.1 K/uL    RBC 3.67 (L) 4.10 - 5.70 M/uL    HGB 11.1 (L) 12.1 - 17.0 g/dL    HCT 32.1 (L) 36.6 - 50.3 %    MCV 87.5 80.0 - 99.0 FL    MCH 30.2 26.0 - 34.0 PG    MCHC 34.6 30.0 - 36.5 g/dL    RDW 17.0 (H) 11.5 - 14.5 %    PLATELET 935 432 - 683 K/uL    MPV 11.6 8.9 - 12.9 FL    NRBC 0.0 0  WBC    ABSOLUTE NRBC 0.00 0.00 - 0.01 K/uL    NEUTROPHILS 50 32 - 75 %    LYMPHOCYTES 33 12 - 49 %    MONOCYTES 16 (H) 5 - 13 %    EOSINOPHILS 1 0 - 7 %    BASOPHILS 0 0 - 1 %    IMMATURE GRANULOCYTES 0 0.0 - 0.5 %    ABS. NEUTROPHILS 2.8 1.8 - 8.0 K/UL    ABS. LYMPHOCYTES 1.8 0.8 - 3.5 K/UL    ABS. MONOCYTES 0.9 0.0 - 1.0 K/UL    ABS. EOSINOPHILS 0.1 0.0 - 0.4 K/UL    ABS. BASOPHILS 0.0 0.0 - 0.1 K/UL    ABS. IMM. GRANS. 0.0 0.00 - 0.04 K/UL    DF AUTOMATED     METABOLIC PANEL, COMPREHENSIVE    Collection Time: 09/10/20 11:15 AM   Result Value Ref Range    Sodium 139 136 - 145 mmol/L    Potassium 3.8 3.5 - 5.1 mmol/L    Chloride 102 97 - 108 mmol/L    CO2 25 21 - 32 mmol/L    Anion gap 12 5 - 15 mmol/L    Glucose 205 (H) 65 - 100 mg/dL    BUN 13 6 - 20 MG/DL    Creatinine 1.15 0.70 - 1.30 MG/DL    BUN/Creatinine ratio 11 (L) 12 - 20      GFR est AA >60 >60 ml/min/1.73m2    GFR est non-AA >60 >60 ml/min/1.73m2    Calcium 9.7 8.5 - 10.1 MG/DL    Bilirubin, total 0.3 0.2 - 1.0 MG/DL    ALT (SGPT) 16 12 - 78 U/L    AST (SGOT) 14 (L) 15 - 37 U/L    Alk. phosphatase 79 45 - 117 U/L    Protein, total 7.2 6.4 - 8.2 g/dL    Albumin 3.4 (L) 3.5 - 5.0 g/dL    Globulin 3.8 2.0 - 4.0 g/dL    A-G Ratio 0.9 (L) 1.1 - 2.2       Pre-medications  were administered as ordered and chemotherapy was initiated. Medication:  Medications Administered     heparin (porcine) pf 300-500 Units     Admin Date  09/10/2020 Action  Given Dose  500 Units Route  InterCATHeter Administered By  Daralene Ulysses A          saline peripheral flush soln 10 mL     Admin Date  09/10/2020 Action  Given Dose  10 mL Route  InterCATHeter Administered By  Daralene Ulysses A                Patient port flushed; heparinized; and de-accessed per protocol.     Mr. Beatrice Mendoza tolerated treatment well and was discharged from [facility] in stable condition at 1215. He is aware of when to return for his next appointment.     Future Appointments   Date Time Provider Carmen Wynn   9/16/2020 11:30 AM Aide Messer MD [facility] BS AMB   9/24/2020  9:00 AM [facility] [place] [facility] INFUSION NURSE 1 Ricky Adkins [employer] [facility]   10/8/2020  9:00 AM [facility] [place] [facility] INFUSION NURSE 1 YASMINE LACY COM   10/22/2020  8:00 AM [facility] [place] [facility] INFUSION NURSE 1 [facility] COM       Amanda Lay  September 10, 2020

## 2020-09-17 NOTE — PROGRESS NOTES
Palliative Medicine Outpatient Services Bogdan: [phone]) Patient Name: Chey Silva YOB: 1960 Date of Current Visit: 7/1/2020 Location of Current Visit:  
[] [facility] Office 
[] [facility] Office [] [facility] Office [x] Home-synchronous A/V virtual visit 
[] Other: Norma Curry Date of Initial Visit: 1/15/2020 Referral from: Dr. An XAVIER Primary Care Physician: Meron Granados NP 
  
 SUMMARY:  
Chey Silva is a 63y.o. year old with a  history of COPD, colon cancer in 2002, metastatic adenocarcinoma of unknown primary, who was referred to Palliative Medicine by Dr. An Walker for management of symptoms and psychosocial support. The patients social history includes served in the [employer] for 22 years, lives with his girlfriend now. Treatment history-patient had progression of disease on carbo plus Taxol, currently on Keytruda infusions. He struggles with neoplasm related pain 3 new brain tumors diagnosed at [facility], status post craniotomy and removal of one tumor, about to start stereotactic radiation to right frontal and temporal tumors on July 8. Plan for total 5 doses of radiation. Status post appendectomy 7/17/2020. Continues to have epigastric pain ER visit on 8/18/2020 epigastric pain PALLIATIVE DIAGNOSES:  
 
  ICD-10-CM ICD-9-CM 1. Chronic midline thoracic back pain  M54.6 724.1 G89.29 338.29   
2. Depression, unspecified depression type  F32.9 311   
3. Esophageal spasm  K22.4 530.5 4. Metastatic cancer (HCC)  C79.9 199.1 5. Brain tumor ([facility] [address]  D49.6 239.6 PLAN:  
Patient Instructions Dear Chey Silva , It was a pleasure seeing you today in person along with your daughter Owen Milling We will see you again in 4 weeks If labs or imaging tests have been ordered for you today, please call the office  at [phone] 48 hours after completion to obtain the results. Your stated goal:  
-Better pain control -Better relationship with your providers thereby enhancing your experience with [employer] This is the plan we talked about: 1. Esophageal spasm /chest pain, back pain 
-You have been struggling with epigastric/chest spasms for a long time now. -You have had multiple ER visits for the same thinking that it is chest pain. Each time, you have been ruled out for cardiac causes of chest pain and you get better with IV Pepcid. 
-You have been taking pantoprazole daily, now add Pepcid 20 mg 2 times a day 
-You are not taking sucralfate on a regular basis. Please restart sucralfate 5 mL 3 times a day. -Please take morphine 15 mg every 4-6 hours as needed for the back pain. This medicine makes you sleepy but it also helps with the pain. Over a period of several days, you will get used to the morphine and not feel so sleepy. 2.  Brain tumor and hospitalization 
-You completed radiation to the brain, you completed dexamethasone. You had an MRI yesterday. 3. Severe chemotherapy induced neuropathy - You are tolerating Gabapentin 600 mg three times a day and this is helping some - We tried Amitrptyline and you reacted very poorly to it. 4.  Constipation 
-Constipation is a common side effect of pain medications as they slow your bowels. -Continue senna 2 tabs two times a day. This is necessary to keep your bowels soft. - Add Miralax every other day as a laxative. - Goal is to have soft bowel movements every day or every other day. - Please call us if you do not have bowel movements for more than 3 days. 5. Insomnia - You are struggling with severe lack of sleep due to stress and anxiety 
-You finally picked up the trazodone from the pharmacy and have been taking it the last few days and this is helping. 
-You take trazodone sparingly as well 6. Anxiety/ depression 
-Please stop taking the Zoloft because you felt it made you more sad 7.   ACP 
 -You completed an AMD today naming [employer] as your primary medical power of . 8.  Your disease and goals of care 
-We reviewed your recent visit with Dr. Anamaria Waters in detail with your family. Dr. Anamaria Waters is willing to continue the monthly infusion but does not think that overall your cancer is responding to it. The fact that you progressed with brain tumors is worrisome. However, the cancer in your body is otherwise stable which is what is allowing us to continue the monthly infusions. You were started on gemcitabine plus Abraxane but the most recent infusion was held. You were told you have less than 6 months to live and this is making you very sad. You have a hard time understanding why the cancer is not responding to treatments. We talked about this in detail. Stage IV cancer patients have poor prognosis with less than 1 year to live regardless of treatment options in most cases, in your situation, you have progressed on first-line treatments which leaves you with limited options. You have gotten much weaker, very poor appetite even though you are on Megace and did not tolerate Marinol. You have poor tolerance to most medications that we have to help with your side effects anyway. You are also going through many psychosocial stressors with your girlfriend, etc.  Barbara Del Toro are not ready to discontinue treatment just yet. This is what you have shared with us about Advance Care Planning: 
 
  Primary Decision Maker (Active): Annika Edgar - Daughter - [phone] Secondary Decision Maker: Korey Wyatt - Daughter - [phone] Advance Care Planning 9/17/2020 Patient's Healthcare Decision Maker is: Named in scanned ACP document Primary Decision Maker Name -  
Primary Decision Maker Phone Number -  
Primary Decision Maker Relationship to Patient - Secondary Decision Maker Name -  
Confirm Advance Directive Yes, on file Patient Would Like to Complete Advance Directive -  
 
 
 
 
 The Palliative Medicine Team is here to support you and your family. Sincerely, 
 
 
Damir Marley MD and the Palliative Medicine Team 
 
 
 GOALS OF CARE / TREATMENT PREFERENCES:  
[====Goals of Care====] GOALS OF CARE: 
Patient / health care proxy stated goals: See Patient Instructions / Summary TREATMENT PREFERENCES:  
Code Status:  [x] Attempt Resuscitation       [] Do Not Attempt Resuscitation Advance Care Planning: 
[x] The [employer] [facility] Interdisciplinary Team has updated the ACP Navigator with Decision Maker and Patient Capacity Primary Decision Maker (Active): Ben Raman - Daughter - [phone] Secondary Decision Maker: Prasanna Umana - Daughter - [phone] Advance Care Planning 9/17/2020 Patient's Healthcare Decision Maker is: Named in scanned ACP document Primary Decision Maker Name -  
Primary Decision Maker Phone Number -  
Primary Decision Maker Relationship to Patient - Secondary Decision Maker Name -  
Confirm Advance Directive Yes, on file Patient Would Like to Complete Advance Directive - Other: 
(If patient appropriate for POST, consider using PALLPOST smart phrase here) The palliative care team has discussed with patient / health care proxy about goals of care / treatment preferences for patient. 
[====Goals of Care====] PRESCRIPTIONS GIVEN:  
 
No orders of the defined types were placed in this encounter. FOLLOW UP: Future Appointments Date Time Provider Carmen Wynn 9/24/2020  9:00  [facility] INFUSION NURSE 1 [employer]  
10/8/2020  9:00 AM [facility] INFUSION NURSE 1 [employer]  
10/8/2020  9:15 AM Janey Rosenbaum NP ONC BS AMB  
10/22/2020  8:00  [facility] INFUSION NURSE 1 [employer]  
  
 
 
 PHYSICIANS INVOLVED IN CARE:  
Patient Care Team: 
Ciara Garsia NP as PCP - General (Nurse Practitioner) Isai Rodriguez MD as Physician (Cardiology) Lexis Mcnulty MD (Gastroenterology) Margo Godfrey MD (Hematology and Oncology) Kiara Duenas MD as Palliative Care (Palliative Medicine) HISTORY:  
Reviewed patient-completed ESAS and advance care planning form. Reviewed patient record in prescription monitoring program. 
 
CHIEF COMPLAINT:  
No chief complaint on file. HPI/SUBJECTIVE: The patient is: [x] Verbal / [] Nonverbal  
 
Patient seen at home along with his daughter Yesenia Cloud today. He is in bed, interacting very poorly, complaining of back pain, tells me that the chest and epigastric pain is no longer there. He kept talking about his brain scan and past treatments for the brain tumor. He wants to know why the brain surgery did not cure his cancer in other parts of his body. He has a hard time understanding his recent prognosis that was shared by Dr. Seamus Milian. He was told he has less than 3 to 6 months to live and he is surprised to hear that. He is very weak, eating very poorly per his daughter despite Megace. Daughter also not willing to consider electively stopping treatment and focusing on quality of life. Patient and daughter both insist that he has good quality of life after about 2 to 3 days of recovering from chemotherapy. They wish to continue chemotherapy. They have a follow-up with their [facility] team next week. --- 
5/14/2020 visit Patient seen in infusion center along with his girlfriend Christian Chapman is very worried about back pain. He has been taking oxycodone 10 mg every 6 hours but it only lasts about 5 hours. His girlfriend started noticing that he repeats the same questions over and over again and does not remember things that happened earlier in the day. This is causing him confusion and severe anxiety. In the last 2 weeks, he struggled with a urine infection and urinary tract infection for which we had him on antibiotics and he is much better now.   He thinks the confusion started before the antibiotics and terms if it is related to the opioids. But he has been on opioids for several months prior to this. No new medications, no illicit drug use. 
 
------------ Initial visit Patient is here alone. He starts off by talking about how his trust in the medical system has been broken especially with patient to physician communication. He lists all his concerns about his care over the last 6 months but is willing to be redirected and start with a clean [place] with our team.  His main complaint is right-sided flank pain for which he has been taking OxyContin and oxycodone. He has been chewing the OxyContin. He also struggles with lack of appetite, no desire to eat. He had significant constipation initially but now he is on bowel regimen. He is willing to work with  about his anxiety. Clinical Pain Assessment (nonverbal scale for nonverbal patients):  
[++++ Clinical Pain Assessment++++] [++++Pain Severity++++]: Pain: 8 
[++++Pain Character++++]: deep, gnawing 
[++++Pain Duration++++]: months 
[++++Pain Effect++++]: functional 
[++++Pain Factors++++]: none in particular 
[++++Pain Frequency++++]: on and off 
[++++Pain Location++++]: right flank [++++ Clinical Pain Assessment++++] FUNCTIONAL ASSESSMENT:  
 
Palliative Performance Scale (PPS): PPS: 8231 PSYCHOSOCIAL/SPIRITUAL SCREENING:  
 
Any spiritual / Christianity concerns: 
[] Yes /  [x] No 
 
Caregiver Burnout: 
[] Yes /  [x] No /  [] No Caregiver Present Anticipatory grief assessment:  
[x] Normal  / [] Maladaptive ESAS Anxiety: Anxiety: 8 ESAS Depression: Depression: 9 REVIEW OF SYSTEMS:  
 
The following systems were [x] reviewed / [] unable to be reviewed Systems: constitutional, ears/nose/mouth/throat, respiratory, gastrointestinal, genitourinary, musculoskeletal, integumentary, neurologic, psychiatric, endocrine. Positive findings noted below. Modified ESAS Completed by: provider Fatigue: 8 Drowsiness: 9 Depression: 9 Pain: 8 Anxiety: 8 Nausea: 8 Anorexia: 10 Dyspnea: 8 Best Well-Being: 10 Constipation: No  
Other Problem (Comment): 0 PHYSICAL EXAM:  
 
Wt Readings from Last 3 Encounters:  
09/10/20 132 lb 4.8 oz (60 kg) 09/10/20 132 lb (59.9 kg) 20 137 lb 8 oz (62.4 kg) There were no vitals taken for this visit. Last bowel movement: See Nursing Note Constitutional   
[x] Appears well-developed and well-nourished in no apparent distress   
[] Abnormal: 
Mental status [x] Alert and awake [x] Oriented to person/place/time 
[x] Able to follow commands HEENT-healing well left craniotomy scar No oral thrush Cardiovascular-regular heart sounds Respiratory-normal breath sounds Abdomen-soft, bowel sounds present Musculoskeletal-no edema HISTORY:  
 
Past Medical History:  
Diagnosis Date  Abnormal nuclear stress test 2015  Cardiomyopathy ([facility] [address]  EF 45%  Chronic obstructive pulmonary disease ([address]  Chronic systolic heart failure ([address]  GERD (gastroesophageal reflux disease)  HTN (hypertension)  Lung cancer ([address]  Lung cancer ([facility] [address]   PAC (premature atrial contraction)  Tobacco use disorder Past Surgical History:  
Procedure Laterality Date  COLONOSCOPY N/A 7/10/2019 COLONOSCOPY AND ESOPHAGOGASTRODUODENOSCOPY (EGD) performed by Qing Kwok MD at [facility]  HX SPLENECTOMY  IR INSERT TUNL CVC W PORT OVER 5 YEARS  2019 Family History Problem Relation Age of Onset  Stroke Mother  Coronary Artery Disease Sister  Heart Disease Paternal Grandfather History reviewed, no pertinent family history. Social History Tobacco Use  Smoking status: Former Smoker Packs/day: 0.50 Types: Cigarettes Last attempt to quit: 2016 Years since quittin.4  Smokeless tobacco: Never Used Substance Use Topics  Alcohol use: Not Currently Comment: rarely No Known Allergies Current Outpatient Medications Medication Sig  
 megestroL (MEGACE) 40 mg tablet Take 1 Tab by mouth two (2) times a day.  enoxaparin (LOVENOX) 60 mg/0.6 mL injection 60 mg by SubCUTAneous route.  sertraline (ZOLOFT) 50 mg tablet TAKE 1 TABLET BY MOUTH DAILY  gabapentin (NEURONTIN) 300 mg capsule Take 2 Caps by mouth three (3) times daily. Max Daily Amount: 1,800 mg.  morphine IR (MS IR) 15 mg tablet Take 1 Tab by mouth every eight (8) hours as needed for Pain for up to 30 days. Max Daily Amount: 45 mg.  
 naloxone (Narcan) 4 mg/actuation nasal spray USE 1 SPRAY NASALLY THEN DISCARD. REPEAT WITH NEW SPRAY EVERY 2 MINUTES AS NEEDED FOR OPIOID OVERDOSE SYMPTOMS, ALTERNATING NOSTRILS  sucralfate (CARAFATE) 100 mg/mL suspension SHAKE LIQUID AND TAKE 5 ML BY MOUTH FOUR TIMES DAILY  pantoprazole (PROTONIX) 40 mg tablet TAKE 1 TABLET BY MOUTH DAILY  butalbital-acetaminophen-caffeine (FIORICET, ESGIC) -40 mg per tablet Take 1 Tab by mouth three (3) times daily.  albuterol (PROVENTIL HFA, VENTOLIN HFA, PROAIR HFA) 90 mcg/actuation inhaler Take 2 Puffs by inhalation every four (4) hours as needed for Wheezing.  traZODone (DESYREL) 50 mg tablet TAKE 1 TABLET BY MOUTH EVERY NIGHT  tolterodine ER (DETROL-LA) 2 mg ER capsule Take 1 Cap by mouth daily.  senna-docusate (PERICOLACE) 8.6-50 mg per tablet Take 1 Tab by mouth two (2) times a day.  umeclidinium brm/vilanterol tr (ANORO ELLIPTA IN) Take 1 Puff by inhalation daily.  metoprolol succinate (TOPROL-XL) 25 mg XL tablet Take 1 Tab by mouth nightly.  lidocaine-prilocaine (EMLA) topical cream APPLY QUARTER SIZE AMOUNT TO PORT PRIOR TO CHEMOTHERAPY  potassium chloride (K-DUR, KLOR-CON) 20 mEq tablet Take 1 Tab by mouth daily.  ondansetron hcl (ZOFRAN) 4 mg tablet Take 2 Tabs by mouth every eight (8) hours as needed for Nausea.  prochlorperazine (COMPAZINE) 10 mg tablet Take 1 Tab by mouth every six (6) hours as needed for Nausea.  lisinopril (PRINIVIL, ZESTRIL) 5 mg tablet TAKE 1 TABLET BY MOUTH DAILY No current facility-administered medications for this visit. LAB DATA REVIEWED:  
 
Lab Results Component Value Date/Time WBC 5.5 09/10/2020 11:15 AM  
 HGB 11.1 (L) 09/10/2020 11:15 AM  
 PLATELET 612 30/93/2424 11:15 AM  
 
Lab Results Component Value Date/Time Sodium 139 09/10/2020 11:15 AM  
 Potassium 3.8 09/10/2020 11:15 AM  
 Chloride 102 09/10/2020 11:15 AM  
 CO2 25 09/10/2020 11:15 AM  
 BUN 13 09/10/2020 11:15 AM  
 Creatinine 1.15 09/10/2020 11:15 AM  
 Calcium 9.7 09/10/2020 11:15 AM  
  
Lab Results Component Value Date/Time Alk. phosphatase 79 09/10/2020 11:15 AM  
 Protein, total 7.2 09/10/2020 11:15 AM  
 Albumin 3.4 (L) 09/10/2020 11:15 AM  
 Globulin 3.8 09/10/2020 11:15 AM  
 
Lab Results Component Value Date/Time INR 1.0 11/25/2015 10:22 AM  
 Prothrombin time 10.6 11/25/2015 10:22 AM  
  
Lab Results Component Value Date/Time Iron 52 10/07/2019 12:53 PM  
 TIBC 235 (L) 10/07/2019 12:53 PM  
 Iron % saturation 22 10/07/2019 12:53 PM  
 Ferritin 880 (H) 10/07/2019 12:53 PM  
  
Reviewed today's labs and most recent imaging. CONTROLLED SUBSTANCES SAFETY ASSESSMENT (IF ON CONTROLLED SUBSTANCES):  
 
Reviewed opioid safety handout:  [x] Yes   [] No 
24 hour opioid dose >150mg morphine equivalent/day:  [] Yes   [] No 
Benzodiazepines:  [] Yes   [] No 
Sleep apnea:  [] Yes   [] No 
Urine Toxicology Testing within last 6 months:  [] Yes   [] No 
History of or new aberrant medication taking behaviors:  [] Yes   [] No 
Has Narcan been prescribed [] Yes   [] No 
 
   
 
Total time: 70 minutes Counseling / coordination time: 60 minutes 
> 50% counseling / coordination?:  
 
Consent: 
He and/or health care decision maker is aware that that he may receive a bill for this telehealth service, depending on his insurance coverage, and has provided verbal consent to proceed: Yes CPT Codes 84004-15428 for Established Patients may apply to this Telehealth VisitTime-based coding, delete if not needed: I spent at least 40 minutes with this established patient, and >50% of the time was spent counseling and/or coordinating care regarding Symptom review, medication reconciliation coordination of care Pursuant to the emergency declaration under the [address] [place] [address], [facility] waiver authority and the [employer] Act, this Virtual  Visit was conducted, with patient's consent, to reduce the patient's risk of exposure to COVID-19 and provide continuity of care for an established patient. Services were provided through a video synchronous discussion virtually to substitute for in-person clinic visit.

## 2020-09-17 NOTE — PROGRESS NOTES
Palliative Medicine Office Visit Palliative Medicine Nurse Check In 
[phone]) Patient Name: Chapin Dwyer YOB: 1960 Date of Office Visit: 9/17/2020 Patient states: \"  \" 
 
From Check In Sheet (scanned in Media): 
Has a medical provider talked with you about cardiopulmonary resuscitation (CPR)? [x] Yes   [] No   [] Unable to obtain Nurse reminder to complete or update ACP FlowSheet: 
 
Is ACP on the Problem List?    [x] Yes    [] No 
IF ACP Document is ON FILE; Nurse to place ACP on Problem List  
 
Is there an ACP Note in Chart Review/Note? [x] Yes    [] No  
If NO: ALERT PROVIDER Primary Decision Maker (Active): Toni Quintanilla - Daughter - [phone] Secondary Decision Maker: José Diller - Daughter - [phone] Advance Care Planning 9/17/2020 Patient's Healthcare Decision Maker is: Named in scanned ACP document Primary Decision Maker Name -  
Primary Decision Maker Phone Number -  
Primary Decision Maker Relationship to Patient - Secondary Decision Maker Name -  
Confirm Advance Directive Yes, on file Patient Would Like to Complete Advance Directive - Is there anything that we should know about you as a person in order to provide you the best care possible? Have you been to the ER, urgent care clinic since your last visit? [] Yes   [] No   [] Unable to obtain Have you been hospitalized since your last visit? [] Yes   [] No   [] Unable to obtain Have you seen or consulted any other health care providers outside of the [address] [place] [address] since your last visit? [] Yes   [x] No   [] Unable to obtain Functional status (describe):  
 
 
 
Last BM: 9/10/2020  accessed (date): 9/17/2020 Bottle review (for opioid pain medication): 
Medication 1:  
Date filled:  
Directions:  
# filled: # left: # pills taking per day: 
Last dose taken: 
 
Medication 2:  
Date filled:  
Directions:  
# filled: # left: # pills taking per day: 
Last dose taken: 
 
Medication 3:  
Date filled:  
Directions:  
# filled: # left: # pills taking per day: 
Last dose taken: 
 
Medication 4:  
Date filled:  
Directions:  
# filled: # left: # pills taking per day: 
Last dose taken:

## 2020-09-17 NOTE — PATIENT INSTRUCTIONS
Dear Jim Gonzalez , It was a pleasure seeing you today in person along with your daughter Rosangela Taylor We will see you again in 4 weeks If labs or imaging tests have been ordered for you today, please call the office  at [phone] 48 hours after completion to obtain the results. Your stated goal:  
-Better pain control 
-Better relationship with your providers thereby enhancing your experience with [facility] This is the plan we talked about: 1. Esophageal spasm /chest pain, back pain 
-You have been struggling with epigastric/chest spasms for a long time now. -You have had multiple ER visits for the same thinking that it is chest pain. Each time, you have been ruled out for cardiac causes of chest pain and you get better with IV Pepcid. 
-You have been taking pantoprazole daily, now add Pepcid 20 mg 2 times a day 
-You are not taking sucralfate on a regular basis. Please restart sucralfate 5 mL 3 times a day. -Please take morphine 15 mg every 4-6 hours as needed for the back pain. This medicine makes you sleepy but it also helps with the pain. Over a period of several days, you will get used to the morphine and not feel so sleepy. 2.  Brain tumor and hospitalization 
-You completed radiation to the brain, you completed dexamethasone. You had an MRI yesterday. 3. Severe chemotherapy induced neuropathy - You are tolerating Gabapentin 600 mg three times a day and this is helping some - We tried Amitrptyline and you reacted very poorly to it. 4.  Constipation 
-Constipation is a common side effect of pain medications as they slow your bowels. -Continue senna 2 tabs two times a day. This is necessary to keep your bowels soft. - Add Miralax every other day as a laxative. - Goal is to have soft bowel movements every day or every other day. - Please call us if you do not have bowel movements for more than 3 days. 5. Insomnia - You are struggling with severe lack of sleep due to stress and anxiety 
-You finally picked up the trazodone from the pharmacy and have been taking it the last few days and this is helping. 
-You take trazodone sparingly as well 6. Anxiety/ depression 
-Please stop taking the Zoloft because you felt it made you more sad 7. ACP 
-You completed an AMD today naming Kelsey Norwood as your primary medical power of . 8.  Your disease and goals of care 
-We reviewed your recent visit with Dr. Simone Marr in detail with your family. Dr. Simone Marr is willing to continue the monthly infusion but does not think that overall your cancer is responding to it. The fact that you progressed with brain tumors is worrisome. However, the cancer in your body is otherwise stable which is what is allowing us to continue the monthly infusions. You were started on gemcitabine plus Abraxane but the most recent infusion was held. You were told you have less than 6 months to live and this is making you very sad. You have a hard time understanding why the cancer is not responding to treatments. We talked about this in detail. Stage IV cancer patients have poor prognosis with less than 1 year to live regardless of treatment options in most cases, in your situation, you have progressed on first-line treatments which leaves you with limited options. You have gotten much weaker, very poor appetite even though you are on Megace and did not tolerate Marinol. You have poor tolerance to most medications that we have to help with your side effects anyway. You are also going through many psychosocial stressors with your girlfriend, etc.  Linda Ho are not ready to discontinue treatment just yet. This is what you have shared with us about Advance Care Planning: 
 
  Primary Decision Maker (Active): Jacike Elam - Daughter - [phone] Secondary Decision Maker: Nicholas Pedrazas - Daughter - [phone] Advance Care Planning 9/17/2020 Patient's Healthcare Decision Maker is: Named in scanned ACP document Primary Decision Maker Name -  
Primary Decision Maker Phone Number -  
Primary Decision Maker Relationship to Patient - Secondary Decision Maker Name -  
Confirm Advance Directive Yes, on file Patient Would Like to Complete Advance Directive - The Palliative Medicine Team is here to support you and your family.   
 
 
Sincerely, 
 
 
Petr Moura MD and the Palliative Medicine Team

## 2020-09-22 NOTE — TELEPHONE ENCOUNTER
[facility] [place] [facility] [place]  [address] [facility], [address] [place], [place] [address] [id number]  W: [phone]  F: [phone]    Medical Nutrition Therapy    Nutrition Referral:    Referral received regarding weight loss. Called and spoke with patient, explained that RD is available to address nutrition throughout the spectrum of care. He is very difficult to hear. He reports he does not want to eat due to pain. Reviewed notes from palliative and patient has not been taking carafate as directed. MD reinforced. RD also reinforced and told him to use the carafate and wait about 15-30 mins and try to eat. The carafate may help reduce the pain when eating. He states he will try that. He has lots of ensure at home, but just hasn't been drinking it. Reviewed:  · Eating small amounts several times a day verses large meals. · Add protein and calories to favorite foods  (Ex. adding non-fat dry milk powder, butters, oils, whole milk, etc)  · Keep nutrient-dense foods close at hand and snack frequently   · Discussed consumption of nutrition supplements per day - increase as needed. · Ideas to make more calorically dense without increasing volume. · Think of food as medicine, eating based on time/schedule verses hunger cues. Wt Readings from Last 5 Encounters:   09/10/20 132 lb 4.8 oz (60 kg)   09/10/20 132 lb (59.9 kg)   08/27/20 137 lb 8 oz (62.4 kg)   08/18/20 139 lb (63 kg)   08/13/20 139 lb (63 kg)        Provided contact information and days available at oncology office.     Signed By: Kateryna Carnes, [address], [address] [facility] [address]

## 2020-09-23 NOTE — TELEPHONE ENCOUNTER
Triage for Controlled Substance Refill Request    Pain Diagnosis: _Adenocarcinoma of unknown primary ([facility] [address] (C80.1)     Last Outpatient Visit: _9/17/2020    Next Outpatient Visit: _10/21/2020    Reason for refill needed outside of office visit?   -Appointment not scheduled prior to need for scheduled refill      Pharmacy: [id number] [place] [address]     Medication:Morphine IR 15 mg   Dose and directions:1 tab by mouth every 8 hours   Number dispensed:90  Date filled ( or Pharmacy):8/19/2020  # left:       reviewed: _yes    Date of Urine Drug Screen:  _n/a    Opioid Safety Handout given:  _yes     Appropriate for refill:  _yes     Action:  _ please refill

## 2020-09-24 NOTE — TELEPHONE ENCOUNTER
Ismael Britton returned call - advised per MD burroughs to take MS 15 mg 1.5 tabs before bed to see if pain is better controlled. Advised to keep pain log - monitor prior to giving pain med and record, and then assess 1 hour later and record pain level - also make note if patient is able to sleep pass 4 am, with the increase night time dose. Requested that daughter call this nurse tomorrow with update. Per min she is only with her dad Sunday thru Thursday, and sister, Nava Castaneda is with him on Friday and Saturdays. Advised to share treatment plan with sister, and use a note book to record pain log, so they can both keep account of his pain level. Advised patient will need to be on a long acting medication to give better pain control, but so far patient has declined long acting medication offered. Per Ismael Britton patient has Morphine ER in the home. Advised to continue MS IR for now and we will re-evaluate tomorrow and again next with and then MD can make another decision based on pain level. Ismael Britton verbalized understanding. Daughter confirmed brain MRI was done last Wednesday at [facility].

## 2020-09-24 NOTE — TELEPHONE ENCOUNTER
Outgoing call placed to patient and his daughter, Sandoval Benjaminer - she reported that he awakens every morning around 4 am in pain in his back. She reported that he gets morphine 15 mg and Trazodone before bed and he frequently wakes up around 4 in the morning in excruciating pain. Patient takes Morphine 15 mg three times daily, Gabapentin 300 mg 2 tabs three times daily, and Trazodone 50 mg nightly. She has tried applying pain patch, Aspercreme rub, heat and tylenol in between with not much relief. Per daughter she thinks the Morphine 15 mg is effective, but think taking 2 tabs before bed may help him sleep through the night. Advised I will discussed options with MD and call back with recommendations.       Lisa Sharpe, RN  Palliative Medicine

## 2020-09-24 NOTE — TELEPHONE ENCOUNTER
----- Message from Clarissa Baumgarten, LPN sent at 3/40/6235  8:14 AM EDT -----  Regarding: FW: Visit Follow-Up Question  Contact: [phone]    ----- Message -----  From: Philipp Merrill  Sent: 9/23/2020  10:05 PM EDT  To: Pm Nurses  Subject: Visit Follow-Up Question                         My dad is complaining about severe back pain. He can't sleep and it's extreme. Is there anything we can do?

## 2020-09-24 NOTE — TELEPHONE ENCOUNTER
Returned call to patient/daughter - per Mr. Hakeem Luna he already had head MRI done @ [facility] - records requested    Requested for Lesia Guevara to return call to discuss MD recommendations.

## 2020-10-15 NOTE — TELEPHONE ENCOUNTER
Called and spoke to daughter Talya Tang who states that patient is now in hospice. [facility] for [facility] with [facility].   Appt for 10/21 cancelled

## 2020-10-30 NOTE — PROGRESS NOTES
[address] [facility], [address], [address] 
[phone] Hematology / Oncology Established Visit Hematology Oncology Treatment History:  
 
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  
6/4/19 4R LN biopsy: rare malignant cells admixed with abundant blood clot 6/4/19 4L LN FNA and core biopsy: Adenocarcinoma. 6/4/19 2R LN FNA and core biopsy: Adenocarcinoma. Comment: IHC stains negative for GATA3, AR, ER, p40. Immunohistochemistry shows that the tumor cells express CK7 with focal expression of CDX2. Tumor cells lack expression of CK20, TTF-1 and Napsin A. These findings are not entirely diagnostic and could be seen in either a primary pulmonary malignancy or a primary upper gastrointestinal tract malignancy. Other sites are also not excluded. Clinical and radiographic correlation is recommended. 6/25/19 2R LN, mediastinum: Metastatic adenocarcinoma (see Comment). Comment - The tumor appears poorly differentiated with areas of necrosis. Immunohistochemical staining reveals positive staining for cytokeratin 7 (strong, diffuse), CDX-2 (focal, weak to moderate) and CEA (focal, moderate to strong). The tumor cells are negative for cytokeratin 20, cytokeratin 5/6, p40, p63, TTF-1, napsin-A, PLAP, HCG, AFP and c-KIT (). The findings are not entirely specific with regard to primary site but would be consistent with upper GI/pancreaticobiliary tract. A pulmonary primary is less likely, but still in the differential.  
-PDL1 30%, [employer] [facility] identified high tumor mutational burden - which is predictive of higher response to immunotherapy agents. Prior Treatment:  
 Completed 6 cycles of Carbo-Taxol on 12/02/2019  Keytruda 200mg every 21 days, started 12/20/19, stopped 02/2020 - s/p 3 cycles Current Treatment:  
 Gemcitabine/Abraxane - s/p 5 Cycles History of Present Illness:  
Mr. Lucio Berry is a 63 y.o. male with COPD, remote h/o colon cancer comes in for evaluation of mediastinal lymphadenopathy. Pt had recently p/w shortness of breath, dyspnea on exertion, and was found on chest CT to have R mediastinal lymphadenopathy, which also were PET avid. Case was discussed at Thoracic Tumor Board with thought that this could be pancreatic, urothelial, or germ cell origin. More tissue is recommended. Pt had EGD 3 yrs ago and was told he had GERD. Patient has h/o colon cancer in 2002. He states a cancerous polyp was found and then he underwent colectomy in 2002. This was done by Dr. Kelli Wilkinson at [facility]. Patient had colonoscopies regularly afterwards, last one was approx 2015. Last EGD was in 2018. As part of search for primary lesion, patient underwent EGD, colonoscopy by Dr. Hayes Holter, notable for diffusely enlarged gastric folds, but biopsies negative. Pt underwent cytoscopy on 7/17/19 with findings negative for malignancy. He received 6 cycles of carbo-taxol and experienced many side effects from the chemotherapy including severe sensory neuropathy in feet. Last CT showed progression of disease in the right adrenal glans. He received Keytruda as second line therapy. Recent scans show disease progression. He is receiving palliative chemotherapy with gem/abraxane. He was noted to have brain metastasis at [address] [place] [address]. He underwent craniotomy followed by SBRT. More recent CT shows PE and progression in the mediastinal nodes. Past Medical History:  
Diagnosis Date  Abnormal nuclear stress test 12/7/2015  Cardiomyopathy ([address] 2010 EF 45%  Chronic obstructive pulmonary disease ([address]  Chronic systolic heart failure ([address]  GERD (gastroesophageal reflux disease)  HTN (hypertension)  Lung cancer ([address] 2019  PAC (premature atrial contraction)  Tobacco use disorder Past Surgical History:  
Procedure Laterality Date  COLONOSCOPY N/A 7/10/2019 COLONOSCOPY AND ESOPHAGOGASTRODUODENOSCOPY (EGD) [facility]  HX SPLENECTOMY  IR INSERT TUNL CVC W PORT OVER 5 YEARS  2019 Social History Tobacco Use  Smoking status: Former Smoker Packs/day: 0.50 Types: Cigarettes Last attempt to quit: 2016 Years since quittin.5  Smokeless tobacco: Never Used Substance Use Topics  Alcohol use: Not Currently Comment: rarely Family History Problem Relation Age of Onset  Stroke Mother  Coronary Artery Disease Sister  Heart Disease Paternal Grandfather Current Outpatient Medications Medication Sig  
 enoxaparin (LOVENOX) 60 mg/0.6 mL injection 60 mg by SubCUTAneous route.  sertraline (ZOLOFT) 50 mg tablet TAKE 1 TABLET BY MOUTH DAILY  gabapentin (NEURONTIN) 300 mg capsule Take 2 Caps by mouth three (3) times daily. Max Daily Amount: 1,800 mg.  
 naloxone (Narcan) 4 mg/actuation nasal spray USE 1 SPRAY NASALLY THEN DISCARD. REPEAT WITH NEW SPRAY EVERY 2 MINUTES AS NEEDED FOR OPIOID OVERDOSE SYMPTOMS, ALTERNATING NOSTRILS  pantoprazole (PROTONIX) 40 mg tablet TAKE 1 TABLET BY MOUTH DAILY  butalbital-acetaminophen-caffeine (FIORICET, ESGIC) -40 mg per tablet Take 1 Tab by mouth three (3) times daily.  albuterol (PROVENTIL HFA, VENTOLIN HFA, PROAIR HFA) 90 mcg/actuation inhaler Take 2 Puffs by inhalation every four (4) hours as needed for Wheezing.  tolterodine ER (DETROL-LA) 2 mg ER capsule Take 1 Cap by mouth daily.  senna-docusate (PERICOLACE) 8.6-50 mg per tablet Take 1 Tab by mouth two (2) times a day.  umeclidinium brm/vilanterol tr (ANORO ELLIPTA IN) Take 1 Puff by inhalation daily.  metoprolol succinate (TOPROL-XL) 25 mg XL tablet Take 1 Tab by mouth nightly.  lidocaine-prilocaine (EMLA) topical cream APPLY QUARTER SIZE AMOUNT TO PORT PRIOR TO CHEMOTHERAPY  potassium chloride (K-DUR, KLOR-CON) 20 mEq tablet Take 1 Tab by mouth daily.  ondansetron hcl (ZOFRAN) 4 mg tablet Take 2 Tabs by mouth every eight (8) hours as needed for Nausea.  prochlorperazine (COMPAZINE) 10 mg tablet Take 1 Tab by mouth every six (6) hours as needed for Nausea.  lisinopril (PRINIVIL, ZESTRIL) 5 mg tablet TAKE 1 TABLET BY MOUTH DAILY  megestroL (MEGACE) 40 mg tablet TAKE 1 TABLET BY MOUTH TWICE DAILY  lidocaine (LIDODERM) 5 % UNWRAP AND APPLY 1 PATCH TO THE AFFECTED AREA ON THE SKIN DAILY FOR 12 HOURS ON, THEN REMOVE FOR 12 HOURS  traZODone (DESYREL) 50 mg tablet TAKE 1 TABLET BY MOUTH EVERY NIGHT  sucralfate (CARAFATE) 100 mg/mL suspension SHAKE LIQUID AND TAKE 5 ML BY MOUTH FOUR TIMES DAILY No current facility-administered medications for this visit. No Known Allergies Review of Systems: A complete review of systems was obtained, negative except as described above. Physical Exam:  
 
Vitals:  
 09/10/20 1100 BP: 100/68 Pulse: 79 Resp: 18 Temp: 97.6 °F (36.4 °C) SpO2: 99% Weight: 132 lb (59.9 kg) Height: 5' 11\" (1.803 m)  
 
 
  
  
ECOG PS: 1 General:  thin, no acute distress,ambulating with a cane. Eyes: PERRLA, EOMI, anicteric sclerae HENT: Atraumatic, OP clear, TMs intact without erythema Neck: Supple Lymphatic: No cervical, supraclavicular, axillary or inguinal adenopathy Respiratory: CTAB, normal respiratory effort CV: Normal rate, regular rhythm, no murmurs, no peripheral edema, port in place. GI: Soft, nontender, nondistended, no masses, no hepatomegaly, no splenomegaly MS: Normal gait and station. Digits without clubbing or cyanosis. Skin: No rashes, ecchymoses, or petechiae. Normal temperature, turgor, and texture. Neuro/Psych: Alert, oriented. 5/5 strength in all 4 extremities. Appropriate affect, normal judgment/insight. 
  
 
 
 
Results:  
 
Lab Results Component Value Date/Time WBC 5.5 09/10/2020 11:15 AM  
 HGB 11.1 (L) 09/10/2020 11:15 AM  
 HCT 32.1 (L) 09/10/2020 11:15 AM  
 PLATELET 542 44/06/2516 11:15 AM  
 MCV 87.5 09/10/2020 11:15 AM  
 ABS. NEUTROPHILS 2.8 09/10/2020 11:15 AM  
 
Lab Results Component Value Date/Time Sodium 139 09/10/2020 11:15 AM  
 Potassium 3.8 09/10/2020 11:15 AM  
 Chloride 102 09/10/2020 11:15 AM  
 CO2 25 09/10/2020 11:15 AM  
 Glucose 205 (H) 09/10/2020 11:15 AM  
 BUN 13 09/10/2020 11:15 AM  
 Creatinine 1.15 09/10/2020 11:15 AM  
 GFR est AA >60 09/10/2020 11:15 AM  
 GFR est non-AA >60 09/10/2020 11:15 AM  
 Calcium 9.7 09/10/2020 11:15 AM  
 Creatinine (POC) 0.9 07/17/2020 04:17 PM  
 
Lab Results Component Value Date/Time Bilirubin, total 0.3 09/10/2020 11:15 AM  
 ALT (SGPT) 16 09/10/2020 11:15 AM  
 Alk. phosphatase 79 09/10/2020 11:15 AM  
 Protein, total 7.2 09/10/2020 11:15 AM  
 Albumin 3.4 (L) 09/10/2020 11:15 AM  
 Globulin 3.8 09/10/2020 11:15 AM  
 
Lab Results Component Value Date/Time Iron 52 10/07/2019 12:53 PM  
 TIBC 235 (L) 10/07/2019 12:53 PM  
 Iron % saturation 22 10/07/2019 12:53 PM  
 Ferritin 880 (H) 10/07/2019 12:53 PM  
 
 
No results found for: B12LT, FOL, RBCF Lab Results Component Value Date/Time TSH 0.78 01/30/2020 09:50 AM  
 
No results found for: HAMAT, HAAB, HABT, HAAT, HBSAG, HBSB, HBSAT, HBABN, HBCM, HBCAB, HBCAT, XBCABS, HBEAB, HBEAG, XHEPCS, [id number], [address] [facility], [facility], [address] [facility], [id number], [id number], [address] [place] [address], [id number], Formerly [facility], [id number], [facility] Imaging: CT Results (most recent): 
Results from [facility] Encounter encounter on 07/25/20 CTA CHEST W OR W WO CONT Narrative History: Lung cancer, left lateral chest pain. CTA of the chest was performed. 96 mL Isovue-370 were injected and scanning was 
performed during the arterial phase of contrast administration. Post processing was performed. 3D reconstructions were performed. CT dose reduction was 
achieved through use of a standardized protocol tailored for this examination 
and automatic exposure control for dose modulation. There are no intraluminal filling defects to suggest pulmonary embolism. The 
heart, pericardium, and great vessels appear unremarkable. The chest wall and 
axilla appear unremarkable. There is stable mediastinal lymphadenopathy. The 
lungs are clear. There is centrilobular emphysema. No bone lesions are 
identified. There are clips in the upper abdomen. Impression IMPRESSION: 
1. No evidence for pulmonary embolism. 2. Stable mediastinal lymphadenopathy. 3. Emphysema. I personally reviewed the images. Stable size of the mediastinal nodes and right adrenal gland. Assessment & Plan:  
Laurita Lowry is a 63 y.o. male with COPD, remote h/o colon cancer comes in for evaluation and management of adenocarcinoma of unknown primary. 1. 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 Involving mediastinal LNs, with no other PET findings. Per Thoracic Tumor Board discussion, this could represent upper GI origin, urothelial origin, germ cell tumor, or lung origin. Search for primary lesion was overall negative: Cystoscopy, EGD, colonoscopy negative for malignancy although EGD abnormal with diffusely enlarged gastric folds. Despite h/o colon cancer in 2002, it would be very odd to see a recurrence in the mediastinal LNs. CEA 26.8. Other tumor markers negative (AFP, hCG, CA 19-9). Unfortunately, this disease is considered incurable, but is treatable. At this time, I recommend chemotherapy treatment using the Carboplatin-Paclitaxel regimen (which covers both lung and GI primaries). We discussed the risks and benefits of chemotherapy with Carboplatin and Paclitaxel.   Potential side effects include but are not limited to: nausea, vomiting, diarrhea, constipation, taste changes, allergic reactions, myelosuppression, infection, fatigue, alopecia, neuropathy, mucositis, renal failure, infertility, and rarely, death. Additionally, chemotherapy leads to radiosensitization which can intensify the side effects of radiation therapy. The patient has consented to beginning chemotherapy and chemo ed packet given. Completed 6 cycles of Carbo-Taxol. CT on 12/16/19 shows a mixed response to treatment no change in mediastinal adenopathy and increased size of right adrenal mass. Receiving Keytruda in 2nd line. CT shows disease progression in the mediastinal nodes and adrenal mass. Based on the gene expression profile revealing the likely primary to be bile duct, I recommended switching systemic therapy to Gemcitabine/Abraxane Receiving palliative chemotherapy Gemcitabine/Abraxane - s/p 5 Cycles He has experienced disease progression in the brain. S/P resection followed by SBRT Most recent CT shows disease progression in the chest.  
At this point he is no longer a candidate for systemic therapy. I recommended Hospice care for end of life. He will think it over and let me know if he would accept Hospice 2. Neoplasm pain: better 
-- Follow up with palliative 
-- oxycontin 10 mg ER every 12 hours -- oxycodone 10 mg g3fsumg 
-- Senna-docusate (pericolace) daily to prevent constipation. 3. H/o Stage I [T1NxMx] sigmoid colon cancer: Found in sigmoid adenoma during a colonoscopy; went on to undergo segmented resection of the sigmoid colon in 2002. Pathology per outside records: 
2/6/2002 sigmoid colon polyp: Well differentiated adenocarcinoma arising in an adenoma, involving the mucosa and invading into the upper half of the submucosal stalk. No vascular space involvement is identified. 2/21/2002 Sigmoidectomy, liver biopsy: 
-Sigmoid colon, segmented resection: No residual adenocarcinoma present. Polypectomy site with granulation tissue and vascular congestion. No LNs recovered. Distal and proximal margins benign. 
-Liver biopsy: Negative for metastatic carcinoma. No significant inflammation or obvious cirrhosis identified. Very minimal steatosis (rare cells with fat globules). -Addendum: Reblocks of adipose tissue; three small benign lymphoid aggregates (< 0.1cm). 4. COPD: Quit smoking in approx 2016. SOB improved after starting inhaler. 5. HTN: Well controlled on Lisinopril and Metoprolol. 6. BPH: On Flomax. 7. 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 Increased numbness in feet, tingling and pain in left hand. Left hand dominant. Increased Gabapentin 300mg BID on 10/16/19.  
-- Continue gabapentin to 300 mg TID, #90 tabs with 2 refills e-scribed on 11/11/19.  
 
8. Anemia from cancer/chronic disease 
 
observation 9. Severe protein calorie malnutrition - better Gaining weight Dietary consult Protein supplementation 10. Insomnia: Tried Ambien which did not work. Started on lorazepam 1 mg QHS which he reports helped him sleep better than the Ambien. Continue to monitor. 11. Anxiety/Depression: Follows up with palliative Signed by: Alix Catherine MD 
                   October 29, 2020 
 
 
 
CC. Nina Mondragon MD 
CC. Caren Whitten MD 
CC.  Gabi Acosta MD

## 2024-01-31 NOTE — PROGRESS NOTES
Palliative Medicine Office Visit Palliative Medicine Nurse Check In 
[phone] ([id number]) Patient Name: Ines Tyson YOB: 1960 Date of Office Visit: 8/19/2020 Patient states: \"  \" 
 
From Check In Sheet (scanned in Media): 
Has a medical provider talked with you about cardiopulmonary resuscitation (CPR)? [x] Yes   [] No   [] Unable to obtain Nurse reminder to complete or update ACP FlowSheet: 
 
Is ACP on the Problem List?    [x] Yes    [] No 
IF ACP Document is ON FILE; Nurse to place ACP on Problem List  
 
Is there an ACP Note in Chart Review/Note? [x] Yes    [] No  
If NO: ALERT PROVIDER Primary Decision Maker (Active): Rui Perez - Daughter - [phone] Secondary Decision Maker: Ceci Yesenia - Daughter - [phone] Advance Care Planning 8/19/2020 Patient's Healthcare Decision Maker is: Named in scanned ACP document Primary Decision Maker Name -  
Primary Decision Maker Phone Number -  
Primary Decision Maker Relationship to Patient - Secondary Decision Maker Name -  
Confirm Advance Directive Yes, on file Patient Would Like to Complete Advance Directive - Is there anything that we should know about you as a person in order to provide you the best care possible? Have you been to the ER, urgent care clinic since your last visit? [x] Yes   [x] No   [] Unable to obtain Have you been hospitalized since your last visit? [] Yes   [x] No   [] Unable to obtain Have you seen or consulted any other health care providers outside of the [address] [place] [address] since your last visit? [] Yes   [x] No   [] Unable to obtain Functional status (describe):  
 
 
 
Last BM: 8/18/2020  accessed (date): 8/19/2020 Bottle review (for opioid pain medication): 
Medication 1:  
Date filled:  
Directions:  
# filled: # left: # pills taking per day: 
Last dose taken: 
 
Medication 2:  
Date filled:  
Directions:  
# filled: # left: # pills taking per day: 
Last dose taken: 
 
Medication 3:  
Date filled:  
Directions:  
# filled: # left: # pills taking per day: 
Last dose taken: 
 
Medication 4:  
Date filled:  
Directions:  
# filled: # left: # pills taking per day: 
Last dose taken: No/Unable to asses
